# Patient Record
Sex: FEMALE | Race: WHITE | NOT HISPANIC OR LATINO | Employment: OTHER | URBAN - NONMETROPOLITAN AREA
[De-identification: names, ages, dates, MRNs, and addresses within clinical notes are randomized per-mention and may not be internally consistent; named-entity substitution may affect disease eponyms.]

---

## 2017-02-01 ENCOUNTER — LAB (OUTPATIENT)
Dept: LAB | Facility: HOSPITAL | Age: 67
End: 2017-02-01
Attending: INTERNAL MEDICINE

## 2017-02-01 DIAGNOSIS — M81.0 OSTEOPOROSIS: ICD-10-CM

## 2017-02-01 DIAGNOSIS — E04.2 NONTOXIC MULTINODULAR GOITER: ICD-10-CM

## 2017-02-01 DIAGNOSIS — E53.8 B12 DEFICIENCY: ICD-10-CM

## 2017-02-01 DIAGNOSIS — I10 ESSENTIAL HYPERTENSION: ICD-10-CM

## 2017-02-01 LAB
25(OH)D3 SERPL-MCNC: 46.4 NG/ML (ref 30–100)
ALBUMIN SERPL-MCNC: 4.5 G/DL (ref 3.4–4.8)
ALBUMIN/GLOB SERPL: 2 G/DL (ref 1.1–1.8)
ALP SERPL-CCNC: 108 U/L (ref 38–126)
ALT SERPL W P-5'-P-CCNC: 136 U/L (ref 9–52)
ANION GAP SERPL CALCULATED.3IONS-SCNC: 13 MMOL/L (ref 5–15)
AST SERPL-CCNC: 102 U/L (ref 14–36)
BASOPHILS # BLD AUTO: 0.06 10*3/MM3 (ref 0–0.2)
BASOPHILS NFR BLD AUTO: 0.9 % (ref 0–2)
BILIRUB SERPL-MCNC: 0.4 MG/DL (ref 0.2–1.3)
BUN BLD-MCNC: 15 MG/DL (ref 7–21)
BUN/CREAT SERPL: 13.5 (ref 7–25)
CALCIUM SPEC-SCNC: 8.9 MG/DL (ref 8.4–10.2)
CHLORIDE SERPL-SCNC: 98 MMOL/L (ref 95–110)
CO2 SERPL-SCNC: 28 MMOL/L (ref 22–31)
CREAT BLD-MCNC: 1.11 MG/DL (ref 0.5–1)
DEPRECATED RDW RBC AUTO: 41.2 FL (ref 36.4–46.3)
EOSINOPHIL # BLD AUTO: 0.14 10*3/MM3 (ref 0–0.7)
EOSINOPHIL NFR BLD AUTO: 2 % (ref 0–7)
ERYTHROCYTE [DISTWIDTH] IN BLOOD BY AUTOMATED COUNT: 12.4 % (ref 11.5–14.5)
GFR SERPL CREATININE-BSD FRML MDRD: 49 ML/MIN/1.73 (ref 45–104)
GLOBULIN UR ELPH-MCNC: 2.2 GM/DL (ref 2.3–3.5)
GLUCOSE BLD-MCNC: 105 MG/DL (ref 60–100)
HCT VFR BLD AUTO: 41.1 % (ref 35–45)
HGB BLD-MCNC: 13.7 G/DL (ref 12–15.5)
IMM GRANULOCYTES # BLD: 0.01 10*3/MM3 (ref 0–0.02)
IMM GRANULOCYTES NFR BLD: 0.1 % (ref 0–0.5)
LYMPHOCYTES # BLD AUTO: 2.21 10*3/MM3 (ref 0.6–4.2)
LYMPHOCYTES NFR BLD AUTO: 31.6 % (ref 10–50)
MCH RBC QN AUTO: 30.2 PG (ref 26.5–34)
MCHC RBC AUTO-ENTMCNC: 33.3 G/DL (ref 31.4–36)
MCV RBC AUTO: 90.5 FL (ref 80–98)
MONOCYTES # BLD AUTO: 0.6 10*3/MM3 (ref 0–0.9)
MONOCYTES NFR BLD AUTO: 8.6 % (ref 0–12)
NEUTROPHILS # BLD AUTO: 3.97 10*3/MM3 (ref 2–8.6)
NEUTROPHILS NFR BLD AUTO: 56.8 % (ref 37–80)
PLATELET # BLD AUTO: 196 10*3/MM3 (ref 150–450)
PMV BLD AUTO: 9.8 FL (ref 8–12)
POTASSIUM BLD-SCNC: 4.6 MMOL/L (ref 3.5–5.1)
PROT SERPL-MCNC: 6.7 G/DL (ref 6.3–8.6)
RBC # BLD AUTO: 4.54 10*6/MM3 (ref 3.77–5.16)
SODIUM BLD-SCNC: 139 MMOL/L (ref 137–145)
TSH SERPL DL<=0.05 MIU/L-ACNC: 1.84 MIU/ML (ref 0.46–4.68)
VIT B12 BLD-MCNC: 937 PG/ML (ref 239–931)
WBC NRBC COR # BLD: 6.99 10*3/MM3 (ref 3.2–9.8)

## 2017-02-01 PROCEDURE — 84443 ASSAY THYROID STIM HORMONE: CPT

## 2017-02-01 PROCEDURE — 82306 VITAMIN D 25 HYDROXY: CPT

## 2017-02-01 PROCEDURE — 85025 COMPLETE CBC W/AUTO DIFF WBC: CPT

## 2017-02-01 PROCEDURE — 82607 VITAMIN B-12: CPT

## 2017-02-01 PROCEDURE — 80053 COMPREHEN METABOLIC PANEL: CPT

## 2017-02-01 PROCEDURE — 36415 COLL VENOUS BLD VENIPUNCTURE: CPT

## 2017-04-06 ENCOUNTER — LAB (OUTPATIENT)
Dept: LAB | Facility: HOSPITAL | Age: 67
End: 2017-04-06
Attending: INTERNAL MEDICINE

## 2017-04-06 DIAGNOSIS — E53.8 B12 DEFICIENCY: ICD-10-CM

## 2017-04-06 DIAGNOSIS — E04.2 NONTOXIC MULTINODULAR GOITER: ICD-10-CM

## 2017-04-06 DIAGNOSIS — I10 ESSENTIAL HYPERTENSION: ICD-10-CM

## 2017-04-06 DIAGNOSIS — M81.0 OSTEOPOROSIS: ICD-10-CM

## 2017-04-06 LAB
25(OH)D3 SERPL-MCNC: 43.5 NG/ML (ref 30–100)
ALBUMIN SERPL-MCNC: 4.6 G/DL (ref 3.4–4.8)
ALBUMIN/GLOB SERPL: 1.8 G/DL (ref 1.1–1.8)
ALP SERPL-CCNC: 117 U/L (ref 38–126)
ALT SERPL W P-5'-P-CCNC: 38 U/L (ref 9–52)
ANION GAP SERPL CALCULATED.3IONS-SCNC: 13 MMOL/L (ref 5–15)
AST SERPL-CCNC: 27 U/L (ref 14–36)
BASOPHILS # BLD AUTO: 0.06 10*3/MM3 (ref 0–0.2)
BASOPHILS NFR BLD AUTO: 1 % (ref 0–2)
BILIRUB SERPL-MCNC: 0.5 MG/DL (ref 0.2–1.3)
BUN BLD-MCNC: 21 MG/DL (ref 7–21)
BUN/CREAT SERPL: 17.2 (ref 7–25)
CALCIUM SPEC-SCNC: 9.1 MG/DL (ref 8.4–10.2)
CHLORIDE SERPL-SCNC: 101 MMOL/L (ref 95–110)
CO2 SERPL-SCNC: 24 MMOL/L (ref 22–31)
CREAT BLD-MCNC: 1.22 MG/DL (ref 0.5–1)
DEPRECATED RDW RBC AUTO: 39.9 FL (ref 36.4–46.3)
EOSINOPHIL # BLD AUTO: 0.15 10*3/MM3 (ref 0–0.7)
EOSINOPHIL NFR BLD AUTO: 2.5 % (ref 0–7)
ERYTHROCYTE [DISTWIDTH] IN BLOOD BY AUTOMATED COUNT: 12.4 % (ref 11.5–14.5)
GFR SERPL CREATININE-BSD FRML MDRD: 44 ML/MIN/1.73 (ref 45–104)
GLOBULIN UR ELPH-MCNC: 2.5 GM/DL (ref 2.3–3.5)
GLUCOSE BLD-MCNC: 114 MG/DL (ref 60–100)
HCT VFR BLD AUTO: 38.2 % (ref 35–45)
HGB BLD-MCNC: 12.9 G/DL (ref 12–15.5)
IMM GRANULOCYTES # BLD: 0.01 10*3/MM3 (ref 0–0.02)
IMM GRANULOCYTES NFR BLD: 0.2 % (ref 0–0.5)
LYMPHOCYTES # BLD AUTO: 2.2 10*3/MM3 (ref 0.6–4.2)
LYMPHOCYTES NFR BLD AUTO: 36.4 % (ref 10–50)
MCH RBC QN AUTO: 29.9 PG (ref 26.5–34)
MCHC RBC AUTO-ENTMCNC: 33.8 G/DL (ref 31.4–36)
MCV RBC AUTO: 88.6 FL (ref 80–98)
MONOCYTES # BLD AUTO: 0.5 10*3/MM3 (ref 0–0.9)
MONOCYTES NFR BLD AUTO: 8.3 % (ref 0–12)
NEUTROPHILS # BLD AUTO: 3.12 10*3/MM3 (ref 2–8.6)
NEUTROPHILS NFR BLD AUTO: 51.6 % (ref 37–80)
PLATELET # BLD AUTO: 184 10*3/MM3 (ref 150–450)
PMV BLD AUTO: 9.6 FL (ref 8–12)
POTASSIUM BLD-SCNC: 4 MMOL/L (ref 3.5–5.1)
PROT SERPL-MCNC: 7.1 G/DL (ref 6.3–8.6)
RBC # BLD AUTO: 4.31 10*6/MM3 (ref 3.77–5.16)
SODIUM BLD-SCNC: 138 MMOL/L (ref 137–145)
TSH SERPL DL<=0.05 MIU/L-ACNC: 1.32 MIU/ML (ref 0.46–4.68)
VIT B12 BLD-MCNC: 628 PG/ML (ref 239–931)
WBC NRBC COR # BLD: 6.04 10*3/MM3 (ref 3.2–9.8)

## 2017-04-06 PROCEDURE — 84443 ASSAY THYROID STIM HORMONE: CPT

## 2017-04-06 PROCEDURE — 80053 COMPREHEN METABOLIC PANEL: CPT

## 2017-04-06 PROCEDURE — 82306 VITAMIN D 25 HYDROXY: CPT

## 2017-04-06 PROCEDURE — 36415 COLL VENOUS BLD VENIPUNCTURE: CPT

## 2017-04-06 PROCEDURE — 85025 COMPLETE CBC W/AUTO DIFF WBC: CPT

## 2017-04-06 PROCEDURE — 82607 VITAMIN B-12: CPT

## 2017-06-20 ENCOUNTER — LAB (OUTPATIENT)
Dept: ONCOLOGY | Facility: HOSPITAL | Age: 67
End: 2017-06-20

## 2017-06-20 ENCOUNTER — CONSULT (OUTPATIENT)
Dept: ONCOLOGY | Facility: CLINIC | Age: 67
End: 2017-06-20

## 2017-06-20 VITALS
RESPIRATION RATE: 16 BRPM | HEIGHT: 70 IN | BODY MASS INDEX: 26.07 KG/M2 | SYSTOLIC BLOOD PRESSURE: 132 MMHG | TEMPERATURE: 97.9 F | HEART RATE: 77 BPM | WEIGHT: 182.1 LBS | DIASTOLIC BLOOD PRESSURE: 69 MMHG

## 2017-06-20 DIAGNOSIS — C7A.090 ATYPICAL CARCINOID LUNG TUMOR (HCC): ICD-10-CM

## 2017-06-20 DIAGNOSIS — R79.89 ELEVATED FERRITIN LEVEL: Primary | ICD-10-CM

## 2017-06-20 DIAGNOSIS — R79.89 ELEVATED FERRITIN LEVEL: ICD-10-CM

## 2017-06-20 LAB
ALBUMIN SERPL-MCNC: 4.6 G/DL (ref 3.4–4.8)
ALBUMIN/GLOB SERPL: 1.6 G/DL (ref 1.1–1.8)
ALP SERPL-CCNC: 119 U/L (ref 38–126)
ALT SERPL W P-5'-P-CCNC: 39 U/L (ref 9–52)
ANION GAP SERPL CALCULATED.3IONS-SCNC: 13 MMOL/L (ref 5–15)
AST SERPL-CCNC: 29 U/L (ref 14–36)
BASOPHILS # BLD AUTO: 0.06 10*3/MM3 (ref 0–0.2)
BASOPHILS NFR BLD AUTO: 0.8 % (ref 0–2)
BILIRUB SERPL-MCNC: 0.3 MG/DL (ref 0.2–1.3)
BUN BLD-MCNC: 27 MG/DL (ref 7–21)
BUN/CREAT SERPL: 21.8 (ref 7–25)
CALCIUM SPEC-SCNC: 9.3 MG/DL (ref 8.4–10.2)
CHLORIDE SERPL-SCNC: 98 MMOL/L (ref 95–110)
CO2 SERPL-SCNC: 26 MMOL/L (ref 22–31)
CREAT BLD-MCNC: 1.24 MG/DL (ref 0.5–1)
DEPRECATED RDW RBC AUTO: 41.4 FL (ref 36.4–46.3)
EOSINOPHIL # BLD AUTO: 0.15 10*3/MM3 (ref 0–0.7)
EOSINOPHIL NFR BLD AUTO: 2 % (ref 0–7)
ERYTHROCYTE [DISTWIDTH] IN BLOOD BY AUTOMATED COUNT: 12.8 % (ref 11.5–14.5)
FERRITIN SERPL-MCNC: 454 NG/ML (ref 11.1–264)
FOLATE SERPL-MCNC: 9.41 NG/ML (ref 2.76–21)
GFR SERPL CREATININE-BSD FRML MDRD: 43 ML/MIN/1.73 (ref 60–104)
GLOBULIN UR ELPH-MCNC: 2.8 GM/DL (ref 2.3–3.5)
GLUCOSE BLD-MCNC: 85 MG/DL (ref 60–100)
HCT VFR BLD AUTO: 41.7 % (ref 35–45)
HGB BLD-MCNC: 14 G/DL (ref 12–15.5)
IMM GRANULOCYTES # BLD: 0.02 10*3/MM3 (ref 0–0.02)
IMM GRANULOCYTES NFR BLD: 0.3 % (ref 0–0.5)
IRON 24H UR-MRATE: 50 MCG/DL (ref 37–170)
IRON SATN MFR SERPL: 18 % (ref 15–50)
LYMPHOCYTES # BLD AUTO: 2.16 10*3/MM3 (ref 0.6–4.2)
LYMPHOCYTES NFR BLD AUTO: 29.3 % (ref 10–50)
MCH RBC QN AUTO: 29.7 PG (ref 26.5–34)
MCHC RBC AUTO-ENTMCNC: 33.6 G/DL (ref 31.4–36)
MCV RBC AUTO: 88.5 FL (ref 80–98)
MONOCYTES # BLD AUTO: 0.63 10*3/MM3 (ref 0–0.9)
MONOCYTES NFR BLD AUTO: 8.5 % (ref 0–12)
NEUTROPHILS # BLD AUTO: 4.36 10*3/MM3 (ref 2–8.6)
NEUTROPHILS NFR BLD AUTO: 59.1 % (ref 37–80)
PLATELET # BLD AUTO: 257 10*3/MM3 (ref 150–450)
PMV BLD AUTO: 9.6 FL (ref 8–12)
POTASSIUM BLD-SCNC: 4.1 MMOL/L (ref 3.5–5.1)
PROT SERPL-MCNC: 7.4 G/DL (ref 6.3–8.6)
RBC # BLD AUTO: 4.71 10*6/MM3 (ref 3.77–5.16)
SODIUM BLD-SCNC: 137 MMOL/L (ref 137–145)
TIBC SERPL-MCNC: 278 MCG/DL (ref 265–497)
VIT B12 BLD-MCNC: 600 PG/ML (ref 239–931)
WBC NRBC COR # BLD: 7.38 10*3/MM3 (ref 3.2–9.8)

## 2017-06-20 PROCEDURE — G0463 HOSPITAL OUTPT CLINIC VISIT: HCPCS | Performed by: INTERNAL MEDICINE

## 2017-06-20 PROCEDURE — 99204 OFFICE O/P NEW MOD 45 MIN: CPT | Performed by: INTERNAL MEDICINE

## 2017-06-20 PROCEDURE — 82746 ASSAY OF FOLIC ACID SERUM: CPT | Performed by: INTERNAL MEDICINE

## 2017-06-20 PROCEDURE — 85025 COMPLETE CBC W/AUTO DIFF WBC: CPT | Performed by: INTERNAL MEDICINE

## 2017-06-20 PROCEDURE — 83540 ASSAY OF IRON: CPT | Performed by: INTERNAL MEDICINE

## 2017-06-20 PROCEDURE — 82607 VITAMIN B-12: CPT | Performed by: INTERNAL MEDICINE

## 2017-06-20 PROCEDURE — 80053 COMPREHEN METABOLIC PANEL: CPT | Performed by: INTERNAL MEDICINE

## 2017-06-20 PROCEDURE — 83550 IRON BINDING TEST: CPT | Performed by: INTERNAL MEDICINE

## 2017-06-20 PROCEDURE — 82728 ASSAY OF FERRITIN: CPT | Performed by: INTERNAL MEDICINE

## 2017-06-20 NOTE — PROGRESS NOTES
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  Patient was found to have elevated ferritin few months back with a level of 474.  Subsequently patient had a repeat blood work done in May 2017 by Dr. Shah Which showed a ferritin still elevated at 499.  Patient is being referred to [facility] for further evaluation and recommendation regarding her elevated ferritin level.  Patient denies any family history of suggestive of hemochromatosis.  Denies any blood in the stool or urine.  Denies any dizziness or tingling numbness affecting extremities.  Denies any symptoms suggestive of erythromelalgia.      PAST MEDICAL HISTORY:    Past Medical History:   Diagnosis Date   • Backache 02/17/2016    Low back pain, in sacral region      • Benign hypertension 02/17/2016   • Cortical senile cataract 02/17/2016   • Essential (primary) hypertension 04/20/2016   • GERD (gastroesophageal reflux disease) 02/17/2016   • Hallux valgus     Deformity, w/inflammation      • Iron deficiency anemia    • Lung nodule, solitary 03/08/2012    Solitary nodule of lung - RIGHT upper lobe 15mm, PET positive      • Malignant carcinoid tumor of lung 02/17/2016    Atypical carcinoid tumor of the LEFT lung treated in March 2012     • Mitral valve prolapse 02/17/2016   • Osteoporosis 04/20/2016   • Primary fibromyalgia syndrome 02/17/2016   • Rosacea 02/17/2016   • Seborrheic keratosis     History of, upper and lower back      • 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 Enodscopic vein harvest on the left. Left lower extremity arteriogram.)   • CERVICAL CONIZATION      CONIZATION OF CERVIX 23116 (Excisional laser cone biopsy and vaporization of cervix.)   • CHOLECYSTECTOMY  09/07/2005    Cholecystectomy, laparoscopic (With intraoperative cholangiogram.)   • EXCISION LESION  11/11/1998    REMOVE LESION BACK OR FLANK 46307 (Excision times two.)   • FOOT SURGERY  01/20/2009    Foot/toes surgery procedure (Soft tissue mass, left foot. Excision of)   • HERNIA REPAIR      Past history of umbilical herni repair.   • OTHER SURGICAL HISTORY  12/04/2012    Anesth, elbow area surgery (Manipulation of left elbow.)   • OTHER SURGICAL HISTORY  03/05/2012    Anesth, elbow area surgery (Excision of plate and screws from olecranon bursa. Retained plate and screws, olecranon bursitis of previous fracture left elbow.)   • OTHER SURGICAL HISTORY  10/16/2012    Treat elbow fracture (Open reduction and internal fixation.)   • THORACOSCOPY  03/14/2012    Thoracoscopy, surgical (Bronchoscopy,LVATS (wedge resect MARYBETH), LULobectomy Thoracic lymphadenectomy)   • THROAT SURGERY  07/25/2012    Throat surgery procedure (Thyroplasty type I (vocal cord medialozation & larynooplasty) Left vocal cord paralysis. Dysphoria. [facility] by Dr Tk Heart)   • THYROID SURGERY  03/22/2016    Thyroid surgery (Right thyroid lobectomy and isthmusectomy.)   • TONSILLECTOMY  02/03/1956    Chronic tonsillitis   • VEIN LIGATION  09/28/2000    PHLEB VEINS - EXTREM (20+) 96926 (High ligation of ling saphenous  vein, left, plus multiple phlebectomies, left lower extremity.)       ALLERGIES:    Allergies   Allergen Reactions   • Evista [Raloxifene]    • Gabapentin      HA/sedation   • Lyrica [Pregabalin]        SOCIAL HISTORY:   Social History   Substance Use Topics   • Smoking status: Never Smoker   • Smokeless tobacco: Never Used   • Alcohol use Yes      Comment: OCCASIONAL WINE       CURRENT MEDICATIONS:    Current Outpatient Prescriptions   Medication Sig Dispense Refill   • amLODIPine (NORVASC) 5 MG tablet Take 5 mg by mouth daily.     • ASCORBIC ACID PO Take 1 tablet by mouth daily.     • ASPIRIN PO Take 81 mg by mouth Daily. YSP Aspirin oral  (unconfirmed      • Calcium Citrate-Vitamin D (CITRACAL + D PO) Take 1 tablet by mouth 2 (two) times a day.     • cholecalciferol (VITAMIN D3) 1000 UNITS tablet Take 1,000 Units by mouth daily.     • Cyanocobalamin (VITAMIN B-12 PO) Take 1 tablet by mouth 1 (one) time per week.     • cyclobenzaprine (FLEXERIL) 10 MG tablet Take 10 mg by mouth at night as needed for muscle spasms.     • DULoxetine (CYMBALTA) 60 MG capsule Take 60 mg by mouth daily.     • HYDROcodone-acetaminophen (NORCO) 7.5-325 MG per tablet Take 1 tablet by mouth every 4 (four) hours as needed for moderate pain (4-6).     • metoprolol succinate XL (TOPROL-XL) 25 MG 24 hr tablet Take 25 mg by mouth 2 (two) times a day.     • OMEPRAZOLE PO Take 40 mg by mouth Daily.     • promethazine-dextromethorphan (PROMETHAZINE-DM) 6.25-15 MG/5ML syrup      • ranitidine (ZANTAC) 150 MG tablet Take 150 mg by mouth daily.       No current facility-administered medications for this visit.         HOME MEDICATIONS:   Current Outpatient Prescriptions on File Prior to Visit   Medication Sig Dispense Refill   • amLODIPine (NORVASC) 5 MG tablet Take 5 mg by mouth daily.     • ASCORBIC ACID PO Take 1 tablet by mouth daily.     • ASPIRIN PO Take 81 mg by mouth Daily. YSP Aspirin oral  (unconfirmed      • Calcium Citrate-Vitamin D  (CITRACAL + D PO) Take 1 tablet by mouth 2 (two) times a day.     • cholecalciferol (VITAMIN D3) 1000 UNITS tablet Take 1,000 Units by mouth daily.     • Cyanocobalamin (VITAMIN B-12 PO) Take 1 tablet by mouth 1 (one) time per week.     • cyclobenzaprine (FLEXERIL) 10 MG tablet Take 10 mg by mouth at night as needed for muscle spasms.     • DULoxetine (CYMBALTA) 60 MG capsule Take 60 mg by mouth daily.     • HYDROcodone-acetaminophen (NORCO) 7.5-325 MG per tablet Take 1 tablet by mouth every 4 (four) hours as needed for moderate pain (4-6).     • metoprolol succinate XL (TOPROL-XL) 25 MG 24 hr tablet Take 25 mg by mouth 2 (two) times a day.     • OMEPRAZOLE PO Take 40 mg by mouth Daily.     • promethazine-dextromethorphan (PROMETHAZINE-DM) 6.25-15 MG/5ML syrup      • ranitidine (ZANTAC) 150 MG tablet Take 150 mg by mouth daily.     • [DISCONTINUED] gabapentin (NEURONTIN) 100 MG capsule Take 100 mg by mouth As Needed.       No current facility-administered medications on file prior to visit.        FAMILY HISTORY:    Family History   Problem Relation Age of Onset   • Cancer Mother    • Diabetes Mother    • Heart disease Mother    • Stroke Mother    • Heart disease Father    • Lung cancer Maternal Grandfather    • Lung cancer Other        REVIEW OF SYSTEMS:      CONSTITUTIONAL:  Complains of fatigue. Denies any fever, chills or weight loss.     HEENT:  No epistaxis, mouth sores or difficulty swallowing.    RESPIRATORY:  Complains of chronic shortness of breath since her lobectomy in 2012.  No new cough or hemoptysis.    CARDIOVASCULAR:  No chest pain or palpitations.    GASTROINTESTINAL:  No abdominal pain nausea, vomiting or blood in the stool.    GENITOURINARY: No Dysuria or Hematuria.    MUSCULOSKELETAL:  Complains of diffuse body pain consistent with fibromyalgia.    LYMPHATICS:  Denies any abnormal swollen glands anywhere in the body.    NEUROLOGICAL : No tingling or numbness. No headache or dizziness. No  seizures or balance problems.          PHYSICAL EXAMINATION:      VITAL SIGNS:  Temp:  [97.9 °F (36.6 °C)] 97.9 °F (36.6 °C)  Heart Rate:  [77] 77  Resp:  [16] 16  BP: (132)/(69) 132/69    GENERAL:  Not in any distress.    HEENT:  Normocephalic, Atraumatic.Eyes  Shows mild pallor. No icterus. Extraocular Movements Intact. No Facial Asymmetry noted.    NECK:  No adenopathy. NO JVD.    RESPIRATORY:  Fair air entry bilateral. No rhonchi or wheezing.    CARDIOVASCULAR:  S1, S2. Regular rate and rhythm. No murmur or gallop appreciated.    ABDOMEN:  Soft,  nontender. Bowel sounds present in all four quadrants.  No organomegaly appreciated.    EXTREMITIES:  No edema.No Calf Tenderness.    NEUROLOGIC:  Alert, awake and oriented ×3.  No  Motor or sensory deficit appreciated. Cranial Nerves 2-12 grossly intact.          DIAGNOSTIC DATA:    WBC   Date Value Ref Range Status   04/06/2017 6.04 3.20 - 9.80 10*3/mm3 Final     RBC   Date Value Ref Range Status   04/06/2017 4.31 3.77 - 5.16 10*6/mm3 Final     Hemoglobin   Date Value Ref Range Status   04/06/2017 12.9 12.0 - 15.5 g/dL Final     Hematocrit   Date Value Ref Range Status   04/06/2017 38.2 35.0 - 45.0 % Final     MCV   Date Value Ref Range Status   04/06/2017 88.6 80.0 - 98.0 fL Final     MCH   Date Value Ref Range Status   04/06/2017 29.9 26.5 - 34.0 pg Final     MCHC   Date Value Ref Range Status   04/06/2017 33.8 31.4 - 36.0 g/dL Final     RDW   Date Value Ref Range Status   04/06/2017 12.4 11.5 - 14.5 % Final     RDW-SD   Date Value Ref Range Status   04/06/2017 39.9 36.4 - 46.3 fl Final     MPV   Date Value Ref Range Status   04/06/2017 9.6 8.0 - 12.0 fL Final     Platelets   Date Value Ref Range Status   04/06/2017 184 150 - 450 10*3/mm3 Final     Neutrophil %   Date Value Ref Range Status   04/06/2017 51.6 37.0 - 80.0 % Final     Lymphocyte %   Date Value Ref Range Status   04/06/2017 36.4 10.0 - 50.0 % Final     Monocyte %   Date Value Ref Range Status    04/06/2017 8.3 0.0 - 12.0 % Final     Eosinophil %   Date Value Ref Range Status   04/06/2017 2.5 0.0 - 7.0 % Final     Basophil %   Date Value Ref Range Status   04/06/2017 1.0 0.0 - 2.0 % Final     Immature Grans %   Date Value Ref Range Status   04/06/2017 0.2 0.0 - 0.5 % Final     Neutrophils, Absolute   Date Value Ref Range Status   04/06/2017 3.12 2.00 - 8.60 10*3/mm3 Final     Lymphocytes, Absolute   Date Value Ref Range Status   04/06/2017 2.20 0.60 - 4.20 10*3/mm3 Final     Monocytes, Absolute   Date Value Ref Range Status   04/06/2017 0.50 0.00 - 0.90 10*3/mm3 Final     Eosinophils, Absolute   Date Value Ref Range Status   04/06/2017 0.15 0.00 - 0.70 10*3/mm3 Final     Basophils, Absolute   Date Value Ref Range Status   04/06/2017 0.06 0.00 - 0.20 10*3/mm3 Final     Immature Grans, Absolute   Date Value Ref Range Status   04/06/2017 0.01 0.00 - 0.02 10*3/mm3 Final     Glucose   Date Value Ref Range Status   04/06/2017 114 (H) 60 - 100 mg/dL Final     Sodium   Date Value Ref Range Status   04/06/2017 138 137 - 145 mmol/L Final     Potassium   Date Value Ref Range Status   04/06/2017 4.0 3.5 - 5.1 mmol/L Final     CO2   Date Value Ref Range Status   04/06/2017 24.0 22.0 - 31.0 mmol/L Final     Chloride   Date Value Ref Range Status   04/06/2017 101 95 - 110 mmol/L Final     Anion Gap   Date Value Ref Range Status   04/06/2017 13.0 5.0 - 15.0 mmol/L Final     Creatinine   Date Value Ref Range Status   04/06/2017 1.22 (H) 0.50 - 1.00 mg/dL Final     BUN   Date Value Ref Range Status   04/06/2017 21 7 - 21 mg/dL Final     BUN/Creatinine Ratio   Date Value Ref Range Status   04/06/2017 17.2 7.0 - 25.0 Final     Calcium   Date Value Ref Range Status   04/06/2017 9.1 8.4 - 10.2 mg/dL Final     eGFR Non  Amer   Date Value Ref Range Status   04/06/2017 44 (L) 45 - 104 mL/min/1.73 Final     Alkaline Phosphatase   Date Value Ref Range Status   04/06/2017 117 38 - 126 U/L Final     Total Protein   Date Value  Ref Range Status   04/06/2017 7.1 6.3 - 8.6 g/dL Final     ALT (SGPT)   Date Value Ref Range Status   04/06/2017 38 9 - 52 U/L Final     AST (SGOT)   Date Value Ref Range Status   04/06/2017 27 14 - 36 U/L Final     Total Bilirubin   Date Value Ref Range Status   04/06/2017 0.5 0.2 - 1.3 mg/dL Final     Albumin   Date Value Ref Range Status   04/06/2017 4.60 3.40 - 4.80 g/dL Final     Globulin   Date Value Ref Range Status   04/06/2017 2.5 2.3 - 3.5 gm/dL Final     A/G Ratio   Date Value Ref Range Status   04/06/2017 1.8 1.1 - 1.8 g/dL Final     Lab Results   Component Value Date    [id number] 04/06/2017         Radiology Data :  CT of chest without contrast done on November 3, 2016 showed:      There is enlarged and heterogeneous thyroid gland. The cardiac  silhouette is within normal limits. No mediastinal lymphadenopathy is  seen. There is calcified granuloma in the left hilum. There is  evidence of left upper lobe lobectomy. There is mild pleural  thickening in the lung apices. Otherwise lungs are clear. No pleural  effusion is identified.      No acute abnormality is seen in the upper abdomen. No acute bony  abnormality is seen.      CONCLUSION- No residual or recurrent tumor is identified.  No mediastinal lymphadenopathy is seen.        Pathology :  Pathology report from March 2012 showed:  FINAL DIAGNOSIS:   A.  WEDGE, UPPER LOBE OF LEFT LUNG:            ATYPICAL CARCINOID TUMOR (1.8 CM), COMPLETELY EXCISED.   B.  UPPER LOBE, LEFT LUNG:            RECENT WEDGE EXCISIONAL SITE.            BRONCHIAL SURGICAL MARGIN NEGATIVE FOR TUMOR.            EIGHT (8) LOBAR LYMPH NODES (LEVEL 12) NEGATIVE FOR TUMOR.   C.  LYMPH NODE, LEVEL 6:            ONE (1) LYMPH NODE NEGATIVE FOR TUMOR.   D.  LYMPH NODE, LEVEL 8:            ONE (1) LYMPH NODE NEGATIVE FOR TUMOR.   E.  LYMPH NODES, LEVEL 9:            TWO (2) LYMPH NODES NEGATIVE FOR TUMOR.   F.  LYMPH NODE, LEVEL 10:            ONE (1) LYMPH NODE NEGATIVE FOR  TUMOR.   G.  LYMPH NODE, LEVEL 7:            ONE (1) LYMPH NODE NEGATIVE FOR TUMOR.          Comment   COMMENT:   Immunostains (block A2) have atypical carcinoid tumor positive for   synaptophysin, positive for chromogranin A, few cells positive for S-100   protein, strongly positive for thyroid transcription factor-1 (TTF-1)   and positive for cytokeratin (AE1/AE3).  Dr. Cheney reviewed the   histologic slides and concurs with the above diagnoses.   Synoptic report, lung, resection:        Specimen type:  Upper lobe of left lung.        Procedure:  Lobectomy.        Specimen integrity:  Intact.        Tumor site:  Upper lobe.        Tumor size:  1.8 x 1.5 x 1.5 cm.        Tumor focality:  Unifocal.        Histologic type:  Atypical carcinoid tumor.        Histologic grade:  Not applicable.        Visceral pleural invasion:  Not identified.        Direct tumor extension into extrapulmonary structures:  Not   applicable.        Bronchial margin:  Uninvolved by invasive carcinoma.        Vascular margin:  Uninvolved by invasive carcinoma.        Parenchymal (stapled) margin:  Uninvolved by invasive carcinoma.        Parietal pleural margin:  Not applicable.        Chest wall margin:  Not applicable.        Other attached tissue margin:  Not applicable.        Distance to closest margin:  3.0 cm from bronchial margin.        Neoadjuvant treatment effect:  Not applicable.        Lymph-vascular invasion:  Not identified.            Pathologic staging (pTNM):             Primary tumor:  pT1a.             Regional lymph nodes:  pN0.                  Number examined:  14.                  Number involved:  0.   The atypical carcinoid tumor has an average of 2 mitotic figures per 10   high power fields (12 mitotic figures per 60 high power fields).   Necrosis is not identified.                 ASSESSMENT AND PLAN:      1.  Elevated ferritin: Patient does have evidence of elevated ferritin with ferritin being 499 on most  recent blood work done by Dr. Shah on May 22, 2017.  Patient had a hysterectomy done in 2005.  Her hemoglobin is still 13.  Differential diagnosis for elevated ferritin includes reactive causes like inflammation versus chronic kidney disease versus genetic causes like candidate for hemochromatosis.  Since her hemoglobin is normal and her ferritin has been around 475 for last few months Hereditary Hemochromatosis is less likely.  However we don't have any iron saturation  or TIBC level available.  At this point we'll repeat iron studies today including ferritin, TIBC, percent saturation of iron and iron level.  We will also repeat CBC and CMP today.  We will see her back in about 2 weeks to go over the result of above-mentioned blood work.  If her iron saturation comes high then we will call her back to get genetic testing done for hereditary hemochromatosis which was discussed with patient.    2.  History of atypical carcinoid of left upper lobe diagnosed in March 2012.  Patient is status post left upper lobectomy.  At that 0.14 lymph nodes were negative.  Most recent CT scan done in November 2016 is negative for any recurrence.  At this point recommend following up with Dr. Iglesias.    3.  Hypertension    4.  Chronic kidney disease    5.  Fibromyalgia    6.  Health maintenance: Patient does not smoke.  Had a colonoscopy done in last 5 years.  Remains full code.        Thank you for this consultation.        Mayur Neal MD  6/20/2017  3:52 PM          EMR Dragon/Transcription disclaimer:   Much of this encounter note is an electronic transcription/translation of spoken language to printed text. The electronic translation of spoken language may permit erroneous, or at times, nonsensical words or phrases to be inadvertently transcribed; Although I have reviewed the note for such errors, some may still exist.

## 2017-06-23 ENCOUNTER — TELEPHONE (OUTPATIENT)
Dept: ONCOLOGY | Facility: HOSPITAL | Age: 67
End: 2017-06-23

## 2017-06-23 NOTE — TELEPHONE ENCOUNTER
When I spoke with patient when she called, I told patient that I would call her back if any other labs were needed and if she didn't hear back from me to just keep her appointment for 2 weeks. Pt verbalized understanding. Pt will follow up in 2 weeks as scheduled.

## 2017-06-23 NOTE — TELEPHONE ENCOUNTER
----- Message from Mayur Neal MD sent at 6/22/2017  5:57 PM CDT -----  No, she does not need any more labs done prior to seeing me in 2 weeks.  Thank you  ----- Message -----     From: Noreen Rodriguez RN     Sent: 6/22/2017   3:09 PM       To: Mayur Neal MD    Pt checked her labs on My Chart and called to make sure she does not need any more lab work prior to her appt with you in two weeks.

## 2017-07-06 ENCOUNTER — OFFICE VISIT (OUTPATIENT)
Dept: ONCOLOGY | Facility: CLINIC | Age: 67
End: 2017-07-06

## 2017-07-06 VITALS
BODY MASS INDEX: 27.02 KG/M2 | HEART RATE: 69 BPM | TEMPERATURE: 97.4 F | SYSTOLIC BLOOD PRESSURE: 152 MMHG | DIASTOLIC BLOOD PRESSURE: 77 MMHG | RESPIRATION RATE: 18 BRPM | WEIGHT: 185.6 LBS

## 2017-07-06 DIAGNOSIS — E04.2 NONTOXIC MULTINODULAR GOITER: Primary | ICD-10-CM

## 2017-07-06 DIAGNOSIS — M81.0 OSTEOPOROSIS: ICD-10-CM

## 2017-07-06 DIAGNOSIS — I10 ESSENTIAL HYPERTENSION: ICD-10-CM

## 2017-07-06 DIAGNOSIS — E53.8 B12 DEFICIENCY: ICD-10-CM

## 2017-07-06 DIAGNOSIS — R79.89 ELEVATED FERRITIN: Primary | ICD-10-CM

## 2017-07-06 PROCEDURE — 99213 OFFICE O/P EST LOW 20 MIN: CPT | Performed by: INTERNAL MEDICINE

## 2017-07-06 PROCEDURE — G0463 HOSPITAL OUTPT CLINIC VISIT: HCPCS | Performed by: INTERNAL MEDICINE

## 2017-07-06 NOTE — PROGRESS NOTES
DATE OF VISIT: 7/6/2017    REASON FOR VISIT:  Elevated Ferritin    HISTORY OF PRESENT ILLNESS:    66-year-old female with a past medical history significant for history of atypical carcinoid of the lung status post lobectomy in March 2012, history of fibromyalgia, history of restless leg syndrome who was seen in consultation on June 22, 2017 for evaluation of persistent elevated ferritin around 499 for last 1 year.  Patient had some blood work done on that visit.  She is here to discuss the result of blood work and further treatment recommendation.  Complains of chronic shortness of breath.  Denies any blood in the stool or urine.    PAST MEDICAL HISTORY:    Past Medical History:   Diagnosis Date   • Backache 02/17/2016    Low back pain, in sacral region      • Benign hypertension 02/17/2016   • Cortical senile cataract 02/17/2016   • Essential (primary) hypertension 04/20/2016   • GERD (gastroesophageal reflux disease) 02/17/2016   • Hallux valgus     Deformity, w/inflammation      • Iron deficiency anemia    • Lung nodule, solitary 03/08/2012    Solitary nodule of lung - RIGHT upper lobe 15mm, PET positive      • Malignant carcinoid tumor of lung 02/17/2016    Atypical carcinoid tumor of the LEFT lung treated in March 2012     • Mitral valve prolapse 02/17/2016   • Osteoporosis 04/20/2016   • Primary fibromyalgia syndrome 02/17/2016   • Rosacea 02/17/2016   • Seborrheic keratosis     History of, upper and lower back      • 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 04/20/2016   • 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 02/17/2016    Paralysis of vocal cords and larynx, unilateral - LEFT side      • Varicose vein    • Vitamin D deficiency 04/20/2016   • Vitreous detachment 12/01/2014   • Vitreous floaters 12/01/2014       SOCIAL HISTORY:    Social History   Substance Use Topics   • Smoking status: Never Smoker   • Smokeless tobacco: Never Used   • Alcohol use Yes       Comment: OCCASIONAL WINE       Surgical History :  Past Surgical History:   Procedure Laterality Date   • ARTERIAL BYPASS SURGERY  09/25/2012    Artery bypass graft (Left femoral to posterior tibial artery bypass. Enodscopic vein harvest on the left. Left lower extremity arteriogram.)   • CERVICAL CONIZATION      CONIZATION OF CERVIX 74617 (Excisional laser cone biopsy and vaporization of cervix.)   • CHOLECYSTECTOMY  09/07/2005    Cholecystectomy, laparoscopic (With intraoperative cholangiogram.)   • EXCISION LESION  11/11/1998    REMOVE LESION BACK OR FLANK 57130 (Excision times two.)   • FOOT SURGERY  01/20/2009    Foot/toes surgery procedure (Soft tissue mass, left foot. Excision of)   • HERNIA REPAIR      Past history of umbilical herni repair.   • OTHER SURGICAL HISTORY  12/04/2012    Anesth, elbow area surgery (Manipulation of left elbow.)   • OTHER SURGICAL HISTORY  03/05/2012    Anesth, elbow area surgery (Excision of plate and screws from olecranon bursa. Retained plate and screws, olecranon bursitis of previous fracture left elbow.)   • OTHER SURGICAL HISTORY  10/16/2012    Treat elbow fracture (Open reduction and internal fixation.)   • THORACOSCOPY  03/14/2012    Thoracoscopy, surgical (Bronchoscopy,LVATS (wedge resect MARYBETH), LULobectomy Thoracic lymphadenectomy)   • THROAT SURGERY  07/25/2012    Throat surgery procedure (Thyroplasty type I (vocal cord medialozation & larynooplasty) Left vocal cord paralysis. Dysphoria. [facility] by Dr Tk Heart)   • THYROID SURGERY  03/22/2016    Thyroid surgery (Right thyroid lobectomy and isthmusectomy.)   • TONSILLECTOMY  02/03/1956    Chronic tonsillitis   • VEIN LIGATION  09/28/2000    PHLEB VEINS - EXTREM (20+) 48417 (High ligation of ling saphenous vein, left, plus multiple phlebectomies, left lower extremity.)       ALLERGIES:    Allergies   Allergen Reactions   • Evista [Raloxifene]    • Gabapentin      HA/sedation   • Lyrica [Pregabalin]         REVIEW OF SYSTEMS:      CONSTITUTIONAL: Complains of fatigue. Denies any fever, chills or weight loss.      HEENT: No epistaxis, mouth sores or difficulty swallowing.     RESPIRATORY: Complains of chronic shortness of breath since her lobectomy in 2012. No new cough or hemoptysis.     CARDIOVASCULAR: No chest pain or palpitations.     GASTROINTESTINAL: No abdominal pain nausea, vomiting or blood in the stool.     GENITOURINARY: No Dysuria or Hematuria.     MUSCULOSKELETAL: Complains of diffuse body pain consistent with fibromyalgia.     LYMPHATICS: Denies any abnormal swollen glands anywhere in the body.     NEUROLOGICAL : No tingling or numbness. No headache or dizziness. No seizures or balance problems.      PHYSICAL EXAMINATION:      VITAL SIGNS:  /77  Pulse 69  Temp 97.4 °F (36.3 °C)  Resp 18  Wt 185 lb 9.6 oz (84.2 kg)  BMI 27.02 kg/m2    GENERAL:  Not in any distress.    HEENT:  Normocephalic, Atraumatic.Mild Conjunctival pallor. No icterus. Extraocular Movements Intact. No Facial Asymmetry noted.    NECK:  No adenopathy. No JVD.    RESPIRATORY:  Fair air entry bilateral. No rhonchi or wheezing.    CARDIOVASCULAR:  S1, S2. Regular rate and rhythm. No murmur or gallop appreciated.    ABDOMEN:  Soft, obese, nontender. Bowel sounds present in all four quadrants.  No organomegaly appreciated.    EXTREMITIES:  No edema.No Calf Tenderness.    NEUROLOGIC:  Alert, awake and oriented ×3.  No  Motor or sensory deficit appreciated. Cranial Nerves 2-12 grossly intact.        DIAGNOSTIC DATA:    Glucose   Date Value Ref Range Status   06/20/2017 85 60 - 100 mg/dL Final     Sodium   Date Value Ref Range Status   06/20/2017 137 137 - 145 mmol/L Final     Potassium   Date Value Ref Range Status   06/20/2017 4.1 3.5 - 5.1 mmol/L Final     CO2   Date Value Ref Range Status   06/20/2017 26.0 22.0 - 31.0 mmol/L Final     Chloride   Date Value Ref Range Status   06/20/2017 98 95 - 110 mmol/L Final     Anion Gap    Date Value Ref Range Status   06/20/2017 13.0 5.0 - 15.0 mmol/L Final     Creatinine   Date Value Ref Range Status   06/20/2017 1.24 (H) 0.50 - 1.00 mg/dL Final     BUN   Date Value Ref Range Status   06/20/2017 27 (H) 7 - 21 mg/dL Final     BUN/Creatinine Ratio   Date Value Ref Range Status   06/20/2017 21.8 7.0 - 25.0 Final     Calcium   Date Value Ref Range Status   06/20/2017 9.3 8.4 - 10.2 mg/dL Final     eGFR Non  Amer   Date Value Ref Range Status   06/20/2017 43 (L) >60 mL/min/1.73 Final     Alkaline Phosphatase   Date Value Ref Range Status   06/20/2017 119 38 - 126 U/L Final     Total Protein   Date Value Ref Range Status   06/20/2017 7.4 6.3 - 8.6 g/dL Final     ALT (SGPT)   Date Value Ref Range Status   06/20/2017 39 9 - 52 U/L Final     AST (SGOT)   Date Value Ref Range Status   06/20/2017 29 14 - 36 U/L Final     Total Bilirubin   Date Value Ref Range Status   06/20/2017 0.3 0.2 - 1.3 mg/dL Final     Albumin   Date Value Ref Range Status   06/20/2017 4.60 3.40 - 4.80 g/dL Final     Globulin   Date Value Ref Range Status   06/20/2017 2.8 2.3 - 3.5 gm/dL Final     A/G Ratio   Date Value Ref Range Status   06/20/2017 1.6 1.1 - 1.8 g/dL Final     Lab Results   Component Value Date    WBC 7.38 06/20/2017    HGB 14.0 06/20/2017    HCT 41.7 06/20/2017    MCV 88.5 06/20/2017     06/20/2017     Lab Results   Component Value Date    NEUTROABS 4.36 06/20/2017    IRON 50 06/20/2017    TIBC 278 06/20/2017    LABIRON 18 06/20/2017    FERRITIN 454.00 (H) 06/20/2017    [id number] 600 06/20/2017    FOLATE 9.41 06/20/2017     Radiology Data :  CT of chest without contrast done on November 3, 2016 showed:      There is enlarged and heterogeneous thyroid gland. The cardiac  silhouette is within normal limits. No mediastinal lymphadenopathy is  seen. There is calcified granuloma in the left hilum. There is  evidence of left upper lobe lobectomy. There is mild pleural  thickening in the lung apices.  Otherwise lungs are clear. No pleural  effusion is identified.      No acute abnormality is seen in the upper abdomen. No acute bony  abnormality is seen.      CONCLUSION- No residual or recurrent tumor is identified.  No mediastinal lymphadenopathy is seen.           Pathology :  Pathology report from March 2012 showed:  FINAL DIAGNOSIS:   A.  WEDGE, UPPER LOBE OF LEFT LUNG:            ATYPICAL CARCINOID TUMOR (1.8 CM), COMPLETELY EXCISED.   B.  UPPER LOBE, LEFT LUNG:            RECENT WEDGE EXCISIONAL SITE.            BRONCHIAL SURGICAL MARGIN NEGATIVE FOR TUMOR.            EIGHT (8) LOBAR LYMPH NODES (LEVEL 12) NEGATIVE FOR TUMOR.   C.  LYMPH NODE, LEVEL 6:            ONE (1) LYMPH NODE NEGATIVE FOR TUMOR.   D.  LYMPH NODE, LEVEL 8:            ONE (1) LYMPH NODE NEGATIVE FOR TUMOR.   E.  LYMPH NODES, LEVEL 9:            TWO (2) LYMPH NODES NEGATIVE FOR TUMOR.   F.  LYMPH NODE, LEVEL 10:            ONE (1) LYMPH NODE NEGATIVE FOR TUMOR.   G.  LYMPH NODE, LEVEL 7:            ONE (1) LYMPH NODE NEGATIVE FOR TUMOR.          Comment   COMMENT:   Immunostains (block A2) have atypical carcinoid tumor positive for   synaptophysin, positive for chromogranin A, few cells positive for S-100   protein, strongly positive for thyroid transcription factor-1 (TTF-1)   and positive for cytokeratin (AE1/AE3).  Dr. Cheney reviewed the   histologic slides and concurs with the above diagnoses.   Synoptic report, lung, resection:        Specimen type:  Upper lobe of left lung.        Procedure:  Lobectomy.        Specimen integrity:  Intact.        Tumor site:  Upper lobe.        Tumor size:  1.8 x 1.5 x 1.5 cm.        Tumor focality:  Unifocal.        Histologic type:  Atypical carcinoid tumor.        Histologic grade:  Not applicable.        Visceral pleural invasion:  Not identified.        Direct tumor extension into extrapulmonary structures:  Not   applicable.        Bronchial margin:  Uninvolved by invasive carcinoma.         Vascular margin:  Uninvolved by invasive carcinoma.        Parenchymal (stapled) margin:  Uninvolved by invasive carcinoma.        Parietal pleural margin:  Not applicable.        Chest wall margin:  Not applicable.        Other attached tissue margin:  Not applicable.        Distance to closest margin:  3.0 cm from bronchial margin.        Neoadjuvant treatment effect:  Not applicable.        Lymph-vascular invasion:  Not identified.            Pathologic staging (pTNM):             Primary tumor:  pT1a.             Regional lymph nodes:  pN0.                  Number examined:  14.                  Number involved:  0.   The atypical carcinoid tumor has an average of 2 mitotic figures per 10   high power fields (12 mitotic figures per 60 high power fields).   Necrosis is not identified.                     ASSESSMENT AND PLAN:      1. Elevated ferritin:Blood work done on June 22, 2017.  Patient's ferritin was elevated at 454.  However iron saturation is only 18% which is normal.  Workup was consistent with inflammatory process versus chronic disease.  Since her iron saturation is only 18% no need to do any genetic testing for hemochromatosis which was discussed with patient.  At this point recommend not taking any iron supplement.  Since she has a history of fibromyalgia as well as chronic kidney disease or ferritin might stay above normal limit.  We will see her back in about one year with a repeat ferritin as well as folic acid and vitamin B12 level to be done prior to that.  Since her folate level is only 9.0.  It was recommended to patient she started taking folic acid supplement for next 3 months.     2. History of atypical carcinoid of left upper lobe diagnosed in March 2012. Patient is status post left upper lobectomy. 14 lymph nodes were negative. Most recent CT scan done in November 2016 is negative for any recurrence. At this point recommend following up with Dr. Waite.     3. Hypertension     4.  Chronic kidney disease     5. Fibromyalgia     6. Health maintenance: Patient does not smoke. Had a colonoscopy done in last 5 years. Remains full code.         Mayur Neal MD  7/6/2017  3:24 PM        EMR Dragon/Transcription disclaimer:   Much of this encounter note is an electronic transcription/translation of spoken language to printed text. The electronic translation of spoken language may permit erroneous, or at times, nonsensical words or phrases to be inadvertently transcribed; Although I have reviewed the note for such errors, some may still exist.

## 2017-07-11 ENCOUNTER — APPOINTMENT (OUTPATIENT)
Dept: LAB | Facility: HOSPITAL | Age: 67
End: 2017-07-11
Attending: INTERNAL MEDICINE

## 2017-07-11 ENCOUNTER — LAB (OUTPATIENT)
Dept: LAB | Facility: HOSPITAL | Age: 67
End: 2017-07-11
Attending: INTERNAL MEDICINE

## 2017-07-11 DIAGNOSIS — E53.8 B12 DEFICIENCY: ICD-10-CM

## 2017-07-11 DIAGNOSIS — R79.89 ELEVATED FERRITIN: ICD-10-CM

## 2017-07-11 LAB
25(OH)D3 SERPL-MCNC: 54.8 NG/ML (ref 30–100)
ALBUMIN SERPL-MCNC: 4.7 G/DL (ref 3.4–4.8)
ALBUMIN/GLOB SERPL: 1.9 G/DL (ref 1.1–1.8)
ALP SERPL-CCNC: 135 U/L (ref 38–126)
ALT SERPL W P-5'-P-CCNC: 36 U/L (ref 9–52)
ANION GAP SERPL CALCULATED.3IONS-SCNC: 12 MMOL/L (ref 5–15)
AST SERPL-CCNC: 23 U/L (ref 14–36)
BASOPHILS # BLD AUTO: 0.05 10*3/MM3 (ref 0–0.2)
BASOPHILS NFR BLD AUTO: 0.6 % (ref 0–2)
BILIRUB SERPL-MCNC: 0.4 MG/DL (ref 0.2–1.3)
BUN BLD-MCNC: 19 MG/DL (ref 7–21)
BUN/CREAT SERPL: 16.2 (ref 7–25)
CALCIUM SPEC-SCNC: 9.2 MG/DL (ref 8.4–10.2)
CHLORIDE SERPL-SCNC: 100 MMOL/L (ref 95–110)
CO2 SERPL-SCNC: 27 MMOL/L (ref 22–31)
CREAT BLD-MCNC: 1.17 MG/DL (ref 0.5–1)
DEPRECATED RDW RBC AUTO: 42.8 FL (ref 36.4–46.3)
EOSINOPHIL # BLD AUTO: 0.17 10*3/MM3 (ref 0–0.7)
EOSINOPHIL NFR BLD AUTO: 2 % (ref 0–7)
ERYTHROCYTE [DISTWIDTH] IN BLOOD BY AUTOMATED COUNT: 13 % (ref 11.5–14.5)
FERRITIN SERPL-MCNC: 501 NG/ML (ref 11.1–264)
FOLATE SERPL-MCNC: >20 NG/ML (ref 2.76–21)
GFR SERPL CREATININE-BSD FRML MDRD: 46 ML/MIN/1.73 (ref 45–104)
GLOBULIN UR ELPH-MCNC: 2.5 GM/DL (ref 2.3–3.5)
GLUCOSE BLD-MCNC: 91 MG/DL (ref 60–100)
HCT VFR BLD AUTO: 42.3 % (ref 35–45)
HGB BLD-MCNC: 13.9 G/DL (ref 12–15.5)
IMM GRANULOCYTES # BLD: 0.02 10*3/MM3 (ref 0–0.02)
IMM GRANULOCYTES NFR BLD: 0.2 % (ref 0–0.5)
IRON 24H UR-MRATE: 83 MCG/DL (ref 37–170)
IRON SATN MFR SERPL: 31 % (ref 15–50)
LYMPHOCYTES # BLD AUTO: 2.82 10*3/MM3 (ref 0.6–4.2)
LYMPHOCYTES NFR BLD AUTO: 33.5 % (ref 10–50)
MCH RBC QN AUTO: 29.5 PG (ref 26.5–34)
MCHC RBC AUTO-ENTMCNC: 32.9 G/DL (ref 31.4–36)
MCV RBC AUTO: 89.8 FL (ref 80–98)
MONOCYTES # BLD AUTO: 0.67 10*3/MM3 (ref 0–0.9)
MONOCYTES NFR BLD AUTO: 7.9 % (ref 0–12)
NEUTROPHILS # BLD AUTO: 4.7 10*3/MM3 (ref 2–8.6)
NEUTROPHILS NFR BLD AUTO: 55.8 % (ref 37–80)
PLATELET # BLD AUTO: 277 10*3/MM3 (ref 150–450)
PMV BLD AUTO: 10.7 FL (ref 8–12)
POTASSIUM BLD-SCNC: 4 MMOL/L (ref 3.5–5.1)
PROT SERPL-MCNC: 7.2 G/DL (ref 6.3–8.6)
RBC # BLD AUTO: 4.71 10*6/MM3 (ref 3.77–5.16)
SODIUM BLD-SCNC: 139 MMOL/L (ref 137–145)
TIBC SERPL-MCNC: 265 MCG/DL (ref 265–497)
TSH SERPL DL<=0.05 MIU/L-ACNC: 1.99 MIU/ML (ref 0.46–4.68)
VIT B12 BLD-MCNC: 473 PG/ML (ref 239–931)
WBC NRBC COR # BLD: 8.43 10*3/MM3 (ref 3.2–9.8)

## 2017-07-11 PROCEDURE — 36415 COLL VENOUS BLD VENIPUNCTURE: CPT | Performed by: INTERNAL MEDICINE

## 2017-07-11 PROCEDURE — 83540 ASSAY OF IRON: CPT

## 2017-07-11 PROCEDURE — 80053 COMPREHEN METABOLIC PANEL: CPT

## 2017-07-11 PROCEDURE — 84443 ASSAY THYROID STIM HORMONE: CPT | Performed by: INTERNAL MEDICINE

## 2017-07-11 PROCEDURE — 82728 ASSAY OF FERRITIN: CPT

## 2017-07-11 PROCEDURE — 83550 IRON BINDING TEST: CPT

## 2017-07-11 PROCEDURE — 82607 VITAMIN B-12: CPT

## 2017-07-11 PROCEDURE — 85025 COMPLETE CBC W/AUTO DIFF WBC: CPT

## 2017-07-11 PROCEDURE — 82746 ASSAY OF FOLIC ACID SERUM: CPT

## 2017-07-11 PROCEDURE — 82306 VITAMIN D 25 HYDROXY: CPT | Performed by: INTERNAL MEDICINE

## 2017-07-14 ENCOUNTER — OFFICE VISIT (OUTPATIENT)
Dept: CARDIOLOGY | Facility: CLINIC | Age: 67
End: 2017-07-14

## 2017-07-14 VITALS
SYSTOLIC BLOOD PRESSURE: 108 MMHG | HEIGHT: 70 IN | WEIGHT: 186 LBS | DIASTOLIC BLOOD PRESSURE: 68 MMHG | BODY MASS INDEX: 26.63 KG/M2 | HEART RATE: 68 BPM

## 2017-07-14 DIAGNOSIS — I10 ESSENTIAL HYPERTENSION: Primary | ICD-10-CM

## 2017-07-14 DIAGNOSIS — R00.2 PALPITATIONS: ICD-10-CM

## 2017-07-14 DIAGNOSIS — R06.02 SOB (SHORTNESS OF BREATH): ICD-10-CM

## 2017-07-14 PROBLEM — D3A.090 CARCINOID TUMOR OF LUNG: Status: ACTIVE | Noted: 2017-06-20

## 2017-07-14 PROCEDURE — 99214 OFFICE O/P EST MOD 30 MIN: CPT | Performed by: INTERNAL MEDICINE

## 2017-07-14 NOTE — PROGRESS NOTES
Myra Charles  66 y.o. female      1. Essential hypertension    2. SOB (shortness of breath)    3. Palpitations        Chief complaint -  palpitations      History of present Illness- 65-year-old lady Who had thyrotoxicosis and had hemithyroidectomy and she is off of Tapazole for hyperthyroidism and has done well and is followed by Dr. Lewis Caraballo.  She denies any palpitations or chest pain or shortness of breath.  She is on Toprol-XL and amlodipine for blood pressure and blood pressure.  The blood pressure is very well controlled and  doesn't drop at any time.  She has history of carcinoid of the lung and is being followed by Dr. Waite.  And she is scheduled for a scan soon.  She has no clinical sequelae from the carcinoid with flushing or diarrhea.  Her palpitations are well controlled with the Toprol-XL and it has become well controlled after she had the thyroid surgery.          Allergies   Allergen Reactions   • Evista [Raloxifene]    • Gabapentin      HA/sedation   • Lyrica [Pregabalin]          Past Medical History:   Diagnosis Date   • Backache 02/17/2016    Low back pain, in sacral region      • Benign hypertension 02/17/2016   • Cortical senile cataract 02/17/2016   • Essential (primary) hypertension 04/20/2016   • GERD (gastroesophageal reflux disease) 02/17/2016   • Hallux valgus     Deformity, w/inflammation      • Iron deficiency anemia    • Lung nodule, solitary 03/08/2012    Solitary nodule of lung - RIGHT upper lobe 15mm, PET positive      • Malignant carcinoid tumor of lung 02/17/2016    Atypical carcinoid tumor of the LEFT lung treated in March 2012     • Mitral valve prolapse 02/17/2016   • Osteoporosis 04/20/2016   • Primary fibromyalgia syndrome 02/17/2016   • Rosacea 02/17/2016   • Seborrheic keratosis     History of, upper and lower back      • 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 Enodscopic vein harvest on the left. Left lower extremity arteriogram.)   • CERVICAL CONIZATION      CONIZATION OF CERVIX 69455 (Excisional laser cone biopsy and vaporization of cervix.)   • CHOLECYSTECTOMY  09/07/2005    Cholecystectomy, laparoscopic (With intraoperative cholangiogram.)   • EXCISION LESION  11/11/1998    REMOVE LESION BACK OR FLANK 89271 (Excision times two.)   • FOOT SURGERY  01/20/2009    Foot/toes surgery procedure (Soft tissue mass, left foot. Excision of)   • HERNIA REPAIR      Past history of umbilical herni repair.   • OTHER SURGICAL HISTORY  12/04/2012    Anesth, elbow area surgery (Manipulation of left elbow.)   • OTHER SURGICAL HISTORY  03/05/2012    Anesth, elbow area surgery (Excision of plate and screws from olecranon bursa. Retained plate and screws, olecranon bursitis of previous fracture left elbow.)   • OTHER SURGICAL HISTORY  10/16/2012    Treat elbow fracture (Open reduction and internal fixation.)   • THORACOSCOPY  03/14/2012    Thoracoscopy, surgical (Bronchoscopy,LVATS (wedge resect MARYBETH), LULobectomy Thoracic lymphadenectomy)   • THROAT SURGERY  07/25/2012    Throat surgery procedure (Thyroplasty type I (vocal cord medialozation & larynooplasty) Left vocal cord paralysis. Dysphoria. [facility] by Dr Tk Heart)   • THYROID SURGERY  03/22/2016    Thyroid surgery (Right thyroid lobectomy and isthmusectomy.)   • TONSILLECTOMY  02/03/1956    Chronic tonsillitis   • VEIN LIGATION  09/28/2000    PHLEB VEINS - EXTREM (20+) 00489 (High ligation of ling saphenous vein, left, plus multiple phlebectomies,  left lower extremity.)         Family History   Problem Relation Age of Onset   • Cancer Mother    • Diabetes Mother    • Heart disease Mother    • Stroke Mother    • Heart disease Father    • Lung cancer Maternal Grandfather    • Lung cancer Other          Social History     Social History   • Marital status:      Spouse name: N/A   • Number of children: N/A   • Years of education: N/A     Occupational History   • Not on file.     Social History Main Topics   • Smoking status: Never Smoker   • Smokeless tobacco: Never Used   • Alcohol use Yes      Comment: OCCASIONAL WINE   • Drug use: No   • Sexual activity: Defer     Other Topics Concern   • Not on file     Social History Narrative         Current Outpatient Prescriptions   Medication Sig Dispense Refill   • amLODIPine (NORVASC) 5 MG tablet Take 5 mg by mouth daily.     • ASCORBIC ACID PO Take 1 tablet by mouth daily.     • ASPIRIN PO Take 81 mg by mouth Daily. YSP Aspirin oral  (unconfirmed      • Calcium Citrate-Vitamin D (CITRACAL + D PO) Take 1 tablet by mouth 2 (two) times a day.     • cholecalciferol (VITAMIN D3) 1000 UNITS tablet Take 1,000 Units by mouth daily.     • Cyanocobalamin (VITAMIN B-12 PO) Take 1 tablet by mouth 1 (one) time per week.     • cyclobenzaprine (FLEXERIL) 10 MG tablet Take 10 mg by mouth at night as needed for muscle spasms.     • DULoxetine (CYMBALTA) 60 MG capsule Take 60 mg by mouth daily.     • FOLIC ACID PO Take 10 mg by mouth Daily.     • metoprolol succinate XL (TOPROL-XL) 25 MG 24 hr tablet Take 25 mg by mouth 2 (two) times a day.     • OMEPRAZOLE PO Take 40 mg by mouth Daily.     • ranitidine (ZANTAC) 150 MG tablet Take 150 mg by mouth daily.       No current facility-administered medications for this visit.          Review of Systems     Constitution: Denies any fatigue, fever or chills    HENT: Denies any headache, hearing impairment, nasal discharge or sore throat    Eyes: Denies any blurring of vision, or  "photophobia     Cardivascular - As per history of present illness     Respiratory system-denies any COPD, shortness of breath, sleep apnea.     Endocrine:  No history of hyperlipidemia, diabetes, had thyrotoxicosis       Musculoskeletal:  No history of arthritis, musculoskeletal problems    Gastrointestinal: No nausea, vomiting, or melena.  Has GERD    Genitourinary: No dysuria or hematuria    Neurological:   No history of seizure disorder, stroke, memory problems and neuropathy    Psychiatric/Behavioral:        No history of depression, bipolar disorder or schizophrenia     Hematological- no history of easy bruising or any bleeding diathesis            OBJECTIVE    /68  Pulse 68  Ht 69.5\" (176.5 cm)  Wt 186 lb (84.4 kg)  BMI 27.07 kg/m2      Physical Exam     Constitutional: is oriented to person, place, and time.     Skin-warm and dry    Well developed and nourished in no acute distress      Head: Normocephalic and atraumatic.     Eyes: Pupils are equal, round, and reactive to light.     Neck: Neck supple. No bruit in the carotids, no elevation of JVD.  Surgical scar healed well    Cardiovascular: [place] in the fifth intercostal space, regular rate, and  Rhythm,  S1 greater than S2, no S3 or S4, no gallop       Pulmonary/Chest:   Air  Entry is equal on both sides  No wheezing or crackles,      Abdominal: Soft.  No hepatosplenomegaly, bowel sounds are present    Musculoskeletal: No kyphoscoliosis, no significant thickening of the joints    Neurological: is alert and oriented to person, place, and time.    cranial nerve are intact .   No motor or sensory deficit    Extremities-no edema,  pulses are felt equally on both sides, no radial femoral delay      Psychiatric: He has a normal mood and affect.                  His behavior is normal.           Procedures      Lab Results   Component Value Date    WBC 8.43 07/11/2017    HGB 13.9 07/11/2017    HCT 42.3 07/11/2017    MCV 89.8 07/11/2017     " 07/11/2017     Lab Results   Component Value Date    GLUCOSE 91 07/11/2017    BUN 19 07/11/2017    CREATININE 1.17 (H) 07/11/2017    EGFRIFNONA 46 07/11/2017    BCR 16.2 07/11/2017    CO2 27.0 07/11/2017    CALCIUM 9.2 07/11/2017    ALBUMIN 4.70 07/11/2017    LABIL2 1.9 (H) 07/11/2017    AST 23 07/11/2017    ALT 36 07/11/2017       Lab Results   Component Value Date    TSH 1.990 07/11/2017    [id number] 8.2 02/29/2016                  A/P      Palpitations status post thyrotoxicosis and partial thyroidectomy,  on Toprol-XL 25 mg twice a day and his blood pressure is controlled along with amlodipine.    GERD doing well with Prilosec daily and ranitidine.  She takes vitamin D supplements for vitamin D deficiency    Carcinoid of the lung-followed by Dr. Mariann Natarajan on Cymbalta, follow-up in 10 months      Tomy Pathak MD  7/14/2017  11:20 AM

## 2017-08-16 ENCOUNTER — OFFICE VISIT (OUTPATIENT)
Dept: OPHTHALMOLOGY | Facility: CLINIC | Age: 67
End: 2017-08-16

## 2017-08-16 DIAGNOSIS — H25.013 CORTICAL AGE-RELATED CATARACT, BILATERAL: Primary | ICD-10-CM

## 2017-08-16 DIAGNOSIS — H52.13 MYOPIA, BILATERAL: ICD-10-CM

## 2017-08-16 DIAGNOSIS — H35.3190 NONEXUDATIVE AGE-RELATED MACULAR DEGENERATION: ICD-10-CM

## 2017-08-16 PROCEDURE — 99214 OFFICE O/P EST MOD 30 MIN: CPT | Performed by: OPHTHALMOLOGY

## 2017-08-16 NOTE — PROGRESS NOTES
Subjective   Myra Charles is a 66 y.o. female.   Chief Complaint   Patient presents with   • Eye Exam     HPI     Eye Exam   Laterality: both eyes       Last edited by Myranda Livingston on 8/16/2017  1:18 PM. (History)          Review of Systems    Objective   Base Eye Exam     Visual Acuity (Snellen - Linear)      Right Left   Dist cc 20/30 +1 20/25   Near cc J1 J2       Correction:  Glasses      Tonometry (Applanation, 1:46 PM)      Right Left   Pressure 17 17         Dilation     Both eyes:  1.0% Mydriacyl, 2.5% Damir Synephrine @ 1:50 PM            Additional Tests     Amsler      Right Left   [place] Normal Normal               Slit Lamp and Fundus Exam     External Exam      Right Left    External Normal Normal      Slit Lamp Exam      Right Left    Lids/Lashes Normal Normal    Conjunctiva/Sclera White and quiet White and quiet    Cornea Clear Clear    Anterior Chamber Deep and quiet Deep and quiet    Iris Round and reactive Round and reactive    Lens 1+ Cortical cataract 1+ Cortical cataract    Vitreous Normal Normal      Fundus Exam      Right Left    Disc Normal Normal    Macula Early age related macular degeneration, no hemorrhage, no exudates, Mottling Early age related macular degeneration, no lesion, no exudates, Mottling    Vessels Normal Normal    Periphery Normal Normal            Refraction     Wearing Rx      Sphere Cylinder Axis Add   Right -3.00 +1.00 152 +2.25   Left -1.75  180 +2.25         Manifest Refraction      Sphere Cylinder Axis Dist   Right -2.25 +0.75 160 20/25   Left -1.75 Sphere  20/25         Final Rx      Sphere Cylinder Axis Add   Right -2.25 +0.75 160 +2.50   Left -1.75 Sphere  +2.50                   Assessment/Plan   Diagnoses and all orders for this visit:    Cortical age-related cataract, bilateral    Nonexudative age-related macular degeneration    Myopia, bilateral      Plan:    Glasses Rx given per refraction  High antioxidant foods +/- AREDS 2 supplement  [place]  grid daily, call if changes occur.

## 2017-09-18 ENCOUNTER — LAB (OUTPATIENT)
Dept: LAB | Facility: HOSPITAL | Age: 67
End: 2017-09-18
Attending: INTERNAL MEDICINE

## 2017-09-18 DIAGNOSIS — I10 ESSENTIAL HYPERTENSION: ICD-10-CM

## 2017-09-18 DIAGNOSIS — M81.0 OSTEOPOROSIS: ICD-10-CM

## 2017-09-18 DIAGNOSIS — E04.2 NONTOXIC MULTINODULAR GOITER: ICD-10-CM

## 2017-09-18 DIAGNOSIS — E53.8 B12 DEFICIENCY: ICD-10-CM

## 2017-09-18 LAB — TSH SERPL DL<=0.05 MIU/L-ACNC: 1.35 MIU/ML (ref 0.46–4.68)

## 2017-09-18 PROCEDURE — 36415 COLL VENOUS BLD VENIPUNCTURE: CPT

## 2017-09-18 PROCEDURE — 84443 ASSAY THYROID STIM HORMONE: CPT

## 2017-09-21 ENCOUNTER — TELEPHONE (OUTPATIENT)
Dept: ONCOLOGY | Facility: CLINIC | Age: 67
End: 2017-09-21

## 2017-09-21 RX ORDER — FOLIC ACID 1 MG/1
1 TABLET ORAL DAILY
Qty: 90 TABLET | Refills: 1 | Status: SHIPPED | OUTPATIENT
Start: 2017-09-21 | End: 2018-03-27 | Stop reason: SDUPTHER

## 2017-11-06 ENCOUNTER — HOSPITAL ENCOUNTER (OUTPATIENT)
Dept: CT IMAGING | Facility: HOSPITAL | Age: 67
Discharge: HOME | End: 2017-11-06
Attending: THORACIC SURGERY (CARDIOTHORACIC VASCULAR SURGERY) | Admitting: THORACIC SURGERY (CARDIOTHORACIC VASCULAR SURGERY)

## 2017-11-06 ENCOUNTER — TELEPHONE (OUTPATIENT)
Dept: ENDOCRINOLOGY | Facility: CLINIC | Age: 67
End: 2017-11-06

## 2017-11-06 DIAGNOSIS — E53.8 B12 DEFICIENCY: ICD-10-CM

## 2017-11-06 DIAGNOSIS — E04.2 NONTOXIC MULTINODULAR GOITER: Primary | ICD-10-CM

## 2017-11-06 DIAGNOSIS — I10 ESSENTIAL HYPERTENSION: ICD-10-CM

## 2017-11-06 DIAGNOSIS — C34.12 MALIGNANT NEOPLASM OF UPPER LOBE OF LEFT LUNG (HCC): ICD-10-CM

## 2017-11-06 DIAGNOSIS — M81.0 AGE RELATED OSTEOPOROSIS, UNSPECIFIED PATHOLOGICAL FRACTURE PRESENCE: ICD-10-CM

## 2017-11-06 PROCEDURE — 71250 CT THORAX DX C-: CPT

## 2017-11-16 ENCOUNTER — OFFICE VISIT (OUTPATIENT)
Dept: CARDIAC SURGERY | Facility: CLINIC | Age: 67
End: 2017-11-16

## 2017-11-16 ENCOUNTER — APPOINTMENT (OUTPATIENT)
Dept: LAB | Facility: HOSPITAL | Age: 67
End: 2017-11-16
Attending: INTERNAL MEDICINE

## 2017-11-16 VITALS
HEART RATE: 77 BPM | DIASTOLIC BLOOD PRESSURE: 74 MMHG | BODY MASS INDEX: 27.1 KG/M2 | SYSTOLIC BLOOD PRESSURE: 130 MMHG | HEIGHT: 70 IN | WEIGHT: 189.3 LBS | OXYGEN SATURATION: 98 %

## 2017-11-16 DIAGNOSIS — I83.813 VARICOSE VEINS OF BOTH LOWER EXTREMITIES WITH PAIN: ICD-10-CM

## 2017-11-16 DIAGNOSIS — I10 ESSENTIAL (PRIMARY) HYPERTENSION: ICD-10-CM

## 2017-11-16 DIAGNOSIS — R00.2 PALPITATIONS: ICD-10-CM

## 2017-11-16 DIAGNOSIS — C7A.090 MALIGNANT CARCINOID TUMOR OF LUNG (HCC): Primary | ICD-10-CM

## 2017-11-16 LAB
25(OH)D3 SERPL-MCNC: 39.9 NG/ML (ref 30–100)
ALBUMIN SERPL-MCNC: 4.7 G/DL (ref 3.4–4.8)
ALBUMIN/GLOB SERPL: 1.6 G/DL (ref 1.1–1.8)
ALP SERPL-CCNC: 120 U/L (ref 38–126)
ALT SERPL W P-5'-P-CCNC: 40 U/L (ref 9–52)
ANION GAP SERPL CALCULATED.3IONS-SCNC: 11 MMOL/L (ref 5–15)
AST SERPL-CCNC: 32 U/L (ref 14–36)
BASOPHILS # BLD AUTO: 0.08 10*3/MM3 (ref 0–0.2)
BASOPHILS NFR BLD AUTO: 1 % (ref 0–2)
BILIRUB SERPL-MCNC: 0.4 MG/DL (ref 0.2–1.3)
BUN BLD-MCNC: 23 MG/DL (ref 7–21)
BUN/CREAT SERPL: 19 (ref 7–25)
CALCIUM SPEC-SCNC: 9.5 MG/DL (ref 8.4–10.2)
CHLORIDE SERPL-SCNC: 101 MMOL/L (ref 95–110)
CO2 SERPL-SCNC: 27 MMOL/L (ref 22–31)
CREAT BLD-MCNC: 1.21 MG/DL (ref 0.5–1)
DEPRECATED RDW RBC AUTO: 41 FL (ref 36.4–46.3)
EOSINOPHIL # BLD AUTO: 0.19 10*3/MM3 (ref 0–0.7)
EOSINOPHIL NFR BLD AUTO: 2.5 % (ref 0–7)
ERYTHROCYTE [DISTWIDTH] IN BLOOD BY AUTOMATED COUNT: 12.6 % (ref 11.5–14.5)
GFR SERPL CREATININE-BSD FRML MDRD: 45 ML/MIN/1.73 (ref 45–104)
GLOBULIN UR ELPH-MCNC: 2.9 GM/DL (ref 2.3–3.5)
GLUCOSE BLD-MCNC: 86 MG/DL (ref 60–100)
HCT VFR BLD AUTO: 40.1 % (ref 35–45)
HGB BLD-MCNC: 13.2 G/DL (ref 12–15.5)
IMM GRANULOCYTES # BLD: 0.02 10*3/MM3 (ref 0–0.02)
IMM GRANULOCYTES NFR BLD: 0.3 % (ref 0–0.5)
LYMPHOCYTES # BLD AUTO: 2.47 10*3/MM3 (ref 0.6–4.2)
LYMPHOCYTES NFR BLD AUTO: 32 % (ref 10–50)
MCH RBC QN AUTO: 29.5 PG (ref 26.5–34)
MCHC RBC AUTO-ENTMCNC: 32.9 G/DL (ref 31.4–36)
MCV RBC AUTO: 89.7 FL (ref 80–98)
MONOCYTES # BLD AUTO: 0.72 10*3/MM3 (ref 0–0.9)
MONOCYTES NFR BLD AUTO: 9.3 % (ref 0–12)
NEUTROPHILS # BLD AUTO: 4.23 10*3/MM3 (ref 2–8.6)
NEUTROPHILS NFR BLD AUTO: 54.9 % (ref 37–80)
PLATELET # BLD AUTO: 217 10*3/MM3 (ref 150–450)
PMV BLD AUTO: 9.7 FL (ref 8–12)
POTASSIUM BLD-SCNC: 4.4 MMOL/L (ref 3.5–5.1)
PROT SERPL-MCNC: 7.6 G/DL (ref 6.3–8.6)
RBC # BLD AUTO: 4.47 10*6/MM3 (ref 3.77–5.16)
SODIUM BLD-SCNC: 139 MMOL/L (ref 137–145)
TSH SERPL DL<=0.05 MIU/L-ACNC: 1.45 MIU/ML (ref 0.46–4.68)
VIT B12 BLD-MCNC: 810 PG/ML (ref 239–931)
WBC NRBC COR # BLD: 7.71 10*3/MM3 (ref 3.2–9.8)

## 2017-11-16 PROCEDURE — 99214 OFFICE O/P EST MOD 30 MIN: CPT | Performed by: THORACIC SURGERY (CARDIOTHORACIC VASCULAR SURGERY)

## 2017-11-16 PROCEDURE — 80053 COMPREHEN METABOLIC PANEL: CPT | Performed by: INTERNAL MEDICINE

## 2017-11-16 PROCEDURE — 85025 COMPLETE CBC W/AUTO DIFF WBC: CPT | Performed by: INTERNAL MEDICINE

## 2017-11-16 PROCEDURE — 84443 ASSAY THYROID STIM HORMONE: CPT | Performed by: INTERNAL MEDICINE

## 2017-11-16 PROCEDURE — 82306 VITAMIN D 25 HYDROXY: CPT | Performed by: INTERNAL MEDICINE

## 2017-11-16 PROCEDURE — 82607 VITAMIN B-12: CPT | Performed by: INTERNAL MEDICINE

## 2017-11-16 PROCEDURE — 36415 COLL VENOUS BLD VENIPUNCTURE: CPT | Performed by: INTERNAL MEDICINE

## 2017-11-18 PROBLEM — D3A.090 CARCINOID TUMOR OF LUNG: Status: RESOLVED | Noted: 2017-06-20 | Resolved: 2017-11-18

## 2017-11-19 NOTE — PROGRESS NOTES
11/16/2017    Myra Charles  1950    Chief Complaint:    Cardiology:  Dr Pathak       65 y.o. female with lung cancer, osteoporosis, GERD, HTN. .  never smoked.  Lung cancer resected 2012.  No Hemoptysis, No bone pain, Smoking none, Walking, Progressing with activities, Compliant with medications and instructions, Weight is stable, Shortness of breath stable, no new problems..       2/2012 CXR:  small LEFT upper lobe nodule  2/20/2012 CT Chest:  1.5cm LEFT upper lobe nodule.  no significant lymphadenopathy.  liver, adrenals clear.  3cm thyroid nodule RIGHT lobe.  3/5/2012 PET CT:  1.5cm LEFT upper lobe nodule, mSUV 3.8, suspicious for malignancy.   RIGHT thyroid nodule PET negative.  no evidence metastatic disease.  3/14/2012:  LEFT upper lobectomy, thoracic lymphadenectomy (Atypical Carcinoid neuroendocrine tumor.  no lymph node or lymphatic vascular invasion)  4/16/2012 CXR:  satisfactory post op.  7/25/2012:  paralyzed LEFT vocal cord, treated with vocal cord medialization and laryngoplasty  9/11/2012 CT Chest:  no evidence of recurrence. liver, adrenals ok.  no new mass or adenopathy.  multinodular goiter.  2/8/2013 CT Chest:  post surgical changes.  no evidence of recurrence. liver, adrenals ok.  no new mass or adenopathy.  multinodular goiter.  8/12/2013 CT Chest:  post surgical changes.  no evidence of recurrence. liver, adrenals ok.  no new mass or adenopathy.  multinodular goiter.  3/10/2014  CT Chest:  post surgical changes.  no evidence of recurrence. liver, adrenals ok.  no new mass or adenopathy.  multinodular goiter.  9/23/2014 CT Chest:  post surgical changes.  no evidence of recurrence. liver, adrenals ok.  no new mass or adenopathy.  multinodular goiter.  10/19/2015 CT Chest:  post surgical changes.  no evidence of recurrence. liver, adrenals ok.  no new mass or adenopathy.  multinodular goiter.  11/3/2016 CT Chest:  post surgical changes.  no evidence of recurrence. liver,  adrenals ok.  no new mass or adenopathy.  multinodular goiter.  11/6/2017 CT Chest:  post surgical changes.  no evidence of recurrence. liver, adrenals ok.  no new mass or adenopathy.  multinodular goiter.    The following portions of the patient's history were reviewed and updated as appropriate: allergies, current medications, past family history, past medical history, past social history, past surgical history and problem list.  Recent images independently reviewed.  Available laboratory values reviewed.    PMH:  Past Medical History:   Diagnosis Date   • Backache 02/17/2016    Low back pain, in sacral region      • Benign hypertension 02/17/2016   • Cortical senile cataract 02/17/2016   • Essential (primary) hypertension 04/20/2016   • GERD (gastroesophageal reflux disease) 02/17/2016   • Hallux valgus     Deformity, w/inflammation      • Iron deficiency anemia    • Lung nodule, solitary 03/08/2012    Solitary nodule of lung - RIGHT upper lobe 15mm, PET positive      • Malignant carcinoid tumor of lung 02/17/2016    Atypical carcinoid tumor of the LEFT lung treated in March 2012     • Mitral valve prolapse 02/17/2016   • Osteoporosis 04/20/2016   • Primary fibromyalgia syndrome 02/17/2016   • Rosacea 02/17/2016   • Seborrheic keratosis     History of, upper and lower back      • 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 04/20/2016   • 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 02/17/2016    Paralysis of vocal cords and larynx, unilateral - LEFT side      • Varicose vein    • Vitamin D deficiency 04/20/2016   • Vitreous detachment 12/01/2014   • Vitreous floaters 12/01/2014       ALLERGIES:  Allergies   Allergen Reactions   • Evista [Raloxifene]    • Gabapentin      HA/sedation   • Lyrica [Pregabalin]          MEDICATIONS:    Current Outpatient Prescriptions:   •  amLODIPine (NORVASC) 5 MG tablet, Take 5 mg by mouth daily., Disp: , Rfl:   •  ASCORBIC ACID PO, Take 1 tablet  by mouth daily., Disp: , Rfl:   •  ASPIRIN PO, Take 81 mg by mouth Daily. YSP Aspirin oral  (unconfirmed , Disp: , Rfl:   •  Calcium Citrate-Vitamin D (CITRACAL + D PO), Take 1 tablet by mouth 2 (two) times a day., Disp: , Rfl:   •  cholecalciferol (VITAMIN D3) 1000 UNITS tablet, Take 1,000 Units by mouth daily., Disp: , Rfl:   •  Cyanocobalamin (VITAMIN B-12 PO), Take 1 tablet by mouth 1 (one) time per week., Disp: , Rfl:   •  cyclobenzaprine (FLEXERIL) 10 MG tablet, Take 10 mg by mouth at night as needed for muscle spasms., Disp: , Rfl:   •  DULoxetine (CYMBALTA) 60 MG capsule, Take 60 mg by mouth daily., Disp: , Rfl:   •  folic acid (FOLVITE) 1 MG tablet, Take 1 tablet by mouth Daily., Disp: 90 tablet, Rfl: 1  •  metoprolol succinate XL (TOPROL-XL) 25 MG 24 hr tablet, Take 25 mg by mouth 2 (two) times a day., Disp: , Rfl:   •  OMEPRAZOLE PO, Take 40 mg by mouth Daily., Disp: , Rfl:   •  ranitidine (ZANTAC) 150 MG tablet, Take 150 mg by mouth daily., Disp: , Rfl:     Review of Systems   Review of Systems   Constitution: Negative for weakness, malaise/fatigue and weight loss.   Cardiovascular: Negative for chest pain, claudication and dyspnea on exertion.   Respiratory: Negative for cough and shortness of breath.    Skin: Negative for color change and poor wound healing.   Neurological: Negative for dizziness and numbness.       Physical Exam   Physical Exam   Constitutional: She is oriented to person, place, and time. She is active and cooperative. She does not appear ill. No distress.   HENT:   Right Ear: Hearing normal.   Left Ear: Hearing normal.   Nose: No nasal deformity. No epistaxis.   Mouth/Throat: She does not have dentures. Normal dentition.   Cardiovascular: Normal rate and regular rhythm.    No murmur heard.  Pulses:       Carotid pulses are 2+ on the right side, and 2+ on the left side.       Radial pulses are 2+ on the right side, and 2+ on the left side.        Dorsalis pedis pulses are 2+ on the  right side, and 2+ on the left side.        Posterior tibial pulses are 2+ on the right side, and 2+ on the left side.   Pulmonary/Chest: Effort normal and breath sounds normal.   Abdominal: Soft. She exhibits no distension and no mass. There is no tenderness.   Musculoskeletal: She exhibits no deformity.   Gait normal.    Neurological: She is alert and oriented to person, place, and time. She has normal strength.   Skin: Skin is warm and dry. No cyanosis or erythema. No pallor.   No venous staining   Psychiatric: She has a normal mood and affect. Her speech is normal. Judgment and thought content normal.     bun 23 creat 1.2    ASSESSMENT:  Myra was seen today for lung nodule.    Diagnoses and all orders for this visit:    Malignant carcinoid tumor of lung  -     CT Chest Without Contrast; Future    Essential (primary) hypertension    Palpitations    Varicose veins of both lower extremities with pain    PLAN:  Detailed discussion with Ms Charles regarding situation and options.  Stable CT imaging.  No evidence recurrence or metastases.  Multiple risk factors with severe comorbidities.  No intervention indicated at this time.  Will follow with interval imaging.  Risks, benefits discussed.  Understands and wishes to proceed with plan.     Return in 2years with CT Chest without contrast.  Return after above studies complete  Recommended regular physical activity, progressive walking program.  Continue current medications as directed.    Thank you for the opportunity to participate in this patient's care.    Copy to primary care provider.    EMR Dragon/Transcription disclaimer:   Much of this encounter note is an electronic transcription/translation of spoken language to printed text. The electronic translation of spoken language may permit erroneous, or at times, nonsensical words or phrases to be inadvertently transcribed; Although I have reviewed the note for such errors, some may still exist.

## 2017-12-04 ENCOUNTER — OFFICE VISIT (OUTPATIENT)
Dept: ENDOCRINOLOGY | Facility: CLINIC | Age: 67
End: 2017-12-04

## 2017-12-04 VITALS
BODY MASS INDEX: 27.59 KG/M2 | SYSTOLIC BLOOD PRESSURE: 126 MMHG | HEART RATE: 76 BPM | WEIGHT: 186.3 LBS | DIASTOLIC BLOOD PRESSURE: 74 MMHG | HEIGHT: 69 IN

## 2017-12-04 DIAGNOSIS — I10 ESSENTIAL HYPERTENSION: ICD-10-CM

## 2017-12-04 DIAGNOSIS — M81.0 AGE RELATED OSTEOPOROSIS, UNSPECIFIED PATHOLOGICAL FRACTURE PRESENCE: ICD-10-CM

## 2017-12-04 DIAGNOSIS — E53.8 B12 DEFICIENCY: ICD-10-CM

## 2017-12-04 DIAGNOSIS — E04.2 NONTOXIC MULTINODULAR GOITER: Primary | ICD-10-CM

## 2017-12-04 PROCEDURE — 99214 OFFICE O/P EST MOD 30 MIN: CPT | Performed by: INTERNAL MEDICINE

## 2017-12-04 NOTE — PROGRESS NOTES
Myra Charles is a 67 y.o. female who presents for  evaluation of   Chief Complaint   Patient presents with   • Thyroid Problem         Primary Care / Referring Provider  Jeff Shah MD  65 y o comes for cfu    history of lung cancer sp VATS , cured     reason toxic  MNG    thyroid scan 12-15 revealed increased bilateral uptake   right cold nodule inferior pole and progressive enlargement despite 2 FNA led to right thryoidectomy that was benign    left thyroid nodules are stable in size dating back to 2013    Last US 12-15    Stable mid and lower pole thyroid lobe heterogeneous solid nodules as well as   lower pole complex cystic lesion are seen with the largest measuring 1   x 0.76 x 0.89 cm.     prior to surgery on methimazole, now euthyroid off treatment.           Past Medical History:   Diagnosis Date   • Backache 02/17/2016    Low back pain, in sacral region      • Benign hypertension 02/17/2016   • Cortical senile cataract 02/17/2016   • Essential (primary) hypertension 04/20/2016   • GERD (gastroesophageal reflux disease) 02/17/2016   • Hallux valgus     Deformity, w/inflammation      • Iron deficiency anemia    • Lung nodule, solitary 03/08/2012    Solitary nodule of lung - RIGHT upper lobe 15mm, PET positive      • Malignant carcinoid tumor of lung 02/17/2016    Atypical carcinoid tumor of the LEFT lung treated in March 2012     • Mitral valve prolapse 02/17/2016   • Osteoporosis 04/20/2016   • Primary fibromyalgia syndrome 02/17/2016   • Rosacea 02/17/2016   • Seborrheic keratosis     History of, upper and lower back      • 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 04/20/2016   • 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 02/17/2016    Paralysis of vocal cords and larynx, unilateral - LEFT side      • Varicose vein    • Vitamin D deficiency 04/20/2016   • Vitreous detachment 12/01/2014   • Vitreous floaters 12/01/2014     Family History   Problem  Relation Age of Onset   • Cancer Mother    • Diabetes Mother    • Heart disease Mother    • Stroke Mother    • Heart disease Father    • Lung cancer Maternal Grandfather    • Lung cancer Other      Social History   Substance Use Topics   • Smoking status: Never Smoker   • Smokeless tobacco: Never Used   • Alcohol use Yes      Comment: OCCASIONAL WINE         Current Outpatient Prescriptions:   •  amLODIPine (NORVASC) 5 MG tablet, Take 5 mg by mouth daily., Disp: , Rfl:   •  ASCORBIC ACID PO, Take 1 tablet by mouth daily., Disp: , Rfl:   •  ASPIRIN PO, Take 81 mg by mouth Daily. YSP Aspirin oral  (unconfirmed , Disp: , Rfl:   •  Calcium Citrate-Vitamin D (CITRACAL + D PO), Take 1 tablet by mouth 2 (two) times a day., Disp: , Rfl:   •  cholecalciferol (VITAMIN D3) 1000 UNITS tablet, Take 1,000 Units by mouth daily., Disp: , Rfl:   •  Cyanocobalamin (VITAMIN B-12 PO), Take 1 tablet by mouth 1 (one) time per week., Disp: , Rfl:   •  cyclobenzaprine (FLEXERIL) 10 MG tablet, Take 10 mg by mouth at night as needed for muscle spasms., Disp: , Rfl:   •  DULoxetine (CYMBALTA) 60 MG capsule, Take 60 mg by mouth daily., Disp: , Rfl:   •  folic acid (FOLVITE) 1 MG tablet, Take 1 tablet by mouth Daily., Disp: 90 tablet, Rfl: 1  •  metoprolol succinate XL (TOPROL-XL) 25 MG 24 hr tablet, Take 25 mg by mouth 2 (two) times a day., Disp: , Rfl:   •  OMEPRAZOLE PO, Take 40 mg by mouth Daily., Disp: , Rfl:   •  ranitidine (ZANTAC) 150 MG tablet, Take 150 mg by mouth daily., Disp: , Rfl:     Review of Systems    Review of Systems   Constitutional: Negative for activity change, appetite change, chills, diaphoresis, fatigue, fever and unexpected weight change.   HENT: Negative for congestion, drooling, ear discharge, ear pain, facial swelling, mouth sores, nosebleeds, postnasal drip, sinus pressure, sneezing, sore throat, tinnitus, trouble swallowing and voice change.    Eyes: Negative.  Negative for photophobia, pain, discharge, redness  "and itching.   Respiratory: Negative.  Negative for apnea, cough, choking, chest tightness, shortness of breath, wheezing and stridor.    Cardiovascular: Negative.  Negative for chest pain, palpitations and leg swelling.   Gastrointestinal: Negative.  Negative for abdominal distention, abdominal pain, constipation, diarrhea, nausea and vomiting.   Endocrine: Negative.  Negative for cold intolerance, heat intolerance, polydipsia, polyphagia and polyuria.   Genitourinary: Negative for difficulty urinating, dysuria, flank pain and frequency.   Musculoskeletal: Negative for arthralgias, back pain, gait problem, joint swelling, myalgias, neck pain and neck stiffness.   Skin: Negative for color change, pallor, rash and wound.   Allergic/Immunologic: Negative for environmental allergies, food allergies and immunocompromised state.   Neurological: Negative for dizziness, tremors, seizures, syncope, facial asymmetry, speech difficulty, weakness, light-headedness, numbness and headaches.   Hematological: Negative for adenopathy. Does not bruise/bleed easily.   Psychiatric/Behavioral: Negative for agitation, behavioral problems, confusion, decreased concentration, dysphoric mood, hallucinations, self-injury, sleep disturbance and suicidal ideas. The patient is not nervous/anxious and is not hyperactive.         Objective:     /74 (BP Location: Right arm, Patient Position: Sitting, Cuff Size: Adult)  Pulse 76  Ht 69\" (175.3 cm)  Wt 186 lb 4.8 oz (84.5 kg)  BMI 27.51 kg/m2    Physical Exam   Constitutional: She is oriented to person, place, and time. She appears well-developed.   HENT:   Head: Normocephalic.   Right Ear: External ear normal.   Left Ear: External ear normal.   Nose: Nose normal.   Eyes: Conjunctivae and EOM are normal. No scleral icterus.   Neck: Normal range of motion. Neck supple. No tracheal deviation present. No thyromegaly present.   Absent right thyroid lobe    Cardiovascular: Normal rate, regular " rhythm, normal heart sounds and intact distal pulses.  Exam reveals no gallop and no friction rub.    No murmur heard.  Pulmonary/Chest: Effort normal and breath sounds normal. No stridor. No respiratory distress. She has no wheezes. She has no rales. She exhibits no tenderness.   Abdominal: Soft. Bowel sounds are normal. She exhibits no distension and no mass. There is no tenderness. There is no rebound and no guarding.   Musculoskeletal: Normal range of motion. She exhibits no tenderness or deformity.   Lymphadenopathy:     She has no cervical adenopathy.   Neurological: She is alert and oriented to person, place, and time. She displays normal reflexes. She exhibits normal muscle tone. Coordination normal.   Skin: No rash noted. No erythema. No pallor.   Psychiatric: She has a normal mood and affect. Her behavior is normal. Judgment and thought content normal.       Lab Review    Results for orders placed or performed in visit on 11/06/17   CBC Auto Differential   Result Value Ref Range    WBC 7.71 3.20 - 9.80 10*3/mm3    RBC 4.47 3.77 - 5.16 10*6/mm3    Hemoglobin 13.2 12.0 - 15.5 g/dL    Hematocrit 40.1 35.0 - 45.0 %    MCV 89.7 80.0 - 98.0 fL    MCH 29.5 26.5 - 34.0 pg    MCHC 32.9 31.4 - 36.0 g/dL    RDW 12.6 11.5 - 14.5 %    RDW-SD 41.0 36.4 - 46.3 fl    MPV 9.7 8.0 - 12.0 fL    Platelets 217 150 - 450 10*3/mm3    Neutrophil % 54.9 37.0 - 80.0 %    Lymphocyte % 32.0 10.0 - 50.0 %    Monocyte % 9.3 0.0 - 12.0 %    Eosinophil % 2.5 0.0 - 7.0 %    Basophil % 1.0 0.0 - 2.0 %    Immature Grans % 0.3 0.0 - 0.5 %    Neutrophils, Absolute 4.23 2.00 - 8.60 10*3/mm3    Lymphocytes, Absolute 2.47 0.60 - 4.20 10*3/mm3    Monocytes, Absolute 0.72 0.00 - 0.90 10*3/mm3    Eosinophils, Absolute 0.19 0.00 - 0.70 10*3/mm3    Basophils, Absolute 0.08 0.00 - 0.20 10*3/mm3    Immature Grans, Absolute 0.02 0.00 - 0.02 10*3/mm3   Comprehensive Metabolic Panel   Result Value Ref Range    Glucose 86 60 - 100 mg/dL    BUN 23 (H) 7 -  21 mg/dL    Creatinine 1.21 (H) 0.50 - 1.00 mg/dL    Sodium 139 137 - 145 mmol/L    Potassium 4.4 3.5 - 5.1 mmol/L    Chloride 101 95 - 110 mmol/L    CO2 27.0 22.0 - 31.0 mmol/L    Calcium 9.5 8.4 - 10.2 mg/dL    Total Protein 7.6 6.3 - 8.6 g/dL    Albumin 4.70 3.40 - 4.80 g/dL    ALT (SGPT) 40 9 - 52 U/L    AST (SGOT) 32 14 - 36 U/L    Alkaline Phosphatase 120 38 - 126 U/L    Total Bilirubin 0.4 0.2 - 1.3 mg/dL    eGFR Non  Amer 45 45 - 104 mL/min/1.73    Globulin 2.9 2.3 - 3.5 gm/dL    A/G Ratio 1.6 1.1 - 1.8 g/dL    BUN/Creatinine Ratio 19.0 7.0 - 25.0    Anion Gap 11.0 5.0 - 15.0 mmol/L   Vitamin B12   Result Value Ref Range    Vitamin B-12 810 239 - 931 pg/mL   TSH   Result Value Ref Range    TSH 1.450 0.460 - 4.680 mIU/mL   Vitamin D 25 Hydroxy   Result Value Ref Range    25 Hydroxy, Vitamin D 39.9 30.0 - 100.0 ng/ml           Assessment/Plan       ICD-10-CM ICD-9-CM   1. Nontoxic multinodular goiter E04.2 241.1   2. Essential hypertension I10 401.9   3. B12 deficiency E53.8 266.2   4. Age related osteoporosis, unspecified pathological fracture presence M81.0 733.01         toxic mng  sp right hemityroidectomy for progressive enlarging r thyroid nodule, pathology benign  left nodules stable in size, last US from 12-15    was on  mmi, now euthyroid off tx    we will still do one more US , since there is low risk of malignancy ( hot nodules )  then follow clinically       ==    HTN , palpitations    on amlodipine 5 mg qam     lopressor 25 mg  Bid   labs q 3 m but meet in 12m    ==    osteoporosis     Followed by rheumatology    Was on boniva but completed 10 y approx      I reviewed and summarized records from Jeff Shah MD from 2016 and I reviewed / ordered labs.     No orders of the defined types were placed in this encounter.        A copy of my note was sent to Jeff Shah MD    Please see my above opinion and suggestions.

## 2017-12-08 ENCOUNTER — HOSPITAL ENCOUNTER (OUTPATIENT)
Dept: ULTRASOUND IMAGING | Facility: HOSPITAL | Age: 67
Discharge: HOME | End: 2017-12-08
Attending: INTERNAL MEDICINE | Admitting: INTERNAL MEDICINE

## 2017-12-08 PROCEDURE — 76536 US EXAM OF HEAD AND NECK: CPT

## 2018-03-27 RX ORDER — FOLIC ACID 1 MG/1
TABLET ORAL
Qty: 90 TABLET | Refills: 1 | Status: SHIPPED | OUTPATIENT
Start: 2018-03-27 | End: 2018-09-27 | Stop reason: SDUPTHER

## 2018-05-02 ENCOUNTER — OFFICE VISIT (OUTPATIENT)
Dept: PODIATRY | Facility: CLINIC | Age: 68
End: 2018-05-02

## 2018-05-02 VITALS
DIASTOLIC BLOOD PRESSURE: 89 MMHG | HEIGHT: 69 IN | SYSTOLIC BLOOD PRESSURE: 136 MMHG | BODY MASS INDEX: 28.53 KG/M2 | HEART RATE: 87 BPM | OXYGEN SATURATION: 98 % | WEIGHT: 192.6 LBS

## 2018-05-02 DIAGNOSIS — M79.2 NERVE PAIN: ICD-10-CM

## 2018-05-02 DIAGNOSIS — M79.675 CHRONIC PAIN OF TOE OF LEFT FOOT: Primary | ICD-10-CM

## 2018-05-02 DIAGNOSIS — G89.29 CHRONIC PAIN OF TOE OF LEFT FOOT: Primary | ICD-10-CM

## 2018-05-02 PROCEDURE — 99203 OFFICE O/P NEW LOW 30 MIN: CPT | Performed by: PODIATRIST

## 2018-05-02 NOTE — PROGRESS NOTES
Myra Charles  1950  67 y.o. female   PCP- Dr. Shah  Patient presents today for left 3rd toe pain. Patient states she thinks it could be nerve pain.    05/02/2018    Chief Complaint   Patient presents with   • Left Foot - Toe Pain       History of Present Illness    Myra Charles is a 67 y.o.female presents with chief complaint of left foot pain.  Pain is located to the underside of the third digit.  Rates the pain as a 3 out of 10.  She describes it as burning and numbness pain.  Pain has been present for several years.  Pain is aggravated with weightbearing and pressure. She has history of cyst removal under the third digit back in 2009.  States that this has been a problem since the surgery.  She has done nothing to treat it.  She denies any new injuries to the toe. She has no other pedal complaints.       Past Medical History:   Diagnosis Date   • Backache 02/17/2016    Low back pain, in sacral region      • Benign hypertension 02/17/2016   • Cortical senile cataract 02/17/2016   • Essential (primary) hypertension 04/20/2016   • GERD (gastroesophageal reflux disease) 02/17/2016   • Hallux valgus     Deformity, w/inflammation      • Iron deficiency anemia    • Lung nodule, solitary 03/08/2012    Solitary nodule of lung - RIGHT upper lobe 15mm, PET positive      • Malignant carcinoid tumor of lung 02/17/2016    Atypical carcinoid tumor of the LEFT lung treated in March 2012     • Mitral valve prolapse 02/17/2016   • Osteoporosis 04/20/2016   • Primary fibromyalgia syndrome 02/17/2016   • Rosacea 02/17/2016   • Seborrheic keratosis     History of, upper and lower back      • 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 Enodscopic vein harvest on the left. Left lower extremity arteriogram.)   • CERVICAL CONIZATION      CONIZATION OF CERVIX 33434 (Excisional laser cone biopsy and vaporization of cervix.)   • CHOLECYSTECTOMY  09/07/2005    Cholecystectomy, laparoscopic (With intraoperative cholangiogram.)   • CYST REMOVAL      left 3rd toe   • EXCISION LESION  11/11/1998    REMOVE LESION BACK OR FLANK 50740 (Excision times two.)   • FOOT SURGERY  01/20/2009    Foot/toes surgery procedure (Soft tissue mass, left foot. Excision of)   • HERNIA REPAIR      Past history of umbilical herni repair.   • OTHER SURGICAL HISTORY  12/04/2012    Anesth, elbow area surgery (Manipulation of left elbow.)   • OTHER SURGICAL HISTORY  03/05/2012    Anesth, elbow area surgery (Excision of plate and screws from olecranon bursa. Retained plate and screws, olecranon bursitis of previous fracture left elbow.)   • OTHER SURGICAL HISTORY  10/16/2012    Treat elbow fracture (Open reduction and internal fixation.)   • THORACOSCOPY  03/14/2012    Thoracoscopy, surgical (Bronchoscopy,LVATS (wedge resect MARYBETH), LULobectomy Thoracic lymphadenectomy)   • THROAT SURGERY  07/25/2012    Throat surgery procedure (Thyroplasty type I (vocal cord medialozation & larynooplasty) Left vocal cord paralysis. Dysphoria. [facility] by Dr Tk Heart)   • THYROID SURGERY  03/22/2016    Thyroid surgery (Right thyroid lobectomy and isthmusectomy.)   • TONSILLECTOMY  02/03/1956    Chronic tonsillitis   • VEIN LIGATION  09/28/2000    PHLEB VEINS - EXTREM (20+) 64521 (High ligation of ling saphenous vein, left, plus multiple phlebectomies, left lower extremity.)         Family History   Problem Relation Age of Onset   • Cancer Mother    • Diabetes Mother    • Heart disease Mother    •  "Stroke Mother    • Heart disease Father    • Lung cancer Maternal Grandfather    • Lung cancer Other        Allergies   Allergen Reactions   • Evista [Raloxifene]    • Gabapentin      HA/sedation   • Lyrica [Pregabalin]        Social History     Social History   • Marital status:      Spouse name: N/A   • Number of children: N/A   • Years of education: N/A     Occupational History   • Not on file.     Social History Main Topics   • Smoking status: Never Smoker   • Smokeless tobacco: Never Used   • Alcohol use Yes      Comment: OCCASIONAL WINE   • Drug use: No   • Sexual activity: Defer     Other Topics Concern   • Not on file     Social History Narrative   • No narrative on file         Current Outpatient Prescriptions   Medication Sig Dispense Refill   • amLODIPine (NORVASC) 5 MG tablet Take 5 mg by mouth daily.     • ASCORBIC ACID PO Take 1 tablet by mouth daily.     • ASPIRIN PO Take 81 mg by mouth Daily. YSP Aspirin oral  (unconfirmed      • Calcium Citrate-Vitamin D (CITRACAL + D PO) Take 1 tablet by mouth 2 (two) times a day.     • cholecalciferol (VITAMIN D3) 1000 UNITS tablet Take 1,000 Units by mouth daily.     • Cyanocobalamin (VITAMIN B-12 PO) Take 1 tablet by mouth 1 (one) time per week.     • cyclobenzaprine (FLEXERIL) 10 MG tablet Take 10 mg by mouth at night as needed for muscle spasms.     • DULoxetine (CYMBALTA) 60 MG capsule Take 60 mg by mouth daily.     • folic acid (FOLVITE) 1 MG tablet TAKE ONE TABLET BY MOUTH ONCE DAILY 90 tablet 1   • metoprolol succinate XL (TOPROL-XL) 25 MG 24 hr tablet Take 25 mg by mouth 2 (two) times a day.     • Multiple Vitamins-Minerals (PRESERVISION AREDS 2 PO) 2 (Two) Times a Day.     • OMEPRAZOLE PO Take 40 mg by mouth Daily.     • ranitidine (ZANTAC) 150 MG tablet Take 150 mg by mouth daily.       No current facility-administered medications for this visit.          OBJECTIVE    /89   Pulse 87   Ht 175.3 cm (69.02\")   Wt 87.4 kg (192 lb 9.6 oz)   " SpO2 98%   BMI 28.43 kg/m²       Review of Systems   Constitutional: Negative.    HENT: Negative.    Eyes: Positive for visual disturbance.   Respiratory: Positive for shortness of breath.    Cardiovascular: Negative.    Gastrointestinal: Negative.    Endocrine: Negative.    Genitourinary: Negative.    Musculoskeletal:        Foot pain   Skin: Negative.    Allergic/Immunologic: Negative.    Neurological: Negative.    Hematological: Negative.    Psychiatric/Behavioral: Negative.            Physical Exam   Constitutional: She is oriented to person, place, and time. She appears well-developed and well-nourished. No distress.   HENT:   Head: Normocephalic and atraumatic.   Nose: Nose normal.   Eyes: Conjunctivae are normal. Pupils are equal, round, and reactive to light.   Pulmonary/Chest: Effort normal. No respiratory distress. She has no wheezes.   Neurological: She is alert and oriented to person, place, and time. She has normal reflexes.   Skin: She is not diaphoretic.   Psychiatric: She has a normal mood and affect. Her behavior is normal.   Vitals reviewed.      Gait: normal    Assistive Device: none    Lower Extremity    Cardiovascular:    DP/PT pulses palpable    Pedal hair growth present.   No erythema or edema noted     Musculoskeletal:  Muscle strength is 5/5 for all muscle groups tested   ROM of the 1st MTP is full without pain or crepitus  ROM of the MTJ is full without pain or crepitus    ROM of the STJ is full without pain or crepitus    ROM of the ankle joint is full without pain or crepitus      Dermatological:   Webspaces 1-4 bilateral are clean, dry and intact.   No subcutaneous nodules or masses noted    No open wounds noted     Neurological:   Protective sensation decreased to the plantar surface and distal tuft of the left third digit  Sensation intact to light touch          Procedures        ASSESSMENT AND PLAN    Myra was seen today for toe pain.    Diagnoses and all orders for this  visit:    Chronic pain of toe of left foot    Nerve pain      - Comprehensive foot and ankle exam performed.   - Diagnosis, etiology and treatment of patients nerve pain discussed in detail. Treatment options discussed including steroid injection vs pain cream. Patient elected for pain cream  - rx for compounded pain cream  - All questions were answered to the patients satisfaction.  - RTC 4 weeks as needed           This document has been electronically signed by Damian Rock DPM on May 2, 2018 2:22 PM     5/2/2018  2:22 PM

## 2018-05-14 ENCOUNTER — OFFICE VISIT (OUTPATIENT)
Dept: CARDIOLOGY | Facility: CLINIC | Age: 68
End: 2018-05-14

## 2018-05-14 VITALS
BODY MASS INDEX: 28.14 KG/M2 | SYSTOLIC BLOOD PRESSURE: 120 MMHG | DIASTOLIC BLOOD PRESSURE: 82 MMHG | HEIGHT: 69 IN | HEART RATE: 86 BPM | WEIGHT: 190 LBS

## 2018-05-14 DIAGNOSIS — I10 ESSENTIAL HYPERTENSION: ICD-10-CM

## 2018-05-14 DIAGNOSIS — R00.2 PALPITATIONS: Primary | ICD-10-CM

## 2018-05-14 PROCEDURE — 99213 OFFICE O/P EST LOW 20 MIN: CPT | Performed by: INTERNAL MEDICINE

## 2018-05-14 NOTE — PROGRESS NOTES
[facility] Cardiology  OFFICE NOTE    Myra Charles  67 y.o. female    05/14/2018  1. Palpitations    2. Essential hypertension        Chief complaint -Palpitations      History of present Illness- 67-year-old lady who has history of palpitations and was on Tapazole for hyperthyroidism, after she had hemithyroidectomy she has done well and she is off of Tapazole and she is not requiring any Synthroid supplements either and is being followed by Dr. Lewis Caraballo.  She is on Toprol-XL and amlodipine for blood pressure she do get occasional palpitations when she lies on the left side.  She is under stress as her  was diagnosed with Lewy body dementia.  She also has history of carcinoid in the lung and has been followed by Dr. Waite and has been cancer free.  She denies any chest pain or shortness of breath.  No GI symptoms of diarrhea or flushing.              Allergies   Allergen Reactions   • Evista [Raloxifene]    • Gabapentin      HA/sedation   • Lyrica [Pregabalin]          Past Medical History:   Diagnosis Date   • Backache 02/17/2016    Low back pain, in sacral region      • Benign hypertension 02/17/2016   • Cortical senile cataract 02/17/2016   • Essential (primary) hypertension 04/20/2016   • GERD (gastroesophageal reflux disease) 02/17/2016   • Hallux valgus     Deformity, w/inflammation      • Iron deficiency anemia    • Lung nodule, solitary 03/08/2012    Solitary nodule of lung - RIGHT upper lobe 15mm, PET positive      • Malignant carcinoid tumor of lung 02/17/2016    Atypical carcinoid tumor of the LEFT lung treated in March 2012     • Mitral valve prolapse 02/17/2016   • Osteoporosis 04/20/2016   • Primary fibromyalgia syndrome 02/17/2016   • Rosacea 02/17/2016   • Seborrheic keratosis     History of, upper and lower back      • 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 Enodscopic vein harvest on the left. Left lower extremity arteriogram.)   • CERVICAL CONIZATION      CONIZATION OF CERVIX 70784 (Excisional laser cone biopsy and vaporization of cervix.)   • CHOLECYSTECTOMY  09/07/2005    Cholecystectomy, laparoscopic (With intraoperative cholangiogram.)   • CYST REMOVAL      left 3rd toe   • EXCISION LESION  11/11/1998    REMOVE LESION BACK OR FLANK 01303 (Excision times two.)   • FOOT SURGERY  01/20/2009    Foot/toes surgery procedure (Soft tissue mass, left foot. Excision of)   • HERNIA REPAIR      Past history of umbilical herni repair.   • OTHER SURGICAL HISTORY  12/04/2012    Anesth, elbow area surgery (Manipulation of left elbow.)   • OTHER SURGICAL HISTORY  03/05/2012    Anesth, elbow area surgery (Excision of plate and screws from olecranon bursa. Retained plate and screws, olecranon bursitis of previous fracture left elbow.)   • OTHER SURGICAL HISTORY  10/16/2012    Treat elbow fracture (Open reduction and internal fixation.)   • THORACOSCOPY  03/14/2012    Thoracoscopy, surgical (Bronchoscopy,LVATS (wedge resect MARYBETH), LULobectomy Thoracic lymphadenectomy)   • THROAT SURGERY  07/25/2012    Throat surgery procedure (Thyroplasty type I (vocal cord medialozation & larynooplasty) Left vocal cord paralysis. Dysphoria. [facility] by Dr Tk Heart)   • THYROID SURGERY  03/22/2016    Thyroid surgery (Right thyroid lobectomy and isthmusectomy.)   • TONSILLECTOMY  02/03/1956    Chronic tonsillitis   • VEIN LIGATION  09/28/2000    PHLEB VEINS - EXTREM (20+) 94124 (High ligation of ling saphenous vein,  left, plus multiple phlebectomies, left lower extremity.)         Family History   Problem Relation Age of Onset   • Cancer Mother    • Diabetes Mother    • Heart disease Mother    • Stroke Mother    • Heart disease Father    • Lung cancer Maternal Grandfather    • Lung cancer Other          Social History     Social History   • Marital status:      Spouse name: N/A   • Number of children: N/A   • Years of education: N/A     Occupational History   • Not on file.     Social History Main Topics   • Smoking status: Never Smoker   • Smokeless tobacco: Never Used   • Alcohol use Yes      Comment: OCCASIONAL WINE   • Drug use: No   • Sexual activity: Defer     Other Topics Concern   • Not on file     Social History Narrative   • No narrative on file         Current Outpatient Prescriptions   Medication Sig Dispense Refill   • amLODIPine (NORVASC) 5 MG tablet Take 5 mg by mouth daily.     • ASCORBIC ACID PO Take 1 tablet by mouth daily.     • ASPIRIN PO Take 81 mg by mouth Daily. YSP Aspirin oral  (unconfirmed      • Calcium Citrate-Vitamin D (CITRACAL + D PO) Take 1 tablet by mouth 2 (two) times a day.     • cholecalciferol (VITAMIN D3) 1000 UNITS tablet Take 1,000 Units by mouth daily.     • Cyanocobalamin (VITAMIN B-12 PO) Take 1 tablet by mouth 1 (one) time per week.     • cyclobenzaprine (FLEXERIL) 10 MG tablet Take 10 mg by mouth at night as needed for muscle spasms.     • DULoxetine (CYMBALTA) 60 MG capsule Take 60 mg by mouth daily.     • folic acid (FOLVITE) 1 MG tablet TAKE ONE TABLET BY MOUTH ONCE DAILY 90 tablet 1   • Lidocaine-Prilocaine, Bulk, 2.5-2.5 % cream 3 (Three) Times a Day.     • metoprolol succinate XL (TOPROL-XL) 25 MG 24 hr tablet Take 25 mg by mouth 2 (two) times a day.     • Multiple Vitamins-Minerals (PRESERVISION AREDS 2 PO) 2 (Two) Times a Day.     • OMEPRAZOLE PO Take 40 mg by mouth Daily.     • ranitidine (ZANTAC) 150 MG tablet Take 150 mg by mouth daily.       No current  "facility-administered medications for this visit.          Review of Systems     Constitution: Denies any fatigue, fever or chills    HENT: Denies any headache, hearing impairment,     Eyes: Denies any blurring of vision, or photophobia     Cardivascular - As per history of present illness     Respiratory system-History of carcinoid of the lung in remission     Endocrine:  History of hypothyroidism now corrected after partial thyroidectomy      Musculoskeletal:  history of arthritis with musculoskeletal problems    Gastrointestinal: No nausea, vomiting, or melena    Genitourinary: No dysuria or hematuria    Neurological:   No history of seizure disorder, stroke, memory problems    Psychiatric/Behavioral:        No history of depression    Hematological- no history of easy bruising or any bleeding diathesis            OBJECTIVE    /82   Pulse 86   Ht 175.3 cm (69.02\")   Wt 86.2 kg (190 lb)   BMI 28.05 kg/m²       Physical Exam     Constitutional: is oriented to person, place, and time.     Skin-warm and dry    Well developed and nourished in no acute distress      Head: Normocephalic and atraumatic.     Eyes: Pupils are equal    Neck: Neck supple. No bruit in the carotids    Cardiovascular: [place] in the fifth intercostal space   Regular rate, and  Rhythm,    S1 greater than S2, no S3 or S4, no gallop     Pulmonary/Chest:   Air  Entry is equal on both sides  No wheezing or crackles,      Abdominal: Soft.  No hepatosplenomegaly, bowel sounds are present    Musculoskeletal: No kyphoscoliosis, no significant thickening of the joints    Neurological: is alert and oriented to person, place, and time.    cranial nerve are intact .   No motor or sensory deficit    Extremities-no edema, no radial femoral delay      Psychiatric: He has a normal mood and affect.                  His behavior is normal.           Procedures      Lab Results   Component Value Date    WBC 7.71 11/16/2017    HGB 13.2 11/16/2017    HCT 40.1 " 11/16/2017    MCV 89.7 11/16/2017     11/16/2017     Lab Results   Component Value Date    GLUCOSE 86 11/16/2017    BUN 23 (H) 11/16/2017    CREATININE 1.21 (H) 11/16/2017    EGFRIFNONA 45 11/16/2017    BCR 19.0 11/16/2017    CO2 27.0 11/16/2017    CALCIUM 9.5 11/16/2017    ALBUMIN 4.70 11/16/2017    LABIL2 1.6 11/16/2017    AST 32 11/16/2017    ALT 40 11/16/2017                  A/P    Palpitations reasonably well controlled with Toprol-XL and has gotten better after partial hemithyroidectomy for her hyperthyroidism.  And her thyroid level has been normal and is being followed by Dr. Lewis Caraballo.    Hypertension controlled with amlodipine.    History of depression and anxiety on Cymbalta.    History of carcinoid of the lungs stable currently cancer free followed by Dr. Waite.    Follow-up in 1 year            This document has been electronically signed by Tomy Pathak MD on May 14, 2018 11:37 AM       EMR Dragon/Transcription disclaimer:   Some of this note may be an electronic transcription/translation of spoken language to printed text. The electronic translation of spoken language may permit erroneous, or at times, nonsensical words or phrases to be inadvertently transcribed; Although I have reviewed the note for such errors, some may still exist.

## 2018-06-27 DIAGNOSIS — C34.12 MALIGNANT NEOPLASM OF UPPER LOBE OF LEFT LUNG (HCC): Primary | ICD-10-CM

## 2018-07-05 ENCOUNTER — LAB (OUTPATIENT)
Dept: ONCOLOGY | Facility: HOSPITAL | Age: 68
End: 2018-07-05

## 2018-07-05 ENCOUNTER — OFFICE VISIT (OUTPATIENT)
Dept: ONCOLOGY | Facility: CLINIC | Age: 68
End: 2018-07-05

## 2018-07-05 VITALS
TEMPERATURE: 97.9 F | RESPIRATION RATE: 18 BRPM | WEIGHT: 189.6 LBS | HEART RATE: 71 BPM | BODY MASS INDEX: 28.08 KG/M2 | DIASTOLIC BLOOD PRESSURE: 84 MMHG | HEIGHT: 69 IN | SYSTOLIC BLOOD PRESSURE: 145 MMHG

## 2018-07-05 DIAGNOSIS — C34.12 MALIGNANT NEOPLASM OF UPPER LOBE OF LEFT LUNG (HCC): ICD-10-CM

## 2018-07-05 DIAGNOSIS — R79.89 ELEVATED FERRITIN: Primary | ICD-10-CM

## 2018-07-05 LAB
FERRITIN SERPL-MCNC: 359 NG/ML (ref 11.1–264)
FOLATE SERPL-MCNC: >20 NG/ML (ref 2.76–21)
IRON 24H UR-MRATE: 94 MCG/DL (ref 37–170)
IRON SATN MFR SERPL: 37 % (ref 15–50)
TIBC SERPL-MCNC: 257 MCG/DL (ref 265–497)
VIT B12 BLD-MCNC: 588 PG/ML (ref 239–931)

## 2018-07-05 PROCEDURE — 82607 VITAMIN B-12: CPT | Performed by: INTERNAL MEDICINE

## 2018-07-05 PROCEDURE — G0463 HOSPITAL OUTPT CLINIC VISIT: HCPCS | Performed by: INTERNAL MEDICINE

## 2018-07-05 PROCEDURE — 82728 ASSAY OF FERRITIN: CPT | Performed by: INTERNAL MEDICINE

## 2018-07-05 PROCEDURE — 1123F ACP DISCUSS/DSCN MKR DOCD: CPT | Performed by: INTERNAL MEDICINE

## 2018-07-05 PROCEDURE — 82746 ASSAY OF FOLIC ACID SERUM: CPT | Performed by: INTERNAL MEDICINE

## 2018-07-05 PROCEDURE — 83550 IRON BINDING TEST: CPT | Performed by: INTERNAL MEDICINE

## 2018-07-05 PROCEDURE — 99213 OFFICE O/P EST LOW 20 MIN: CPT | Performed by: INTERNAL MEDICINE

## 2018-07-05 PROCEDURE — 83540 ASSAY OF IRON: CPT | Performed by: INTERNAL MEDICINE

## 2018-07-05 NOTE — PROGRESS NOTES
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  Patient is here for 1 year follow up appointment today.Complains of chronic shortness of breath.  Denies any blood in the stool or urine.    PAST MEDICAL HISTORY:    Past Medical History:   Diagnosis Date   • Backache 02/17/2016    Low back pain, in sacral region      • Benign hypertension 02/17/2016   • Cortical senile cataract 02/17/2016   • Essential (primary) hypertension 04/20/2016   • GERD (gastroesophageal reflux disease) 02/17/2016   • Hallux valgus     Deformity, w/inflammation      • Iron deficiency anemia    • Lung nodule, solitary 03/08/2012    Solitary nodule of lung - RIGHT upper lobe 15mm, PET positive      • Malignant carcinoid tumor of lung (CMS/HCC) 02/17/2016    Atypical carcinoid tumor of the LEFT lung treated in March 2012     • Mitral valve prolapse 02/17/2016   • Osteoporosis 04/20/2016   • Primary fibromyalgia syndrome 02/17/2016   • Rosacea 02/17/2016   • Seborrheic keratosis     History of, upper and lower back      • 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 04/20/2016   • 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 02/17/2016    Paralysis of vocal cords and larynx, unilateral - LEFT side      • Varicose vein    • Vitamin D deficiency 04/20/2016   • Vitreous detachment 12/01/2014   • Vitreous floaters 12/01/2014       SOCIAL HISTORY:    Social History   Substance Use Topics   • Smoking status: Never Smoker   • Smokeless tobacco: Never Used   • Alcohol use Yes      Comment: OCCASIONAL WINE       Surgical History :  Past Surgical History:    Procedure Laterality Date   • ARTERIAL BYPASS SURGERY  09/25/2012    Artery bypass graft (Left femoral to posterior tibial artery bypass. Enodscopic vein harvest on the left. Left lower extremity arteriogram.)   • CERVICAL CONIZATION      CONIZATION OF CERVIX 43557 (Excisional laser cone biopsy and vaporization of cervix.)   • CHOLECYSTECTOMY  09/07/2005    Cholecystectomy, laparoscopic (With intraoperative cholangiogram.)   • CYST REMOVAL      left 3rd toe   • EXCISION LESION  11/11/1998    REMOVE LESION BACK OR FLANK 05948 (Excision times two.)   • FOOT SURGERY  01/20/2009    Foot/toes surgery procedure (Soft tissue mass, left foot. Excision of)   • HERNIA REPAIR      Past history of umbilical herni repair.   • OTHER SURGICAL HISTORY  12/04/2012    Anesth, elbow area surgery (Manipulation of left elbow.)   • OTHER SURGICAL HISTORY  03/05/2012    Anesth, elbow area surgery (Excision of plate and screws from olecranon bursa. Retained plate and screws, olecranon bursitis of previous fracture left elbow.)   • OTHER SURGICAL HISTORY  10/16/2012    Treat elbow fracture (Open reduction and internal fixation.)   • THORACOSCOPY  03/14/2012    Thoracoscopy, surgical (Bronchoscopy,LVATS (wedge resect MARYBETH), LULobectomy Thoracic lymphadenectomy)   • THROAT SURGERY  07/25/2012    Throat surgery procedure (Thyroplasty type I (vocal cord medialozation & larynooplasty) Left vocal cord paralysis. Dysphoria. [facility] by Dr Tk Heart)   • THYROID SURGERY  03/22/2016    Thyroid surgery (Right thyroid lobectomy and isthmusectomy.)   • TONSILLECTOMY  02/03/1956    Chronic tonsillitis   • VEIN LIGATION  09/28/2000    PHLEB VEINS - EXTREM (20+) 17656 (High ligation of ling saphenous vein, left, plus multiple phlebectomies, left lower extremity.)       ALLERGIES:    Allergies   Allergen Reactions   • Evista [Raloxifene]    • Gabapentin      HA/sedation   • Lyrica [Pregabalin]        REVIEW OF SYSTEMS:      CONSTITUTIONAL:  "Complains of fatigue. Denies any fever, chills or weight loss.      HEENT: No epistaxis, mouth sores or difficulty swallowing.     RESPIRATORY: Complains of chronic shortness of breath since her lobectomy in 2012. No new cough or hemoptysis.     CARDIOVASCULAR: No chest pain or palpitations.     GASTROINTESTINAL: No abdominal pain nausea, vomiting or blood in the stool.     GENITOURINARY: No Dysuria or Hematuria.     MUSCULOSKELETAL: Complains of diffuse body pain consistent with fibromyalgia.     LYMPHATICS: Denies any abnormal swollen glands anywhere in the body.     NEUROLOGICAL : No tingling or numbness. No headache or dizziness. No seizures or balance problems.      PHYSICAL EXAMINATION:      VITAL SIGNS:  /84   Pulse 71   Temp 97.9 °F (36.6 °C) (Temporal Artery )   Resp 18   Ht 175.3 cm (69.02\")   Wt 86 kg (189 lb 9.6 oz)   BMI 27.99 kg/m²     GENERAL:  Not in any distress.    HEENT:  Normocephalic, Atraumatic.Mild Conjunctival pallor. No icterus. Extraocular Movements Intact. No Facial Asymmetry noted.    NECK:  No adenopathy. No JVD.    RESPIRATORY:  Fair air entry bilateral. No rhonchi or wheezing.    CARDIOVASCULAR:  S1, S2. Regular rate and rhythm. No murmur or gallop appreciated.    ABDOMEN:  Soft, obese, nontender. Bowel sounds present in all four quadrants.  No organomegaly appreciated.    EXTREMITIES:  No edema.No Calf Tenderness.    NEUROLOGIC:  Alert, awake and oriented ×3.  No  Motor or sensory deficit appreciated. Cranial Nerves 2-12 grossly intact.    SKIN: No new skin lesions.        DIAGNOSTIC DATA:    Glucose   Date Value Ref Range Status   11/16/2017 86 60 - 100 mg/dL Final     Sodium   Date Value Ref Range Status   11/16/2017 139 137 - 145 mmol/L Final     Potassium   Date Value Ref Range Status   11/16/2017 4.4 3.5 - 5.1 mmol/L Final     CO2   Date Value Ref Range Status   11/16/2017 27.0 22.0 - 31.0 mmol/L Final     Chloride   Date Value Ref Range Status   11/16/2017 101 95 - " 110 mmol/L Final     Anion Gap   Date Value Ref Range Status   11/16/2017 11.0 5.0 - 15.0 mmol/L Final     Creatinine   Date Value Ref Range Status   11/16/2017 1.21 (H) 0.50 - 1.00 mg/dL Final     BUN   Date Value Ref Range Status   11/16/2017 23 (H) 7 - 21 mg/dL Final     BUN/Creatinine Ratio   Date Value Ref Range Status   11/16/2017 19.0 7.0 - 25.0 Final     Calcium   Date Value Ref Range Status   11/16/2017 9.5 8.4 - 10.2 mg/dL Final     eGFR Non  Amer   Date Value Ref Range Status   11/16/2017 45 45 - 104 mL/min/1.73 Final     Alkaline Phosphatase   Date Value Ref Range Status   11/16/2017 120 38 - 126 U/L Final     Total Protein   Date Value Ref Range Status   11/16/2017 7.6 6.3 - 8.6 g/dL Final     ALT (SGPT)   Date Value Ref Range Status   11/16/2017 40 9 - 52 U/L Final     AST (SGOT)   Date Value Ref Range Status   11/16/2017 32 14 - 36 U/L Final     Total Bilirubin   Date Value Ref Range Status   11/16/2017 0.4 0.2 - 1.3 mg/dL Final     Albumin   Date Value Ref Range Status   11/16/2017 4.70 3.40 - 4.80 g/dL Final     Globulin   Date Value Ref Range Status   11/16/2017 2.9 2.3 - 3.5 gm/dL Final     A/G Ratio   Date Value Ref Range Status   11/16/2017 1.6 1.1 - 1.8 g/dL Final     Lab Results   Component Value Date    WBC 7.71 11/16/2017    HGB 13.2 11/16/2017    HCT 40.1 11/16/2017    MCV 89.7 11/16/2017     11/16/2017     Lab Results   Component Value Date    NEUTROABS 4.23 11/16/2017    IRON 94 07/05/2018    TIBC 257 (L) 07/05/2018    LABIRON 37 07/05/2018    FERRITIN 359.00 (H) 07/05/2018    [id number] 588 07/05/2018    FOLATE >20.00 07/05/2018     Radiology Data :  CT of chest without contrast done on November 3, 2016 showed:      There is enlarged and heterogeneous thyroid gland. The cardiac  silhouette is within normal limits. No mediastinal lymphadenopathy is  seen. There is calcified granuloma in the left hilum. There is  evidence of left upper lobe lobectomy. There is mild  pleural  thickening in the lung apices. Otherwise lungs are clear. No pleural  effusion is identified.      No acute abnormality is seen in the upper abdomen. No acute bony  abnormality is seen.      CONCLUSION- No residual or recurrent tumor is identified.  No mediastinal lymphadenopathy is seen.           Pathology :  Pathology report from March 2012 showed:  FINAL DIAGNOSIS:   A.  WEDGE, UPPER LOBE OF LEFT LUNG:            ATYPICAL CARCINOID TUMOR (1.8 CM), COMPLETELY EXCISED.   B.  UPPER LOBE, LEFT LUNG:            RECENT WEDGE EXCISIONAL SITE.            BRONCHIAL SURGICAL MARGIN NEGATIVE FOR TUMOR.            EIGHT (8) LOBAR LYMPH NODES (LEVEL 12) NEGATIVE FOR TUMOR.   C.  LYMPH NODE, LEVEL 6:            ONE (1) LYMPH NODE NEGATIVE FOR TUMOR.   D.  LYMPH NODE, LEVEL 8:            ONE (1) LYMPH NODE NEGATIVE FOR TUMOR.   E.  LYMPH NODES, LEVEL 9:            TWO (2) LYMPH NODES NEGATIVE FOR TUMOR.   F.  LYMPH NODE, LEVEL 10:            ONE (1) LYMPH NODE NEGATIVE FOR TUMOR.   G.  LYMPH NODE, LEVEL 7:            ONE (1) LYMPH NODE NEGATIVE FOR TUMOR.          Comment   COMMENT:   Immunostains (block A2) have atypical carcinoid tumor positive for   synaptophysin, positive for chromogranin A, few cells positive for S-100   protein, strongly positive for thyroid transcription factor-1 (TTF-1)   and positive for cytokeratin (AE1/AE3).  Dr. Cheney reviewed the   histologic slides and concurs with the above diagnoses.   Synoptic report, lung, resection:        Specimen type:  Upper lobe of left lung.        Procedure:  Lobectomy.        Specimen integrity:  Intact.        Tumor site:  Upper lobe.        Tumor size:  1.8 x 1.5 x 1.5 cm.        Tumor focality:  Unifocal.        Histologic type:  Atypical carcinoid tumor.        Histologic grade:  Not applicable.        Visceral pleural invasion:  Not identified.        Direct tumor extension into extrapulmonary structures:  Not   applicable.        Bronchial margin:   Uninvolved by invasive carcinoma.        Vascular margin:  Uninvolved by invasive carcinoma.        Parenchymal (stapled) margin:  Uninvolved by invasive carcinoma.        Parietal pleural margin:  Not applicable.        Chest wall margin:  Not applicable.        Other attached tissue margin:  Not applicable.        Distance to closest margin:  3.0 cm from bronchial margin.        Neoadjuvant treatment effect:  Not applicable.        Lymph-vascular invasion:  Not identified.            Pathologic staging (pTNM):             Primary tumor:  pT1a.             Regional lymph nodes:  pN0.                  Number examined:  14.                  Number involved:  0.   The atypical carcinoid tumor has an average of 2 mitotic figures per 10   high power fields (12 mitotic figures per 60 high power fields).   Necrosis is not identified.                     ASSESSMENT AND PLAN:      1. Elevated ferritin:Blood work done on June 22, 2017.  Patient's ferritin was elevated at 454.  However iron saturation is only 18% which is normal.  Workup was consistent with inflammatory process versus chronic disease.  Since her iron saturation is only 18% no need to do any genetic testing for hemochromatosis which was discussed with patient.  At this point recommend not taking any iron supplement.  Since she has a history of fibromyalgia as well as chronic kidney disease or ferritin might stay above normal limit.    -Ferritin level done earlier today on July 5, 2018 has decreased to 359.  We will continue monitoring clinically.  Will ask patient to return to clinic in 1 year with a repeat CBC and anemia workup to be done 2 days prior to next clinic visit.     2. History of atypical carcinoid of left upper lobe diagnosed in March 2012. Patient is status post left upper lobectomy. 14 lymph nodes were negative. Most recent CT scan done in November 2017 is negative for any recurrence. At this point recommend following up with   Mariann.     3. Hypertension     4. Chronic kidney disease     5. Fibromyalgia     6. Health maintenance: Patient does not smoke. Had a colonoscopy done in last 5 years.     7. Advance care planning: Patient has advance directive on file.         Mayur Neal MD  7/5/2018  8:01 PM        EMR Dragon/Transcription disclaimer:   Much of this encounter note is an electronic transcription/translation of spoken language to printed text. The electronic translation of spoken language may permit erroneous, or at times, nonsensical words or phrases to be inadvertently transcribed; Although I have reviewed the note for such errors, some may still exist.

## 2018-07-06 ENCOUNTER — TELEPHONE (OUTPATIENT)
Dept: ONCOLOGY | Facility: HOSPITAL | Age: 68
End: 2018-07-06

## 2018-07-06 NOTE — TELEPHONE ENCOUNTER
----- Message from Mayur Neal MD sent at 7/5/2018  8:04 PM CDT -----  Please let patient know that her ferritin level has decreased as compared to last year.  We'll recheck ferritin level prior to next clinic visit in one year.Thanks

## 2018-07-26 LAB
BH CV ECHO MEAS - ACS: 1.9 CM
BH CV ECHO MEAS - AO MAX PG (FULL): 1.6 MMHG
BH CV ECHO MEAS - AO MAX PG: 4.2 MMHG
BH CV ECHO MEAS - AO MEAN PG (FULL): 0 MMHG
BH CV ECHO MEAS - AO MEAN PG: 2 MMHG
BH CV ECHO MEAS - AO ROOT AREA (BSA CORRECTED): 1.3
BH CV ECHO MEAS - AO ROOT AREA: 5.7 CM^2
BH CV ECHO MEAS - AO ROOT DIAM: 2.7 CM
BH CV ECHO MEAS - AO V2 MAX: 102 CM/SEC
BH CV ECHO MEAS - AO V2 MEAN: 72.6 CM/SEC
BH CV ECHO MEAS - AO V2 VTI: 22.7 CM
BH CV ECHO MEAS - ASC AORTA: 2.7 CM
BH CV ECHO MEAS - AVA(I,A): 3.1 CM^2
BH CV ECHO MEAS - AVA(I,D): 3.1 CM^2
BH CV ECHO MEAS - AVA(V,A): 2.7 CM^2
BH CV ECHO MEAS - AVA(V,D): 2.7 CM^2
BH CV ECHO MEAS - BSA(HAYCOCK): 2.1 M^2
BH CV ECHO MEAS - BSA: 2 M^2
BH CV ECHO MEAS - BZI_BMI: 27.9 KILOGRAMS/M^2
BH CV ECHO MEAS - BZI_METRIC_HEIGHT: 175.3 CM
BH CV ECHO MEAS - BZI_METRIC_WEIGHT: 85.7 KG
BH CV ECHO MEAS - EDV(CUBED): 97.3 ML
BH CV ECHO MEAS - EDV(TEICH): 97.3 ML
BH CV ECHO MEAS - EF(CUBED): 69.4 %
BH CV ECHO MEAS - EF(TEICH): 61 %
BH CV ECHO MEAS - EPSS: 0.6 CM
BH CV ECHO MEAS - ESV(CUBED): 29.8 ML
BH CV ECHO MEAS - ESV(TEICH): 37.9 ML
BH CV ECHO MEAS - FS: 32.6 %
BH CV ECHO MEAS - IVS/LVPW: 0.56
BH CV ECHO MEAS - IVSD: 0.9 CM
BH CV ECHO MEAS - LA DIMENSION: 3.4 CM
BH CV ECHO MEAS - LA/AO: 1.3
BH CV ECHO MEAS - LV MASS(C)D: 217.4 GRAMS
BH CV ECHO MEAS - LV MASS(C)DI: 107.8 GRAMS/M^2
BH CV ECHO MEAS - LV MAX PG: 2.5 MMHG
BH CV ECHO MEAS - LV MEAN PG: 2 MMHG
BH CV ECHO MEAS - LV V1 MAX: 79.7 CM/SEC
BH CV ECHO MEAS - LV V1 MEAN: 59.4 CM/SEC
BH CV ECHO MEAS - LV V1 VTI: 20 CM
BH CV ECHO MEAS - LVIDD: 4.6 CM
BH CV ECHO MEAS - LVIDS: 3.1 CM
BH CV ECHO MEAS - LVOT AREA (M): 3.5 CM^2
BH CV ECHO MEAS - LVOT AREA: 3.5 CM^2
BH CV ECHO MEAS - LVOT DIAM: 2.1 CM
BH CV ECHO MEAS - LVPWD: 1.6 CM
BH CV ECHO MEAS - MR MAX PG: 97.2 MMHG
BH CV ECHO MEAS - MR MAX VEL: 493 CM/SEC
BH CV ECHO MEAS - MV A MAX VEL: 110 CM/SEC
BH CV ECHO MEAS - MV E MAX VEL: 84.4 CM/SEC
BH CV ECHO MEAS - MV E/A: 0.77
BH CV ECHO MEAS - PA MAX PG: 2.2 MMHG
BH CV ECHO MEAS - PA MEAN PG: 1 MMHG
BH CV ECHO MEAS - PA V2 MAX: 74.4 CM/SEC
BH CV ECHO MEAS - PA V2 MEAN: 54.2 CM/SEC
BH CV ECHO MEAS - PA V2 VTI: 16.7 CM
BH CV ECHO MEAS - RAP SYSTOLE: 5 MMHG
BH CV ECHO MEAS - RVDD: 2 CM
BH CV ECHO MEAS - RVSP: 22.5 MMHG
BH CV ECHO MEAS - SI(AO): 64.5 ML/M^2
BH CV ECHO MEAS - SI(CUBED): 33.5 ML/M^2
BH CV ECHO MEAS - SI(LVOT): 34.4 ML/M^2
BH CV ECHO MEAS - SI(TEICH): 29.5 ML/M^2
BH CV ECHO MEAS - SV(AO): 130 ML
BH CV ECHO MEAS - SV(CUBED): 67.5 ML
BH CV ECHO MEAS - SV(LVOT): 69.3 ML
BH CV ECHO MEAS - SV(TEICH): 59.4 ML
BH CV ECHO MEAS - TR MAX VEL: 209 CM/SEC
LV EF 2D ECHO EST: 63 %

## 2018-08-01 ENCOUNTER — DOCUMENTATION (OUTPATIENT)
Dept: CARDIOLOGY | Facility: CLINIC | Age: 68
End: 2018-08-01

## 2018-09-27 RX ORDER — FOLIC ACID 1 MG/1
TABLET ORAL
Qty: 90 TABLET | Refills: 1 | Status: SHIPPED | OUTPATIENT
Start: 2018-09-27 | End: 2019-03-29 | Stop reason: SDUPTHER

## 2018-12-04 ENCOUNTER — APPOINTMENT (OUTPATIENT)
Dept: LAB | Facility: HOSPITAL | Age: 68
End: 2018-12-04

## 2018-12-04 ENCOUNTER — OFFICE VISIT (OUTPATIENT)
Dept: ENDOCRINOLOGY | Facility: CLINIC | Age: 68
End: 2018-12-04

## 2018-12-04 VITALS
WEIGHT: 188.8 LBS | SYSTOLIC BLOOD PRESSURE: 126 MMHG | OXYGEN SATURATION: 98 % | DIASTOLIC BLOOD PRESSURE: 80 MMHG | BODY MASS INDEX: 27.96 KG/M2 | HEIGHT: 69 IN | HEART RATE: 79 BPM

## 2018-12-04 DIAGNOSIS — M81.0 AGE RELATED OSTEOPOROSIS, UNSPECIFIED PATHOLOGICAL FRACTURE PRESENCE: ICD-10-CM

## 2018-12-04 DIAGNOSIS — E04.2 NONTOXIC MULTINODULAR GOITER: Primary | ICD-10-CM

## 2018-12-04 DIAGNOSIS — I10 ESSENTIAL HYPERTENSION: ICD-10-CM

## 2018-12-04 DIAGNOSIS — E53.8 B12 DEFICIENCY: ICD-10-CM

## 2018-12-04 LAB
25(OH)D3 SERPL-MCNC: 52.7 NG/ML (ref 30–100)
ALBUMIN SERPL-MCNC: 4.5 G/DL (ref 3.4–4.8)
ALBUMIN/GLOB SERPL: 1.6 G/DL (ref 1.1–1.8)
ALP SERPL-CCNC: 108 U/L (ref 38–126)
ALT SERPL W P-5'-P-CCNC: 21 U/L (ref 9–52)
ANION GAP SERPL CALCULATED.3IONS-SCNC: 10 MMOL/L (ref 5–15)
AST SERPL-CCNC: 31 U/L (ref 14–36)
BASOPHILS # BLD AUTO: 0.05 10*3/MM3 (ref 0–0.2)
BASOPHILS NFR BLD AUTO: 0.6 % (ref 0–2)
BILIRUB SERPL-MCNC: 0.4 MG/DL (ref 0.2–1.3)
BUN BLD-MCNC: 25 MG/DL (ref 7–21)
BUN/CREAT SERPL: 18.9 (ref 7–25)
CALCIUM SPEC-SCNC: 9.4 MG/DL (ref 8.4–10.2)
CHLORIDE SERPL-SCNC: 102 MMOL/L (ref 95–110)
CO2 SERPL-SCNC: 28 MMOL/L (ref 22–31)
CREAT BLD-MCNC: 1.32 MG/DL (ref 0.5–1)
DEPRECATED RDW RBC AUTO: 42.5 FL (ref 36.4–46.3)
EOSINOPHIL # BLD AUTO: 0.14 10*3/MM3 (ref 0–0.7)
EOSINOPHIL NFR BLD AUTO: 1.8 % (ref 0–7)
ERYTHROCYTE [DISTWIDTH] IN BLOOD BY AUTOMATED COUNT: 12.8 % (ref 11.5–14.5)
GFR SERPL CREATININE-BSD FRML MDRD: 40 ML/MIN/1.73 (ref 45–104)
GLOBULIN UR ELPH-MCNC: 2.8 GM/DL (ref 2.3–3.5)
GLUCOSE BLD-MCNC: 57 MG/DL (ref 60–100)
HCT VFR BLD AUTO: 41.5 % (ref 35–45)
HGB BLD-MCNC: 13.6 G/DL (ref 12–15.5)
IMM GRANULOCYTES # BLD: 0.01 10*3/MM3 (ref 0–0.02)
IMM GRANULOCYTES NFR BLD: 0.1 % (ref 0–0.5)
LYMPHOCYTES # BLD AUTO: 2.2 10*3/MM3 (ref 0.6–4.2)
LYMPHOCYTES NFR BLD AUTO: 28 % (ref 10–50)
MCH RBC QN AUTO: 29.8 PG (ref 26.5–34)
MCHC RBC AUTO-ENTMCNC: 32.8 G/DL (ref 31.4–36)
MCV RBC AUTO: 91 FL (ref 80–98)
MONOCYTES # BLD AUTO: 0.57 10*3/MM3 (ref 0–0.9)
MONOCYTES NFR BLD AUTO: 7.2 % (ref 0–12)
NEUTROPHILS # BLD AUTO: 4.9 10*3/MM3 (ref 2–8.6)
NEUTROPHILS NFR BLD AUTO: 62.3 % (ref 37–80)
PLATELET # BLD AUTO: 243 10*3/MM3 (ref 150–450)
PMV BLD AUTO: 9.9 FL (ref 8–12)
POTASSIUM BLD-SCNC: 4.1 MMOL/L (ref 3.5–5.1)
PROT SERPL-MCNC: 7.3 G/DL (ref 6.3–8.6)
RBC # BLD AUTO: 4.56 10*6/MM3 (ref 3.77–5.16)
SODIUM BLD-SCNC: 140 MMOL/L (ref 137–145)
TSH SERPL DL<=0.05 MIU/L-ACNC: 1.91 MIU/ML (ref 0.46–4.68)
VIT B12 BLD-MCNC: 788 PG/ML (ref 239–931)
WBC NRBC COR # BLD: 7.87 10*3/MM3 (ref 3.2–9.8)

## 2018-12-04 PROCEDURE — 99214 OFFICE O/P EST MOD 30 MIN: CPT | Performed by: INTERNAL MEDICINE

## 2018-12-04 PROCEDURE — 84443 ASSAY THYROID STIM HORMONE: CPT | Performed by: INTERNAL MEDICINE

## 2018-12-04 PROCEDURE — 85025 COMPLETE CBC W/AUTO DIFF WBC: CPT | Performed by: INTERNAL MEDICINE

## 2018-12-04 PROCEDURE — 80053 COMPREHEN METABOLIC PANEL: CPT | Performed by: INTERNAL MEDICINE

## 2018-12-04 PROCEDURE — 36415 COLL VENOUS BLD VENIPUNCTURE: CPT | Performed by: INTERNAL MEDICINE

## 2018-12-04 PROCEDURE — 82306 VITAMIN D 25 HYDROXY: CPT | Performed by: INTERNAL MEDICINE

## 2018-12-04 PROCEDURE — 82607 VITAMIN B-12: CPT | Performed by: INTERNAL MEDICINE

## 2018-12-04 PROCEDURE — 83835 ASSAY OF METANEPHRINES: CPT | Performed by: INTERNAL MEDICINE

## 2018-12-04 NOTE — PROGRESS NOTES
Myra Charles is a 68 y.o. female who presents for  evaluation of   Chief Complaint   Patient presents with   • Goiter         Primary Care / Referring Provider  Jeff Shah MD  65 y o comes for cfu    history of lung cancer sp VATS , cured     reason toxic  MNG    thyroid scan 12-15 revealed increased bilateral uptake   right cold nodule inferior pole and progressive enlargement despite 2 FNA led to right thryoidectomy that was benign    left thyroid nodules are stable in size dating back to 2013    Last US 12-17    Stable mid and lower pole thyroid lobe heterogeneous solid nodules as well as   lower pole complex cystic lesion are seen with the largest measuring 1   x 0.76 x 0.89 cm.     prior to surgery on methimazole, now euthyroid off treatment.           Past Medical History:   Diagnosis Date   • Backache 02/17/2016    Low back pain, in sacral region      • Benign hypertension 02/17/2016   • Cortical senile cataract 02/17/2016   • Essential (primary) hypertension 04/20/2016   • GERD (gastroesophageal reflux disease) 02/17/2016   • Hallux valgus     Deformity, w/inflammation      • Iron deficiency anemia    • Lung nodule, solitary 03/08/2012    Solitary nodule of lung - RIGHT upper lobe 15mm, PET positive      • Malignant carcinoid tumor of lung (CMS/HCC) 02/17/2016    Atypical carcinoid tumor of the LEFT lung treated in March 2012     • Mitral valve prolapse 02/17/2016   • Osteoporosis 04/20/2016   • Primary fibromyalgia syndrome 02/17/2016   • Rosacea 02/17/2016   • Seborrheic keratosis     History of, upper and lower back      • 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 04/20/2016   • 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 02/17/2016    Paralysis of vocal cords and larynx, unilateral - LEFT side      • Varicose vein    • Vitamin D deficiency 04/20/2016   • Vitreous detachment 12/01/2014   • Vitreous floaters 12/01/2014     Family History   Problem  Relation Age of Onset   • Cancer Mother    • Diabetes Mother    • Heart disease Mother    • Stroke Mother    • Heart disease Father    • Lung cancer Maternal Grandfather    • Lung cancer Other      Social History     Tobacco Use   • Smoking status: Never Smoker   • Smokeless tobacco: Never Used   Substance Use Topics   • Alcohol use: Yes     Comment: OCCASIONAL WINE   • Drug use: No         Current Outpatient Medications:   •  amLODIPine (NORVASC) 5 MG tablet, Take 5 mg by mouth daily., Disp: , Rfl:   •  ASCORBIC ACID PO, Take 1 tablet by mouth daily., Disp: , Rfl:   •  ASPIRIN PO, Take 81 mg by mouth Daily. YSP Aspirin oral  (unconfirmed , Disp: , Rfl:   •  Calcium Citrate-Vitamin D (CITRACAL + D PO), Take 1 tablet by mouth 2 (two) times a day., Disp: , Rfl:   •  cholecalciferol (VITAMIN D3) 1000 UNITS tablet, Take 1,000 Units by mouth daily., Disp: , Rfl:   •  Cyanocobalamin (VITAMIN B-12 PO), Take 1 tablet by mouth 1 (one) time per week., Disp: , Rfl:   •  cyclobenzaprine (FLEXERIL) 10 MG tablet, Take 10 mg by mouth at night as needed for muscle spasms., Disp: , Rfl:   •  DULoxetine (CYMBALTA) 60 MG capsule, Take 60 mg by mouth daily., Disp: , Rfl:   •  folic acid (FOLVITE) 1 MG tablet, TAKE ONE TABLET BY MOUTH ONCE DAILY, Disp: 90 tablet, Rfl: 1  •  Lidocaine-Prilocaine, Bulk, 2.5-2.5 % cream, 3 (Three) Times a Day., Disp: , Rfl:   •  metoprolol succinate XL (TOPROL-XL) 25 MG 24 hr tablet, Take 25 mg by mouth 2 (two) times a day., Disp: , Rfl:   •  OMEPRAZOLE PO, Take 40 mg by mouth Daily., Disp: , Rfl:   •  ranitidine (ZANTAC) 150 MG tablet, Take 150 mg by mouth daily., Disp: , Rfl:     Review of Systems    Review of Systems   Constitutional: Negative for activity change, appetite change, chills, diaphoresis, fatigue, fever and unexpected weight change.   HENT: Negative for congestion, drooling, ear discharge, ear pain, facial swelling, mouth sores, nosebleeds, postnasal drip, sinus pressure, sneezing, sore  "throat, tinnitus, trouble swallowing and voice change.    Eyes: Negative.  Negative for photophobia, pain, discharge, redness and itching.   Respiratory: Negative.  Negative for apnea, cough, choking, chest tightness, shortness of breath, wheezing and stridor.    Cardiovascular: Negative.  Negative for chest pain, palpitations and leg swelling.   Gastrointestinal: Negative.  Negative for abdominal distention, abdominal pain, constipation, diarrhea, nausea and vomiting.   Endocrine: Negative.  Negative for cold intolerance, heat intolerance, polydipsia, polyphagia and polyuria.   Genitourinary: Negative for difficulty urinating, dysuria, flank pain and frequency.   Musculoskeletal: Negative for arthralgias, back pain, gait problem, joint swelling, myalgias, neck pain and neck stiffness.   Skin: Negative for color change, pallor, rash and wound.   Allergic/Immunologic: Negative for environmental allergies, food allergies and immunocompromised state.   Neurological: Negative for dizziness, tremors, seizures, syncope, facial asymmetry, speech difficulty, weakness, light-headedness, numbness and headaches.   Hematological: Negative for adenopathy. Does not bruise/bleed easily.   Psychiatric/Behavioral: Negative for agitation, behavioral problems, confusion, decreased concentration, dysphoric mood, hallucinations, self-injury, sleep disturbance and suicidal ideas. The patient is not nervous/anxious and is not hyperactive.         Objective:     /80   Pulse 79   Ht 175.3 cm (69\")   Wt 85.6 kg (188 lb 12.8 oz)   SpO2 98%   BMI 27.88 kg/m²     Physical Exam   Constitutional: She is oriented to person, place, and time. She appears well-developed.   HENT:   Head: Normocephalic.   Right Ear: External ear normal.   Left Ear: External ear normal.   Nose: Nose normal.   Eyes: Conjunctivae and EOM are normal. No scleral icterus.   Neck: Normal range of motion. Neck supple. No tracheal deviation present. No thyromegaly " present.   Absent right thyroid lobe    Cardiovascular: Normal rate, regular rhythm, normal heart sounds and intact distal pulses. Exam reveals no gallop and no friction rub.   No murmur heard.  Pulmonary/Chest: Effort normal and breath sounds normal. No stridor. No respiratory distress. She has no wheezes. She has no rales. She exhibits no tenderness.   Abdominal: Soft. Bowel sounds are normal. She exhibits no distension and no mass. There is no tenderness. There is no rebound and no guarding.   Musculoskeletal: Normal range of motion. She exhibits no tenderness or deformity.   Lymphadenopathy:     She has no cervical adenopathy.   Neurological: She is alert and oriented to person, place, and time. She displays normal reflexes. She exhibits normal muscle tone. Coordination normal.   Skin: No rash noted. No erythema. No pallor.   Psychiatric: She has a normal mood and affect. Her behavior is normal. Judgment and thought content normal.       Lab Review    Results for orders placed or performed in visit on 07/05/18   Iron Profile   Result Value Ref Range    Iron 94 37 - 170 mcg/dL    TIBC 257 (L) 265 - 497 mcg/dL    Iron Saturation 37 15 - 50 %   Ferritin   Result Value Ref Range    Ferritin 359.00 (H) 11.10 - 264.00 ng/mL   Vitamin B12   Result Value Ref Range    Vitamin B-12 588 239 - 931 pg/mL   Folate   Result Value Ref Range    Folate >20.00 2.76 - 21.00 ng/mL           Assessment/Plan       ICD-10-CM ICD-9-CM   1. Nontoxic multinodular goiter E04.2 241.1   2. Essential hypertension I10 401.9   3. B12 deficiency E53.8 266.2   4. Age related osteoporosis, unspecified pathological fracture presence M81.0 733.01         toxic mng  sp right hemityroidectomy for progressive enlarging r thyroid nodule, pathology benign  left nodules stable in size, last US from 12-17    was on  mmi, now euthyroid off tx    Repeat US , repeat SH      ==    HTN , palpitations    on amlodipine 5 mg qam     lopressor 25 mg  Bid   labs q 3  m but meet in 12m    ==    osteoporosis     Followed by rheumatology that also follows her for fibromyalgia    Was on boniva but completed 10 y approx    --    Breast lesions, clarified by MRI to be benign       I reviewed and summarized records from Jeff Shah MD from 2016 and I reviewed / ordered labs.     No orders of the defined types were placed in this encounter.        A copy of my note was sent to Jeff Shah MD    Please see my above opinion and suggestions.

## 2018-12-07 ENCOUNTER — HOSPITAL ENCOUNTER (OUTPATIENT)
Dept: ULTRASOUND IMAGING | Facility: HOSPITAL | Age: 68
Discharge: HOME | End: 2018-12-07
Attending: INTERNAL MEDICINE | Admitting: INTERNAL MEDICINE

## 2018-12-07 PROCEDURE — 76536 US EXAM OF HEAD AND NECK: CPT

## 2018-12-09 LAB
METANEPHRINE, PL: <10 PG/ML (ref 0–62)
NORMETANEPHRINE, PL: 78 PG/ML (ref 0–145)

## 2019-03-29 ENCOUNTER — TELEPHONE (OUTPATIENT)
Dept: ONCOLOGY | Facility: HOSPITAL | Age: 69
End: 2019-03-29

## 2019-03-29 RX ORDER — FOLIC ACID 1 MG/1
TABLET ORAL
Qty: 90 TABLET | Refills: 1 | Status: SHIPPED | OUTPATIENT
Start: 2019-03-29 | End: 2019-09-23 | Stop reason: SDUPTHER

## 2019-03-29 NOTE — TELEPHONE ENCOUNTER
----- Message from Ashli Ridley sent at 3/29/2019  2:12 PM CDT -----  Regarding: refill  Folic Acid  Send to [facility]

## 2019-03-29 NOTE — TELEPHONE ENCOUNTER
----- Message from Mayur Neal MD sent at 3/29/2019  4:04 PM CDT -----  Regarding: RE: refill  Prescription sent. Thanks  ----- Message -----  From: Mariann Dixon RN  Sent: 3/29/2019   2:15 PM  To: Mayur Neal MD  Subject: FW: refill                                       Patient needs refill.    ----- Message -----  From: Ashli Ridley  Sent: 3/29/2019   2:12 PM  To: [facility]  Subject: refill                                           Folic Acid  Send to [facility]

## 2019-05-22 ENCOUNTER — LAB (OUTPATIENT)
Dept: LAB | Facility: HOSPITAL | Age: 69
End: 2019-05-22

## 2019-05-22 ENCOUNTER — OFFICE VISIT (OUTPATIENT)
Dept: CARDIOLOGY | Facility: CLINIC | Age: 69
End: 2019-05-22

## 2019-05-22 ENCOUNTER — DOCUMENTATION (OUTPATIENT)
Dept: CARDIOLOGY | Facility: CLINIC | Age: 69
End: 2019-05-22

## 2019-05-22 VITALS
HEIGHT: 69 IN | SYSTOLIC BLOOD PRESSURE: 126 MMHG | BODY MASS INDEX: 27.7 KG/M2 | WEIGHT: 187 LBS | OXYGEN SATURATION: 98 % | HEART RATE: 76 BPM | DIASTOLIC BLOOD PRESSURE: 78 MMHG

## 2019-05-22 DIAGNOSIS — I10 ESSENTIAL HYPERTENSION: ICD-10-CM

## 2019-05-22 DIAGNOSIS — I10 ESSENTIAL HYPERTENSION: Primary | ICD-10-CM

## 2019-05-22 DIAGNOSIS — I20.9 ANGINA PECTORIS (HCC): ICD-10-CM

## 2019-05-22 DIAGNOSIS — I34.1 MITRAL VALVE PROLAPSE: ICD-10-CM

## 2019-05-22 DIAGNOSIS — R00.2 PALPITATIONS: Primary | ICD-10-CM

## 2019-05-22 LAB
ALBUMIN SERPL-MCNC: 4.5 G/DL (ref 3.5–5.2)
ALBUMIN/GLOB SERPL: 1.9 G/DL
ALP SERPL-CCNC: 114 U/L (ref 39–117)
ALT SERPL W P-5'-P-CCNC: 15 U/L (ref 1–33)
ANION GAP SERPL CALCULATED.3IONS-SCNC: 13 MMOL/L
AST SERPL-CCNC: 14 U/L (ref 1–32)
BILIRUB SERPL-MCNC: 0.3 MG/DL (ref 0.2–1.2)
BUN BLD-MCNC: 22 MG/DL (ref 8–23)
BUN/CREAT SERPL: 15.5 (ref 7–25)
CALCIUM SPEC-SCNC: 9.4 MG/DL (ref 8.6–10.5)
CHLORIDE SERPL-SCNC: 101 MMOL/L (ref 98–107)
CO2 SERPL-SCNC: 26 MMOL/L (ref 22–29)
CREAT BLD-MCNC: 1.42 MG/DL (ref 0.57–1)
GFR SERPL CREATININE-BSD FRML MDRD: 37 ML/MIN/1.73
GLOBULIN UR ELPH-MCNC: 2.4 GM/DL
GLUCOSE BLD-MCNC: 87 MG/DL (ref 65–99)
POTASSIUM BLD-SCNC: 4.2 MMOL/L (ref 3.5–5.2)
PROT SERPL-MCNC: 6.9 G/DL (ref 6–8.5)
SODIUM BLD-SCNC: 140 MMOL/L (ref 136–145)

## 2019-05-22 PROCEDURE — 80053 COMPREHEN METABOLIC PANEL: CPT

## 2019-05-22 PROCEDURE — 99214 OFFICE O/P EST MOD 30 MIN: CPT | Performed by: INTERNAL MEDICINE

## 2019-05-22 PROCEDURE — 36415 COLL VENOUS BLD VENIPUNCTURE: CPT

## 2019-05-22 NOTE — PROGRESS NOTES
CTA scheduled. Date,time, and instructions given to patient. CMP ordered, patient aware. Patient on metoprolol and advised per order to take an extra 25 mg x3 doses prior to CT scan and to hold amlodipine on those days. Pt reports no allergy to IVP dye. Patient verbalized understanding and advised to call office should questions arise.

## 2019-05-22 NOTE — PROGRESS NOTES
[facility] Cardiology  OFFICE NOTE    Myra Charles  68 y.o. female    05/22/2019  1. Palpitations    2. Mitral valve prolapse    3. Angina pectoris (CMS/HCC)        Chief complaint -Palpitations/episode of angina      History of present Illness- 68-year-old lady who has history of palpitations and She is on Toprol-XL and amlodipine for blood pressure she do get occasional palpitations when she lies on the left side.  She is under stress as her  was diagnosed with Lewy body dementia.  She also has history of carcinoid in the lung and has been followed by Dr. Waite and has been cancer free.  She denies any chest pain or shortness of breath.  No GI symptoms of diarrhea or flushing.  She had an episode of chest pain with shortness of breath diaphoresis and nausea about 2 weeks ago and has not had any more since then.  She is going to have cataract surgery tomorrow.  We will schedule for a CT coronary angiogram in 2 to 3 weeks after the cataract surgery              Allergies   Allergen Reactions   • Evista [Raloxifene]    • Gabapentin      HA/sedation   • Lyrica [Pregabalin]          Past Medical History:   Diagnosis Date   • Backache 02/17/2016    Low back pain, in sacral region      • Benign hypertension 02/17/2016   • Cortical senile cataract 02/17/2016   • Essential (primary) hypertension 04/20/2016   • GERD (gastroesophageal reflux disease) 02/17/2016   • Hallux valgus     Deformity, w/inflammation      • Iron deficiency anemia    • Lung nodule, solitary 03/08/2012    Solitary nodule of lung - RIGHT upper lobe 15mm, PET positive      • Malignant carcinoid tumor of lung (CMS/HCC) 02/17/2016    Atypical carcinoid tumor of the LEFT lung treated in March 2012     • Mitral valve prolapse 02/17/2016   • Osteoporosis 04/20/2016   • Primary fibromyalgia syndrome 02/17/2016   • Rosacea 02/17/2016   • Seborrheic keratosis     History of, upper and lower back      • Thyrotoxicosis with  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 Enodscopic vein harvest on the left. Left lower extremity arteriogram.)   • CERVICAL CONIZATION      CONIZATION OF CERVIX 90595 (Excisional laser cone biopsy and vaporization of cervix.)   • CHOLECYSTECTOMY  09/07/2005    Cholecystectomy, laparoscopic (With intraoperative cholangiogram.)   • CYST REMOVAL      left 3rd toe   • EXCISION LESION  11/11/1998    REMOVE LESION BACK OR FLANK 61512 (Excision times two.)   • FOOT SURGERY  01/20/2009    Foot/toes surgery procedure (Soft tissue mass, left foot. Excision of)   • HERNIA REPAIR      Past history of umbilical herni repair.   • OTHER SURGICAL HISTORY  12/04/2012    Anesth, elbow area surgery (Manipulation of left elbow.)   • OTHER SURGICAL HISTORY  03/05/2012    Anesth, elbow area surgery (Excision of plate and screws from olecranon bursa. Retained plate and screws, olecranon bursitis of previous fracture left elbow.)   • OTHER SURGICAL HISTORY  10/16/2012    Treat elbow fracture (Open reduction and internal fixation.)   • THORACOSCOPY  03/14/2012    Thoracoscopy, surgical (Bronchoscopy,LVATS (wedge resect MARYBETH), LULobectomy Thoracic lymphadenectomy)   • THROAT SURGERY  07/25/2012    Throat surgery procedure (Thyroplasty type I (vocal cord medialozation & larynooplasty) Left vocal cord paralysis. Dysphoria. [facility] by Dr Tk Heart)   • THYROID SURGERY  03/22/2016    Thyroid surgery (Right thyroid lobectomy and isthmusectomy.)   • TONSILLECTOMY  02/03/1956    Chronic  tonsillitis   • VEIN LIGATION  09/28/2000    PHLEB VEINS - EXTREM (20+) 24643 (High ligation of ling saphenous vein, left, plus multiple phlebectomies, left lower extremity.)         Family History   Problem Relation Age of Onset   • Cancer Mother    • Diabetes Mother    • Heart disease Mother    • Stroke Mother    • Heart disease Father    • Lung cancer Maternal Grandfather    • Lung cancer Other          Social History     Socioeconomic History   • Marital status:      Spouse name: Not on file   • Number of children: Not on file   • Years of education: Not on file   • Highest education level: Not on file   Tobacco Use   • Smoking status: Never Smoker   • Smokeless tobacco: Never Used   Substance and Sexual Activity   • Alcohol use: Yes     Comment: OCCASIONAL WINE   • Drug use: No   • Sexual activity: Defer         Current Outpatient Medications   Medication Sig Dispense Refill   • amLODIPine (NORVASC) 5 MG tablet Take 5 mg by mouth daily.     • ASCORBIC ACID PO Take 1 tablet by mouth daily.     • ASPIRIN PO Take 81 mg by mouth Daily. YSP Aspirin oral  (unconfirmed      • Calcium Citrate-Vitamin D (CITRACAL + D PO) Take 1 tablet by mouth 2 (two) times a day.     • cholecalciferol (VITAMIN D3) 1000 UNITS tablet Take 1,000 Units by mouth daily.     • Cyanocobalamin (VITAMIN B-12 PO) Take 1 tablet by mouth 1 (one) time per week.     • cyclobenzaprine (FLEXERIL) 10 MG tablet Take 10 mg by mouth at night as needed for muscle spasms.     • DULoxetine (CYMBALTA) 60 MG capsule Take 60 mg by mouth daily.     • folic acid (FOLVITE) 1 MG tablet TAKE ONE TABLET BY MOUTH ONCE DAILY 90 tablet 1   • Lidocaine-Prilocaine, Bulk, 2.5-2.5 % cream 3 (Three) Times a Day.     • metoprolol succinate XL (TOPROL-XL) 25 MG 24 hr tablet Take 25 mg by mouth 2 (two) times a day.     • OMEPRAZOLE PO Take 40 mg by mouth Daily.     • ranitidine (ZANTAC) 150 MG tablet Take 150 mg by mouth daily.       No current facility-administered  "medications for this visit.          Review of Systems     Constitution: Denies any fatigue, fever or chills    HENT: Denies any headache, hearing impairment,     Eyes: Denies any blurring of vision, or photophobia     Cardivascular - As per history of present illness     Respiratory system-History of carcinoid of the lung in remission     Endocrine:  History of hypothyroidism now corrected after partial thyroidectomy      Musculoskeletal:  history of arthritis with musculoskeletal problems    Gastrointestinal: No nausea, vomiting, or melena    Genitourinary: No dysuria or hematuria    Neurological:   No history of seizure disorder, stroke, memory problems    Psychiatric/Behavioral:        No history of depression    Hematological- no history of easy bruising or any bleeding diathesis            OBJECTIVE    /78   Pulse 76   Ht 175.3 cm (69.02\")   Wt 84.8 kg (187 lb)   SpO2 98%   BMI 27.60 kg/m²       Physical Exam     Constitutional: is oriented to person, place, and time.     Skin-warm and dry    Well developed and nourished in no acute distress      Head: Normocephalic and atraumatic.     Eyes: Pupils are equal    Neck: Neck supple. No bruit in the carotids    Cardiovascular: [place] in the fifth intercostal space   Regular rate, and  Rhythm,    S1 greater than S2, no S3 or S4, no gallop     Pulmonary/Chest:   Air  Entry is equal on both sides  No wheezing or crackles,      Abdominal: Soft.  No hepatosplenomegaly, bowel sounds are present    Musculoskeletal: No kyphoscoliosis, no significant thickening of the joints    Neurological: is alert and oriented to person, place, and time.    cranial nerve are intact .   No motor or sensory deficit    Extremities-no edema, no radial femoral delay      Psychiatric: He has a normal mood and affect.                  His behavior is normal.           Procedures      Lab Results   Component Value Date    WBC 7.87 12/04/2018    HGB 13.6 12/04/2018    HCT 41.5 " 12/04/2018    MCV 91.0 12/04/2018     12/04/2018     Lab Results   Component Value Date    GLUCOSE 57 (L) 12/04/2018    BUN 25 (H) 12/04/2018    CREATININE 1.32 (H) 12/04/2018    EGFRIFNONA 40 (L) 12/04/2018    BCR 18.9 12/04/2018    CO2 28.0 12/04/2018    CALCIUM 9.4 12/04/2018    ALBUMIN 4.50 12/04/2018    AST 31 12/04/2018    ALT 21 12/04/2018                  A/P  Angina one episode with the risk factors-scheduled for a CT coronary angiogram to rule out obstructive CAD    Palpitations reasonably well controlled with Toprol-XL and has gotten better after partial hemithyroidectomy for her hyperthyroidism.  And her thyroid level has been normal and is being followed by Dr. Lewis Caraballo.    Hypertension controlled with amlodipine.    History of depression and anxiety on Cymbalta.    History of carcinoid of the lungs stable currently cancer free followed by Dr. Waite.    Follow-up in 1 year or earlier if the CT coronary angiogram is abnormal            This document has been electronically signed by Tomy Pathak MD on May 22, 2019 2:32 PM       EMR Dragon/Transcription disclaimer:   Some of this note may be an electronic transcription/translation of spoken language to printed text. The electronic translation of spoken language may permit erroneous, or at times, nonsensical words or phrases to be inadvertently transcribed; Although I have reviewed the note for such errors, some may still exist.

## 2019-05-30 ENCOUNTER — TELEPHONE (OUTPATIENT)
Dept: CARDIOLOGY | Facility: CLINIC | Age: 69
End: 2019-05-30

## 2019-05-30 NOTE — TELEPHONE ENCOUNTER
Spoke to pt about lab results  Per MD she needs to increase her fluid intake   Pt verbalized understanding

## 2019-06-10 ENCOUNTER — TELEPHONE (OUTPATIENT)
Dept: CARDIOLOGY | Facility: CLINIC | Age: 69
End: 2019-06-10

## 2019-06-10 DIAGNOSIS — I20.9 ANGINA PECTORIS (HCC): Primary | ICD-10-CM

## 2019-06-10 RX ORDER — SODIUM CHLORIDE 9 MG/ML
200 INJECTION, SOLUTION INTRAVENOUS CONTINUOUS
Status: CANCELLED | OUTPATIENT
Start: 2019-06-11 | End: 2019-06-11

## 2019-06-10 NOTE — TELEPHONE ENCOUNTER
Attempted to call patient to let her know she needs to come in at 8am to [facility] to receive iv fluids before CT scan. Left message.

## 2019-06-11 ENCOUNTER — HOSPITAL ENCOUNTER (OUTPATIENT)
Dept: CT IMAGING | Facility: HOSPITAL | Age: 69
Discharge: HOME | End: 2019-06-11
Admitting: INTERNAL MEDICINE

## 2019-06-11 ENCOUNTER — TELEPHONE (OUTPATIENT)
Dept: CARDIOLOGY | Facility: CLINIC | Age: 69
End: 2019-06-11

## 2019-06-11 VITALS
TEMPERATURE: 98.6 F | SYSTOLIC BLOOD PRESSURE: 151 MMHG | OXYGEN SATURATION: 99 % | DIASTOLIC BLOOD PRESSURE: 66 MMHG | HEIGHT: 70 IN | BODY MASS INDEX: 26.54 KG/M2 | HEART RATE: 61 BPM | WEIGHT: 185.41 LBS | RESPIRATION RATE: 18 BRPM

## 2019-06-11 DIAGNOSIS — I34.1 MITRAL VALVE PROLAPSE: ICD-10-CM

## 2019-06-11 DIAGNOSIS — R00.2 PALPITATIONS: ICD-10-CM

## 2019-06-11 DIAGNOSIS — I20.9 ANGINA PECTORIS (HCC): ICD-10-CM

## 2019-06-11 PROCEDURE — 0 IOPAMIDOL PER 1 ML: Performed by: INTERNAL MEDICINE

## 2019-06-11 PROCEDURE — 75574 CT ANGIO HRT W/3D IMAGE: CPT

## 2019-06-11 RX ORDER — SODIUM CHLORIDE 9 MG/ML
200 INJECTION, SOLUTION INTRAVENOUS CONTINUOUS
Status: ACTIVE | OUTPATIENT
Start: 2019-06-11 | End: 2019-06-11

## 2019-06-11 RX ADMIN — IOPAMIDOL 80 ML: 755 INJECTION, SOLUTION INTRAVENOUS at 11:28

## 2019-06-11 RX ADMIN — SODIUM CHLORIDE 200 ML/HR: 9 INJECTION, SOLUTION INTRAVENOUS at 08:30

## 2019-06-11 NOTE — TELEPHONE ENCOUNTER
Gave pt CTA results      ----- Message from Lottie Pearl RN sent at 6/11/2019  2:37 PM CDT -----  CTA of coronaries looks ok per Dr Pathak

## 2019-07-11 ENCOUNTER — LAB (OUTPATIENT)
Dept: ONCOLOGY | Facility: HOSPITAL | Age: 69
End: 2019-07-11

## 2019-07-11 DIAGNOSIS — R79.89 ELEVATED FERRITIN: ICD-10-CM

## 2019-07-11 LAB
BASOPHILS # BLD AUTO: 0.08 10*3/MM3 (ref 0–0.2)
BASOPHILS NFR BLD AUTO: 1.4 % (ref 0–1.5)
DEPRECATED RDW RBC AUTO: 40 FL (ref 37–54)
EOSINOPHIL # BLD AUTO: 0.17 10*3/MM3 (ref 0–0.4)
EOSINOPHIL NFR BLD AUTO: 2.9 % (ref 0.3–6.2)
ERYTHROCYTE [DISTWIDTH] IN BLOOD BY AUTOMATED COUNT: 12.2 % (ref 12.3–15.4)
FERRITIN SERPL-MCNC: 617.4 NG/ML (ref 13–150)
FOLATE SERPL-MCNC: >20 NG/ML (ref 4.78–24.2)
HCT VFR BLD AUTO: 38.5 % (ref 34–46.6)
HGB BLD-MCNC: 12.6 G/DL (ref 12–15.9)
IMM GRANULOCYTES # BLD AUTO: 0.01 10*3/MM3 (ref 0–0.05)
IMM GRANULOCYTES NFR BLD AUTO: 0.2 % (ref 0–0.5)
IRON 24H UR-MRATE: 76 MCG/DL (ref 37–145)
IRON SATN MFR SERPL: 27 % (ref 20–50)
LYMPHOCYTES # BLD AUTO: 2.08 10*3/MM3 (ref 0.7–3.1)
LYMPHOCYTES NFR BLD AUTO: 35.6 % (ref 19.6–45.3)
MCH RBC QN AUTO: 29.2 PG (ref 26.6–33)
MCHC RBC AUTO-ENTMCNC: 32.7 G/DL (ref 31.5–35.7)
MCV RBC AUTO: 89.3 FL (ref 79–97)
MONOCYTES # BLD AUTO: 0.54 10*3/MM3 (ref 0.1–0.9)
MONOCYTES NFR BLD AUTO: 9.2 % (ref 5–12)
NEUTROPHILS # BLD AUTO: 2.96 10*3/MM3 (ref 1.7–7)
NEUTROPHILS NFR BLD AUTO: 50.7 % (ref 42.7–76)
NRBC BLD AUTO-RTO: 0 /100 WBC (ref 0–0.2)
PLATELET # BLD AUTO: 215 10*3/MM3 (ref 140–450)
PMV BLD AUTO: 9.7 FL (ref 6–12)
RBC # BLD AUTO: 4.31 10*6/MM3 (ref 3.77–5.28)
TIBC SERPL-MCNC: 280 MCG/DL (ref 298–536)
TRANSFERRIN SERPL-MCNC: 188 MG/DL (ref 200–360)
VIT B12 BLD-MCNC: 1129 PG/ML (ref 211–946)
WBC NRBC COR # BLD: 5.84 10*3/MM3 (ref 3.4–10.8)

## 2019-07-11 PROCEDURE — 83540 ASSAY OF IRON: CPT | Performed by: INTERNAL MEDICINE

## 2019-07-11 PROCEDURE — 82746 ASSAY OF FOLIC ACID SERUM: CPT | Performed by: INTERNAL MEDICINE

## 2019-07-11 PROCEDURE — 36415 COLL VENOUS BLD VENIPUNCTURE: CPT | Performed by: INTERNAL MEDICINE

## 2019-07-11 PROCEDURE — 85025 COMPLETE CBC W/AUTO DIFF WBC: CPT | Performed by: INTERNAL MEDICINE

## 2019-07-11 PROCEDURE — 82607 VITAMIN B-12: CPT | Performed by: INTERNAL MEDICINE

## 2019-07-11 PROCEDURE — 84466 ASSAY OF TRANSFERRIN: CPT | Performed by: INTERNAL MEDICINE

## 2019-07-11 PROCEDURE — 82728 ASSAY OF FERRITIN: CPT | Performed by: INTERNAL MEDICINE

## 2019-07-12 ENCOUNTER — OFFICE VISIT (OUTPATIENT)
Dept: ONCOLOGY | Facility: CLINIC | Age: 69
End: 2019-07-12

## 2019-07-12 VITALS
DIASTOLIC BLOOD PRESSURE: 65 MMHG | HEART RATE: 74 BPM | TEMPERATURE: 97.4 F | BODY MASS INDEX: 26.51 KG/M2 | SYSTOLIC BLOOD PRESSURE: 131 MMHG | HEIGHT: 70 IN | WEIGHT: 185.2 LBS | RESPIRATION RATE: 18 BRPM

## 2019-07-12 DIAGNOSIS — R79.89 ELEVATED FERRITIN: Primary | ICD-10-CM

## 2019-07-12 DIAGNOSIS — C34.12 MALIGNANT NEOPLASM OF UPPER LOBE OF LEFT LUNG (HCC): ICD-10-CM

## 2019-07-12 DIAGNOSIS — C7A.090 MALIGNANT CARCINOID TUMOR OF LUNG (HCC): ICD-10-CM

## 2019-07-12 PROCEDURE — G9903 PT SCRN TBCO ID AS NON USER: HCPCS | Performed by: INTERNAL MEDICINE

## 2019-07-12 PROCEDURE — G0463 HOSPITAL OUTPT CLINIC VISIT: HCPCS | Performed by: INTERNAL MEDICINE

## 2019-07-12 PROCEDURE — G8731 PAIN NEG NO PLAN: HCPCS | Performed by: INTERNAL MEDICINE

## 2019-07-12 PROCEDURE — 1157F ADVNC CARE PLAN IN RCRD: CPT | Performed by: INTERNAL MEDICINE

## 2019-07-12 PROCEDURE — 99213 OFFICE O/P EST LOW 20 MIN: CPT | Performed by: INTERNAL MEDICINE

## 2019-07-12 NOTE — PROGRESS NOTES
DATE OF VISIT: 7/12/2019      REASON FOR VISIT:  Elevated Ferritin      HISTORY OF PRESENT ILLNESS:    68-year-old female with a past medical history significant for history of atypical carcinoid of the lung status post lobectomy in March 2012, history of fibromyalgia, history of restless leg syndrome who was seen in consultation on June 22, 2017 for evaluation of persistent elevated ferritin around 499 for last 1 year.  Patient is here for 1 year follow up appointment today.Complains of chronic shortness of breath.  Complains of recent worsening joint pain neuropathy affecting lower extremity.  Denies any blood in the stool or urine.    PAST MEDICAL HISTORY:    Past Medical History:   Diagnosis Date   • Backache 02/17/2016    Low back pain, in sacral region      • Benign hypertension 02/17/2016   • Cortical senile cataract 02/17/2016   • Essential (primary) hypertension 04/20/2016   • GERD (gastroesophageal reflux disease) 02/17/2016   • Hallux valgus     Deformity, w/inflammation      • Iron deficiency anemia    • Lung nodule, solitary 03/08/2012    Solitary nodule of lung - RIGHT upper lobe 15mm, PET positive      • Malignant carcinoid tumor of lung (CMS/HCC) 02/17/2016    Atypical carcinoid tumor of the LEFT lung treated in March 2012     • Mitral valve prolapse 02/17/2016   • Osteoporosis 04/20/2016   • Primary fibromyalgia syndrome 02/17/2016   • Rosacea 02/17/2016   • Seborrheic keratosis     History of, upper and lower back      • 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 04/20/2016   • 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 02/17/2016    Paralysis of vocal cords and larynx, unilateral - LEFT side      • Varicose vein    • Vitamin D deficiency 04/20/2016   • Vitreous detachment 12/01/2014   • Vitreous floaters 12/01/2014       SOCIAL HISTORY:    Social History     Tobacco Use   • Smoking status: Never Smoker   • Smokeless tobacco: Never Used   Substance Use Topics    • Alcohol use: Yes     Comment: OCCASIONAL WINE   • Drug use: No       Surgical History :  Past Surgical History:   Procedure Laterality Date   • ARTERIAL BYPASS SURGERY  09/25/2012    Artery bypass graft (Left femoral to posterior tibial artery bypass. Enodscopic vein harvest on the left. Left lower extremity arteriogram.)   • CERVICAL CONIZATION      CONIZATION OF CERVIX 71358 (Excisional laser cone biopsy and vaporization of cervix.)   • CHOLECYSTECTOMY  09/07/2005    Cholecystectomy, laparoscopic (With intraoperative cholangiogram.)   • CYST REMOVAL      left 3rd toe   • EXCISION LESION  11/11/1998    REMOVE LESION BACK OR FLANK 16438 (Excision times two.)   • FOOT SURGERY  01/20/2009    Foot/toes surgery procedure (Soft tissue mass, left foot. Excision of)   • HERNIA REPAIR      Past history of umbilical herni repair.   • HYSTERECTOMY     • OTHER SURGICAL HISTORY  12/04/2012    Anesth, elbow area surgery (Manipulation of left elbow.)   • OTHER SURGICAL HISTORY  03/05/2012    Anesth, elbow area surgery (Excision of plate and screws from olecranon bursa. Retained plate and screws, olecranon bursitis of previous fracture left elbow.)   • OTHER SURGICAL HISTORY  10/16/2012    Treat elbow fracture (Open reduction and internal fixation.)   • THORACOSCOPY  03/14/2012    Thoracoscopy, surgical (Bronchoscopy,LVATS (wedge resect MARYBETH), LULobectomy Thoracic lymphadenectomy)   • THROAT SURGERY  07/25/2012    Throat surgery procedure (Thyroplasty type I (vocal cord medialozation & larynooplasty) Left vocal cord paralysis. Dysphoria. [facility] by Dr Tk Heart)   • THYROID SURGERY  03/22/2016    Thyroid surgery (Right thyroid lobectomy and isthmusectomy.)   • TONSILLECTOMY  02/03/1956    Chronic tonsillitis   • VEIN LIGATION  09/28/2000    PHLEB VEINS - EXTREM (20+) 70485 (High ligation of ling saphenous vein, left, plus multiple phlebectomies, left lower extremity.)       ALLERGIES:    Allergies   Allergen  "Reactions   • Evista [Raloxifene] Swelling   • Gabapentin Other (See Comments)     HA/sedation   • Lyrica [Pregabalin] Swelling       REVIEW OF SYSTEMS:      CONSTITUTIONAL: Complains of fatigue. Denies any fever, chills or weight loss.      HEENT: No epistaxis, mouth sores or difficulty swallowing.     RESPIRATORY: Complains of chronic shortness of breath since her lobectomy in 2012. No new cough or hemoptysis.     CARDIOVASCULAR: No chest pain or palpitations.     GASTROINTESTINAL: No abdominal pain nausea, vomiting or blood in the stool.     GENITOURINARY: No Dysuria or Hematuria.     MUSCULOSKELETAL: Complains of recent worsening of diffuse body pain consistent with fibromyalgia.     LYMPHATICS: Denies any abnormal swollen glands anywhere in the body.     NEUROLOGICAL : Complains of tingling and numbness affecting lower extremity. No headache or dizziness. No seizures or balance problems.          PHYSICAL EXAMINATION:      VITAL SIGNS:  /65   Pulse 74   Temp 97.4 °F (36.3 °C) (Temporal)   Resp 18   Ht 176.5 cm (69.5\")   Wt 84 kg (185 lb 3.2 oz)   BMI 26.96 kg/m²      ECOG performance status: 2    GENERAL:  Not in any distress.    HEENT:  Normocephalic, Atraumatic.Mild Conjunctival pallor. No icterus. Extraocular Movements Intact. No Facial Asymmetry noted.    NECK:  No adenopathy. No JVD.Trachea in midline    RESPIRATORY:  Fair air entry bilateral. No rhonchi or wheezing.    CARDIOVASCULAR:  S1, S2. Regular rate and rhythm. No murmur or gallop appreciated.    ABDOMEN:  Soft, obese, nontender. Bowel sounds present in all four quadrants.  No hepatosplenomegaly appreciated.    MUSCULOSKELTAL:  No edema.No Calf Tenderness.Decreased range of motion    NEUROLOGIC:  Alert, awake and oriented ×3.  No  Motor  deficit appreciated. Cranial Nerves 2-12 grossly intact.    SKIN: No new skin lesions.        DIAGNOSTIC DATA:    Glucose   Date Value Ref Range Status   05/22/2019 87 65 - 99 mg/dL Final     Sodium "   Date Value Ref Range Status   05/22/2019 140 136 - 145 mmol/L Final   06/01/2018 137 134 - 144 mmol/L Final     Potassium   Date Value Ref Range Status   05/22/2019 4.2 3.5 - 5.2 mmol/L Final   06/01/2018 4.2 3.5 - 5.1 mmol/L Final     CO2   Date Value Ref Range Status   05/22/2019 26.0 22.0 - 29.0 mmol/L Final     Chloride   Date Value Ref Range Status   05/22/2019 101 98 - 107 mmol/L Final   06/01/2018 103 98 - 107 mmol/L Final     Anion Gap   Date Value Ref Range Status   05/22/2019 13.0 mmol/L Final     Creatinine   Date Value Ref Range Status   05/22/2019 1.42 (H) 0.57 - 1.00 mg/dL Final   06/01/2018 1.2 0.6 - 1.3 mg/dL Final     BUN   Date Value Ref Range Status   05/22/2019 22 8 - 23 mg/dL Final   06/01/2018 17 7 - 18 mg/dL Final     BUN/Creatinine Ratio   Date Value Ref Range Status   05/22/2019 15.5 7.0 - 25.0 Final     Calcium   Date Value Ref Range Status   05/22/2019 9.4 8.6 - 10.5 mg/dL Final   06/01/2018 9.1 8.8 - 10.5 mg/dL Final     eGFR Non  Amer   Date Value Ref Range Status   05/22/2019 37 (L) >60 mL/min/1.73 Final     Alkaline Phosphatase   Date Value Ref Range Status   05/22/2019 114 39 - 117 U/L Final   06/01/2018 117 (H) 46 - 116 U/L Final     Total Protein   Date Value Ref Range Status   05/22/2019 6.9 6.0 - 8.5 g/dL Final   06/01/2018 7 6.4 - 8.2 g/dL Final     ALT (SGPT)   Date Value Ref Range Status   05/22/2019 15 1 - 33 U/L Final   06/01/2018 26 (L) 30 - 65 U/L Final     AST (SGOT)   Date Value Ref Range Status   05/22/2019 14 1 - 32 U/L Final   06/01/2018 19 15 - 37 U/L Final     Total Bilirubin   Date Value Ref Range Status   05/22/2019 0.3 0.2 - 1.2 mg/dL Final   06/01/2018 0.4 0 - 1.0 mg/dL Final     Albumin   Date Value Ref Range Status   05/22/2019 4.50 3.50 - 5.20 g/dL Final   06/01/2018 3.9 3.4 - 5.0 g/dL Final     Globulin   Date Value Ref Range Status   05/22/2019 2.4 gm/dL Final     Lab Results   Component Value Date    WBC 5.84 07/11/2019    HGB 12.6 07/11/2019     HCT 38.5 07/11/2019    MCV 89.3 07/11/2019     07/11/2019     Lab Results   Component Value Date    NEUTROABS 2.96 07/11/2019    IRON 76 07/11/2019    TIBC 280 (L) 07/11/2019    LABIRON 27 07/11/2019    FERRITIN 617.40 (H) 07/11/2019    WVDJAGFR08 1,129 (H) 07/11/2019    FOLATE >20.00 07/11/2019     Radiology Data :  CT of chest without contrast done on November 3, 2016 showed:      There is enlarged and heterogeneous thyroid gland. The cardiac  silhouette is within normal limits. No mediastinal lymphadenopathy is  seen. There is calcified granuloma in the left hilum. There is  evidence of left upper lobe lobectomy. There is mild pleural  thickening in the lung apices. Otherwise lungs are clear. No pleural  effusion is identified.      No acute abnormality is seen in the upper abdomen. No acute bony  abnormality is seen.      CONCLUSION- No residual or recurrent tumor is identified.  No mediastinal lymphadenopathy is seen.           Pathology :  Pathology report from March 2012 showed:  FINAL DIAGNOSIS:   A.  WEDGE, UPPER LOBE OF LEFT LUNG:            ATYPICAL CARCINOID TUMOR (1.8 CM), COMPLETELY EXCISED.   B.  UPPER LOBE, LEFT LUNG:            RECENT WEDGE EXCISIONAL SITE.            BRONCHIAL SURGICAL MARGIN NEGATIVE FOR TUMOR.            EIGHT (8) LOBAR LYMPH NODES (LEVEL 12) NEGATIVE FOR TUMOR.   C.  LYMPH NODE, LEVEL 6:            ONE (1) LYMPH NODE NEGATIVE FOR TUMOR.   D.  LYMPH NODE, LEVEL 8:            ONE (1) LYMPH NODE NEGATIVE FOR TUMOR.   E.  LYMPH NODES, LEVEL 9:            TWO (2) LYMPH NODES NEGATIVE FOR TUMOR.   F.  LYMPH NODE, LEVEL 10:            ONE (1) LYMPH NODE NEGATIVE FOR TUMOR.   G.  LYMPH NODE, LEVEL 7:            ONE (1) LYMPH NODE NEGATIVE FOR TUMOR.          Comment   COMMENT:   Immunostains (block A2) have atypical carcinoid tumor positive for   synaptophysin, positive for chromogranin A, few cells positive for S-100   protein, strongly positive for thyroid transcription  factor-1 (TTF-1)   and positive for cytokeratin (AE1/AE3).  Dr. Cheney reviewed the   histologic slides and concurs with the above diagnoses.   Synoptic report, lung, resection:        Specimen type:  Upper lobe of left lung.        Procedure:  Lobectomy.        Specimen integrity:  Intact.        Tumor site:  Upper lobe.        Tumor size:  1.8 x 1.5 x 1.5 cm.        Tumor focality:  Unifocal.        Histologic type:  Atypical carcinoid tumor.        Histologic grade:  Not applicable.        Visceral pleural invasion:  Not identified.        Direct tumor extension into extrapulmonary structures:  Not   applicable.        Bronchial margin:  Uninvolved by invasive carcinoma.        Vascular margin:  Uninvolved by invasive carcinoma.        Parenchymal (stapled) margin:  Uninvolved by invasive carcinoma.        Parietal pleural margin:  Not applicable.        Chest wall margin:  Not applicable.        Other attached tissue margin:  Not applicable.        Distance to closest margin:  3.0 cm from bronchial margin.        Neoadjuvant treatment effect:  Not applicable.        Lymph-vascular invasion:  Not identified.            Pathologic staging (pTNM):             Primary tumor:  pT1a.             Regional lymph nodes:  pN0.                  Number examined:  14.                  Number involved:  0.   The atypical carcinoid tumor has an average of 2 mitotic figures per 10   high power fields (12 mitotic figures per 60 high power fields).   Necrosis is not identified.                     ASSESSMENT AND PLAN:      1. Elevated ferritin:Blood work done on June 22, 2017.  Patient's ferritin was elevated at 454.  However iron saturation is only 18% which is normal.  Workup was consistent with inflammatory process versus chronic disease.  Since her iron saturation is only 18% no need to do any genetic testing for hemochromatosis which was discussed with patient.  At this point recommend not taking any iron supplement.     -Anemia work-up done on July 11, 2019 shows hemoglobin is normal at 12.6.  Ferritin is still elevated at 617.  His ferritin is acute phase reactant secondary to chronic kidney disease most likely.  -We will continue with clinical surveillance, will ask patient to return to clinic in 1 year with a repeat CBC in iron studies to be done prior to that.    2. History of atypical carcinoid of left upper lobe diagnosed in March 2012. Patient is status post left upper lobectomy. 14 lymph nodes were negative. Most recent CT scan done in November 2017 is negative for any recurrence. At this point recommend following up with Dr. Waite.  -Coronary CT angiogram done in May 2019 does not show any new lung nodules.     3. Hypertension     4. Chronic kidney disease     5. Fibromyalgia     6. Health maintenance: Patient does not smoke. Had a colonoscopy done in last 5 years.     7. Advance care planning: Patient has advance directive on file.     8.  Pain assessment:  - Patient denies any pain today.      Mayur Neal MD  7/12/2019  2:20 PM        EMR Dragon/Transcription disclaimer:   Much of this encounter note is an electronic transcription/translation of spoken language to printed text. The electronic translation of spoken language may permit erroneous, or at times, nonsensical words or phrases to be inadvertently transcribed; Although I have reviewed the note for such errors, some may still exist.

## 2019-09-23 RX ORDER — FOLIC ACID 1 MG/1
TABLET ORAL
Qty: 90 TABLET | Refills: 1 | Status: SHIPPED | OUTPATIENT
Start: 2019-09-23 | End: 2020-03-17 | Stop reason: SDUPTHER

## 2019-10-01 RX ORDER — LIDOCAINE AND PRILOCAINE 25; 25 MG/G; MG/G
CREAM TOPICAL
Qty: 200 G | Refills: 11 | Status: SHIPPED | OUTPATIENT
Start: 2019-10-01 | End: 2020-07-10

## 2019-11-08 ENCOUNTER — HOSPITAL ENCOUNTER (OUTPATIENT)
Dept: CT IMAGING | Facility: HOSPITAL | Age: 69
Discharge: HOME | End: 2019-11-08
Admitting: THORACIC SURGERY (CARDIOTHORACIC VASCULAR SURGERY)

## 2019-11-08 DIAGNOSIS — C7A.090 MALIGNANT CARCINOID TUMOR OF LUNG (HCC): ICD-10-CM

## 2019-11-08 PROCEDURE — 71250 CT THORAX DX C-: CPT

## 2019-11-14 ENCOUNTER — OFFICE VISIT (OUTPATIENT)
Dept: CARDIAC SURGERY | Facility: CLINIC | Age: 69
End: 2019-11-14

## 2019-11-14 VITALS
SYSTOLIC BLOOD PRESSURE: 122 MMHG | HEART RATE: 74 BPM | TEMPERATURE: 98.5 F | OXYGEN SATURATION: 98 % | HEIGHT: 69 IN | DIASTOLIC BLOOD PRESSURE: 80 MMHG | BODY MASS INDEX: 26.96 KG/M2 | RESPIRATION RATE: 16 BRPM | WEIGHT: 182 LBS

## 2019-11-14 DIAGNOSIS — N18.30 CKD (CHRONIC KIDNEY DISEASE) STAGE 3, GFR 30-59 ML/MIN (HCC): ICD-10-CM

## 2019-11-14 DIAGNOSIS — C34.12 MALIGNANT NEOPLASM OF UPPER LOBE OF LEFT LUNG (HCC): ICD-10-CM

## 2019-11-14 DIAGNOSIS — I83.813 VARICOSE VEINS OF BOTH LOWER EXTREMITIES WITH PAIN: ICD-10-CM

## 2019-11-14 DIAGNOSIS — E66.3 OVERWEIGHT (BMI 25.0-29.9): ICD-10-CM

## 2019-11-14 DIAGNOSIS — I34.1 MITRAL VALVE PROLAPSE: ICD-10-CM

## 2019-11-14 DIAGNOSIS — I10 ESSENTIAL (PRIMARY) HYPERTENSION: ICD-10-CM

## 2019-11-14 DIAGNOSIS — C7A.090 MALIGNANT CARCINOID TUMOR OF LUNG (HCC): Primary | ICD-10-CM

## 2019-11-14 DIAGNOSIS — I20.9 ANGINA PECTORIS (HCC): ICD-10-CM

## 2019-11-14 DIAGNOSIS — R00.2 PALPITATIONS: ICD-10-CM

## 2019-11-14 PROCEDURE — 99214 OFFICE O/P EST MOD 30 MIN: CPT | Performed by: THORACIC SURGERY (CARDIOTHORACIC VASCULAR SURGERY)

## 2019-11-29 PROBLEM — N18.30 CKD (CHRONIC KIDNEY DISEASE) STAGE 3, GFR 30-59 ML/MIN: Status: ACTIVE | Noted: 2019-11-29

## 2019-11-29 PROBLEM — E66.3 OVERWEIGHT (BMI 25.0-29.9): Status: ACTIVE | Noted: 2019-11-29

## 2019-11-29 NOTE — PROGRESS NOTES
11/14/2019    Myra Charles  1950    Chief Complaint:  Lung cancer    HPI:      PCP:  Jeff Shah MD  Cardiology:  Dr Berger      65 y.o. female with lung cancer, osteoporosis, GERD, HTN. .  never smoked.  Lung cancer resected 2012.  No Hemoptysis, No bone pain, Smoking none, Walking, Progressing with activities, Compliant with medications and instructions, Weight is stable, Shortness of breath stable, no new problems..      2/2012 CXR:  small LEFT upper lobe nodule  2/2012 CT Chest:  1.5cm LEFT upper lobe nodule.  no significant lymphadenopathy.  liver, adrenals clear.  3cm thyroid nodule RIGHT lobe.  3/2012 PET CT:  1.5cm LEFT upper lobe nodule, mSUV 3.8, suspicious for malignancy.   RIGHT thyroid nodule PET negative.  no evidence metastatic disease.  3/2012:  LEFT upper lobectomy, thoracic lymphadenectomy (Atypical Carcinoid neuroendocrine tumor.  no lymph node or lymphatic vascular invasion)  4/16/2012 CXR:  satisfactory post op.  7/2012:  paralyzed LEFT vocal cord, treated with vocal cord medialization and laryngoplasty  9/2012 CT Chest:  no evidence of recurrence. liver, adrenals ok.  no new mass or adenopathy.  multinodular goiter.  2/2013 CT Chest:  post surgical changes.  no evidence of recurrence. liver, adrenals ok.  no new mass or adenopathy.  multinodular goiter.  8/2013 CT Chest:  post surgical changes.  no evidence of recurrence. liver, adrenals ok.  no new mass or adenopathy.  multinodular goiter.  3/2014  CT Chest:  post surgical changes.  no evidence of recurrence. liver, adrenals ok.  no new mass or adenopathy.  multinodular goiter.  9/2014 CT Chest:  post surgical changes.  no evidence of recurrence. liver, adrenals ok.  no new mass or adenopathy.  multinodular goiter.  10/2015 CT Chest:  post surgical changes.  no evidence of recurrence. liver, adrenals ok.  no new mass or adenopathy.  multinodular goiter.  11/2016 CT Chest:  post surgical changes.  no evidence of  recurrence. liver, adrenals ok.  no new mass or adenopathy.  multinodular goiter.  11/2017 CT Chest:  post surgical changes.  no evidence of recurrence. liver, adrenals ok.  no new mass or adenopathy.  multinodular goiter.  11/2019 CT Chest:  post surgical changes.  no evidence of recurrence. liver, adrenals ok.  no new mass or adenopathy.  multinodular goiter.    The following portions of the patient's history were reviewed and updated as appropriate: allergies, current medications, past family history, past medical history, past social history, past surgical history and problem list.  Recent images independently reviewed.  Available laboratory values reviewed.    PMH:  Past Medical History:   Diagnosis Date   • Backache 02/17/2016    Low back pain, in sacral region      • Benign hypertension 02/17/2016   • Cortical senile cataract 02/17/2016   • Essential (primary) hypertension 04/20/2016   • GERD (gastroesophageal reflux disease) 02/17/2016   • Hallux valgus     Deformity, w/inflammation      • Iron deficiency anemia    • Lung nodule, solitary 03/08/2012    Solitary nodule of lung - RIGHT upper lobe 15mm, PET positive      • Malignant carcinoid tumor of lung (CMS/HCC) 02/17/2016    Atypical carcinoid tumor of the LEFT lung treated in March 2012     • Mitral valve prolapse 02/17/2016   • Osteoporosis 04/20/2016   • Primary fibromyalgia syndrome 02/17/2016   • Rosacea 02/17/2016   • Seborrheic keratosis     History of, upper and lower back      • 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 Enodscopic vein harvest on the left. Left lower extremity arteriogram.)   • CERVICAL CONIZATION      CONIZATION OF CERVIX 38835 (Excisional laser cone biopsy and vaporization of cervix.)   • CHOLECYSTECTOMY  09/07/2005    Cholecystectomy, laparoscopic (With intraoperative cholangiogram.)   • CYST REMOVAL      left 3rd toe   • EXCISION LESION  11/11/1998    REMOVE LESION BACK OR FLANK 78064 (Excision times two.)   • FOOT SURGERY  01/20/2009    Foot/toes surgery procedure (Soft tissue mass, left foot. Excision of)   • HERNIA REPAIR      Past history of umbilical herni repair.   • HYSTERECTOMY     • OTHER SURGICAL HISTORY  12/04/2012    Anesth, elbow area surgery (Manipulation of left elbow.)   • OTHER SURGICAL HISTORY  03/05/2012    Anesth, elbow area surgery (Excision of plate and screws from olecranon bursa. Retained plate and screws, olecranon bursitis of previous fracture left elbow.)   • OTHER SURGICAL HISTORY  10/16/2012    Treat elbow fracture (Open reduction and internal fixation.)   • THORACOSCOPY  03/14/2012    Thoracoscopy, surgical (Bronchoscopy,LVATS (wedge resect MARYBETH), LULobectomy Thoracic lymphadenectomy)   • THROAT SURGERY  07/25/2012    Throat surgery procedure (Thyroplasty type I (vocal cord medialozation & larynooplasty) Left vocal cord paralysis. Dysphoria. [facility] by Dr Tk Heart)   • THYROID SURGERY  03/22/2016    Thyroid surgery (Right thyroid lobectomy and isthmusectomy.)   • TONSILLECTOMY  02/03/1956    Chronic tonsillitis   • VEIN LIGATION  09/28/2000    PHLEB VEINS - EXTREM (20+) 43976 (High ligation of ling saphenous vein, left, plus multiple phlebectomies, left lower extremity.)     Family History   Problem Relation Age of Onset   • Cancer Mother    • Diabetes Mother    • Heart disease Mother    • Stroke Mother    • Heart disease Father    • Lung cancer Maternal Grandfather    • Lung cancer Other      Social History     Tobacco Use   •  Smoking status: Never Smoker   • Smokeless tobacco: Never Used   Substance Use Topics   • Alcohol use: Yes     Comment: OCCASIONAL WINE   • Drug use: No       ALLERGIES:  Allergies   Allergen Reactions   • Evista [Raloxifene] Swelling   • Gabapentin Other (See Comments)     HA/sedation   • Lyrica [Pregabalin] Swelling         MEDICATIONS:    Current Outpatient Medications:   •  amLODIPine (NORVASC) 5 MG tablet, Take 5 mg by mouth daily., Disp: , Rfl:   •  ASCORBIC ACID PO, Take 1 tablet by mouth daily., Disp: , Rfl:   •  ASPIRIN PO, Take 81 mg by mouth Daily. YSP Aspirin oral  (unconfirmed , Disp: , Rfl:   •  Calcium Citrate-Vitamin D (CITRACAL + D PO), Take 1 tablet by mouth 2 (two) times a day., Disp: , Rfl:   •  cholecalciferol (VITAMIN D3) 1000 UNITS tablet, Take 1,000 Units by mouth daily., Disp: , Rfl:   •  Cyanocobalamin (VITAMIN B-12 PO), Take 1 tablet by mouth Every Other Day., Disp: , Rfl:   •  cyclobenzaprine (FLEXERIL) 10 MG tablet, Take 10 mg by mouth at night as needed for muscle spasms., Disp: , Rfl:   •  DULoxetine (CYMBALTA) 60 MG capsule, Take 60 mg by mouth daily., Disp: , Rfl:   •  folic acid (FOLVITE) 1 MG tablet, TAKE ONE TABLET BY MOUTH ONCE DAILY, Disp: 90 tablet, Rfl: 1  •  FORTEO 600 MCG/2.4ML injection, , Disp: , Rfl:   •  lidocaine-prilocaine (EMLA) 2.5-2.5 % cream, APPLY ONE PUMP (GRAM) TO AFFECTED AREA(S) THREE TO FOUR TIMES A DAY TO THE APPENDAGES AND TRUNK AS DIRECTED, Disp: 200 g, Rfl: 11  •  Lidocaine-Prilocaine, Bulk, 2.5-2.5 % cream, 3 (Three) Times a Day., Disp: , Rfl:   •  metoprolol succinate XL (TOPROL-XL) 25 MG 24 hr tablet, Take 25 mg by mouth 2 (two) times a day., Disp: , Rfl:   •  Multiple Vitamins-Minerals (PRESERVISION AREDS 2 PO), Take  by mouth 2 (Two) Times a Day., Disp: , Rfl:   •  OMEPRAZOLE PO, Take 40 mg by mouth Daily., Disp: , Rfl:   •  ranitidine (ZANTAC) 150 MG tablet, Take 150 mg by mouth daily., Disp: , Rfl:     Review of Systems   Review of Systems  "  Constitution: Negative for weakness, malaise/fatigue and weight loss.   Cardiovascular: Negative for chest pain, claudication and dyspnea on exertion.   Respiratory: Negative for cough and shortness of breath.    Skin: Negative for color change and poor wound healing.   Neurological: Negative for dizziness and numbness.       Physical Exam   Vitals:    11/14/19 1401   BP: 122/80   BP Location: Left arm   Patient Position: Sitting   Cuff Size: Adult   Pulse: 74   Resp: 16   Temp: 98.5 °F (36.9 °C)   TempSrc: Temporal   SpO2: 98%   Weight: 82.6 kg (182 lb)   Height: 175.3 cm (69\")     Physical Exam   Constitutional: She is oriented to person, place, and time. She is active and cooperative. She does not appear ill. No distress.   HENT:   Right Ear: Hearing normal.   Left Ear: Hearing normal.   Nose: No nasal deformity. No epistaxis.   Mouth/Throat: She does not have dentures. Normal dentition.   Cardiovascular: Normal rate and regular rhythm.   No murmur heard.  Pulses:       Carotid pulses are 2+ on the right side, and 2+ on the left side.       Radial pulses are 2+ on the right side, and 2+ on the left side.        Dorsalis pedis pulses are 2+ on the right side, and 2+ on the left side.        Posterior tibial pulses are 2+ on the right side, and 2+ on the left side.   Pulmonary/Chest: Effort normal and breath sounds normal.   Abdominal: Soft. She exhibits no distension and no mass. There is no tenderness.   Musculoskeletal: She exhibits no deformity.   Gait normal.    Neurological: She is alert and oriented to person, place, and time. She has normal strength.   Skin: Skin is warm and dry. No cyanosis or erythema. No pallor.   No venous staining   Psychiatric: She has a normal mood and affect. Her speech is normal. Judgment and thought content normal.     Results for CHRISTIAN LOZA (MRN [id number]) as of 11/29/2019 08:19  GFR 37 Ref. Range 5/22/2019 14:50   Creatinine Latest Ref Range: 0.57 - 1.00 mg/dL " 1.42 (H)   BUN Latest Ref Range: 8 - 23 mg/dL 22     ASSESSMENT:  Myra was seen today for follow-up.    Diagnoses and all orders for this visit:    Malignant carcinoid tumor of lung (CMS/HCC)  -     CT Chest Without Contrast; Future    Palpitations    Varicose veins of both lower extremities with pain    Mitral valve prolapse    Essential (primary) hypertension    Angina pectoris (CMS/HCC)    Malignant neoplasm of upper lobe of left lung (CMS/HCC)    CKD (chronic kidney disease) stage 3, GFR 30-59 ml/min (CMS/HCC)    Overweight (BMI 25.0-29.9)    PLAN:  Detailed discussion with Ms Charles regarding situation and options.  Stable CT imaging.  No evidence recurrence or metastases.  Multiple risk factors with severe comorbidities.  No intervention indicated at this time.  Will follow with interval imaging.  Risks, benefits discussed.  Understands and wishes to proceed with plan.     Return in 2 years with CT Chest without contrast.    Return after above studies complete  Obesity Class  0. Increased risk cardiovascular events, sleep and breathing disorders, joint issues, type 2 diabetes mellitus. Options for weight management, heart healthy diet, exercise programs, and associated health risks of obesity discussed.  Recommended regular physical activity, progressive walking program.  Continue current medications as directed.    Thank you for the opportunity to participate in this patient's care.    Copy to primary care provider.    EMR Dragon/Transcription disclaimer:   Much of this encounter note is an electronic transcription/translation of spoken language to printed text. The electronic translation of spoken language may permit erroneous, or at times, nonsensical words or phrases to be inadvertently transcribed; Although I have reviewed the note for such errors, some may still exist.

## 2019-12-26 ENCOUNTER — TRANSCRIBE ORDERS (OUTPATIENT)
Dept: PODIATRY | Facility: CLINIC | Age: 69
End: 2019-12-26

## 2019-12-26 DIAGNOSIS — M79.672 LEFT FOOT PAIN: Primary | ICD-10-CM

## 2019-12-26 DIAGNOSIS — M79.89 SWELLING OF LEFT FOOT: ICD-10-CM

## 2020-01-08 ENCOUNTER — OFFICE VISIT (OUTPATIENT)
Dept: PODIATRY | Facility: CLINIC | Age: 70
End: 2020-01-08

## 2020-01-08 VITALS — OXYGEN SATURATION: 99 % | HEART RATE: 90 BPM | BODY MASS INDEX: 26.96 KG/M2 | HEIGHT: 69 IN | WEIGHT: 182 LBS

## 2020-01-08 DIAGNOSIS — M79.672 LEFT FOOT PAIN: Primary | ICD-10-CM

## 2020-01-08 DIAGNOSIS — D36.13 FOOT NEUROMA: ICD-10-CM

## 2020-01-08 PROCEDURE — 99213 OFFICE O/P EST LOW 20 MIN: CPT | Performed by: PODIATRIST

## 2020-01-08 PROCEDURE — 64455 NJX AA&/STRD PLTR COM DG NRV: CPT | Performed by: PODIATRIST

## 2020-01-08 RX ORDER — TRIAMCINOLONE ACETONIDE 40 MG/ML
10 INJECTION, SUSPENSION INTRA-ARTICULAR; INTRAMUSCULAR ONCE
Status: COMPLETED | OUTPATIENT
Start: 2020-01-08 | End: 2020-01-08

## 2020-01-08 RX ORDER — DEXAMETHASONE SODIUM PHOSPHATE 4 MG/ML
4 INJECTION, SOLUTION INTRA-ARTICULAR; INTRALESIONAL; INTRAMUSCULAR; INTRAVENOUS; SOFT TISSUE ONCE
Status: COMPLETED | OUTPATIENT
Start: 2020-01-08 | End: 2020-01-08

## 2020-01-08 RX ORDER — BUPIVACAINE HYDROCHLORIDE 5 MG/ML
3 INJECTION, SOLUTION EPIDURAL; INTRACAUDAL ONCE
Status: COMPLETED | OUTPATIENT
Start: 2020-01-08 | End: 2020-01-08

## 2020-01-08 RX ADMIN — BUPIVACAINE HYDROCHLORIDE 3 ML: 5 INJECTION, SOLUTION EPIDURAL; INTRACAUDAL at 09:15

## 2020-01-08 RX ADMIN — TRIAMCINOLONE ACETONIDE 10 MG: 40 INJECTION, SUSPENSION INTRA-ARTICULAR; INTRAMUSCULAR at 10:39

## 2020-01-08 RX ADMIN — DEXAMETHASONE SODIUM PHOSPHATE 4 MG: 4 INJECTION, SOLUTION INTRA-ARTICULAR; INTRALESIONAL; INTRAMUSCULAR; INTRAVENOUS; SOFT TISSUE at 09:15

## 2020-01-08 NOTE — PROGRESS NOTES
Myra Charles  1950  69 y.o. female   PCP- Dr. Shah    Patient presents today for left foot swelling states she is not having pain at the moment    01/08/2020     Chief Complaint   Patient presents with   • Left Foot - Foot Swelling       History of Present Illness    Myra Charles is a 69 y.o.female presents with chief complaint of left foot pain.  Pain started a week and before [place].  There are no associated injuries.  She describes the pain as burning and tingling causing her toes to go numb.  She is currently weightbearing in surgical shoe which does help.  She has no other complaints.      Past Medical History:   Diagnosis Date   • Backache 02/17/2016    Low back pain, in sacral region      • Benign hypertension 02/17/2016   • Cortical senile cataract 02/17/2016   • Essential (primary) hypertension 04/20/2016   • GERD (gastroesophageal reflux disease) 02/17/2016   • Hallux valgus     Deformity, w/inflammation      • Iron deficiency anemia    • Lung nodule, solitary 03/08/2012    Solitary nodule of lung - RIGHT upper lobe 15mm, PET positive      • Malignant carcinoid tumor of lung (CMS/HCC) 02/17/2016    Atypical carcinoid tumor of the LEFT lung treated in March 2012     • Mitral valve prolapse 02/17/2016   • Osteoporosis 04/20/2016   • Primary fibromyalgia syndrome 02/17/2016   • Rosacea 02/17/2016   • Seborrheic keratosis     History of, upper and lower back      • 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 Enodscopic  vein harvest on the left. Left lower extremity arteriogram.)   • CERVICAL CONIZATION      CONIZATION OF CERVIX 89373 (Excisional laser cone biopsy and vaporization of cervix.)   • CHOLECYSTECTOMY  09/07/2005    Cholecystectomy, laparoscopic (With intraoperative cholangiogram.)   • CYST REMOVAL      left 3rd toe   • EXCISION LESION  11/11/1998    REMOVE LESION BACK OR FLANK 09389 (Excision times two.)   • FOOT SURGERY  01/20/2009    Foot/toes surgery procedure (Soft tissue mass, left foot. Excision of)   • HERNIA REPAIR      Past history of umbilical herni repair.   • HYSTERECTOMY     • OTHER SURGICAL HISTORY  12/04/2012    Anesth, elbow area surgery (Manipulation of left elbow.)   • OTHER SURGICAL HISTORY  03/05/2012    Anesth, elbow area surgery (Excision of plate and screws from olecranon bursa. Retained plate and screws, olecranon bursitis of previous fracture left elbow.)   • OTHER SURGICAL HISTORY  10/16/2012    Treat elbow fracture (Open reduction and internal fixation.)   • THORACOSCOPY  03/14/2012    Thoracoscopy, surgical (Bronchoscopy,LVATS (wedge resect MARYBETH), LULobectomy Thoracic lymphadenectomy)   • THROAT SURGERY  07/25/2012    Throat surgery procedure (Thyroplasty type I (vocal cord medialozation & larynooplasty) Left vocal cord paralysis. Dysphoria. [facility] by Dr Tk Heart)   • THYROID SURGERY  03/22/2016    Thyroid surgery (Right thyroid lobectomy and isthmusectomy.)   • TONSILLECTOMY  02/03/1956    Chronic tonsillitis   • VEIN LIGATION  09/28/2000    PHLEB VEINS - EXTREM (20+) 06178 (High ligation of ling saphenous vein, left, plus multiple phlebectomies, left lower extremity.)         Family History   Problem Relation Age of Onset   • Cancer Mother    • Diabetes Mother    • Heart disease Mother    • Stroke Mother    • Heart disease Father    • Lung cancer Maternal Grandfather    • Lung cancer Other        Allergies   Allergen Reactions   • Evista [Raloxifene] Swelling   • Gabapentin  Other (See Comments)     HA/sedation   • Lyrica [Pregabalin] Swelling       Social History     Socioeconomic History   • Marital status:      Spouse name: Not on file   • Number of children: Not on file   • Years of education: Not on file   • Highest education level: Not on file   Tobacco Use   • Smoking status: Never Smoker   • Smokeless tobacco: Never Used   Substance and Sexual Activity   • Alcohol use: Yes     Comment: OCCASIONAL WINE   • Drug use: No   • Sexual activity: Defer         Current Outpatient Medications   Medication Sig Dispense Refill   • amLODIPine (NORVASC) 5 MG tablet Take 5 mg by mouth daily.     • ASCORBIC ACID PO Take 1 tablet by mouth daily.     • ASPIRIN PO Take 81 mg by mouth Daily. YSP Aspirin oral  (unconfirmed      • Calcium Citrate-Vitamin D (CITRACAL + D PO) Take 1 tablet by mouth 2 (two) times a day.     • cholecalciferol (VITAMIN D3) 1000 UNITS tablet Take 1,000 Units by mouth daily.     • Cyanocobalamin (VITAMIN B-12 PO) Take 1 tablet by mouth Every Other Day.     • cyclobenzaprine (FLEXERIL) 10 MG tablet Take 10 mg by mouth at night as needed for muscle spasms.     • DULoxetine (CYMBALTA) 60 MG capsule Take 60 mg by mouth daily.     • folic acid (FOLVITE) 1 MG tablet TAKE ONE TABLET BY MOUTH ONCE DAILY 90 tablet 1   • FORTEO 600 MCG/2.4ML injection      • lidocaine-prilocaine (EMLA) 2.5-2.5 % cream APPLY ONE PUMP (GRAM) TO AFFECTED AREA(S) THREE TO FOUR TIMES A DAY TO THE APPENDAGES AND TRUNK AS DIRECTED 200 g 11   • Lidocaine-Prilocaine, Bulk, 2.5-2.5 % cream 3 (Three) Times a Day.     • metoprolol succinate XL (TOPROL-XL) 25 MG 24 hr tablet Take 25 mg by mouth 2 (two) times a day.     • Multiple Vitamins-Minerals (PRESERVISION AREDS 2 PO) Take  by mouth 2 (Two) Times a Day.     • OMEPRAZOLE PO Take 40 mg by mouth Daily.     • ranitidine (ZANTAC) 150 MG tablet Take 150 mg by mouth daily.       No current facility-administered medications for this visit.   "        OBJECTIVE    Pulse 90   Ht 175.3 cm (69\")   Wt 82.6 kg (182 lb)   SpO2 99%   BMI 26.88 kg/m²       Review of Systems   Constitutional: Negative.    HENT: Negative.    Cardiovascular: Negative.    Gastrointestinal: Negative.    Endocrine: Negative.    Genitourinary: Negative.    Musculoskeletal:        Left foot pain   Skin: Negative.    Neurological: Negative.    Psychiatric/Behavioral: Negative.            Physical Exam   Constitutional: She is oriented to person, place, and time. She appears well-developed and well-nourished. No distress.   HENT:   Head: Normocephalic and atraumatic.   Nose: Nose normal.   Eyes: Pupils are equal, round, and reactive to light. Conjunctivae are normal.   Pulmonary/Chest: Effort normal. No respiratory distress. She has no wheezes.   Neurological: She is alert and oriented to person, place, and time. She has normal reflexes.   Skin: Skin is warm and dry. Capillary refill takes less than 2 seconds. She is not diaphoretic.   Psychiatric: She has a normal mood and affect. Her behavior is normal.   Vitals reviewed.      Gait: normal    Assistive Device: none    Left lower Extremity    Cardiovascular:    DP/PT pulses palpable    Pedal hair growth present.   No erythema or edema noted   Musculoskeletal:  Muscle strength is 5/5 for all muscle groups tested   ROM of the 1st MTP is full without pain or crepitus  ROM of the ankle joint is full without pain or crepitus    Pain with compression of the third intermetatarsal space.  Pain with lateral compression of the forefoot  Dermatological:   Webspaces 1-4  are clean, dry and intact.   No subcutaneous nodules or masses noted    No open wounds noted   Neurological:   Protective sensation decreased to  digits 3 4 and 5  Sensation intact to light touch          Procedures    Neuroma njection  Date/Time: 01/09/20  Performed by: SUZAN CARLISLE  Authorized by: SUZAN CARLISLE   Consent: Verbal consent obtained. Written consent " obtained.  Risks and benefits: risks, benefits and alternatives were discussed  Consent given by: patient  Patient identity confirmed: verbally with patient  Indications: pain relief  Nerve block body site: Left 3rd IM space  Sedation:  Patient sedated: no    Patient position: sitting  Needle size: 27 G  Local anesthetic: 0.5% Marcaine plain, Kenalog 40 mg/ml , Decadron 4 mg/mL.   Outcome: pain improved  Patient tolerance: Patient tolerated the procedure well with no immediate complications        ASSESSMENT AND PLAN    Myra was seen today for foot swelling.    Diagnoses and all orders for this visit:    Left foot pain  -     XR Foot 3+ View Left  -     bupivacaine (PF) (MARCAINE) 0.5 % injection 3 mL  -     dexamethasone (DECADRON) injection 4 mg  -     triamcinolone acetonide (KENALOG-40) injection 10 mg    Foot neuroma      - Comprehensive foot and ankle exam performed.   - radiographs taken and reviewed  - Diagnosis, etiology and treatment of neuromas discussed in detail with the patient.  Conservative and surgical treatment options are discussed.  Patient elected for steroid injection.  - Steroid injection left third intermetatarsal space  -Recommended OTC arch supports with metatarsal pads  - All questions were answered to the patients satisfaction.  - RTC 4 weeks as needed           This document has been electronically signed by Damian Rock DPM on January 9, 2020 11:25 AM     1/9/2020  11:25 AM

## 2020-01-15 ENCOUNTER — OFFICE VISIT (OUTPATIENT)
Dept: ENDOCRINOLOGY | Facility: CLINIC | Age: 70
End: 2020-01-15

## 2020-01-15 VITALS
HEIGHT: 69 IN | WEIGHT: 178 LBS | OXYGEN SATURATION: 98 % | SYSTOLIC BLOOD PRESSURE: 124 MMHG | DIASTOLIC BLOOD PRESSURE: 76 MMHG | HEART RATE: 81 BPM | BODY MASS INDEX: 26.36 KG/M2

## 2020-01-15 DIAGNOSIS — E04.2 NONTOXIC MULTINODULAR GOITER: Primary | ICD-10-CM

## 2020-01-15 DIAGNOSIS — E53.8 B12 DEFICIENCY: ICD-10-CM

## 2020-01-15 DIAGNOSIS — I10 ESSENTIAL HYPERTENSION: ICD-10-CM

## 2020-01-15 DIAGNOSIS — M81.0 AGE RELATED OSTEOPOROSIS, UNSPECIFIED PATHOLOGICAL FRACTURE PRESENCE: ICD-10-CM

## 2020-01-15 PROCEDURE — 99214 OFFICE O/P EST MOD 30 MIN: CPT | Performed by: INTERNAL MEDICINE

## 2020-01-15 NOTE — PROGRESS NOTES
Myra Charles is a 69 y.o. female who presents for  evaluation of   Chief Complaint   Patient presents with   • Goiter         Primary Care / Referring Provider  Jeff Shah MD  69 y o comes for cfu    history of lung cancer sp VATS , cured     reason toxic  MNG    thyroid scan 12-15 revealed increased bilateral uptake   right cold nodule inferior pole and progressive enlargement despite 2 FNA led to right thryoidectomy that was benign    left thyroid nodules are stable in size dating back to 2013    Last US 12-17    Stable mid and lower pole thyroid lobe heterogeneous solid nodules as well as   lower pole complex cystic lesion are seen with the largest measuring 1   x 0.76 x 0.89 cm.     prior to surgery on methimazole, now euthyroid off treatment.           Past Medical History:   Diagnosis Date   • Backache 02/17/2016    Low back pain, in sacral region      • Benign hypertension 02/17/2016   • Cortical senile cataract 02/17/2016   • Essential (primary) hypertension 04/20/2016   • GERD (gastroesophageal reflux disease) 02/17/2016   • Hallux valgus     Deformity, w/inflammation      • Iron deficiency anemia    • Lung nodule, solitary 03/08/2012    Solitary nodule of lung - RIGHT upper lobe 15mm, PET positive      • Malignant carcinoid tumor of lung (CMS/HCC) 02/17/2016    Atypical carcinoid tumor of the LEFT lung treated in March 2012     • Mitral valve prolapse 02/17/2016   • Osteoporosis 04/20/2016   • Primary fibromyalgia syndrome 02/17/2016   • Rosacea 02/17/2016   • Seborrheic keratosis     History of, upper and lower back      • 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 04/20/2016   • 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 02/17/2016    Paralysis of vocal cords and larynx, unilateral - LEFT side      • Varicose vein    • Vitamin D deficiency 04/20/2016   • Vitreous detachment 12/01/2014   • Vitreous floaters 12/01/2014     Family History   Problem  Relation Age of Onset   • Cancer Mother    • Diabetes Mother    • Heart disease Mother    • Stroke Mother    • Heart disease Father    • Lung cancer Maternal Grandfather    • Lung cancer Other      Social History     Tobacco Use   • Smoking status: Never Smoker   • Smokeless tobacco: Never Used   Substance Use Topics   • Alcohol use: Yes     Comment: OCCASIONAL WINE   • Drug use: No         Current Outpatient Medications:   •  amLODIPine (NORVASC) 5 MG tablet, Take 5 mg by mouth daily., Disp: , Rfl:   •  ASCORBIC ACID PO, Take 1 tablet by mouth daily., Disp: , Rfl:   •  ASPIRIN PO, Take 81 mg by mouth Daily. YSP Aspirin oral  (unconfirmed , Disp: , Rfl:   •  Calcium Citrate-Vitamin D (CITRACAL + D PO), Take 1 tablet by mouth 2 (two) times a day., Disp: , Rfl:   •  cholecalciferol (VITAMIN D3) 1000 UNITS tablet, Take 1,000 Units by mouth daily., Disp: , Rfl:   •  Cyanocobalamin (VITAMIN B-12 PO), Take 1 tablet by mouth Every Other Day., Disp: , Rfl:   •  cyclobenzaprine (FLEXERIL) 10 MG tablet, Take 10 mg by mouth at night as needed for muscle spasms., Disp: , Rfl:   •  DULoxetine (CYMBALTA) 60 MG capsule, Take 60 mg by mouth daily., Disp: , Rfl:   •  folic acid (FOLVITE) 1 MG tablet, TAKE ONE TABLET BY MOUTH ONCE DAILY, Disp: 90 tablet, Rfl: 1  •  FORTEO 600 MCG/2.4ML injection, , Disp: , Rfl:   •  lidocaine-prilocaine (EMLA) 2.5-2.5 % cream, APPLY ONE PUMP (GRAM) TO AFFECTED AREA(S) THREE TO FOUR TIMES A DAY TO THE APPENDAGES AND TRUNK AS DIRECTED, Disp: 200 g, Rfl: 11  •  Lidocaine-Prilocaine, Bulk, 2.5-2.5 % cream, 3 (Three) Times a Day., Disp: , Rfl:   •  metoprolol succinate XL (TOPROL-XL) 25 MG 24 hr tablet, Take 25 mg by mouth 2 (two) times a day., Disp: , Rfl:   •  Multiple Vitamins-Minerals (PRESERVISION AREDS 2 PO), Take  by mouth 2 (Two) Times a Day., Disp: , Rfl:   •  OMEPRAZOLE PO, Take 40 mg by mouth Daily., Disp: , Rfl:   •  ranitidine (ZANTAC) 150 MG tablet, Take 150 mg by mouth daily., Disp: , Rfl:  "    Review of Systems    Review of Systems   Constitutional: Negative for activity change, appetite change, chills, diaphoresis, fatigue, fever and unexpected weight change.   HENT: Negative for congestion, drooling, ear discharge, ear pain, facial swelling, mouth sores, nosebleeds, postnasal drip, sinus pressure, sneezing, sore throat, tinnitus, trouble swallowing and voice change.    Eyes: Negative.  Negative for photophobia, pain, discharge, redness and itching.   Respiratory: Negative.  Negative for apnea, cough, choking, chest tightness, shortness of breath, wheezing and stridor.    Cardiovascular: Negative.  Negative for chest pain, palpitations and leg swelling.   Gastrointestinal: Negative.  Negative for abdominal distention, abdominal pain, constipation, diarrhea, nausea and vomiting.   Endocrine: Negative.  Negative for cold intolerance, heat intolerance, polydipsia, polyphagia and polyuria.   Genitourinary: Negative for difficulty urinating, dysuria, flank pain and frequency.   Musculoskeletal: Negative for arthralgias, back pain, gait problem, joint swelling, myalgias, neck pain and neck stiffness.   Skin: Negative for color change, pallor, rash and wound.   Allergic/Immunologic: Negative for environmental allergies, food allergies and immunocompromised state.   Neurological: Negative for dizziness, tremors, seizures, syncope, facial asymmetry, speech difficulty, weakness, light-headedness, numbness and headaches.   Hematological: Negative for adenopathy. Does not bruise/bleed easily.   Psychiatric/Behavioral: Negative for agitation, behavioral problems, confusion, decreased concentration, dysphoric mood, hallucinations, self-injury, sleep disturbance and suicidal ideas. The patient is not nervous/anxious and is not hyperactive.         Objective:     /76   Pulse 81   Ht 175.3 cm (69\")   Wt 80.7 kg (178 lb)   SpO2 98%   BMI 26.29 kg/m²     Physical Exam   Constitutional: She is oriented to " person, place, and time. She appears well-developed.   HENT:   Head: Normocephalic.   Right Ear: External ear normal.   Left Ear: External ear normal.   Nose: Nose normal.   Eyes: Conjunctivae and EOM are normal. No scleral icterus.   Neck: Normal range of motion. Neck supple. No tracheal deviation present. No thyromegaly present.   Absent right thyroid lobe    Cardiovascular: Normal rate, regular rhythm, normal heart sounds and intact distal pulses. Exam reveals no gallop and no friction rub.   No murmur heard.  Pulmonary/Chest: Effort normal and breath sounds normal. No stridor. No respiratory distress. She has no wheezes. She has no rales. She exhibits no tenderness.   Abdominal: Soft. Bowel sounds are normal. She exhibits no distension and no mass. There is no tenderness. There is no rebound and no guarding.   Musculoskeletal: Normal range of motion. She exhibits no tenderness or deformity.   Lymphadenopathy:     She has no cervical adenopathy.   Neurological: She is alert and oriented to person, place, and time. She displays normal reflexes. She exhibits normal muscle tone. Coordination normal.   Skin: No rash noted. No erythema. No pallor.   Psychiatric: She has a normal mood and affect. Her behavior is normal. Judgment and thought content normal.       Lab Review    Results for orders placed or performed in visit on 07/11/19   Vitamin B12   Result Value Ref Range    Vitamin B-12 1,129 (H) 211 - 946 pg/mL   Folate   Result Value Ref Range    Folate >20.00 4.78 - 24.20 ng/mL   Ferritin   Result Value Ref Range    Ferritin 617.40 (H) 13.00 - 150.00 ng/mL   Iron and TIBC   Result Value Ref Range    Iron 76 37 - 145 mcg/dL    Iron Saturation 27 20 - 50 %    Transferrin 188 (L) 200 - 360 mg/dL    TIBC 280 (L) 298 - 536 mcg/dL   CBC Auto Differential   Result Value Ref Range    WBC 5.84 3.40 - 10.80 10*3/mm3    RBC 4.31 3.77 - 5.28 10*6/mm3    Hemoglobin 12.6 12.0 - 15.9 g/dL    Hematocrit 38.5 34.0 - 46.6 %    MCV  89.3 79.0 - 97.0 fL    MCH 29.2 26.6 - 33.0 pg    MCHC 32.7 31.5 - 35.7 g/dL    RDW 12.2 (L) 12.3 - 15.4 %    RDW-SD 40.0 37.0 - 54.0 fl    MPV 9.7 6.0 - 12.0 fL    Platelets 215 140 - 450 10*3/mm3    Neutrophil % 50.7 42.7 - 76.0 %    Lymphocyte % 35.6 19.6 - 45.3 %    Monocyte % 9.2 5.0 - 12.0 %    Eosinophil % 2.9 0.3 - 6.2 %    Basophil % 1.4 0.0 - 1.5 %    Immature Grans % 0.2 0.0 - 0.5 %    Neutrophils, Absolute 2.96 1.70 - 7.00 10*3/mm3    Lymphocytes, Absolute 2.08 0.70 - 3.10 10*3/mm3    Monocytes, Absolute 0.54 0.10 - 0.90 10*3/mm3    Eosinophils, Absolute 0.17 0.00 - 0.40 10*3/mm3    Basophils, Absolute 0.08 0.00 - 0.20 10*3/mm3    Immature Grans, Absolute 0.01 0.00 - 0.05 10*3/mm3    nRBC 0.0 0.0 - 0.2 /100 WBC           Assessment/Plan       ICD-10-CM ICD-9-CM   1. Nontoxic multinodular goiter E04.2 241.1   2. Essential hypertension I10 401.9   3. B12 deficiency E53.8 266.2   4. Age related osteoporosis, unspecified pathological fracture presence M81.0 733.01         toxic mng  sp right hemityroidectomy for progressive enlarging r thyroid nodule, pathology benign  left nodules stable in size, last US from 12-17 - last from 12-19     was on  mmi, now euthyroid off tx    Repeat US , repeat TSH      ==    HTN , palpitations    on amlodipine 5 mg qam     lopressor 25 mg  Bid   labs q 3 m but meet in 12m    ==    osteoporosis     Followed by rheumatology that also follows her for fibromyalgia    Was on boniva but completed 10 y approx    Now on forteo T score -3.1 forearm , followed by Shahram   Start Sept 2019     --    Breast lesions, clarified by MRI to be benign       I reviewed and summarized records from Jeff Shah MD from 2019 and I reviewed / ordered labs.     No orders of the defined types were placed in this encounter.        A copy of my note was sent to Jeff Shah MD    Please see my above opinion and suggestions.

## 2020-01-21 ENCOUNTER — HOSPITAL ENCOUNTER (OUTPATIENT)
Dept: ULTRASOUND IMAGING | Facility: HOSPITAL | Age: 70
Discharge: HOME | End: 2020-01-21
Admitting: INTERNAL MEDICINE

## 2020-01-21 PROCEDURE — 76536 US EXAM OF HEAD AND NECK: CPT

## 2020-01-28 ENCOUNTER — TELEPHONE (OUTPATIENT)
Dept: ENDOCRINOLOGY | Facility: CLINIC | Age: 70
End: 2020-01-28

## 2020-01-28 NOTE — TELEPHONE ENCOUNTER
Jeri, she is signed up for my chart so I assumed she read it     I sent the following message on Jan 23    MsEladio Melvin, I reviewed your thyroid ultrasound, the left inferior nodule has remained the same size compared to 2017.  I know the radiologist states that it has increased but after personal evaluation of the images it has remained stable in size

## 2020-02-05 ENCOUNTER — OFFICE VISIT (OUTPATIENT)
Dept: PODIATRY | Facility: CLINIC | Age: 70
End: 2020-02-05

## 2020-02-05 VITALS — BODY MASS INDEX: 26.36 KG/M2 | HEIGHT: 69 IN | HEART RATE: 84 BPM | WEIGHT: 178 LBS | OXYGEN SATURATION: 99 %

## 2020-02-05 DIAGNOSIS — D36.13 FOOT NEUROMA: Primary | ICD-10-CM

## 2020-02-05 DIAGNOSIS — M79.672 LEFT FOOT PAIN: ICD-10-CM

## 2020-02-05 PROCEDURE — 99212 OFFICE O/P EST SF 10 MIN: CPT | Performed by: PODIATRIST

## 2020-02-05 NOTE — PROGRESS NOTES
Myra Charles  1950  69 y.o. female     Patient presents today for a recheck of her left foot.    01/08/2020     Chief Complaint   Patient presents with   • Left Foot - Follow-up       History of Present Illness    Myra Charles is a 69 y.o.female presents to clinic for follow-up of her left foot pain.  She received a steroid injection and recommendation for over-the-counter arch supports on her last visit.  She states that both have significantly helped her pain.  Currently she denies pain.  She is very happy with her progression.  She denies any new complaints      Past Medical History:   Diagnosis Date   • Backache 02/17/2016    Low back pain, in sacral region      • Benign hypertension 02/17/2016   • Cortical senile cataract 02/17/2016   • Essential (primary) hypertension 04/20/2016   • GERD (gastroesophageal reflux disease) 02/17/2016   • Hallux valgus     Deformity, w/inflammation      • Iron deficiency anemia    • Lung nodule, solitary 03/08/2012    Solitary nodule of lung - RIGHT upper lobe 15mm, PET positive      • Malignant carcinoid tumor of lung (CMS/HCC) 02/17/2016    Atypical carcinoid tumor of the LEFT lung treated in March 2012     • Mitral valve prolapse 02/17/2016   • Osteoporosis 04/20/2016   • Primary fibromyalgia syndrome 02/17/2016   • Rosacea 02/17/2016   • Seborrheic keratosis     History of, upper and lower back      • 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 Enodscopic vein harvest on the left. Left lower  extremity arteriogram.)   • CERVICAL CONIZATION      CONIZATION OF CERVIX 77615 (Excisional laser cone biopsy and vaporization of cervix.)   • CHOLECYSTECTOMY  09/07/2005    Cholecystectomy, laparoscopic (With intraoperative cholangiogram.)   • CYST REMOVAL      left 3rd toe   • EXCISION LESION  11/11/1998    REMOVE LESION BACK OR FLANK 53355 (Excision times two.)   • FOOT SURGERY  01/20/2009    Foot/toes surgery procedure (Soft tissue mass, left foot. Excision of)   • HERNIA REPAIR      Past history of umbilical herni repair.   • HYSTERECTOMY     • OTHER SURGICAL HISTORY  12/04/2012    Anesth, elbow area surgery (Manipulation of left elbow.)   • OTHER SURGICAL HISTORY  03/05/2012    Anesth, elbow area surgery (Excision of plate and screws from olecranon bursa. Retained plate and screws, olecranon bursitis of previous fracture left elbow.)   • OTHER SURGICAL HISTORY  10/16/2012    Treat elbow fracture (Open reduction and internal fixation.)   • THORACOSCOPY  03/14/2012    Thoracoscopy, surgical (Bronchoscopy,LVATS (wedge resect MARYBETH), LULobectomy Thoracic lymphadenectomy)   • THROAT SURGERY  07/25/2012    Throat surgery procedure (Thyroplasty type I (vocal cord medialozation & larynooplasty) Left vocal cord paralysis. Dysphoria. [facility] by Dr Tk Heart)   • THYROID SURGERY  03/22/2016    Thyroid surgery (Right thyroid lobectomy and isthmusectomy.)   • TONSILLECTOMY  02/03/1956    Chronic tonsillitis   • VEIN LIGATION  09/28/2000    PHLEB VEINS - EXTREM (20+) 90084 (High ligation of ling saphenous vein, left, plus multiple phlebectomies, left lower extremity.)         Family History   Problem Relation Age of Onset   • Cancer Mother    • Diabetes Mother    • Heart disease Mother    • Stroke Mother    • Heart disease Father    • Lung cancer Maternal Grandfather    • Lung cancer Other        Allergies   Allergen Reactions   • Evista [Raloxifene] Swelling   • Gabapentin Other (See Comments)     HA/sedation  "  • Lyrica [Pregabalin] Swelling       Social History     Socioeconomic History   • Marital status:      Spouse name: Not on file   • Number of children: Not on file   • Years of education: Not on file   • Highest education level: Not on file   Tobacco Use   • Smoking status: Never Smoker   • Smokeless tobacco: Never Used   Substance and Sexual Activity   • Alcohol use: Yes     Comment: OCCASIONAL WINE   • Drug use: No   • Sexual activity: Defer         Current Outpatient Medications   Medication Sig Dispense Refill   • amLODIPine (NORVASC) 5 MG tablet Take 5 mg by mouth daily.     • ASCORBIC ACID PO Take 1 tablet by mouth daily.     • ASPIRIN PO Take 81 mg by mouth Daily. YSP Aspirin oral  (unconfirmed      • Calcium Citrate-Vitamin D (CITRACAL + D PO) Take 1 tablet by mouth 2 (two) times a day.     • cholecalciferol (VITAMIN D3) 1000 UNITS tablet Take 1,000 Units by mouth daily.     • Cyanocobalamin (VITAMIN B-12 PO) Take 1 tablet by mouth Every Other Day.     • cyclobenzaprine (FLEXERIL) 10 MG tablet Take 10 mg by mouth at night as needed for muscle spasms.     • DULoxetine (CYMBALTA) 60 MG capsule Take 60 mg by mouth daily.     • folic acid (FOLVITE) 1 MG tablet TAKE ONE TABLET BY MOUTH ONCE DAILY 90 tablet 1   • FORTEO 600 MCG/2.4ML injection      • lidocaine-prilocaine (EMLA) 2.5-2.5 % cream APPLY ONE PUMP (GRAM) TO AFFECTED AREA(S) THREE TO FOUR TIMES A DAY TO THE APPENDAGES AND TRUNK AS DIRECTED 200 g 11   • Lidocaine-Prilocaine, Bulk, 2.5-2.5 % cream 3 (Three) Times a Day.     • metoprolol succinate XL (TOPROL-XL) 25 MG 24 hr tablet Take 25 mg by mouth 2 (two) times a day.     • Multiple Vitamins-Minerals (PRESERVISION AREDS 2 PO) Take  by mouth 2 (Two) Times a Day.     • OMEPRAZOLE PO Take 40 mg by mouth Daily.     • ranitidine (ZANTAC) 150 MG tablet Take 150 mg by mouth daily.       No current facility-administered medications for this visit.          OBJECTIVE    Pulse 84   Ht 175.3 cm (69\")   " Wt 80.7 kg (178 lb)   SpO2 99%   BMI 26.29 kg/m²       Review of Systems   Constitutional: Negative.    HENT: Negative.    Eyes: Negative.    Respiratory: Negative.    Cardiovascular: Negative.    Gastrointestinal: Negative.    Musculoskeletal: Negative.    Skin: Negative.    Neurological: Negative.    Psychiatric/Behavioral: Negative.            Physical Exam   Constitutional: She is oriented to person, place, and time. She appears well-developed and well-nourished. No distress.   HENT:   Head: Normocephalic and atraumatic.   Nose: Nose normal.   Eyes: Pupils are equal, round, and reactive to light. Conjunctivae are normal.   Pulmonary/Chest: Effort normal. No respiratory distress. She has no wheezes.   Musculoskeletal: Normal range of motion. She exhibits no tenderness.   Neurological: She is alert and oriented to person, place, and time. She has normal reflexes.   Skin: Skin is warm and dry. Capillary refill takes less than 2 seconds. She is not diaphoretic.   Psychiatric: She has a normal mood and affect. Her behavior is normal.   Vitals reviewed.      Gait: normal    Assistive Device: none    Left lower Extremity    Cardiovascular:    DP/PT pulses palpable    Pedal hair growth present.   No erythema or edema noted   Musculoskeletal:  Muscle strength is 5/5 for all muscle groups tested   ROM of the 1st MTP is full without pain or crepitus  ROM of the ankle joint is full without pain or crepitus    No pain on palpation  Dermatological:   Webspaces 1-4  are clean, dry and intact.   No subcutaneous nodules or masses noted    No open wounds noted   Neurological:   Protective sensation decreased to  digits 3 4 and 5  Sensation intact to light touch          Procedures        ASSESSMENT AND PLAN    Myra was seen today for follow-up.    Diagnoses and all orders for this visit:    Foot neuroma    Left foot pain      -Pain to left foot has significantly improved.  Continue OTC arch supports.  Limit barefoot  walking.  - All questions were answered to the patients satisfaction.  - RTC as needed           This document has been electronically signed by Damian Rock DPM on February 5, 2020 4:40 PM     2/5/2020  4:40 PM

## 2020-03-17 RX ORDER — FOLIC ACID 1 MG/1
1000 TABLET ORAL DAILY
Qty: 90 TABLET | Refills: 1 | Status: SHIPPED | OUTPATIENT
Start: 2020-03-17 | End: 2020-06-25

## 2020-06-24 DIAGNOSIS — L70.8 OTHER ACNE: ICD-10-CM

## 2020-06-25 RX ORDER — FOLIC ACID 1 MG/1
TABLET ORAL
Qty: 90 TABLET | Refills: 1 | Status: SHIPPED | OUTPATIENT
Start: 2020-06-25 | End: 2021-01-30 | Stop reason: SDUPTHER

## 2020-07-09 ENCOUNTER — LAB (OUTPATIENT)
Dept: ONCOLOGY | Facility: HOSPITAL | Age: 70
End: 2020-07-09

## 2020-07-09 DIAGNOSIS — C34.12 MALIGNANT NEOPLASM OF UPPER LOBE OF LEFT LUNG (HCC): ICD-10-CM

## 2020-07-09 DIAGNOSIS — R79.89 ELEVATED FERRITIN: ICD-10-CM

## 2020-07-09 LAB
BASOPHILS # BLD AUTO: 0.1 10*3/MM3 (ref 0–0.2)
BASOPHILS NFR BLD AUTO: 1.3 % (ref 0–1.5)
DEPRECATED RDW RBC AUTO: 40.8 FL (ref 37–54)
EOSINOPHIL # BLD AUTO: 0.17 10*3/MM3 (ref 0–0.4)
EOSINOPHIL NFR BLD AUTO: 2.3 % (ref 0.3–6.2)
ERYTHROCYTE [DISTWIDTH] IN BLOOD BY AUTOMATED COUNT: 12.5 % (ref 12.3–15.4)
FERRITIN SERPL-MCNC: 591.6 NG/ML (ref 13–150)
HCT VFR BLD AUTO: 38.2 % (ref 34–46.6)
HGB BLD-MCNC: 12.5 G/DL (ref 12–15.9)
IMM GRANULOCYTES # BLD AUTO: 0.02 10*3/MM3 (ref 0–0.05)
IMM GRANULOCYTES NFR BLD AUTO: 0.3 % (ref 0–0.5)
IRON 24H UR-MRATE: 84 MCG/DL (ref 37–145)
IRON SATN MFR SERPL: 29 % (ref 20–50)
LYMPHOCYTES # BLD AUTO: 2.13 10*3/MM3 (ref 0.7–3.1)
LYMPHOCYTES NFR BLD AUTO: 28.7 % (ref 19.6–45.3)
MCH RBC QN AUTO: 29.2 PG (ref 26.6–33)
MCHC RBC AUTO-ENTMCNC: 32.7 G/DL (ref 31.5–35.7)
MCV RBC AUTO: 89.3 FL (ref 79–97)
MONOCYTES # BLD AUTO: 0.64 10*3/MM3 (ref 0.1–0.9)
MONOCYTES NFR BLD AUTO: 8.6 % (ref 5–12)
NEUTROPHILS NFR BLD AUTO: 4.35 10*3/MM3 (ref 1.7–7)
NEUTROPHILS NFR BLD AUTO: 58.8 % (ref 42.7–76)
NRBC BLD AUTO-RTO: 0 /100 WBC (ref 0–0.2)
PLATELET # BLD AUTO: 237 10*3/MM3 (ref 140–450)
PMV BLD AUTO: 9.6 FL (ref 6–12)
RBC # BLD AUTO: 4.28 10*6/MM3 (ref 3.77–5.28)
TIBC SERPL-MCNC: 294 MCG/DL (ref 298–536)
TRANSFERRIN SERPL-MCNC: 197 MG/DL (ref 200–360)
WBC # BLD AUTO: 7.41 10*3/MM3 (ref 3.4–10.8)

## 2020-07-09 PROCEDURE — 82728 ASSAY OF FERRITIN: CPT | Performed by: INTERNAL MEDICINE

## 2020-07-09 PROCEDURE — 36415 COLL VENOUS BLD VENIPUNCTURE: CPT | Performed by: INTERNAL MEDICINE

## 2020-07-09 PROCEDURE — 84466 ASSAY OF TRANSFERRIN: CPT | Performed by: INTERNAL MEDICINE

## 2020-07-09 PROCEDURE — 83540 ASSAY OF IRON: CPT | Performed by: INTERNAL MEDICINE

## 2020-07-09 PROCEDURE — 85025 COMPLETE CBC W/AUTO DIFF WBC: CPT | Performed by: INTERNAL MEDICINE

## 2020-07-10 ENCOUNTER — OFFICE VISIT (OUTPATIENT)
Dept: ONCOLOGY | Facility: CLINIC | Age: 70
End: 2020-07-10

## 2020-07-10 VITALS
DIASTOLIC BLOOD PRESSURE: 60 MMHG | SYSTOLIC BLOOD PRESSURE: 135 MMHG | HEART RATE: 77 BPM | BODY MASS INDEX: 26.32 KG/M2 | HEIGHT: 69 IN | WEIGHT: 177.7 LBS | TEMPERATURE: 97.5 F | RESPIRATION RATE: 20 BRPM

## 2020-07-10 DIAGNOSIS — C34.12 MALIGNANT NEOPLASM OF UPPER LOBE OF LEFT LUNG (HCC): ICD-10-CM

## 2020-07-10 DIAGNOSIS — R79.89 ELEVATED FERRITIN: Primary | ICD-10-CM

## 2020-07-10 PROCEDURE — G8730 PAIN DOC POS AND PLAN: HCPCS | Performed by: INTERNAL MEDICINE

## 2020-07-10 PROCEDURE — 1157F ADVNC CARE PLAN IN RCRD: CPT | Performed by: INTERNAL MEDICINE

## 2020-07-10 PROCEDURE — G0463 HOSPITAL OUTPT CLINIC VISIT: HCPCS | Performed by: INTERNAL MEDICINE

## 2020-07-10 PROCEDURE — G9903 PT SCRN TBCO ID AS NON USER: HCPCS | Performed by: INTERNAL MEDICINE

## 2020-07-10 PROCEDURE — 99214 OFFICE O/P EST MOD 30 MIN: CPT | Performed by: INTERNAL MEDICINE

## 2020-07-10 NOTE — PROGRESS NOTES
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  Patient is here for one-year follow-up appointment today.  Complains of recent worsening of neck pain for which she is getting physical therapy.  Continues to have fibromyalgia pain.  Denies any bleeding.  Denies any new lymph node enlargement            PAST MEDICAL HISTORY:    Past Medical History:   Diagnosis Date   • Backache 02/17/2016    Low back pain, in sacral region      • Benign hypertension 02/17/2016   • Cortical senile cataract 02/17/2016   • Essential (primary) hypertension 04/20/2016   • GERD (gastroesophageal reflux disease) 02/17/2016   • Hallux valgus     Deformity, w/inflammation      • Iron deficiency anemia    • Lung nodule, solitary 03/08/2012    Solitary nodule of lung - RIGHT upper lobe 15mm, PET positive      • Malignant carcinoid tumor of lung (CMS/HCC) 02/17/2016    Atypical carcinoid tumor of the LEFT lung treated in March 2012     • Mitral valve prolapse 02/17/2016   • Osteoporosis 04/20/2016   • Primary fibromyalgia syndrome 02/17/2016   • Rosacea 02/17/2016   • Seborrheic keratosis     History of, upper and lower back      • 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 04/20/2016   • 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 02/17/2016    Paralysis of vocal cords and larynx, unilateral - LEFT side      • Varicose vein    • Vitamin D deficiency 04/20/2016   • Vitreous detachment 12/01/2014   • Vitreous floaters 12/01/2014       SOCIAL HISTORY:    Social History     Tobacco Use   • Smoking status: Never Smoker   • Smokeless tobacco: Never Used    Substance Use Topics   • Alcohol use: Yes     Comment: OCCASIONAL WINE   • Drug use: No       Surgical History :  Past Surgical History:   Procedure Laterality Date   • ARTERIAL BYPASS SURGERY  09/25/2012    Artery bypass graft (Left femoral to posterior tibial artery bypass. Enodscopic vein harvest on the left. Left lower extremity arteriogram.)   • CERVICAL CONIZATION      CONIZATION OF CERVIX 21410 (Excisional laser cone biopsy and vaporization of cervix.)   • CHOLECYSTECTOMY  09/07/2005    Cholecystectomy, laparoscopic (With intraoperative cholangiogram.)   • CYST REMOVAL      left 3rd toe   • EXCISION LESION  11/11/1998    REMOVE LESION BACK OR FLANK 21327 (Excision times two.)   • FOOT SURGERY  01/20/2009    Foot/toes surgery procedure (Soft tissue mass, left foot. Excision of)   • HERNIA REPAIR      Past history of umbilical herni repair.   • HYSTERECTOMY     • OTHER SURGICAL HISTORY  12/04/2012    Anesth, elbow area surgery (Manipulation of left elbow.)   • OTHER SURGICAL HISTORY  03/05/2012    Anesth, elbow area surgery (Excision of plate and screws from olecranon bursa. Retained plate and screws, olecranon bursitis of previous fracture left elbow.)   • OTHER SURGICAL HISTORY  10/16/2012    Treat elbow fracture (Open reduction and internal fixation.)   • THORACOSCOPY  03/14/2012    Thoracoscopy, surgical (Bronchoscopy,LVATS (wedge resect MARYBETH), LULobectomy Thoracic lymphadenectomy)   • THROAT SURGERY  07/25/2012    Throat surgery procedure (Thyroplasty type I (vocal cord medialozation & larynooplasty) Left vocal cord paralysis. Dysphoria. [facility] by Dr Tk Heart)   • THYROID SURGERY  03/22/2016    Thyroid surgery (Right thyroid lobectomy and isthmusectomy.)   • TONSILLECTOMY  02/03/1956    Chronic tonsillitis   • VEIN LIGATION  09/28/2000    PHLEB VEINS - EXTREM (20+) 21971 (High ligation of ling saphenous vein, left, plus multiple phlebectomies, left lower extremity.)       ALLERGIES:   "  Allergies   Allergen Reactions   • Evista [Raloxifene] Swelling   • Gabapentin Other (See Comments)     HA/sedation   • Lyrica [Pregabalin] Swelling         FAMILY HISTORY:  Family History   Problem Relation Age of Onset   • Cancer Mother    • Diabetes Mother    • Heart disease Mother    • Stroke Mother    • Heart disease Father    • Lung cancer Maternal Grandfather    • Lung cancer Other            REVIEW OF SYSTEMS:      CONSTITUTIONAL:  Complains of fatigue.  Has lost 7 pounds since last clinic visit.  Denies any fever, chills .     EYES: No visual disturbances. No discharge. No new lesions    ENMT:  No epistaxis, mouth sores or difficulty swallowing.    RESPIRATORY: Positive for chronic shortness of breath, denies any recent worsening. No new cough or hemoptysis.    CARDIOVASCULAR:  No chest pain or palpitations.    GASTROINTESTINAL:  No abdominal pain nausea, vomiting or blood in the stool.    GENITOURINARY: No Dysuria or Hematuria.    MUSCULOSKELETAL: Complains of recent worsening of neck pain for which she is getting physical therapy.    LYMPHATICS:  Denies any abnormal swollen glands anywhere in the body.    NEUROLOGICAL : Positive for tingling and numbness affecting bilateral lower extremity. No headache or dizziness. No seizures or balance problems.    SKIN: No new skin lesions.    ENDOCRINE : No new hot or cold intolerance. No new polyuria . No polydipsia.        PHYSICAL EXAMINATION:      VITAL SIGNS:  /60   Pulse 77   Temp 97.5 °F (36.4 °C) (Temporal)   Resp 20   Ht 175.3 cm (69.02\")   Wt 80.6 kg (177 lb 11.2 oz)   BMI 26.23 kg/m²       07/10/20  1107   Weight: 80.6 kg (177 lb 11.2 oz)         ECOG performance status: 2    CONSTITUTIONAL:  Not in any distress.    EYES: Mild conjunctival Pallor. No Icterus. No Pterygium. Extraocular Movements intact.No ptosis.    ENMT:  Normocephalic, Atraumatic.No Facial Asymmetry noted.    NECK:  No adenopathy.Trachea midline. NO JVD.    RESPIRATORY:  " Fair air entry bilateral. No rhonchi or wheezing.Fair respiratory effort.    CARDIOVASCULAR:  S1, S2. Regular rate and rhythm. No murmur or gallop appreciated.    ABDOMEN:  Soft, obese, nontender. Bowel sounds present in all four quadrants.  No Hepatosplenomegaly appreciated.    MUSCULOSKELETAL:  No edema.No Calf Tenderness.Decreased range of motion.    NEUROLOGIC:    No  Motor or sensory deficit appreciated. Cranial Nerves 2-12 grossly intact.    SKIN : No new skin lesion identified. Skin is warm and moist to touch.    LYMPHATICS: No new enlarged lymph nodes in neck or supraclavicular area.    PSYCHIATRY: Alert, awake and oriented ×3.            DIAGNOSTIC DATA:    Glucose   Date Value Ref Range Status   05/22/2019 87 65 - 99 mg/dL Final     Sodium   Date Value Ref Range Status   05/22/2019 140 136 - 145 mmol/L Final   06/01/2018 137 134 - 144 mmol/L Final     Potassium   Date Value Ref Range Status   05/22/2019 4.2 3.5 - 5.2 mmol/L Final   06/01/2018 4.2 3.5 - 5.1 mmol/L Final     CO2   Date Value Ref Range Status   05/22/2019 26.0 22.0 - 29.0 mmol/L Final     Chloride   Date Value Ref Range Status   05/22/2019 101 98 - 107 mmol/L Final   06/01/2018 103 98 - 107 mmol/L Final     Anion Gap   Date Value Ref Range Status   05/22/2019 13.0 mmol/L Final     Creatinine   Date Value Ref Range Status   05/22/2019 1.42 (H) 0.57 - 1.00 mg/dL Final   06/01/2018 1.2 0.6 - 1.3 mg/dL Final     BUN   Date Value Ref Range Status   05/22/2019 22 8 - 23 mg/dL Final   06/01/2018 17 7 - 18 mg/dL Final     BUN/Creatinine Ratio   Date Value Ref Range Status   05/22/2019 15.5 7.0 - 25.0 Final     Calcium   Date Value Ref Range Status   05/22/2019 9.4 8.6 - 10.5 mg/dL Final   06/01/2018 9.1 8.8 - 10.5 mg/dL Final     eGFR Non  Amer   Date Value Ref Range Status   05/22/2019 37 (L) >60 mL/min/1.73 Final     Alkaline Phosphatase   Date Value Ref Range Status   05/22/2019 114 39 - 117 U/L Final   06/01/2018 117 (H) 46 - 116 U/L Final      Total Protein   Date Value Ref Range Status   05/22/2019 6.9 6.0 - 8.5 g/dL Final   06/01/2018 7 6.4 - 8.2 g/dL Final     ALT (SGPT)   Date Value Ref Range Status   05/22/2019 15 1 - 33 U/L Final   06/01/2018 26 (L) 30 - 65 U/L Final     AST (SGOT)   Date Value Ref Range Status   05/22/2019 14 1 - 32 U/L Final   06/01/2018 19 15 - 37 U/L Final     Total Bilirubin   Date Value Ref Range Status   05/22/2019 0.3 0.2 - 1.2 mg/dL Final   06/01/2018 0.4 0 - 1.0 mg/dL Final     Albumin   Date Value Ref Range Status   05/22/2019 4.50 3.50 - 5.20 g/dL Final   06/01/2018 3.9 3.4 - 5.0 g/dL Final     Globulin   Date Value Ref Range Status   05/22/2019 2.4 gm/dL Final     Lab Results   Component Value Date    WBC 7.41 07/09/2020    HGB 12.5 07/09/2020    HCT 38.2 07/09/2020    MCV 89.3 07/09/2020     07/09/2020     Lab Results   Component Value Date    NEUTROABS 4.35 07/09/2020    IRON 84 07/09/2020    TIBC 294 (L) 07/09/2020    LABIRON 29 07/09/2020    FERRITIN 591.60 (H) 07/09/2020    YCIOIDJS48 837 07/08/2020    FOLATE >20.00 07/11/2019     No results found for: , LABCA2, AFPTM, HCGQUANT, , CHROMGRNA, [id number], CEA, REFLABREPO        Component Ferritin   Latest Ref Rng & Units 13.00 - 150.00 ng/mL   6/20/2017 454.00 (H)   7/11/2017 501.00 (H)   7/5/2018 359.00 (H)   6/5/2019 458 (H)   7/11/2019 617.40 (H)   10/7/2019 506 (H)   7/9/2020 591.60 (H)         RADIOLOGY DATA :  CT of chest without contrast done on November 8, 2019 was reviewed and discussed with patient, it showed:       IMPRESSION:  CONCLUSION: Evidence of prior surgery, left upper lobectomy.  Minimal linear fibrotic changes left lung base. Otherwise  unremarkable CT chest without contrast. No evidence of any  recurrent disease.     No radiology results for the last 7 days      ASSESSMENT AND PLAN:      1.  Elevated ferritin:  -Recent ferritin level done on July 9, 2020 is again elevated at 591.  Ferritin has been between 406 100 since  2017.  - Her ferritin is elevated secondary to being acute phase reactant since patient has significant arthritis affecting the neck.  -Her fibromyalgia can also contribute to chronically elevated ferritin.  - Her iron saturation is normal.  I do not believe patient has any hemochromatosis going on.  -For now we will ask patient to return to clinic in 1 year with repeat CBC, iron studies and ferritin to be done prior to that.    2.  Left upper lobe carcinoid:  -Patient was diagnosed in March 2012.  Had a left upper lobectomy done, 14 lymph node at that time were negative.  -Most recent CT scan done on November 8, 2019 does not show any evidence of recurrence.  -She is scheduled for another CT scan done in November 2020.    3.  Health maintenance: Patient does not smoke.  Had a colonoscopy in last 6 years.    4. Advance Care Planning   ACP discussion was held with the patient during this visit. Patient has an advance directive in EMR which is still valid.          PHQ-9 Total Score: 0   -Patient is not homicidal or suicidal.  No acute intervention required    Myra Charles reports a pain score of 4.  Given her pain assessment as noted, treatment options were discussed and the following options were decided upon as a follow-up plan to address the patient's pain: continuation of current treatment plan for pain.         Mayur Neal MD  7/10/2020  11:13        Part of this note may be an electronic transcription/translation of spoken language to printed text using the Dragon Dictation System.

## 2020-09-30 ENCOUNTER — OFFICE VISIT (OUTPATIENT)
Dept: CARDIOLOGY | Facility: CLINIC | Age: 70
End: 2020-09-30

## 2020-09-30 VITALS
DIASTOLIC BLOOD PRESSURE: 78 MMHG | SYSTOLIC BLOOD PRESSURE: 126 MMHG | HEART RATE: 80 BPM | WEIGHT: 182 LBS | BODY MASS INDEX: 26.96 KG/M2 | OXYGEN SATURATION: 99 % | HEIGHT: 69 IN

## 2020-09-30 DIAGNOSIS — R00.2 PALPITATIONS: Primary | ICD-10-CM

## 2020-09-30 DIAGNOSIS — I10 ESSENTIAL (PRIMARY) HYPERTENSION: ICD-10-CM

## 2020-09-30 DIAGNOSIS — I34.1 MITRAL VALVE PROLAPSE: Primary | ICD-10-CM

## 2020-09-30 DIAGNOSIS — C7A.090 MALIGNANT CARCINOID TUMOR OF LUNG (HCC): ICD-10-CM

## 2020-09-30 DIAGNOSIS — I10 ESSENTIAL HYPERTENSION: ICD-10-CM

## 2020-09-30 PROCEDURE — 93000 ELECTROCARDIOGRAM COMPLETE: CPT | Performed by: INTERNAL MEDICINE

## 2020-09-30 PROCEDURE — 99214 OFFICE O/P EST MOD 30 MIN: CPT | Performed by: INTERNAL MEDICINE

## 2020-09-30 RX ORDER — LANOLIN ALCOHOL/MO/W.PET/CERES
1000 CREAM (GRAM) TOPICAL 2 TIMES WEEKLY
COMMUNITY

## 2020-09-30 RX ORDER — AMLODIPINE BESYLATE 5 MG/1
TABLET ORAL
COMMUNITY
Start: 2020-06-19 | End: 2021-08-27 | Stop reason: SDUPTHER

## 2020-09-30 RX ORDER — TERIPARATIDE 250 UG/ML
INJECTION, SOLUTION SUBCUTANEOUS
COMMUNITY
Start: 2020-08-04 | End: 2021-10-14 | Stop reason: ALTCHOICE

## 2020-09-30 RX ORDER — MULTIVIT WITH MINERALS/LUTEIN
500 TABLET ORAL DAILY
COMMUNITY
End: 2022-06-22

## 2020-09-30 RX ORDER — FAMOTIDINE 20 MG/1
TABLET, FILM COATED ORAL
COMMUNITY
Start: 2020-07-15 | End: 2021-08-27 | Stop reason: SDUPTHER

## 2020-09-30 RX ORDER — NICOTINE POLACRILEX 4 MG/1
GUM, CHEWING ORAL
COMMUNITY
End: 2021-08-16 | Stop reason: SDUPTHER

## 2020-09-30 NOTE — PROGRESS NOTES
Myra Charles  69 y.o. female    09/30/2020  1. Mitral valve prolapse    2. Essential (primary) hypertension    3. Malignant carcinoid tumor of lung (CMS/HCC)        History of Present Illness  Myra Charles is a 69-year-old female who is being seen by me for the first time.  With a patient of Dr. Pathak in the past.  Her history is remarkable for carcinoid tumor of the lung status post resection by Dr. Waite in 2012 and she has been followed on a regular basis without any reoccurrence.  She has been evaluated for one episode of chest pain and palpitation in the past.  She has been placed on beta-blockers to which she is responded well.  She does follow-up with Dr. Neal for elevated ferritin levels.  She had lab work done by  and these were reviewed.    She has been taking care of her  who has dementia and hence under a lot of stress.  He is my patient.    CT angiogram in June 2019 showed:   CT FUNCTIONAL ANALYSIS:  Ejection Fraction      65    %  Diastolic Volume      126    ml  Systolic Volume       44    ml  Stroke Volume         82    ml  Cardiac Output        5.3    L/minute     IMPRESSION:  CONCLUSION: Unremarkable CT coronary arteriogram. No plaque or  narrowing in the coronary arteries.  Calcium score 0, low risk for coronary artery disease.     Normal functional analysis. Computer-assisted calculation of left  ventricular ejection fraction 65%.     Echocardiogram in July 2018 showed:  · Left ventricular systolic function is normal. Estimated EF = 63%.  · Left ventricular diastolic dysfunction (grade I) consistent with impaired relaxation.  · Mild thickening and prolapse of AML. Trace-to-mild mitral valve regurgitation is present.    EKG today showed sinus rhythm with heart rate of 72 bpm.  IVCD/incomplete right bundle branch block.  PAC.    Clinical exam today was unremarkable with no signs of arrhythmia or congestive heart failure.  Her blood pressure was in the normal  range.    SUBJECTIVE    Allergies   Allergen Reactions   • Raloxifene Swelling and Other (See Comments)   • Gabapentin Other (See Comments)     HA/sedation   • Pregabalin Swelling and Other (See Comments)         Past Medical History:   Diagnosis Date   • Backache 02/17/2016    Low back pain, in sacral region      • Benign hypertension 02/17/2016   • Cortical senile cataract 02/17/2016   • Essential (primary) hypertension 04/20/2016   • GERD (gastroesophageal reflux disease) 02/17/2016   • Hallux valgus     Deformity, w/inflammation      • Iron deficiency anemia    • Lung nodule, solitary 03/08/2012    Solitary nodule of lung - RIGHT upper lobe 15mm, PET positive      • Malignant carcinoid tumor of lung (CMS/HCC) 02/17/2016    Atypical carcinoid tumor of the LEFT lung treated in March 2012     • Mitral valve prolapse 02/17/2016   • Osteoporosis 04/20/2016   • Primary fibromyalgia syndrome 02/17/2016   • Rosacea 02/17/2016   • Seborrheic keratosis     History of, upper and lower back      • 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 Enodscopic vein harvest on the left. Left lower extremity arteriogram.)   • CERVICAL CONIZATION      CONIZATION OF CERVIX 41611 (Excisional laser cone biopsy and vaporization of cervix.)   • CHOLECYSTECTOMY  09/07/2005    Cholecystectomy, laparoscopic (With intraoperative cholangiogram.)   • CYST REMOVAL      left 3rd toe   • EXCISION LESION  11/11/1998    REMOVE LESION BACK OR FLANK 73028 (Excision times two.)   • FOOT SURGERY   01/20/2009    Foot/toes surgery procedure (Soft tissue mass, left foot. Excision of)   • HERNIA REPAIR      Past history of umbilical herni repair.   • HYSTERECTOMY     • OTHER SURGICAL HISTORY  12/04/2012    Anesth, elbow area surgery (Manipulation of left elbow.)   • OTHER SURGICAL HISTORY  03/05/2012    Anesth, elbow area surgery (Excision of plate and screws from olecranon bursa. Retained plate and screws, olecranon bursitis of previous fracture left elbow.)   • OTHER SURGICAL HISTORY  10/16/2012    Treat elbow fracture (Open reduction and internal fixation.)   • THORACOSCOPY  03/14/2012    Thoracoscopy, surgical (Bronchoscopy,LVATS (wedge resect MARYBETH), LULobectomy Thoracic lymphadenectomy)   • THROAT SURGERY  07/25/2012    Throat surgery procedure (Thyroplasty type I (vocal cord medialozation & larynooplasty) Left vocal cord paralysis. Dysphoria. [facility] by Dr Tk Heart)   • THYROID SURGERY  03/22/2016    Thyroid surgery (Right thyroid lobectomy and isthmusectomy.)   • TONSILLECTOMY  02/03/1956    Chronic tonsillitis   • VEIN LIGATION  09/28/2000    PHLEB VEINS - EXTREM (20+) 50171 (High ligation of ling saphenous vein, left, plus multiple phlebectomies, left lower extremity.)         Family History   Problem Relation Age of Onset   • Cancer Mother    • Diabetes Mother    • Heart disease Mother    • Stroke Mother    • Heart disease Father    • Lung cancer Maternal Grandfather    • Lung cancer Other          Social History     Socioeconomic History   • Marital status:      Spouse name: Not on file   • Number of children: Not on file   • Years of education: Not on file   • Highest education level: Not on file   Tobacco Use   • Smoking status: Never Smoker   • Smokeless tobacco: Never Used   Substance and Sexual Activity   • Alcohol use: Yes     Comment: OCCASIONAL WINE   • Drug use: No   • Sexual activity: Defer         Current Outpatient Medications   Medication Sig Dispense Refill   •  "amLODIPine (NORVASC) 5 MG tablet TAKE 1 TABLET DAILY     • ascorbic acid (VITAMIN C) 1000 MG tablet Take 1,000 mg by mouth Daily.     • ASCORBIC ACID PO Take 1 tablet by mouth daily.     • ASPIRIN PO Take 81 mg by mouth Daily. YSP Aspirin oral  (unconfirmed      • Calcium Citrate-Vitamin D (CITRACAL + D PO) Take 1 tablet by mouth 2 (two) times a day.     • Calcium-Phosphorus-Vitamin D 250-107-500 MG-MG-UNIT chewable tablet Chew 1 tablet Daily.     • cholecalciferol (VITAMIN D3) 1000 UNITS tablet Take 1,000 Units by mouth daily.     • Cyanocobalamin (VITAMIN B-12 PO) Take 1 tablet by mouth Every Other Day.     • cyclobenzaprine (FLEXERIL) 10 MG tablet Take 10 mg by mouth at night as needed for muscle spasms.     • DULoxetine (CYMBALTA) 60 MG capsule Take 60 mg by mouth daily.     • famotidine (PEPCID) 20 MG tablet      • folic acid (FOLVITE) 1 MG tablet TAKE ONE TABLET BY MOUTH ONCE DAILY 90 tablet 1   • FORTEO 600 MCG/2.4ML injection      • metoprolol succinate XL (TOPROL-XL) 25 MG 24 hr tablet Take 25 mg by mouth 2 (two) times a day.     • Multiple Vitamins-Minerals (PRESERVISION AREDS 2 PO) Take  by mouth 2 (Two) Times a Day.     • Omeprazole 20 MG tablet delayed-release      • ranitidine (ZANTAC) 150 MG tablet Take 150 mg by mouth daily.     • Teriparatide, Recombinant, (Forteo) 600 MCG/2.4ML injection INJECT 20 MCG UNDER THE SKIN DAILY (20 MCG=0.08 ML)     • vitamin B-12 (CYANOCOBALAMIN) 1000 MCG tablet Take 1,000 mcg by mouth 2 (Two) Times a Week.       No current facility-administered medications for this visit.          OBJECTIVE    /78   Pulse 80   Ht 175.3 cm (69\")   Wt 82.6 kg (182 lb)   SpO2 99%   BMI 26.88 kg/m²         Review of Systems     Constitutional:  Denies recent weight loss, weight gain, fever or chills, no change in exercise tolerance     HENT:  Denies any hearing loss, epistaxis, hoarseness, or difficulty speaking.     Eyes: Wears eyeglasses or contact lenses     Respiratory:  " Denies dyspnea with exertion,no cough, wheezing, or hemoptysis.  History of carcinoid tumor lung status post resection 2012    Cardiovascular: Negative for palpations, chest pain, orthopnea, PND, peripheral edema, syncope, or claudication.     Gastrointestinal:  Denies change in bowel habits, dyspepsia, ulcer disease, hematochezia, or melena.     Endocrine: Negative for cold intolerance, heat intolerance, polydipsia, polyphagia and polyuria.    Genitourinary: Negative.      Musculoskeletal: Denies any history of arthritic symptoms or back problems     Skin:  Denies any change in hair or nails, rashes, or skin lesions.     Allergic/Immunologic: Negative.  Negative for environmental allergies, food allergies and immunocompromised state.     Neurological:  Denies any history of recurrent headaches, strokes, TIA, or seizure disorder.     Hematological: Denies any food allergies, seasonal allergies, bleeding disorders, or lymphadenopathy.     Psychiatric/Behavioral: Denies any history of depression, substance abuse, or change in cognitive function.         Physical Exam     Constitutional: Cooperative, alert and oriented, well-developed, well-nourished, in no acute distress.     HENT:   Head: Normocephalic, normal hair patterns, no masses or tenderness.  Ears, Nose, and Throat: No gross abnormalities. No pallor or cyanosis. Dentition good.   Eyes: EOMS intact, PERRL, conjunctivae and lids unremarkable. Fundoscopic exam and visual fields not performed.   Neck: No palpable masses or adenopathy, no thyromegaly, no JVD, carotid pulses are full and equal bilaterally and without  Bruits.     Cardiovascular: Regular rhythm, S1 and S2 normal, no S3 or S4.  No murmurs, gallops, or rubs detected.     Pulmonary/Chest: Chest: normal symmetry, normal respiratory excursion, no intercostal retraction, no use of accessory muscles.            Pulmonary: Normal breath sounds. No rales or ronchi.    Abdominal: Abdomen soft, bowel sounds  normoactive, no masses, no hepatosplenomegaly, non-tender, no bruits.     Musculoskeletal: No deformities, clubbing, cyanosis, erythema, or edema observed. There are no spinal abnormalities noted. Normal muscle strength and tone. Pulses full and equal in all extremities, no bruits auscultated.     Neurological: No gross motor or sensory deficits noted, affect appropriate, oriented to time, person, place.     Skin: Warm and dry to the touch, no apparent skin lesions or masses noted.     Psychiatric: She has a normal mood and affect. Her behavior is normal. Judgment and thought content normal.         Procedures      Lab Results   Component Value Date    WBC 7.41 07/09/2020    HGB 12.5 07/09/2020    HCT 38.2 07/09/2020    MCV 89.3 07/09/2020     07/09/2020     Lab Results   Component Value Date    GLUCOSE 87 05/22/2019    BUN 22 05/22/2019    CREATININE 1.42 (H) 05/22/2019    EGFRIFNONA 37 (L) 05/22/2019    BCR 15.5 05/22/2019    CO2 26.0 05/22/2019    CALCIUM 9.4 05/22/2019    ALBUMIN 4.50 05/22/2019    AST 14 05/22/2019    ALT 15 05/22/2019     Lab Results   Component Value Date    CHOL 175 07/08/2020    CHOL 179 06/05/2019    CHOL 180 06/01/2018     Lab Results   Component Value Date    TRIG 79 07/08/2020    TRIG 90 06/05/2019    TRIG 67 06/01/2018     Lab Results   Component Value Date    HDL 60 07/08/2020    HDL 57 06/05/2019    HDL 79 06/01/2018     No components found for: LDLCALC  Lab Results   Component Value Date     (H) 07/08/2020     (H) 06/05/2019    LDL 91 06/01/2018     No results found for: HDLLDLRATIO  No components found for: CHOLHDL  No results found for: HGBA1C  Lab Results   Component Value Date    TSH 1.910 12/04/2018    [id number] 95 (L) 04/13/2016    [id number] 8.2 02/29/2016           ASSESSMENT AND PLAN  Myra Charles has multiple medical issues as described in the history of present illness but is stable with regards to her heart with no clinical evidence of angina, arrhythmia  or congestive heart failure.  She has no documented coronary artery disease.  To assess her mitral valve prolapse and mitral regurgitation an echocardiogram is being arranged.    I have continued antihypertensive therapy with amlodipine, metoprolol succinate.    Myra was seen today for establish care.    Diagnoses and all orders for this visit:    Mitral valve prolapse    Essential (primary) hypertension    Malignant carcinoid tumor of lung (CMS/HCC)        Patient's Body mass index is 26.88 kg/m². BMI is above normal parameters. Recommendations include: exercise counseling and nutrition counseling.      Myra Charles  reports that she has never smoked. She has never used smokeless tobacco..    Toño Berger MD  9/30/2020  09:59 CDT

## 2020-10-27 ENCOUNTER — DOCUMENTATION (OUTPATIENT)
Dept: CARDIOLOGY | Facility: CLINIC | Age: 70
End: 2020-10-27

## 2021-01-15 ENCOUNTER — OFFICE VISIT (OUTPATIENT)
Dept: ENDOCRINOLOGY | Facility: CLINIC | Age: 71
End: 2021-01-15

## 2021-01-15 ENCOUNTER — LAB (OUTPATIENT)
Dept: LAB | Facility: HOSPITAL | Age: 71
End: 2021-01-15

## 2021-01-15 VITALS
DIASTOLIC BLOOD PRESSURE: 74 MMHG | BODY MASS INDEX: 26.63 KG/M2 | WEIGHT: 179.8 LBS | OXYGEN SATURATION: 99 % | HEIGHT: 69 IN | SYSTOLIC BLOOD PRESSURE: 130 MMHG | HEART RATE: 74 BPM

## 2021-01-15 DIAGNOSIS — E53.8 B12 DEFICIENCY: ICD-10-CM

## 2021-01-15 DIAGNOSIS — C34.12 MALIGNANT NEOPLASM OF UPPER LOBE OF LEFT LUNG (HCC): ICD-10-CM

## 2021-01-15 DIAGNOSIS — R79.89 ELEVATED FERRITIN: ICD-10-CM

## 2021-01-15 DIAGNOSIS — I10 ESSENTIAL HYPERTENSION: ICD-10-CM

## 2021-01-15 DIAGNOSIS — E04.2 NONTOXIC MULTINODULAR GOITER: Primary | ICD-10-CM

## 2021-01-15 DIAGNOSIS — M81.0 AGE RELATED OSTEOPOROSIS, UNSPECIFIED PATHOLOGICAL FRACTURE PRESENCE: ICD-10-CM

## 2021-01-15 LAB
ALBUMIN SERPL-MCNC: 4.6 G/DL (ref 3.5–5.2)
ALBUMIN/GLOB SERPL: 2.1 G/DL
ALP SERPL-CCNC: 241 U/L (ref 39–117)
ALT SERPL W P-5'-P-CCNC: 31 U/L (ref 1–33)
ANION GAP SERPL CALCULATED.3IONS-SCNC: 11.1 MMOL/L (ref 5–15)
AST SERPL-CCNC: 21 U/L (ref 1–32)
BASOPHILS # BLD AUTO: 0.11 10*3/MM3 (ref 0–0.2)
BASOPHILS NFR BLD AUTO: 1.3 % (ref 0–1.5)
BILIRUB SERPL-MCNC: 0.2 MG/DL (ref 0–1.2)
BUN SERPL-MCNC: 23 MG/DL (ref 8–23)
BUN/CREAT SERPL: 17.8 (ref 7–25)
CALCIUM SPEC-SCNC: 9 MG/DL (ref 8.6–10.5)
CHLORIDE SERPL-SCNC: 106 MMOL/L (ref 98–107)
CO2 SERPL-SCNC: 24.9 MMOL/L (ref 22–29)
CREAT SERPL-MCNC: 1.29 MG/DL (ref 0.57–1)
DEPRECATED RDW RBC AUTO: 39.9 FL (ref 37–54)
EOSINOPHIL # BLD AUTO: 0.28 10*3/MM3 (ref 0–0.4)
EOSINOPHIL NFR BLD AUTO: 3.4 % (ref 0.3–6.2)
ERYTHROCYTE [DISTWIDTH] IN BLOOD BY AUTOMATED COUNT: 12.5 % (ref 12.3–15.4)
FERRITIN SERPL-MCNC: 505 NG/ML (ref 13–150)
GFR SERPL CREATININE-BSD FRML MDRD: 41 ML/MIN/1.73
GLOBULIN UR ELPH-MCNC: 2.2 GM/DL
GLUCOSE SERPL-MCNC: 93 MG/DL (ref 65–99)
HCT VFR BLD AUTO: 38.5 % (ref 34–46.6)
HGB BLD-MCNC: 12.9 G/DL (ref 12–15.9)
IMM GRANULOCYTES # BLD AUTO: 0.02 10*3/MM3 (ref 0–0.05)
IMM GRANULOCYTES NFR BLD AUTO: 0.2 % (ref 0–0.5)
IRON 24H UR-MRATE: 51 MCG/DL (ref 37–145)
IRON SATN MFR SERPL: 17 % (ref 20–50)
LYMPHOCYTES # BLD AUTO: 2.64 10*3/MM3 (ref 0.7–3.1)
LYMPHOCYTES NFR BLD AUTO: 32.1 % (ref 19.6–45.3)
MCH RBC QN AUTO: 29.4 PG (ref 26.6–33)
MCHC RBC AUTO-ENTMCNC: 33.5 G/DL (ref 31.5–35.7)
MCV RBC AUTO: 87.7 FL (ref 79–97)
MONOCYTES # BLD AUTO: 0.71 10*3/MM3 (ref 0.1–0.9)
MONOCYTES NFR BLD AUTO: 8.6 % (ref 5–12)
NEUTROPHILS NFR BLD AUTO: 4.46 10*3/MM3 (ref 1.7–7)
NEUTROPHILS NFR BLD AUTO: 54.4 % (ref 42.7–76)
NRBC BLD AUTO-RTO: 0 /100 WBC (ref 0–0.2)
PLATELET # BLD AUTO: 254 10*3/MM3 (ref 140–450)
PMV BLD AUTO: 10.2 FL (ref 6–12)
POTASSIUM SERPL-SCNC: 4.4 MMOL/L (ref 3.5–5.2)
PROT SERPL-MCNC: 6.8 G/DL (ref 6–8.5)
RBC # BLD AUTO: 4.39 10*6/MM3 (ref 3.77–5.28)
SODIUM SERPL-SCNC: 142 MMOL/L (ref 136–145)
TIBC SERPL-MCNC: 307 MCG/DL (ref 298–536)
TRANSFERRIN SERPL-MCNC: 206 MG/DL (ref 200–360)
TSH SERPL DL<=0.05 MIU/L-ACNC: 1.9 UIU/ML (ref 0.27–4.2)
WBC # BLD AUTO: 8.22 10*3/MM3 (ref 3.4–10.8)

## 2021-01-15 PROCEDURE — 80053 COMPREHEN METABOLIC PANEL: CPT | Performed by: INTERNAL MEDICINE

## 2021-01-15 PROCEDURE — 84466 ASSAY OF TRANSFERRIN: CPT

## 2021-01-15 PROCEDURE — 83540 ASSAY OF IRON: CPT

## 2021-01-15 PROCEDURE — 84443 ASSAY THYROID STIM HORMONE: CPT | Performed by: INTERNAL MEDICINE

## 2021-01-15 PROCEDURE — 82306 VITAMIN D 25 HYDROXY: CPT | Performed by: INTERNAL MEDICINE

## 2021-01-15 PROCEDURE — 99214 OFFICE O/P EST MOD 30 MIN: CPT | Performed by: INTERNAL MEDICINE

## 2021-01-15 PROCEDURE — 36415 COLL VENOUS BLD VENIPUNCTURE: CPT | Performed by: INTERNAL MEDICINE

## 2021-01-15 PROCEDURE — 85025 COMPLETE CBC W/AUTO DIFF WBC: CPT

## 2021-01-15 PROCEDURE — 82728 ASSAY OF FERRITIN: CPT

## 2021-01-15 RX ORDER — ROPINIROLE 0.5 MG/1
TABLET, FILM COATED ORAL
COMMUNITY
Start: 2020-12-09 | End: 2021-08-16

## 2021-01-15 NOTE — PROGRESS NOTES
Myra Charles is a 70 y.o. female who presents for  evaluation of   Chief Complaint   Patient presents with   • Nontoxic multinodular goiter         Primary Care / Referring Provider  Jeff Shah MD  70  y o comes for fu   history of lung cancer sp VATS , cured     reason toxic  MNG    thyroid scan 12-15 revealed increased bilateral uptake   right cold nodule inferior pole and progressive enlargement despite 2 FNA led to right thryoidectomy that was benign    left thyroid nodules are stable in size dating back to 2013    Last US Jan 2020 , see below          prior to surgery on methimazole, now euthyroid off treatment.           Review of Systems    Review of Systems   Constitutional: Negative for activity change, appetite change, chills, diaphoresis, fatigue, fever and unexpected weight change.   HENT: Negative for congestion, dental problem, drooling, ear discharge, ear pain, facial swelling, mouth sores, postnasal drip, rhinorrhea, sinus pressure, sore throat, tinnitus, trouble swallowing and voice change.    Eyes: Negative for photophobia, pain, discharge, redness, itching and visual disturbance.   Respiratory: Negative for apnea, cough, choking, chest tightness, shortness of breath, wheezing and stridor.    Cardiovascular: Negative for chest pain, palpitations and leg swelling.   Gastrointestinal: Negative for abdominal distention, abdominal pain, constipation, diarrhea, nausea and vomiting.   Endocrine: Negative for cold intolerance, heat intolerance, polydipsia, polyphagia and polyuria.   Genitourinary: Negative for decreased urine volume, difficulty urinating, dysuria, flank pain, frequency, hematuria and urgency.   Musculoskeletal: Negative for arthralgias, back pain, gait problem, joint swelling, myalgias, neck pain and neck stiffness.   Skin: Negative for color change, pallor, rash and wound.   Allergic/Immunologic: Negative for immunocompromised state.   Neurological: Negative for  "dizziness, tremors, seizures, syncope, facial asymmetry, speech difficulty, weakness, light-headedness, numbness and headaches.   Hematological: Negative for adenopathy.   Psychiatric/Behavioral: Negative for agitation, behavioral problems, confusion, decreased concentration, dysphoric mood, hallucinations, self-injury, sleep disturbance and suicidal ideas. The patient is not nervous/anxious and is not hyperactive.         Objective:     /74   Pulse 74   Ht 175.3 cm (69.02\")   Wt 81.6 kg (179 lb 12.8 oz)   SpO2 99%   BMI 26.54 kg/m²     Physical Exam   HENT:   Head: Normocephalic.   Cardiovascular: Normal rate. An irregular rhythm present.   PAC   Pulmonary/Chest: Effort normal and breath sounds normal.   Abdominal: Normal appearance.   Musculoskeletal: Normal range of motion.   Neurological: She is alert.   Skin: Skin is warm and dry.       Lab Review            Assessment/Plan       ICD-10-CM ICD-9-CM   1. Nontoxic multinodular goiter  E04.2 241.1   2. Essential hypertension  I10 401.9   3. B12 deficiency  E53.8 266.2   4. Age related osteoporosis, unspecified pathological fracture presence  M81.0 733.01         toxic mng  sp right hemityroidectomy for progressive enlarging r thyroid nodule, pathology benign  left nodules stable in size, last US from 12-17 - last from Jan 2020     To my impression , stable in size at 3.3 cm      was on  mmi, now euthyroid off tx    Repeat US , repeat TSH      ==    HTN , palpitations    on amlodipine 5 mg qam     toprol xl 25 bid     ==    osteoporosis     Followed by rheumatology that also follows her for fibromyalgia    Was on boniva but completed 10 y approx    Now on forteo T score -3.1 forearm , followed by Shahram   Start Sept 2019      On citracal plus D one per day and vit D 1000 units daily           =============      I spoke with patient.    I disagree with the radiologist noted that it has increased in size    Isthmus nodule is the exact same size and images " below        Left nodule remains at 2.7 cm

## 2021-01-16 LAB — 25(OH)D3 SERPL-MCNC: 35.8 NG/ML (ref 30–100)

## 2021-01-22 ENCOUNTER — HOSPITAL ENCOUNTER (OUTPATIENT)
Dept: ULTRASOUND IMAGING | Facility: HOSPITAL | Age: 71
Discharge: HOME | End: 2021-01-22
Admitting: INTERNAL MEDICINE

## 2021-01-22 PROCEDURE — 76536 US EXAM OF HEAD AND NECK: CPT

## 2021-01-26 ENCOUNTER — IMMUNIZATION (OUTPATIENT)
Dept: VACCINE CLINIC | Facility: HOSPITAL | Age: 71
End: 2021-01-26

## 2021-01-26 PROCEDURE — 91300 HC SARSCOV02 VAC 30MCG/0.3ML IM: CPT | Performed by: THORACIC SURGERY (CARDIOTHORACIC VASCULAR SURGERY)

## 2021-01-26 PROCEDURE — 0001A: CPT | Performed by: THORACIC SURGERY (CARDIOTHORACIC VASCULAR SURGERY)

## 2021-01-30 DIAGNOSIS — L70.8 OTHER ACNE: ICD-10-CM

## 2021-02-01 RX ORDER — FOLIC ACID 1 MG/1
1000 TABLET ORAL DAILY
Qty: 90 TABLET | Refills: 1 | Status: SHIPPED | OUTPATIENT
Start: 2021-02-01 | End: 2021-06-30 | Stop reason: SDUPTHER

## 2021-02-16 ENCOUNTER — APPOINTMENT (OUTPATIENT)
Dept: VACCINE CLINIC | Facility: HOSPITAL | Age: 71
End: 2021-02-16

## 2021-02-25 ENCOUNTER — IMMUNIZATION (OUTPATIENT)
Dept: VACCINE CLINIC | Facility: HOSPITAL | Age: 71
End: 2021-02-25

## 2021-02-25 PROCEDURE — 0002A: CPT | Performed by: THORACIC SURGERY (CARDIOTHORACIC VASCULAR SURGERY)

## 2021-02-25 PROCEDURE — 91300 HC SARSCOV02 VAC 30MCG/0.3ML IM: CPT | Performed by: THORACIC SURGERY (CARDIOTHORACIC VASCULAR SURGERY)

## 2021-06-09 ENCOUNTER — OFFICE VISIT (OUTPATIENT)
Dept: PODIATRY | Facility: CLINIC | Age: 71
End: 2021-06-09

## 2021-06-09 VITALS
HEIGHT: 69 IN | SYSTOLIC BLOOD PRESSURE: 128 MMHG | OXYGEN SATURATION: 100 % | DIASTOLIC BLOOD PRESSURE: 60 MMHG | WEIGHT: 179 LBS | HEART RATE: 82 BPM | BODY MASS INDEX: 26.51 KG/M2

## 2021-06-09 DIAGNOSIS — M79.672 LEFT FOOT PAIN: Primary | ICD-10-CM

## 2021-06-09 DIAGNOSIS — M77.9 CAPSULITIS: ICD-10-CM

## 2021-06-09 DIAGNOSIS — M77.42 METATARSALGIA, LEFT FOOT: ICD-10-CM

## 2021-06-09 PROCEDURE — 20600 DRAIN/INJ JOINT/BURSA W/O US: CPT | Performed by: PODIATRIST

## 2021-06-09 PROCEDURE — 99213 OFFICE O/P EST LOW 20 MIN: CPT | Performed by: PODIATRIST

## 2021-06-09 RX ORDER — BUPIVACAINE HYDROCHLORIDE 5 MG/ML
1 INJECTION, SOLUTION EPIDURAL; INTRACAUDAL ONCE
Status: COMPLETED | OUTPATIENT
Start: 2021-06-09 | End: 2021-06-09

## 2021-06-09 RX ORDER — TRIAMCINOLONE ACETONIDE 40 MG/ML
10 INJECTION, SUSPENSION INTRA-ARTICULAR; INTRAMUSCULAR ONCE
Status: COMPLETED | OUTPATIENT
Start: 2021-06-09 | End: 2021-06-09

## 2021-06-09 RX ORDER — DEXAMETHASONE SODIUM PHOSPHATE 10 MG/ML
5 INJECTION INTRAMUSCULAR; INTRAVENOUS ONCE
Status: COMPLETED | OUTPATIENT
Start: 2021-06-09 | End: 2021-06-09

## 2021-06-09 RX ADMIN — BUPIVACAINE HYDROCHLORIDE 1 ML: 5 INJECTION, SOLUTION EPIDURAL; INTRACAUDAL at 17:19

## 2021-06-09 RX ADMIN — DEXAMETHASONE SODIUM PHOSPHATE 5 MG: 10 INJECTION INTRAMUSCULAR; INTRAVENOUS at 17:19

## 2021-06-09 RX ADMIN — TRIAMCINOLONE ACETONIDE 10 MG: 40 INJECTION, SUSPENSION INTRA-ARTICULAR; INTRAMUSCULAR at 17:19

## 2021-06-09 NOTE — PROGRESS NOTES
Myra Charles  1950  70 y.o. female     06/09/2021      Chief Complaint   Patient presents with   • Left Foot - Follow-up, Pain       History of Present Illness    Myra Charles is a 70 y.o.female presents to clinic fo with chief complaint of left foot pain.  Pain is located to the top of the left foot at the base of the third toe.  She rates the pain as a 2 out of 10.  She describes it as sharp and achy.  Pain is aggravated with prolonged weightbearing.  There are no associated injuries or trauma.  She has no other complaints.    Past Medical History:   Diagnosis Date   • Backache 02/17/2016    Low back pain, in sacral region      • Benign hypertension 02/17/2016   • Cortical senile cataract 02/17/2016   • Essential (primary) hypertension 04/20/2016   • GERD (gastroesophageal reflux disease) 02/17/2016   • Hallux valgus     Deformity, w/inflammation      • Iron deficiency anemia    • Lung nodule, solitary 03/08/2012    Solitary nodule of lung - RIGHT upper lobe 15mm, PET positive      • Malignant carcinoid tumor of lung (CMS/HCC) 02/17/2016    Atypical carcinoid tumor of the LEFT lung treated in March 2012     • Mitral valve prolapse 02/17/2016   • Osteoporosis 04/20/2016   • Primary fibromyalgia syndrome 02/17/2016   • Rosacea 02/17/2016   • Seborrheic keratosis     History of, upper and lower back      • 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 Enodscopic vein harvest on the left. Left lower extremity arteriogram.)   • CERVICAL  CONIZATION      CONIZATION OF CERVIX 66427 (Excisional laser cone biopsy and vaporization of cervix.)   • CHOLECYSTECTOMY  09/07/2005    Cholecystectomy, laparoscopic (With intraoperative cholangiogram.)   • CYST REMOVAL      left 3rd toe   • EXCISION LESION  11/11/1998    REMOVE LESION BACK OR FLANK 44505 (Excision times two.)   • FOOT SURGERY  01/20/2009    Foot/toes surgery procedure (Soft tissue mass, left foot. Excision of)   • HERNIA REPAIR      Past history of umbilical herni repair.   • HYSTERECTOMY     • OTHER SURGICAL HISTORY  12/04/2012    Anesth, elbow area surgery (Manipulation of left elbow.)   • OTHER SURGICAL HISTORY  03/05/2012    Anesth, elbow area surgery (Excision of plate and screws from olecranon bursa. Retained plate and screws, olecranon bursitis of previous fracture left elbow.)   • OTHER SURGICAL HISTORY  10/16/2012    Treat elbow fracture (Open reduction and internal fixation.)   • THORACOSCOPY  03/14/2012    Thoracoscopy, surgical (Bronchoscopy,LVATS (wedge resect MARYBETH), LULobectomy Thoracic lymphadenectomy)   • THROAT SURGERY  07/25/2012    Throat surgery procedure (Thyroplasty type I (vocal cord medialozation & larynooplasty) Left vocal cord paralysis. Dysphoria. [facility] by Dr Tk Heart)   • THYROID SURGERY  03/22/2016    Thyroid surgery (Right thyroid lobectomy and isthmusectomy.)   • TONSILLECTOMY  02/03/1956    Chronic tonsillitis   • VEIN LIGATION  09/28/2000    PHLEB VEINS - EXTREM (20+) 00564 (High ligation of ling saphenous vein, left, plus multiple phlebectomies, left lower extremity.)         Family History   Problem Relation Age of Onset   • Cancer Mother    • Diabetes Mother    • Heart disease Mother    • Stroke Mother    • Heart disease Father    • Lung cancer Maternal Grandfather    • Lung cancer Other        Allergies   Allergen Reactions   • Raloxifene Swelling and Other (See Comments)   • Gabapentin Other (See Comments)     HA/sedation   • Pregabalin  Swelling and Other (See Comments)       Social History     Socioeconomic History   • Marital status:      Spouse name: Not on file   • Number of children: Not on file   • Years of education: Not on file   • Highest education level: Not on file   Tobacco Use   • Smoking status: Never Smoker   • Smokeless tobacco: Never Used   Vaping Use   • Vaping Use: Never used   Substance and Sexual Activity   • Alcohol use: Yes     Comment: OCCASIONAL WINE   • Drug use: No   • Sexual activity: Defer         Current Outpatient Medications   Medication Sig Dispense Refill   • amLODIPine (NORVASC) 5 MG tablet TAKE 1 TABLET DAILY     • ascorbic acid (VITAMIN C) 1000 MG tablet Take 1,000 mg by mouth Daily.     • ASPIRIN PO Take 81 mg by mouth Daily. YSP Aspirin oral  (unconfirmed      • cholecalciferol (VITAMIN D3) 1000 UNITS tablet Take 1,000 Units by mouth daily.     • cyclobenzaprine (FLEXERIL) 10 MG tablet Take 10 mg by mouth at night as needed for muscle spasms.     • DULoxetine (CYMBALTA) 60 MG capsule Take 60 mg by mouth daily.     • famotidine (PEPCID) 20 MG tablet      • folic acid (FOLVITE) 1 MG tablet Take 1 tablet by mouth Daily. 90 tablet 1   • metoprolol succinate XL (TOPROL-XL) 25 MG 24 hr tablet Take 25 mg by mouth 2 (two) times a day.     • Multiple Vitamins-Minerals (PRESERVISION AREDS 2 PO) Take  by mouth 2 (Two) Times a Day.     • Teriparatide, Recombinant, (Forteo) 600 MCG/2.4ML injection INJECT 20 MCG UNDER THE SKIN DAILY (20 MCG=0.08 ML)     • vitamin B-12 (CYANOCOBALAMIN) 1000 MCG tablet Take 1,000 mcg by mouth 2 (Two) Times a Week.     • ASCORBIC ACID PO Take 1 tablet by mouth daily.     • Calcium Citrate-Vitamin D (CITRACAL + D PO) Take 1 tablet by mouth 2 (two) times a day.     • Calcium-Phosphorus-Vitamin D 250-107-500 MG-MG-UNIT chewable tablet Chew 1 tablet Daily.     • Cyanocobalamin (VITAMIN B-12 PO) Take 1 tablet by mouth Every Other Day.     • FORTEO 600 MCG/2.4ML injection      • Omeprazole 20  "MG tablet delayed-release      • ranitidine (ZANTAC) 150 MG tablet Take 150 mg by mouth daily.     • rOPINIRole (REQUIP) 0.5 MG tablet Take  by mouth.       No current facility-administered medications for this visit.         OBJECTIVE    /60   Pulse 82   Ht 175.3 cm (69.02\")   Wt 81.2 kg (179 lb)   SpO2 100%   BMI 26.42 kg/m²       Review of Systems   Constitutional: Negative.    HENT: Negative.    Eyes: Negative.    Respiratory: Negative.    Cardiovascular: Negative.    Gastrointestinal: Negative.    Musculoskeletal: Negative.    Skin: Negative.    Neurological: Negative.    Psychiatric/Behavioral: Negative.            Physical Exam   Constitutional: She is oriented to person, place, and time. She appears well-developed. No distress.   HENT:   Head: Normocephalic and atraumatic.   Nose: Nose normal.   Eyes: Pupils are equal, round, and reactive to light. Conjunctivae are normal.   Pulmonary/Chest: Effort normal. No respiratory distress. She has no wheezes.   Musculoskeletal: Normal range of motion. No tenderness.   Neurological: She is alert and oriented to person, place, and time. She has normal reflexes.   Skin: Skin is warm and dry. Capillary refill takes less than 2 seconds. She is not diaphoretic.   Psychiatric: Her behavior is normal.   Vitals reviewed.      Gait: normal    Assistive Device: none    Left lower Extremity    Cardiovascular:    DP/PT pulses palpable    Pedal hair growth present.   No erythema or edema noted   Musculoskeletal:  Muscle strength is 5/5 for all muscle groups tested   ROM of the 1st MTP is full without pain or crepitus  ROM of the ankle joint is full without pain or crepitus    Pain along palpation of the dorsal third metatarsal phalangeal joint and with range of motion of the third metatarsal phalangeal joint.  Dermatological:   Webspaces 1-4  are clean, dry and intact.   No subcutaneous nodules or masses noted    No open wounds noted   Neurological:   Protective " sensation decreased    Sensation intact to light touch          Small Joint Arthrocentesis: L third MTP  Consent given by: patient  Site marked: site marked  Timeout: Immediately prior to procedure a time out was called to verify the correct patient, procedure, equipment, support staff and site/side marked as required   Supporting Documentation  Indications: pain   Procedure Details  Location: third toe - L third MTP  Needle size: 27 G  Approach: dorsal  Patient tolerance: patient tolerated the procedure well with no immediate complications              ASSESSMENT AND PLAN    Diagnoses and all orders for this visit:    1. Left foot pain (Primary)    2. Capsulitis    3. Metatarsalgia, left foot    Other orders  -     Small Joint Arthrocentesis      - Comprehensive foot and ankle exam performed.   - steroid injection left foot see procedure note above  -Recommended new pair of OTC power step Millrift plus  - All questions were answered to the patients satisfaction.  - RTC as needed            This document has been electronically signed by Damian Rock DPM on June 9, 2021 14:54 CDT     6/9/2021  14:54 CDT

## 2021-06-16 DIAGNOSIS — D50.9 IRON DEFICIENCY ANEMIA, UNSPECIFIED IRON DEFICIENCY ANEMIA TYPE: Primary | ICD-10-CM

## 2021-06-17 ENCOUNTER — OFFICE VISIT (OUTPATIENT)
Dept: ONCOLOGY | Facility: CLINIC | Age: 71
End: 2021-06-17

## 2021-06-17 ENCOUNTER — LAB (OUTPATIENT)
Dept: ONCOLOGY | Facility: HOSPITAL | Age: 71
End: 2021-06-17

## 2021-06-17 VITALS
HEART RATE: 80 BPM | DIASTOLIC BLOOD PRESSURE: 70 MMHG | SYSTOLIC BLOOD PRESSURE: 149 MMHG | RESPIRATION RATE: 20 BRPM | WEIGHT: 177.8 LBS | HEIGHT: 69 IN | TEMPERATURE: 97.9 F | BODY MASS INDEX: 26.33 KG/M2

## 2021-06-17 DIAGNOSIS — C7A.090 MALIGNANT CARCINOID TUMOR OF LUNG (HCC): Primary | ICD-10-CM

## 2021-06-17 DIAGNOSIS — D50.9 IRON DEFICIENCY ANEMIA, UNSPECIFIED IRON DEFICIENCY ANEMIA TYPE: ICD-10-CM

## 2021-06-17 DIAGNOSIS — R79.89 ELEVATED FERRITIN: Chronic | ICD-10-CM

## 2021-06-17 LAB
BASOPHILS # BLD AUTO: 0.09 10*3/MM3 (ref 0–0.2)
BASOPHILS NFR BLD AUTO: 1.1 % (ref 0–1.5)
DEPRECATED RDW RBC AUTO: 42.7 FL (ref 37–54)
EOSINOPHIL # BLD AUTO: 0.22 10*3/MM3 (ref 0–0.4)
EOSINOPHIL NFR BLD AUTO: 2.7 % (ref 0.3–6.2)
ERYTHROCYTE [DISTWIDTH] IN BLOOD BY AUTOMATED COUNT: 13.1 % (ref 12.3–15.4)
FERRITIN SERPL-MCNC: 519.2 NG/ML (ref 13–150)
HCT VFR BLD AUTO: 37.3 % (ref 34–46.6)
HGB BLD-MCNC: 12.2 G/DL (ref 12–15.9)
HOLD SPECIMEN: NORMAL
IMM GRANULOCYTES # BLD AUTO: 0.03 10*3/MM3 (ref 0–0.05)
IMM GRANULOCYTES NFR BLD AUTO: 0.4 % (ref 0–0.5)
IRON 24H UR-MRATE: 64 MCG/DL (ref 37–145)
IRON SATN MFR SERPL: 23 % (ref 20–50)
LYMPHOCYTES # BLD AUTO: 2.05 10*3/MM3 (ref 0.7–3.1)
LYMPHOCYTES NFR BLD AUTO: 25.1 % (ref 19.6–45.3)
MCH RBC QN AUTO: 29.2 PG (ref 26.6–33)
MCHC RBC AUTO-ENTMCNC: 32.7 G/DL (ref 31.5–35.7)
MCV RBC AUTO: 89.2 FL (ref 79–97)
MONOCYTES # BLD AUTO: 0.81 10*3/MM3 (ref 0.1–0.9)
MONOCYTES NFR BLD AUTO: 9.9 % (ref 5–12)
NEUTROPHILS NFR BLD AUTO: 4.97 10*3/MM3 (ref 1.7–7)
NEUTROPHILS NFR BLD AUTO: 60.8 % (ref 42.7–76)
NRBC BLD AUTO-RTO: 0 /100 WBC (ref 0–0.2)
PLATELET # BLD AUTO: 216 10*3/MM3 (ref 140–450)
PMV BLD AUTO: 9.8 FL (ref 6–12)
RBC # BLD AUTO: 4.18 10*6/MM3 (ref 3.77–5.28)
TIBC SERPL-MCNC: 283 MCG/DL (ref 298–536)
TRANSFERRIN SERPL-MCNC: 190 MG/DL (ref 200–360)
WBC # BLD AUTO: 8.17 10*3/MM3 (ref 3.4–10.8)

## 2021-06-17 PROCEDURE — G9903 PT SCRN TBCO ID AS NON USER: HCPCS | Performed by: INTERNAL MEDICINE

## 2021-06-17 PROCEDURE — 85025 COMPLETE CBC W/AUTO DIFF WBC: CPT

## 2021-06-17 PROCEDURE — 83540 ASSAY OF IRON: CPT

## 2021-06-17 PROCEDURE — G0463 HOSPITAL OUTPT CLINIC VISIT: HCPCS | Performed by: INTERNAL MEDICINE

## 2021-06-17 PROCEDURE — 36415 COLL VENOUS BLD VENIPUNCTURE: CPT

## 2021-06-17 PROCEDURE — 99214 OFFICE O/P EST MOD 30 MIN: CPT | Performed by: INTERNAL MEDICINE

## 2021-06-17 PROCEDURE — 1126F AMNT PAIN NOTED NONE PRSNT: CPT | Performed by: INTERNAL MEDICINE

## 2021-06-17 PROCEDURE — 82728 ASSAY OF FERRITIN: CPT

## 2021-06-17 PROCEDURE — 1157F ADVNC CARE PLAN IN RCRD: CPT | Performed by: INTERNAL MEDICINE

## 2021-06-17 PROCEDURE — 84466 ASSAY OF TRANSFERRIN: CPT

## 2021-06-17 NOTE — PROGRESS NOTES
"DATE OF VISIT: 6/17/2021      REASON FOR VISIT: Carcinoid tumor of the lung, elevated ferritin      HISTORY OF PRESENT ILLNESS:   70-year-old female with medical problems significant for atypical carcinoid tumor of lung s/p lobectomy in March 2012, fibromyalgia, restless leg syndrome has been following up with [facility] since June 22, 2020 for  persistently elevated ferritin and history of carcinoid tumor.  Patient is here for 1 year follow-up appointment today.  Complains of arthritis pain.  Denies any bleeding.  Denies any new lymph node enlargement.  Denies taking any kind of iron supplementation.          Past Medical History, Past Surgical History, Social History, Family History have been reviewed and are without significant changes except as mentioned.    Review of Systems   Constitutional: Positive for fatigue.   Respiratory: Positive for shortness of breath.    Musculoskeletal: Positive for arthralgias.   Neurological: Positive for numbness (Affecting bilateral lower extremity).      A comprehensive 14 point review of systems was performed and was negative except as mentioned.    Medications:  The current medication list was reviewed in the EMR    ALLERGIES:    Allergies   Allergen Reactions   • Raloxifene Swelling and Other (See Comments)   • Gabapentin Other (See Comments)     HA/sedation   • Pregabalin Swelling and Other (See Comments)       Objective      Vitals:    06/17/21 1250   BP: 149/70   Pulse: 80   Resp: 20   Temp: 97.9 °F (36.6 °C)   TempSrc: Temporal   Weight: 80.6 kg (177 lb 12.8 oz)   Height: 175.3 cm (69.02\")   PainSc: 0-No pain     Current Status 6/17/2021   ECOG score 0       Physical Exam  Cardiovascular:      Rate and Rhythm: Normal rate and regular rhythm.   Pulmonary:      Breath sounds: Normal breath sounds.   Neurological:      Mental Status: She is alert and oriented to person, place, and time.           RECENT LABS:  Glucose   Date Value Ref Range Status   01/15/2021 93 65 " - 99 mg/dL Final     Sodium   Date Value Ref Range Status   01/15/2021 142 136 - 145 mmol/L Final   06/01/2018 137 134 - 144 mmol/L Final     Potassium   Date Value Ref Range Status   01/15/2021 4.4 3.5 - 5.2 mmol/L Final   06/01/2018 4.2 3.5 - 5.1 mmol/L Final     CO2   Date Value Ref Range Status   01/15/2021 24.9 22.0 - 29.0 mmol/L Final     Chloride   Date Value Ref Range Status   01/15/2021 106 98 - 107 mmol/L Final   06/01/2018 103 98 - 107 mmol/L Final     Anion Gap   Date Value Ref Range Status   01/15/2021 11.1 5.0 - 15.0 mmol/L Final     Creatinine   Date Value Ref Range Status   01/15/2021 1.29 (H) 0.57 - 1.00 mg/dL Final   06/01/2018 1.2 0.6 - 1.3 mg/dL Final     BUN   Date Value Ref Range Status   01/15/2021 23 8 - 23 mg/dL Final   06/01/2018 17 7 - 18 mg/dL Final     BUN/Creatinine Ratio   Date Value Ref Range Status   01/15/2021 17.8 7.0 - 25.0 Final     Calcium   Date Value Ref Range Status   01/15/2021 9.0 8.6 - 10.5 mg/dL Final   06/01/2018 9.1 8.8 - 10.5 mg/dL Final     eGFR Non  Amer   Date Value Ref Range Status   01/15/2021 41 (L) >60 mL/min/1.73 Final     Alkaline Phosphatase   Date Value Ref Range Status   01/15/2021 241 (H) 39 - 117 U/L Final   06/01/2018 117 (H) 46 - 116 U/L Final     Total Protein   Date Value Ref Range Status   01/15/2021 6.8 6.0 - 8.5 g/dL Final   06/01/2018 7 6.4 - 8.2 g/dL Final     ALT (SGPT)   Date Value Ref Range Status   01/15/2021 31 1 - 33 U/L Final   06/01/2018 26 (L) 30 - 65 U/L Final     AST (SGOT)   Date Value Ref Range Status   01/15/2021 21 1 - 32 U/L Final   06/01/2018 19 15 - 37 U/L Final     Total Bilirubin   Date Value Ref Range Status   01/15/2021 0.2 0.0 - 1.2 mg/dL Final   06/01/2018 0.4 0 - 1.0 mg/dL Final     Albumin   Date Value Ref Range Status   01/15/2021 4.60 3.50 - 5.20 g/dL Final   06/01/2018 3.9 3.4 - 5.0 g/dL Final     Globulin   Date Value Ref Range Status   01/15/2021 2.2 gm/dL Final     Lab Results   Component Value Date    WBC  8.17 06/17/2021    HGB 12.2 06/17/2021    HCT 37.3 06/17/2021    MCV 89.2 06/17/2021     06/17/2021     Lab Results   Component Value Date    NEUTROABS 4.97 06/17/2021    IRON 64 06/17/2021    IRON 51 01/15/2021    IRON 84 07/09/2020    TIBC 283 (L) 06/17/2021    TIBC 307 01/15/2021    TIBC 294 (L) 07/09/2020    LABIRON 23 06/17/2021    LABIRON 17 (L) 01/15/2021    LABIRON 29 07/09/2020    FERRITIN 519.20 (H) 06/17/2021    FERRITIN 505.00 (H) 01/15/2021    FERRITIN 591.60 (H) 07/09/2020    [id number] 745 03/04/2021    UDBXYQNS92 837 07/08/2020    STYQIBCE15 498 10/07/2019    FOLATE >20.00 07/11/2019    FOLATE >20.00 07/05/2018    FOLATE >20.00 07/11/2017     No results found for: , LABCA2, AFPTM, HCGQUANT, , CHROMGRNA, [id number], CEA, REFLABREPO      PATHOLOGY:  * Cannot find OR log *         RADIOLOGY DATA :  No radiology results for the last 7 days        Assessment/Plan     1.  Elevated ferritin:  -Patient has been having chronic elevated ferritin since 2017.  -Believe her ferritin is elevated secondary to her arthritis plus fibromyalgia  -Her iron saturation is always normal.  It is very unlikely we are dealing with hemochromatosis due to normal iron saturation  -Recommend continuing with clinical surveillance.  -We will ask patient to return to clinic in 1 year with repeat CBC, iron studies and ferritin to be done on that day    2.  Left upper lobe carcinoid:  -Patient was diagnosed in March 2012 had a left upper lobectomy done with 14 lymph node that were negative.  -Most recent CT scan was on November 8, 2019 which was negative for any recurrence  -Patient states she is scheduled to get CT scan of chest done later this year.  She was encouraged to get CT scan done later this year to make sure her carcinoid tumor is not recurring.    3.  Health maintenance: Patient does not smoke.  Had a colonoscopy done in last 10 years    4. . Advance Care Planning   ACP discussion was held with the patient  during this visit. Patient has an advance directive in EMR which is still valid.                  PHQ-9 Total Score: 0   -Patient is not homicidal or suicidal.  No acute intervention required.    Myra Charles reports a pain score of 0.  Given her pain assessment as noted, treatment options were discussed and the following options were decided upon as a follow-up plan to address the patient's pain: continuation of current treatment plan for pain.         Mayur Neal MD  6/17/2021  17:06 CDT        Part of this note may be an electronic transcription/translation of spoken language to printed text using the Dragon Dictation System.          CC:

## 2021-06-18 ENCOUNTER — TELEPHONE (OUTPATIENT)
Dept: ONCOLOGY | Facility: HOSPITAL | Age: 71
End: 2021-06-18

## 2021-06-18 NOTE — TELEPHONE ENCOUNTER
----- Message from Mayur Neal MD sent at 6/17/2021  5:05 PM CDT -----  Please let patient know, her ferritin was stable at 519.  Iron saturation is normal at 23%.  We will continue with monitoring.  Thank you

## 2021-06-30 DIAGNOSIS — L70.8 OTHER ACNE: ICD-10-CM

## 2021-06-30 RX ORDER — FOLIC ACID 1 MG/1
1000 TABLET ORAL DAILY
Qty: 90 TABLET | Refills: 1 | Status: SHIPPED | OUTPATIENT
Start: 2021-06-30 | End: 2021-08-27 | Stop reason: SDUPTHER

## 2021-07-08 ENCOUNTER — APPOINTMENT (OUTPATIENT)
Dept: ONCOLOGY | Facility: HOSPITAL | Age: 71
End: 2021-07-08

## 2021-07-09 ENCOUNTER — APPOINTMENT (OUTPATIENT)
Dept: ONCOLOGY | Facility: CLINIC | Age: 71
End: 2021-07-09

## 2021-08-16 ENCOUNTER — OFFICE VISIT (OUTPATIENT)
Dept: FAMILY MEDICINE CLINIC | Facility: CLINIC | Age: 71
End: 2021-08-16

## 2021-08-16 VITALS
HEIGHT: 70 IN | TEMPERATURE: 97.1 F | HEART RATE: 73 BPM | OXYGEN SATURATION: 97 % | DIASTOLIC BLOOD PRESSURE: 74 MMHG | WEIGHT: 178.8 LBS | SYSTOLIC BLOOD PRESSURE: 112 MMHG | BODY MASS INDEX: 25.6 KG/M2

## 2021-08-16 DIAGNOSIS — L71.9 ROSACEA: ICD-10-CM

## 2021-08-16 DIAGNOSIS — K21.9 GASTROESOPHAGEAL REFLUX DISEASE WITHOUT ESOPHAGITIS: ICD-10-CM

## 2021-08-16 DIAGNOSIS — L82.1 SEBORRHEIC KERATOSIS: ICD-10-CM

## 2021-08-16 DIAGNOSIS — M79.672 LEFT FOOT PAIN: ICD-10-CM

## 2021-08-16 DIAGNOSIS — Z79.899 HIGH RISK MEDICATION USE: ICD-10-CM

## 2021-08-16 DIAGNOSIS — I34.1 MITRAL VALVE PROLAPSE: ICD-10-CM

## 2021-08-16 DIAGNOSIS — R79.0 ABNORMAL BLOOD LEVEL OF IRON: ICD-10-CM

## 2021-08-16 DIAGNOSIS — Z12.12 SCREENING FOR COLORECTAL CANCER: Primary | ICD-10-CM

## 2021-08-16 DIAGNOSIS — R79.89 ELEVATED FERRITIN: ICD-10-CM

## 2021-08-16 DIAGNOSIS — M85.88 OSTEOPENIA OF LUMBAR SPINE: ICD-10-CM

## 2021-08-16 DIAGNOSIS — Z12.11 SCREENING FOR COLORECTAL CANCER: Primary | ICD-10-CM

## 2021-08-16 DIAGNOSIS — K21.00 BILE REFLUX ESOPHAGITIS: ICD-10-CM

## 2021-08-16 DIAGNOSIS — Z12.31 SCREENING MAMMOGRAM, ENCOUNTER FOR: ICD-10-CM

## 2021-08-16 DIAGNOSIS — M81.0 AGE RELATED OSTEOPOROSIS, UNSPECIFIED PATHOLOGICAL FRACTURE PRESENCE: ICD-10-CM

## 2021-08-16 DIAGNOSIS — R00.2 PALPITATIONS: ICD-10-CM

## 2021-08-16 DIAGNOSIS — Z12.11 ENCOUNTER FOR SCREENING COLONOSCOPY: ICD-10-CM

## 2021-08-16 DIAGNOSIS — I34.0 NON-RHEUMATIC MITRAL REGURGITATION: ICD-10-CM

## 2021-08-16 DIAGNOSIS — C7A.090 MALIGNANT CARCINOID TUMOR OF LUNG (HCC): ICD-10-CM

## 2021-08-16 DIAGNOSIS — Z11.59 ENCOUNTER FOR HEPATITIS C SCREENING TEST FOR LOW RISK PATIENT: ICD-10-CM

## 2021-08-16 PROBLEM — Z87.81 HISTORY OF FRACTURE OF LEFT ANKLE: Status: ACTIVE | Noted: 2018-12-05

## 2021-08-16 PROBLEM — R74.8 ELEVATED ALKALINE PHOSPHATASE LEVEL: Status: ACTIVE | Noted: 2020-07-14

## 2021-08-16 PROBLEM — Z98.41 HISTORY OF BILATERAL CATARACT EXTRACTION: Status: ACTIVE | Noted: 2018-07-12

## 2021-08-16 PROBLEM — Z98.42 HISTORY OF BILATERAL CATARACT EXTRACTION: Status: ACTIVE | Noted: 2018-07-12

## 2021-08-16 PROBLEM — Z87.448 HISTORY OF ACUTE RENAL FAILURE: Status: ACTIVE | Noted: 2020-07-09

## 2021-08-16 PROBLEM — G89.29 CHRONIC LEFT-SIDED LOW BACK PAIN WITH LEFT-SIDED SCIATICA: Status: ACTIVE | Noted: 2018-02-28

## 2021-08-16 PROBLEM — G54.2 CERVICAL NERVE ROOT COMPRESSION: Status: ACTIVE | Noted: 2020-07-09

## 2021-08-16 PROBLEM — I50.32 CHRONIC DIASTOLIC CONGESTIVE HEART FAILURE: Status: ACTIVE | Noted: 2018-07-30

## 2021-08-16 PROBLEM — M72.0 DUPUYTREN'S CONTRACTURE OF RIGHT HAND: Status: ACTIVE | Noted: 2020-03-16

## 2021-08-16 PROBLEM — M19.90 OSTEOARTHRITIS: Status: ACTIVE | Noted: 2021-08-16

## 2021-08-16 PROBLEM — M54.42 CHRONIC LEFT-SIDED LOW BACK PAIN WITH LEFT-SIDED SCIATICA: Status: ACTIVE | Noted: 2018-02-28

## 2021-08-16 PROBLEM — M54.2 NECK PAIN: Status: ACTIVE | Noted: 2019-05-22

## 2021-08-16 PROBLEM — M50.30 DDD (DEGENERATIVE DISC DISEASE), CERVICAL: Status: ACTIVE | Noted: 2020-06-08

## 2021-08-16 PROBLEM — Z86.39 HISTORY OF IRON DEFICIENCY: Status: ACTIVE | Noted: 2019-05-31

## 2021-08-16 PROBLEM — Z91.81 HISTORY OF FALL WITHIN PAST 90 DAYS: Status: ACTIVE | Noted: 2019-11-18

## 2021-08-16 PROCEDURE — 99204 OFFICE O/P NEW MOD 45 MIN: CPT | Performed by: FAMILY MEDICINE

## 2021-08-16 RX ORDER — FLUTICASONE PROPIONATE 50 MCG
SPRAY, SUSPENSION (ML) NASAL
COMMUNITY
Start: 2021-05-03 | End: 2021-08-27 | Stop reason: SDUPTHER

## 2021-08-16 NOTE — PROGRESS NOTES
Chief Complaint  Establish Care    Subjective          Myra Charles presents to [facility] for     History of Present Illness    She states her [employer] is associated with Medicare and she gets yearly Medicare Wellness Exams.    Her   in 2021. She moved here from [place] which is 12 miles from [place].    She has a history of lung cancer.    She has mitral valve prolapse and has been following with cardiology in [place]. She would like to have a referral to a cardiologist here, closer to her new home.    She states she was diagnosed with fibromyalgia and has been following with Dr. Sonia Dominguez, Rheumatology at [facility]. She has Osteoprosis and has been taking Forteo injections nightly and she states she will finish this two year regimen next month.    She was seeing Dr. Sekou Leggett, Dermatology in [place] as needed for mole checks. She has never had skin cancer. She would like a referral to local dermatology for routine skin checks.    She states after she had her GB removed she had bile reflux. She states it took 6 months to recover from the pain from the bile reflux. She states she is due for her colonoscopy as it has been 10 years.    She states her iron levels went up to 900 ad she has been following with hematology, Dr. ELLEN Neal. She would like to continue following with hematology here closer to home.    She has HTN and takes Amlodipine 5 mg.    She states she felt like her heart was beating too fast at night and takes Metoprolol which she states controls that well.    She has RLS and states she no longer takes Requip. She states her iron levels are normal and she takes vitamin C and she no longer has RLS.    She has Vitamin B12 deficiency that is controlled with B12 tablets.    She has had her COVID-19 vaccines, Pfizer.    She ha d a cyst removed from under left 3rd toe and she gets injections in her foot due to  severe foot pain. She was seeing Podiatry Dr. Noah Rock. She would like to see a local Podiatrist.    Health Maintenance Due   Topic Date Due   • COLORECTAL CANCER SCREENING  Never done   • HEPATITIS C SCREENING  Never done   • ANNUAL WELLNESS VISIT  Never done        reports that she has never smoked. She has never used smokeless tobacco. She reports current alcohol use. She reports that she does not use drugs.    PHQ-9 Depression Screening  Little interest or pleasure in doing things?     Feeling down, depressed, or hopeless?     Trouble falling or staying asleep, or sleeping too much?     Feeling tired or having little energy?     Poor appetite or overeating?     Feeling bad about yourself - or that you are a failure or have let yourself or your family down?     Trouble concentrating on things, such as reading the newspaper or watching television?     Moving or speaking so slowly that other people could have noticed? Or the opposite - being so fidgety or restless that you have been moving around a lot more than usual?     Thoughts that you would be better off dead, or of hurting yourself in some way?     PHQ-9 Total Score     If you checked off any problems, how difficult have these problems made it for you to do your work, take care of things at home, or get along with other people?       Lab Results   Component Value Date    WBC 8.17 06/17/2021    HGB 12.2 06/17/2021    HCT 37.3 06/17/2021    MCV 89.2 06/17/2021     06/17/2021     Lab Results   Component Value Date    GLUCOSE 93 01/15/2021    BUN 23 01/15/2021    CREATININE 1.29 (H) 01/15/2021    EGFRIFNONA 41 (L) 01/15/2021    BCR 17.8 01/15/2021    K 4.4 01/15/2021    CO2 24.9 01/15/2021    CALCIUM 9.0 01/15/2021    ALBUMIN 4.60 01/15/2021    AST 21 01/15/2021    ALT 31 01/15/2021     Lab Results   Component Value Date    TSH 1.900 01/15/2021     No results found for: HGBA1C  Brief Urine Lab Results     None          BP Readings from Last 12  "Encounters:   08/16/21 112/74   06/17/21 149/70   06/09/21 128/60   01/15/21 130/74   10/22/20 132/74   09/30/20 126/78   07/10/20 135/60   01/15/20 124/76   11/14/19 122/80   07/12/19 131/65   06/11/19 151/66   05/22/19 126/78       Wt Readings from Last 12 Encounters:   08/16/21 81.1 kg (178 lb 12.8 oz)   06/17/21 80.6 kg (177 lb 12.8 oz)   06/09/21 81.2 kg (179 lb)   01/15/21 81.6 kg (179 lb 12.8 oz)   10/22/20 82.6 kg (182 lb 1.6 oz)   09/30/20 82.6 kg (182 lb)   07/10/20 80.6 kg (177 lb 11.2 oz)   02/05/20 80.7 kg (178 lb)   01/15/20 80.7 kg (178 lb)   01/08/20 82.6 kg (182 lb)   11/14/19 82.6 kg (182 lb)   07/12/19 84 kg (185 lb 3.2 oz)       Objective   Vital Signs:   /74 (BP Location: Left arm, Patient Position: Sitting, Cuff Size: Large Adult)   Pulse 73   Temp 97.1 °F (36.2 °C) (Temporal)   Ht 176.5 cm (69.5\")   Wt 81.1 kg (178 lb 12.8 oz)   SpO2 97%   BMI 26.03 kg/m²     Physical Exam  Vitals and nursing note reviewed.   Constitutional:       General: She is not in acute distress.     Appearance: Normal appearance. She is well-developed. She is not diaphoretic.   HENT:      Head: Normocephalic and atraumatic.      Right Ear: External ear normal.      Left Ear: External ear normal.      Nose: Nose normal.      Mouth/Throat:      Pharynx: No oropharyngeal exudate.   Eyes:      General: Lids are normal. No scleral icterus.        Right eye: No discharge.         Left eye: No discharge.   Neck:      Thyroid: No thyromegaly.      Trachea: Trachea normal. No tracheal deviation.   Cardiovascular:      Rate and Rhythm: Normal rate and regular rhythm.      Heart sounds: Normal heart sounds. No murmur heard.   No friction rub. No gallop.    Pulmonary:      Effort: Pulmonary effort is normal. No tachypnea, bradypnea or respiratory distress.      Breath sounds: Normal breath sounds. No stridor. No decreased breath sounds, wheezing or rales.   Chest:      Chest wall: No tenderness.   Musculoskeletal:      " Cervical back: Full passive range of motion without pain, normal range of motion and neck supple. No edema.   Lymphadenopathy:      Head:      Right side of head: No submental, submandibular, tonsillar, preauricular, posterior auricular or occipital adenopathy.      Left side of head: No submental, submandibular, tonsillar, preauricular, posterior auricular or occipital adenopathy.      Cervical: No cervical adenopathy.      Right cervical: No superficial, deep or posterior cervical adenopathy.     Left cervical: No superficial, deep or posterior cervical adenopathy.   Skin:     General: Skin is warm and dry.      Capillary Refill: Capillary refill takes less than 2 seconds.      Coloration: Skin is not pale.      Findings: No erythema or rash.      Nails: There is no clubbing.   Neurological:      Mental Status: She is alert and oriented to person, place, and time. She is not disoriented.      Deep Tendon Reflexes: Reflexes are normal and symmetric.   Psychiatric:         Behavior: Behavior normal.        Result Review :                 Assessment and Plan    Diagnoses and all orders for this visit:    1. Screening for colorectal cancer (Primary)    2. Encounter for screening colonoscopy  -     Ambulatory Referral For Screening Colonoscopy    3. Malignant carcinoid tumor of lung (CMS/HCC)  -     Ambulatory Referral to Pulmonology  -     Ambulatory Referral to Hematology / Oncology    4. Mitral valve prolapse  -     Ambulatory Referral to Cardiology    5. Seborrheic keratosis  -     Ambulatory Referral to Dermatology    6. Rosacea  -     Ambulatory Referral to Dermatology    7. Gastroesophageal reflux disease without esophagitis    8. Bile reflux esophagitis  -     Ambulatory Referral For Screening Colonoscopy    9. Abnormal blood level of iron  -     Ambulatory Referral to Hematology / Oncology    10. High risk medication use  -     CBC & Differential; Future  -     Comprehensive Metabolic Panel; Future  -      Lipid Panel With / Chol / HDL Ratio; Future  -     TSH; Future  -     UA / M With / Rflx Culture([facility] ONLY) - Urine, Clean Catch; Future    11. Osteopenia of lumbar spine  -     Vitamin D 25 Hydroxy; Future  -     Vitamin B12; Future    12. Age related osteoporosis, unspecified pathological fracture presence  -     Vitamin D 25 Hydroxy; Future  -     Vitamin B12; Future    13. Elevated ferritin  -     Ambulatory Referral to Hematology / Oncology    14. Palpitations  -     Ambulatory Referral to Cardiology    15. Non-rheumatic mitral regurgitation  -     Ambulatory Referral to Cardiology    16. Screening mammogram, encounter for  -     Mammo Screening Digital Tomosynthesis Bilateral With CAD; Future    17. Left foot pain  -     Ambulatory Referral to Podiatry    18. Encounter for hepatitis C screening test for low risk patient  -     Hepatitis C Antibody; Future         Follow Up   Return in about 3 months (around 11/16/2021) for Medicare Wellness.  Patient was given instructions and counseling regarding her condition or for health maintenance advice. Please see specific information pulled into the AVS if appropriate.

## 2021-08-16 NOTE — ASSESSMENT & PLAN NOTE
She follows with Dr. Heart with [facility]. Her thyroid is monitored once yearly by Dr. Heart.

## 2021-08-20 ENCOUNTER — PATIENT ROUNDING (BHMG ONLY) (OUTPATIENT)
Dept: FAMILY MEDICINE CLINIC | Facility: CLINIC | Age: 71
End: 2021-08-20

## 2021-08-20 NOTE — PROGRESS NOTES
"August 20, 2021    Hello, may I speak with Myra Charles?    My name is Abigail     I am  with EUFEMIA LACEY  [facility]  [address] [place] [id number] [phone].    Before we get started may I verify your date of birth? 1950    I am calling to officially welcome you to our practice and ask about your recent visit. Is this a good time to talk? Yes    Tell me about your visit with us. What things went well?  \"He was very nice and thorough\"        We're always looking for ways to make our patients' experiences even better. Do you have recommendations on ways we may improve?  No    Overall were you satisfied with your first visit to our practice? Yes        I appreciate you taking the time to speak with me today. Is there anything else I can do for you? No    Thank you, and have a great day.      "

## 2021-08-25 ENCOUNTER — TELEPHONE (OUTPATIENT)
Dept: GASTROENTEROLOGY | Facility: CLINIC | Age: 71
End: 2021-08-25

## 2021-08-27 DIAGNOSIS — E55.9 VITAMIN D DEFICIENCY: ICD-10-CM

## 2021-08-27 DIAGNOSIS — J30.2 SEASONAL ALLERGIES: ICD-10-CM

## 2021-08-27 DIAGNOSIS — Z00.00 ENCOUNTER FOR WELL ADULT EXAM WITHOUT ABNORMAL FINDINGS: Primary | ICD-10-CM

## 2021-08-27 DIAGNOSIS — M62.838 MUSCLE SPASM: ICD-10-CM

## 2021-08-27 DIAGNOSIS — K21.9 GASTROESOPHAGEAL REFLUX DISEASE WITHOUT ESOPHAGITIS: ICD-10-CM

## 2021-08-27 DIAGNOSIS — I10 ESSENTIAL HYPERTENSION: ICD-10-CM

## 2021-08-27 DIAGNOSIS — L70.8 OTHER ACNE: ICD-10-CM

## 2021-08-27 RX ORDER — MELATONIN
1000 DAILY
Qty: 90 TABLET | Refills: 1 | Status: SHIPPED | OUTPATIENT
Start: 2021-08-27

## 2021-08-27 RX ORDER — AMLODIPINE BESYLATE 5 MG/1
5 TABLET ORAL DAILY
Qty: 90 TABLET | Refills: 1 | Status: SHIPPED | OUTPATIENT
Start: 2021-08-27 | End: 2022-02-23

## 2021-08-27 RX ORDER — FLUTICASONE PROPIONATE 50 MCG
1 SPRAY, SUSPENSION (ML) NASAL DAILY
Qty: 16 G | Refills: 5 | Status: SHIPPED | OUTPATIENT
Start: 2021-08-27

## 2021-08-27 RX ORDER — FOLIC ACID 1 MG/1
1000 TABLET ORAL DAILY
Qty: 90 TABLET | Refills: 1 | Status: SHIPPED | OUTPATIENT
Start: 2021-08-27 | End: 2021-09-27

## 2021-08-27 RX ORDER — METOPROLOL SUCCINATE 25 MG/1
25 TABLET, EXTENDED RELEASE ORAL DAILY
Qty: 90 TABLET | Refills: 1 | Status: SHIPPED | OUTPATIENT
Start: 2021-08-27 | End: 2021-12-16 | Stop reason: SDUPTHER

## 2021-08-27 RX ORDER — ANTIOX #8/OM3/DHA/EPA/LUT/ZEAX 250-2.5 MG
1 CAPSULE ORAL 2 TIMES DAILY
Qty: 90 CAPSULE | Refills: 1 | Status: SHIPPED | OUTPATIENT
Start: 2021-08-27

## 2021-08-27 RX ORDER — FAMOTIDINE 20 MG/1
20 TABLET, FILM COATED ORAL NIGHTLY PRN
Qty: 90 TABLET | Refills: 1 | Status: SHIPPED | OUTPATIENT
Start: 2021-08-27 | End: 2021-12-16 | Stop reason: SDUPTHER

## 2021-08-27 RX ORDER — CYCLOBENZAPRINE HCL 10 MG
10 TABLET ORAL NIGHTLY PRN
Qty: 90 TABLET | Refills: 1 | Status: SHIPPED | OUTPATIENT
Start: 2021-08-27

## 2021-09-15 ENCOUNTER — APPOINTMENT (OUTPATIENT)
Dept: LAB | Facility: HOSPITAL | Age: 71
End: 2021-09-15

## 2021-09-15 ENCOUNTER — CONSULT (OUTPATIENT)
Dept: ONCOLOGY | Facility: CLINIC | Age: 71
End: 2021-09-15

## 2021-09-15 VITALS
SYSTOLIC BLOOD PRESSURE: 126 MMHG | HEIGHT: 69 IN | HEART RATE: 78 BPM | DIASTOLIC BLOOD PRESSURE: 79 MMHG | TEMPERATURE: 98.6 F | OXYGEN SATURATION: 99 % | WEIGHT: 179.3 LBS | BODY MASS INDEX: 26.56 KG/M2 | RESPIRATION RATE: 14 BRPM

## 2021-09-15 DIAGNOSIS — R91.1 LUNG NODULE, SOLITARY: Primary | ICD-10-CM

## 2021-09-15 DIAGNOSIS — C34.12 MALIGNANT NEOPLASM OF UPPER LOBE OF LEFT LUNG (HCC): Primary | ICD-10-CM

## 2021-09-15 DIAGNOSIS — R79.89 ELEVATED FERRITIN: Chronic | ICD-10-CM

## 2021-09-15 PROCEDURE — 99214 OFFICE O/P EST MOD 30 MIN: CPT | Performed by: INTERNAL MEDICINE

## 2021-09-15 NOTE — PROGRESS NOTES
Subjective     REASON FOR CONSULTATION: Elevated ferritin  Provide an opinion on any further workup or treatment                             REQUESTING PHYSICIAN: Dr. West    RECORDS OBTAINED:  Records of the patients history including those obtained from the referring provider were reviewed and summarized in detail.    HISTORY OF PRESENT ILLNESS:  The patient is a 70 y.o. year old female who is here for an opinion about the above issue.    History of Present Illness   This is a very pleasant 70-year-old lady who has relocated to [place] from [facility] after her  passed away earlier in 2021.  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  She is been undergoing annual then every 2-year CT chest imaging with her lung surgeon in [place] with no evidence of disease recurrence.  She has recently seen pulmonary medicine locally and reports they will schedule a surveillance CT of the chest in the near future.  The patient also has history of an elevated ferritin but a normal iron saturation followed by hematology in [place] and felt to be inflammatory and not genetic hemochromatosis given the normal iron saturation.  She has no known autoimmune disorders, no history of hepatitis/cirrhosis, no recent infections or febrile illnesses.  She does report fibromyalgia.    Past Medical History:   Diagnosis Date   • Arthritis    • Backache 02/17/2016    Low back pain, in sacral region      • Benign hypertension 02/17/2016   • Cortical senile cataract 02/17/2016   • Deep vein thrombosis (CMS/HCC)    • Essential (primary) hypertension 04/20/2016   • GERD (gastroesophageal reflux disease) 02/17/2016   • Hallux valgus     Deformity, w/inflammation      • Iron deficiency anemia    • Lung nodule, solitary 03/08/2012    Solitary nodule of lung - RIGHT upper lobe 15mm, PET positive      • Malignant carcinoid tumor of lung (CMS/HCC) 02/17/2016    Atypical  carcinoid tumor of the LEFT lung treated in March 2012     • Mitral valve prolapse 02/17/2016   • Osteoporosis 04/20/2016   • Primary fibromyalgia syndrome 02/17/2016   • Rosacea 02/17/2016   • Seborrheic keratosis     History of, upper and lower back      • 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 Enodscopic vein harvest on the left. Left lower extremity arteriogram.)   • CERVICAL CONIZATION      CONIZATION OF CERVIX 52099 (Excisional laser cone biopsy and vaporization of cervix.)   • CHOLECYSTECTOMY  09/07/2005    Cholecystectomy, laparoscopic (With intraoperative cholangiogram.)   • CYST REMOVAL      left 3rd toe   • EXCISION LESION  11/11/1998    REMOVE LESION BACK OR FLANK 24638 (Excision times two.)   • FOOT SURGERY  01/20/2009    Foot/toes surgery procedure (Soft tissue mass, left foot. Excision of)   • HERNIA REPAIR      Past history of umbilical herni repair.   • HYSTERECTOMY     • OTHER SURGICAL HISTORY  12/04/2012    Anesth, elbow area surgery (Manipulation of left elbow.)   • OTHER SURGICAL HISTORY  03/05/2012    Anesth, elbow area surgery (Excision of plate and screws from olecranon bursa. Retained plate and screws, olecranon bursitis of previous fracture left elbow.)   • OTHER SURGICAL HISTORY  10/16/2012    Treat elbow fracture (Open reduction and internal fixation.)   • THORACOSCOPY  03/14/2012    Thoracoscopy, surgical (Bronchoscopy,LVATS (wedge resect MARYBETH), LULobectomy Thoracic lymphadenectomy)   • THROAT SURGERY  07/25/2012    Throat  surgery procedure (Thyroplasty type I (vocal cord medialozation & larynooplasty) Left vocal cord paralysis. Dysphoria. [facility] by Dr Tk Heart)   • THYROID SURGERY  03/22/2016    Thyroid surgery (Right thyroid lobectomy and isthmusectomy.)   • TONSILLECTOMY  02/03/1956    Chronic tonsillitis   • VEIN LIGATION  09/28/2000    PHLEB VEINS - EXTREM (20+) 56292 (High ligation of ling saphenous vein, left, plus multiple phlebectomies, left lower extremity.)        Current Outpatient Medications on File Prior to Visit   Medication Sig Dispense Refill   • amLODIPine (NORVASC) 5 MG tablet Take 1 tablet by mouth Daily. 90 tablet 1   • ascorbic acid (VITAMIN C) 1000 MG tablet Take 1,000 mg by mouth Daily.     • ASPIRIN PO Take 81 mg by mouth Daily. YSP Aspirin oral  (unconfirmed      • Calcium Carbonate-Vitamin D (CALCIUM PLUS VITAMIN D PO) Take  by mouth Daily.     • cholecalciferol (VITAMIN D3) 25 MCG (1000 UT) tablet Take 1 tablet by mouth Daily. 90 tablet 1   • cyclobenzaprine (FLEXERIL) 10 MG tablet Take 1 tablet by mouth At Night As Needed for Muscle Spasms. 90 tablet 1   • famotidine (PEPCID) 20 MG tablet Take 1 tablet by mouth At Night As Needed for Heartburn. 90 tablet 1   • fluticasone (FLONASE) 50 MCG/ACT nasal spray 1 spray into the nostril(s) as directed by provider Daily. 16 g 5   • folic acid (FOLVITE) 1 MG tablet Take 1 tablet by mouth Daily. 90 tablet 1   • metoprolol succinate XL (TOPROL-XL) 25 MG 24 hr tablet Take 1 tablet by mouth Daily. 90 tablet 1   • multivitamins-minerals (PRESERVISION AREDS 2) capsule capsule Take 1 capsule by mouth 2 (Two) Times a Day. 90 capsule 1   • Teriparatide, Recombinant, (Forteo) 600 MCG/2.4ML injection INJECT 20 MCG UNDER THE SKIN DAILY (20 MCG=0.08 ML)     • vitamin B-12 (CYANOCOBALAMIN) 1000 MCG tablet Take 1,000 mcg by mouth 2 (Two) Times a Week.     • DULoxetine (CYMBALTA) 60 MG capsule Take 60 mg by mouth daily.       No current facility-administered  "medications on file prior to visit.        ALLERGIES:    Allergies   Allergen Reactions   • Raloxifene Swelling and Other (See Comments)     Caused pain   • Pregabalin Swelling and Other (See Comments)     swelling        Social History     Socioeconomic History   • Marital status:      Spouse name: Not on file   • Number of children: Not on file   • Years of education: Not on file   • Highest education level: Not on file   Tobacco Use   • Smoking status: Never Smoker   • Smokeless tobacco: Never Used   Vaping Use   • Vaping Use: Never used   Substance and Sexual Activity   • Alcohol use: Yes     Comment: OCCASIONAL WINE   • Drug use: No   • Sexual activity: Defer        Family History   Problem Relation Age of Onset   • Cancer Mother         myeloma   • Diabetes Mother    • Heart disease Mother    • Arthritis Mother    • Hypertension Mother    • Heart disease Father    • Arthritis Father    • Heart attack Father    • Stroke Father    • Hypertension Father    • Lung cancer Maternal Grandfather    • Lung cancer Other    • Diabetes Maternal Grandmother         Review of Systems   Constitutional: Positive for fatigue. Negative for activity change, diaphoresis, fever and unexpected weight change.   HENT: Positive for sinus pressure. Negative for facial swelling.    Respiratory: Positive for shortness of breath. Negative for cough.    Cardiovascular: Negative.    Gastrointestinal: Negative for blood in stool, nausea and vomiting.        Acid reflux   Endocrine: Negative.    Musculoskeletal: Positive for arthralgias.   Skin: Negative.    Allergic/Immunologic: Negative.    Neurological: Negative.    Hematological: Negative for adenopathy. Bruises/bleeds easily.          Objective     Vitals:    09/15/21 1439   BP: 126/79   Pulse: 78   Resp: 14   Temp: 98.6 °F (37 °C)   TempSrc: Infrared   SpO2: 99%   Weight: 81.3 kg (179 lb 4.8 oz)   Height: 175.6 cm (69.13\")  Comment: new    PainSc: 0-No pain     Current Status " 9/15/2021   ECOG score 0       Physical Exam    CONSTITUTIONAL: pleasant well-developed adult woman  LYMPH: no cervical or supraclavicular lad  CV: RRR, S1S2, no murmur  RESP: cta bilat, no wheezing, no rales  GI: soft, non-tender, no splenomegaly, +bs  MUSC: no edema, normal gait  NEURO: alert and oriented x3, normal strength  PSYCH: normal mood and affect      RECENT LABS:  Hematology WBC   Date Value Ref Range Status   08/19/2021 6.46 3.40 - 10.80 10*3/mm3 Final   10/07/2019 6.5 4.0 - 11.0 10*3/uL Final     RBC   Date Value Ref Range Status   08/19/2021 4.48 3.77 - 5.28 10*6/mm3 Final   10/07/2019 4.42 4.15 - 4.87 10*6/uL Final     Hemoglobin   Date Value Ref Range Status   08/19/2021 12.9 12.0 - 15.9 g/dL Final   06/17/2021 12.2 12.0 - 15.9 g/dL Final   10/07/2019 12.9 12.7 - 14.7 g/dL Final     Hematocrit   Date Value Ref Range Status   08/19/2021 40.6 34.0 - 46.6 % Final   06/17/2021 37.3 34.0 - 46.6 % Final   10/07/2019 41.1 37.9 - 43.9 % Final     Platelets   Date Value Ref Range Status   08/19/2021 226 140 - 450 10*3/mm3 Final   06/17/2021 216 140 - 450 10*3/mm3 Final   10/07/2019 205 150 - 450 10*3/uL Final        Lab Results   Component Value Date    GLUCOSE 93 01/15/2021    BUN 26 (H) 08/19/2021    CREATININE 1.51 (H) 08/19/2021    EGFRIFNONA 34 (L) 08/19/2021    EGFRIFAFRI 41 (L) 08/19/2021    BCR 17.2 08/19/2021    K 4.6 08/19/2021    CO2 25.1 08/19/2021    CALCIUM 9.3 08/19/2021    PROTENTOTREF 6.2 08/19/2021    ALBUMIN 4.40 08/19/2021    LABIL2 2.4 08/19/2021    AST 16 08/19/2021    ALT 16 08/19/2021     Ferritin 519  Iron saturation 23%    Assessment/Plan     *History of atypical carcinoid tumor of the lung 1.8 cm status post left upper lobectomy 3/14/2012 with negative lymph nodes and no evidence of disease recurrence.  She reports pulmonary medicine is ordering a surveillance CT chest.    *Elevated ferritin: Patient has had an elevated ferritin since at least 2017.  The iron saturation is normal  which argues against iron overload.  The elevated ferritin appears to be inflammatory.  I most often see this with liver disease such as steatohepatitis.  Her alkaline phosphatase is elevated.  I do not see any recent liver imaging.  She has an elevated creatinine so I do not want to give her IV contrast.  I ordered an ultrasound of the liver and we will call her with results.  Otherwise I think she can continue to have a yearly ferritin iron profile performed but my suspicion for biochemical iron overload/genetic hemochromatosis is low.      Thank you for allowing me to participate in the care of this pleasant patient.  I will see her back in 9 to 12 months with repeat CBC CMP ferritin iron profile.

## 2021-09-16 ENCOUNTER — PREP FOR SURGERY (OUTPATIENT)
Dept: OTHER | Facility: HOSPITAL | Age: 71
End: 2021-09-16

## 2021-09-16 DIAGNOSIS — Z12.11 SCREEN FOR COLON CANCER: Primary | ICD-10-CM

## 2021-09-23 ENCOUNTER — APPOINTMENT (OUTPATIENT)
Dept: ULTRASOUND IMAGING | Facility: HOSPITAL | Age: 71
End: 2021-09-23

## 2021-09-24 ENCOUNTER — TRANSCRIBE ORDERS (OUTPATIENT)
Dept: ADMINISTRATIVE | Facility: HOSPITAL | Age: 71
End: 2021-09-24

## 2021-09-24 DIAGNOSIS — C34.00 LUNG CANCER, MAIN BRONCHUS, UNSPECIFIED LATERALITY (HCC): Primary | ICD-10-CM

## 2021-09-24 DIAGNOSIS — Z90.2 STATUS POST LOBECTOMY OF LUNG: ICD-10-CM

## 2021-09-27 DIAGNOSIS — L70.8 OTHER ACNE: ICD-10-CM

## 2021-09-27 DIAGNOSIS — Z00.00 ENCOUNTER FOR WELL ADULT EXAM WITHOUT ABNORMAL FINDINGS: ICD-10-CM

## 2021-09-27 RX ORDER — FOLIC ACID 1 MG/1
1000 TABLET ORAL DAILY
Qty: 90 TABLET | Refills: 1 | Status: SHIPPED | OUTPATIENT
Start: 2021-09-27 | End: 2022-06-20 | Stop reason: SDUPTHER

## 2021-09-27 NOTE — TELEPHONE ENCOUNTER
Rx Refill Note  Requested Prescriptions     Pending Prescriptions Disp Refills   • folic acid (FOLVITE) 1 MG tablet [Pharmacy Med Name: FOLIC ACID 1000MCG TABLET] 90 tablet 1     Sig: TAKE 1 TABLET BY MOUTH DAILY.      Last office visit with prescribing clinician: 6/17/2021      Next office visit with prescribing clinician: 6/16/2022            Kylah Pedraza RN  09/27/21, 11:53 CDT

## 2021-09-30 ENCOUNTER — HOSPITAL ENCOUNTER (OUTPATIENT)
Dept: ULTRASOUND IMAGING | Facility: HOSPITAL | Age: 71
Discharge: HOME | End: 2021-09-30
Admitting: INTERNAL MEDICINE

## 2021-09-30 ENCOUNTER — TELEPHONE (OUTPATIENT)
Dept: ONCOLOGY | Facility: CLINIC | Age: 71
End: 2021-09-30

## 2021-09-30 DIAGNOSIS — R79.89 ELEVATED FERRITIN: Chronic | ICD-10-CM

## 2021-09-30 PROCEDURE — 76705 ECHO EXAM OF ABDOMEN: CPT

## 2021-09-30 NOTE — TELEPHONE ENCOUNTER
Called Ms. Charles and got her voicemail.  After checking her verbal release form, I left her a message that her liver u/s was normal.    ----- Message from Cruzito Souza MD sent at 9/30/2021  9:12 AM EDT -----  Please let her know that her liver u/s was normal

## 2021-10-01 NOTE — PROGRESS NOTES
RM:________    Referral Provider: Jefferson West Jr.* Jeff Shah MD    NEW PATIENT/ CONSULT  PREVIOUS CARDIOLOGIST:   CARDIAC TESTING:     : 1950   AGE: 70 y.o.    10/05/2021  REASON FOR VISIT/  CC:      WT: ____________ BP: __________L __________R/ HR_______________    CHEST PAIN: _____________    SOA: ____________PALPS: __________      LIGHTHEADED: ___________ FATIGUE: _______________ EDEMA______________  ALLERGIES:  Raloxifene and Pregabalin  SMOKING HISTORY  Social History     Tobacco Use   • Smoking status: Never Smoker   • Smokeless tobacco: Never Used   Vaping Use   • Vaping Use: Never used   Substance Use Topics   • Alcohol use: Yes     Comment: OCCASIONAL WINE   • Drug use: No          CHILDREN: _______________________       CAFFEINE USE________  ALCOHOL _____________  OCCUPATION_____________  Past Surgical History:   Procedure Laterality Date   • ARTERIAL BYPASS SURGERY  2012    Artery bypass graft (Left femoral to posterior tibial artery bypass. Enodscopic vein harvest on the left. Left lower extremity arteriogram.)   • CERVICAL CONIZATION      CONIZATION OF CERVIX 24964 (Excisional laser cone biopsy and vaporization of cervix.)   • CHOLECYSTECTOMY  2005    Cholecystectomy, laparoscopic (With intraoperative cholangiogram.)   • CYST REMOVAL      left 3rd toe   • EXCISION LESION  1998    REMOVE LESION BACK OR FLANK 10532 (Excision times two.)   • FOOT SURGERY  2009    Foot/toes surgery procedure (Soft tissue mass, left foot. Excision of)   • HERNIA REPAIR      Past history of umbilical herni repair.   • HYSTERECTOMY     • OTHER SURGICAL HISTORY  2012    Anesth, elbow area surgery (Manipulation of left elbow.)   • OTHER SURGICAL HISTORY  2012    Anesth, elbow area surgery (Excision of plate and screws from olecranon bursa. Retained plate and screws, olecranon bursitis of previous fracture left elbow.)   • OTHER SURGICAL HISTORY  10/16/2012     Treat elbow fracture (Open reduction and internal fixation.)   • THORACOSCOPY  03/14/2012    Thoracoscopy, surgical (Bronchoscopy,LVATS (wedge resect MARYBETH), LULobectomy Thoracic lymphadenectomy)   • THROAT SURGERY  07/25/2012    Throat surgery procedure (Thyroplasty type I (vocal cord medialozation & larynooplasty) Left vocal cord paralysis. Dysphoria. [facility] by Dr Tk Heart)   • THYROID SURGERY  03/22/2016    Thyroid surgery (Right thyroid lobectomy and isthmusectomy.)   • TONSILLECTOMY  02/03/1956    Chronic tonsillitis   • VEIN LIGATION  09/28/2000    PHLEB VEINS - EXTREM (20+) 12757 (High ligation of ling saphenous vein, left, plus multiple phlebectomies, left lower extremity.)                FAMILY HISTORY  HEART DISEASE  CHF  DIABETES  CARDIAC ARREST  STROKE  CANCER  ANEURYSM                                                             H/O: MI_____   STROKE________   GOUT_____   ANEMIA______     CAROTID________ HIV____ CAD_______ HYPERCHOL _____    H/O: CHF _____   RF____ DM___ HTN_______PVD____THYROID DISEASE_______    PMH: GI ____   HEPATITIS ___ KIDNEY DISEASE ___ LUNG DISEASE _______     SLEEP APNEA ____ BLOOD CLOTS ____ DVT ____ VEIN STRIPPING ___________     CANCER _________________________________ CHEMO/ RADIATION__________

## 2021-10-01 NOTE — PROGRESS NOTES
RM:________     PCP: Jeff Shah MD    : 1950  AGE: 70 y.o.  EST PATIENT   REASON FOR VISIT/  CC:    BP Readings from Last 3 Encounters:   09/15/21 126/79   21 112/74   21 149/70        WT: ____________ BP: __________L __________R HR______    CHEST PAIN: _____________    SOA: _____________PALPS: _______________     LIGHTHEADED: ___________FATIGUE: ________________ EDEMA __________    ALLERGIES:Raloxifene and Pregabalin SMOKING HISTORY:  Social History     Tobacco Use   • Smoking status: Never Smoker   • Smokeless tobacco: Never Used   Vaping Use   • Vaping Use: Never used   Substance Use Topics   • Alcohol use: Yes     Comment: OCCASIONAL WINE   • Drug use: No     CAFFEINE USE_________________  ALCOHOL ______________________    Below is the patient's most recent value for Albumin, ALT, AST, BUN, Calcium, Chloride, Cholesterol, CO2, Creatinine, GFR, Glucose, HDL, Hematocrit, Hemoglobin, Hemoglobin A1C, LDL, Magnesium, Phosphorus, Platelets, Potassium, PSA, Sodium, Triglycerides, TSH and WBC.   Lab Results   Component Value Date    ALBUMIN 4.40 2021    ALT 16 2021    AST 16 2021    BUN 26 (H) 2021    CALCIUM 9.3 2021     2021    CHOL 175 2020    CO2 25.1 2021    CREATININE 1.51 (H) 2021    GFRAA 60 10/19/2016    GFRNONAA 50 10/19/2016    GLU 88 2021    HDL 57 2021    HCT 40.6 2021    HGB 12.9 2021    LDL 86 2021    PHOS 3.7 2016     2021    K 4.6 2021     2021    TRIG 91 2021    TSH 1.800 2021    WBC 6.46 2021          NEW DIAGNOSIS/ SURGERY/ HOSP OR ED VISITS: ______________________    __________________________________________________________________      RECENT LABS OR DIAGNOSTIC TESTING:  _____________________________    __________________________________________________________________      ASSESSMENT/ PLAN:  _______________________________________________    __________________________________________________________________

## 2021-10-05 ENCOUNTER — OFFICE VISIT (OUTPATIENT)
Dept: CARDIOLOGY | Facility: CLINIC | Age: 71
End: 2021-10-05

## 2021-10-05 VITALS
DIASTOLIC BLOOD PRESSURE: 72 MMHG | HEART RATE: 79 BPM | OXYGEN SATURATION: 98 % | BODY MASS INDEX: 26.36 KG/M2 | RESPIRATION RATE: 16 BRPM | SYSTOLIC BLOOD PRESSURE: 110 MMHG | HEIGHT: 69 IN | WEIGHT: 178 LBS

## 2021-10-05 DIAGNOSIS — I34.1 MITRAL VALVE PROLAPSE: Primary | ICD-10-CM

## 2021-10-05 DIAGNOSIS — R00.2 PALPITATIONS: ICD-10-CM

## 2021-10-05 PROBLEM — Z91.81 HISTORY OF FALL WITHIN PAST 90 DAYS: Status: RESOLVED | Noted: 2019-11-18 | Resolved: 2021-10-05

## 2021-10-05 PROCEDURE — 99214 OFFICE O/P EST MOD 30 MIN: CPT | Performed by: INTERNAL MEDICINE

## 2021-10-05 PROCEDURE — 93000 ELECTROCARDIOGRAM COMPLETE: CPT | Performed by: INTERNAL MEDICINE

## 2021-10-05 NOTE — PROGRESS NOTES
Date of Office Visit: 10/05/21  Encounter Provider: Stephan Hernandez MD  Place of Service: [facility] CARDIOLOGY  Patient Name: Myra Charles  :1950    Chief Complaint   Patient presents with   • Establish Care   :     HPI:     Ms. Charles is 70 y.o. and presents today to establish care. She recently moved to the area.    She was previously seeing a cardiologist in [place]; she has moved here to be closer to her daughter.     She had a CT coronary angiogram in  that was completely normal; her calcium score was zero. She had an echo in 2020 that showed very mild anterior MVP without significant MR; the study was otherwise normal.      She is doing well. She has occasional palpitations which feel like small flutters. She does not have sustained tachycardia. She denies chest pain, lightheadedness, syncope, leg swelling, orthopnea, dyspnea.     Past Medical History:   Diagnosis Date   • Arthritis    • Backache 2016    Low back pain, in sacral region      • Benign hypertension 2016   • CKD (chronic kidney disease) stage 3, GFR 30-59 ml/min ([facility])    • Cortical senile cataract 2016   • Deep vein thrombosis ([facility])    • Essential (primary) hypertension 2016   • GERD (gastroesophageal reflux disease) 2016   • Hallux valgus     Deformity, w/inflammation      • History of coronary angiogram     CT cor angio , normal cors, [place] 0   • Iron deficiency anemia    • Lung nodule, solitary 2012    Solitary nodule of lung - RIGHT upper lobe 15mm, PET positive      • Malignant carcinoid tumor of lung (HCC) 2016    Atypical carcinoid tumor of the LEFT lung treated in 2012     • Mitral valve prolapse 2016   • Osteoporosis 2016   • Primary fibromyalgia syndrome 2016   • Rosacea 2016   • Seborrheic keratosis     History of, upper and lower back      • 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 Enodscopic vein harvest on the left. Left lower extremity arteriogram.)   • CERVICAL CONIZATION      CONIZATION OF CERVIX 41796 (Excisional laser cone biopsy and vaporization of cervix.)   • CHOLECYSTECTOMY  09/07/2005    Cholecystectomy, laparoscopic (With intraoperative cholangiogram.)   • CYST REMOVAL      left 3rd toe   • EXCISION LESION  11/11/1998    REMOVE LESION BACK OR FLANK 52629 (Excision times two.)   • FOOT SURGERY  01/20/2009    Foot/toes surgery procedure (Soft tissue mass, left foot. Excision of)   • HERNIA REPAIR      Past history of umbilical herni repair.   • HYSTERECTOMY     • OTHER SURGICAL HISTORY  12/04/2012    Anesth, elbow area surgery (Manipulation of left elbow.)   • OTHER SURGICAL HISTORY  03/05/2012    Anesth, elbow area surgery (Excision of plate and screws from olecranon bursa. Retained plate and screws, olecranon bursitis of previous fracture left elbow.)   • OTHER SURGICAL HISTORY  10/16/2012    Treat elbow fracture (Open reduction and internal fixation.)   • THORACOSCOPY  03/14/2012    Thoracoscopy, surgical (Bronchoscopy,LVATS (wedge resect MARYBETH), LULobectomy Thoracic lymphadenectomy)   • THROAT SURGERY  07/25/2012    Throat surgery procedure (Thyroplasty type I (vocal cord medialozation & larynooplasty) Left vocal cord paralysis. Dysphoria. [facility] by Dr Tk Heart)   • THYROID SURGERY  03/22/2016    Thyroid surgery (Right thyroid lobectomy and isthmusectomy.)   • TONSILLECTOMY  02/03/1956    Chronic tonsillitis   •  VEIN LIGATION  09/28/2000    PHLEB VEINS - EXTREM (20+) 86968 (High ligation of ling saphenous vein, left, plus multiple phlebectomies, left lower extremity.)       Social History     Socioeconomic History   • Marital status:      Spouse name: Not on file   • Number of children: Not on file   • Years of education: Not on file   • Highest education level: Not on file   Tobacco Use   • Smoking status: Never Smoker   • Smokeless tobacco: Never Used   Vaping Use   • Vaping Use: Never used   Substance and Sexual Activity   • Alcohol use: Yes     Comment: OCCASIONAL WINE   • Drug use: No   • Sexual activity: Defer       Family History   Problem Relation Age of Onset   • Cancer Mother         myeloma   • Diabetes Mother    • Heart disease Mother    • Arthritis Mother    • Hypertension Mother    • Heart disease Father    • Arthritis Father    • Heart attack Father    • Stroke Father    • Hypertension Father    • Lung cancer Maternal Grandfather    • Lung cancer Other    • Diabetes Maternal Grandmother        Review of Systems   Constitutional: Positive for malaise/fatigue.   Musculoskeletal: Positive for myalgias.   All other systems reviewed and are negative.      Allergies   Allergen Reactions   • Raloxifene Swelling and Other (See Comments)     Caused pain   • Pregabalin Swelling and Other (See Comments)     swelling         Current Outpatient Medications:   •  amLODIPine (NORVASC) 5 MG tablet, Take 1 tablet by mouth Daily., Disp: 90 tablet, Rfl: 1  •  ascorbic acid (VITAMIN C) 1000 MG tablet, Take 1,000 mg by mouth Daily., Disp: , Rfl:   •  ASPIRIN PO, Take 81 mg by mouth Daily. YSP Aspirin oral  (unconfirmed , Disp: , Rfl:   •  Calcium Carbonate-Vitamin D (CALCIUM PLUS VITAMIN D PO), Take  by mouth Daily., Disp: , Rfl:   •  cholecalciferol (VITAMIN D3) 25 MCG (1000 UT) tablet, Take 1 tablet by mouth Daily., Disp: 90 tablet, Rfl: 1  •  cyclobenzaprine (FLEXERIL) 10 MG tablet, Take 1 tablet by mouth At Night As  "Needed for Muscle Spasms., Disp: 90 tablet, Rfl: 1  •  DULoxetine (CYMBALTA) 60 MG capsule, Take 60 mg by mouth daily., Disp: , Rfl:   •  famotidine (PEPCID) 20 MG tablet, Take 1 tablet by mouth At Night As Needed for Heartburn., Disp: 90 tablet, Rfl: 1  •  fluticasone (FLONASE) 50 MCG/ACT nasal spray, 1 spray into the nostril(s) as directed by provider Daily., Disp: 16 g, Rfl: 5  •  folic acid (FOLVITE) 1 MG tablet, TAKE 1 TABLET BY MOUTH DAILY. , Disp: 90 tablet, Rfl: 1  •  metoprolol succinate XL (TOPROL-XL) 25 MG 24 hr tablet, Take 1 tablet by mouth Daily., Disp: 90 tablet, Rfl: 1  •  multivitamins-minerals (PRESERVISION AREDS 2) capsule capsule, Take 1 capsule by mouth 2 (Two) Times a Day., Disp: 90 capsule, Rfl: 1  •  Teriparatide, Recombinant, (Forteo) 600 MCG/2.4ML injection, INJECT 20 MCG UNDER THE SKIN DAILY (20 MCG=0.08 ML), Disp: , Rfl:   •  vitamin B-12 (CYANOCOBALAMIN) 1000 MCG tablet, Take 1,000 mcg by mouth 2 (Two) Times a Week., Disp: , Rfl:       Objective:     Vitals:    10/05/21 1416   BP: 110/72   Pulse: 79   Resp: 16   SpO2: 98%   Weight: 80.7 kg (178 lb)   Height: 175.3 cm (69\")     Body mass index is 26.29 kg/m².    Vitals reviewed.   Constitutional:       Appearance: Healthy appearance. Well-developed and not in distress.   Eyes:      Conjunctiva/sclera: Conjunctivae normal.   HENT:      Head: Normocephalic.      Nose: Nose normal.         Comments: Masked  Neck:      Vascular: No JVD. JVD normal.      Lymphadenopathy: No cervical adenopathy.   Pulmonary:      Effort: Pulmonary effort is normal.      Breath sounds: Normal breath sounds.   Cardiovascular:      Normal rate. Regular rhythm.      Murmurs: There is no murmur.      No gallop.   Pulses:     Intact distal pulses.   Edema:     Peripheral edema absent.   Abdominal:      Palpations: Abdomen is soft.      Tenderness: There is no abdominal tenderness.   Musculoskeletal: Normal range of motion.      Cervical back: Normal range of motion. " "Skin:     General: Skin is warm and dry.      Findings: No rash.   Neurological:      General: No focal deficit present.      Mental Status: Alert and oriented to person, place, and time.      Cranial Nerves: No cranial nerve deficit.   Psychiatric:         Behavior: Behavior normal.         Thought Content: Thought content normal.         Judgment: Judgment normal.           ECG 12 Lead    Date/Time: 10/5/2021 2:36 PM  Performed by: Stephan Hernandez MD  Authorized by: Stephan Hernandez MD   Comparison: compared with previous ECG   Similar to previous ECG  Rhythm: sinus rhythm  Conduction: non-specific intraventricular conduction delay  ST Segments: ST segments normal  T Waves: T waves normal  QRS axis: normal  Other: no other findings    Clinical impression: non-specific ECG              Assessment:       Diagnosis Plan   1. Mitral valve prolapse  ECG 12 Lead   2. Palpitations          Plan:       Mrs Charles has mild anterior MVP without significant MR; I reviewed her last echo. We'll repeat this every three years (next will be in 2023) unless her symptoms or exam change.      She has occasional palpitations which sound like benign ectopics. Her Epic Problem List states \"PACs\" but I can't find any documentation otherwise. However, as long as they remain mild and infrequent, I would not recommend further testing or treatment.     Sincerely,       Stephan Hernandez MD                "

## 2021-10-14 ENCOUNTER — TELEPHONE (OUTPATIENT)
Dept: FAMILY MEDICINE CLINIC | Facility: CLINIC | Age: 71
End: 2021-10-14

## 2021-10-14 DIAGNOSIS — M79.7 FIBROMYALGIA: ICD-10-CM

## 2021-10-14 DIAGNOSIS — M19.09 OSTEOARTHRITIS OF OTHER SITE, UNSPECIFIED OSTEOARTHRITIS TYPE: ICD-10-CM

## 2021-10-14 DIAGNOSIS — M81.0 AGE RELATED OSTEOPOROSIS, UNSPECIFIED PATHOLOGICAL FRACTURE PRESENCE: Primary | ICD-10-CM

## 2021-10-14 NOTE — TELEPHONE ENCOUNTER
----- Message from Gerard Rogers MA sent at 10/14/2021  1:05 PM EDT -----  Regarding: FW: Non-Urgent Medical Question  Contact: [phone]    ----- Message -----  From: Myra Casandra Charles  Sent: 10/14/2021   1:01 PM EDT  To: [facility] Clinical Pool  Subject: Non-Urgent Medical Question                      I saw Dr. Dominguez in [place] on Monday the 11th. You should be able to see the results. She gave me the name of a rheumatologist in [place], Dr. JAKE WEINSTEIN. She took me off Forteo and suggested I take the injections. Do you have a rheumatologist you prefer or is this one ok with you?   Also, should I take the CoVid booster shot? I took Pfizer. If so, can you give to me or do I need to go somewhere else? Should I be tested before I take the booster?  Thank you for assisting me with these questions.

## 2021-10-14 NOTE — TELEPHONE ENCOUNTER
I called the patient to discuss her situation, per her Sensumt message.  She states she would like to see the rheumatologist that Dr. Dominguez recommended, Dr. Blanco.  She states her current rheumatologist discontinued her Forteo and recommended she consider taking Prolia.  This patient states she would like to have the referral to rheumatology for her fibromyalgia, osteoporosis, and osteoarthritis.  I advised her to get the COVID-19 booster, which she said she would get at her pharmacy.  Also let her know that she would need to find a new PCP starting January 1, 2021.  She understands and agrees.

## 2021-11-01 ENCOUNTER — HOSPITAL ENCOUNTER (OUTPATIENT)
Dept: CT IMAGING | Facility: HOSPITAL | Age: 71
Discharge: HOME | End: 2021-11-01
Admitting: INTERNAL MEDICINE

## 2021-11-01 DIAGNOSIS — Z90.2 STATUS POST LOBECTOMY OF LUNG: ICD-10-CM

## 2021-11-01 DIAGNOSIS — C34.00 LUNG CANCER, MAIN BRONCHUS, UNSPECIFIED LATERALITY (HCC): ICD-10-CM

## 2021-11-01 PROCEDURE — 71250 CT THORAX DX C-: CPT

## 2021-11-08 ENCOUNTER — TELEMEDICINE (OUTPATIENT)
Dept: CARDIAC SURGERY | Facility: CLINIC | Age: 71
End: 2021-11-08

## 2021-11-08 DIAGNOSIS — G60.9 IDIOPATHIC PERIPHERAL NEUROPATHY: ICD-10-CM

## 2021-11-08 DIAGNOSIS — G89.29 CHRONIC LEFT-SIDED LOW BACK PAIN WITH LEFT-SIDED SCIATICA: ICD-10-CM

## 2021-11-08 DIAGNOSIS — E05.90 HYPERTHYROIDISM: ICD-10-CM

## 2021-11-08 DIAGNOSIS — E66.3 OVERWEIGHT (BMI 25.0-29.9): ICD-10-CM

## 2021-11-08 DIAGNOSIS — M54.42 CHRONIC LEFT-SIDED LOW BACK PAIN WITH LEFT-SIDED SCIATICA: ICD-10-CM

## 2021-11-08 DIAGNOSIS — M79.7 FIBROMYALGIA: ICD-10-CM

## 2021-11-08 DIAGNOSIS — N18.31 STAGE 3A CHRONIC KIDNEY DISEASE (HCC): ICD-10-CM

## 2021-11-08 DIAGNOSIS — E04.2 MULTINODULAR GOITER (NONTOXIC): ICD-10-CM

## 2021-11-08 DIAGNOSIS — C7A.090 MALIGNANT CARCINOID TUMOR OF LUNG (HCC): Primary | Chronic | ICD-10-CM

## 2021-11-08 DIAGNOSIS — I83.813 VARICOSE VEINS OF BOTH LOWER EXTREMITIES WITH PAIN: ICD-10-CM

## 2021-11-08 DIAGNOSIS — I10 ESSENTIAL HYPERTENSION: ICD-10-CM

## 2021-11-08 PROCEDURE — 99204 OFFICE O/P NEW MOD 45 MIN: CPT | Performed by: THORACIC SURGERY (CARDIOTHORACIC VASCULAR SURGERY)

## 2021-11-15 ENCOUNTER — OFFICE VISIT (OUTPATIENT)
Dept: FAMILY MEDICINE CLINIC | Facility: CLINIC | Age: 71
End: 2021-11-15

## 2021-11-15 VITALS
OXYGEN SATURATION: 98 % | RESPIRATION RATE: 16 BRPM | BODY MASS INDEX: 26.07 KG/M2 | DIASTOLIC BLOOD PRESSURE: 80 MMHG | TEMPERATURE: 98.1 F | HEART RATE: 78 BPM | HEIGHT: 69 IN | SYSTOLIC BLOOD PRESSURE: 136 MMHG | WEIGHT: 176 LBS

## 2021-11-15 DIAGNOSIS — Z76.89 ENCOUNTER TO ESTABLISH CARE: Primary | ICD-10-CM

## 2021-11-15 DIAGNOSIS — I10 ESSENTIAL HYPERTENSION: ICD-10-CM

## 2021-11-15 DIAGNOSIS — M79.7 FIBROMYALGIA: ICD-10-CM

## 2021-11-15 DIAGNOSIS — N18.32 STAGE 3B CHRONIC KIDNEY DISEASE (HCC): ICD-10-CM

## 2021-11-15 PROCEDURE — 99213 OFFICE O/P EST LOW 20 MIN: CPT | Performed by: NURSE PRACTITIONER

## 2021-11-15 NOTE — PROGRESS NOTES
Patient ID: Myra Charles is a 70 y.o. female     Patient Care Team:  Head, ASHLEY Da Silva as PCP - General (Nurse Practitioner)  Mayur Neal MD as Consulting Physician (Hematology and Oncology)  Damian Rock DPM as Consulting Physician (Podiatry)  Jeri Newton MA (Inactive) as Medical Assistant  Jefferson West Jr. DO as Referring Physician (Family Medicine)    Subjective     Chief Complaint   Patient presents with   • Establish Care   • Hypertension       History of Present Illness    Myra Charles presents to [facility] today to establish care with our practice. Previous PCP was Dr. West.    She denies any new problems or concerns.  Colonoscopy scheduled for January.  Mammogram is also scheduled.   Rheumatology scheduled for April with Dr. Blanco.. Followed by rheumatology for management of fibromyalgia and osteoporosis. She takes Cymbalta 60 mg daily for management of fibromyalgia pain. This is managed per rheumatology.  Hypertension: Currently managed with amlodipine 5 mg daily along with metoprolol XL 25 mg daily.  She already has appointment scheduled for next month for AWV.  She is followed by Dr. Hernandez for management of mitral valve prolapse which is been stable.  She is also followed by Dr. Souza with hematology due to elevated ferritin levels.  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 Repeat CT scans of chest have been stable. She is followed by [facility] for continued surveillance.    She denies any complaints of fever, chills, cough, chest pain, shortness of air, abdominal pain, nausea, or any other concerns.     The following portions of the patient's history were reviewed and updated as appropriate: allergies, current medications, past family history, past medical history, past social history, past surgical history and problem list.       Review of Systems    Constitutional: Negative.   HENT: Negative.    Eyes: Negative.    Cardiovascular: Negative.    Respiratory: Negative.    Endocrine: Negative.    Hematologic/Lymphatic: Negative.    Skin: Negative.    Musculoskeletal: Negative.    Gastrointestinal: Negative.    Genitourinary: Negative.    Neurological: Negative.    Psychiatric/Behavioral: Negative.        Vitals:    11/15/21 1315   BP: 136/80   Pulse: 78   Resp: 16   Temp: 98.1 °F (36.7 °C)   SpO2: 98%       Documented weights    11/15/21 1315   Weight: 79.8 kg (176 lb)     Body mass index is 25.99 kg/m².    Results for orders placed or performed in visit on 11/15/21   Comprehensive Metabolic Panel    Specimen: Blood   Result Value Ref Range    Glucose 77 65 - 99 mg/dL    BUN 32 (H) 8 - 27 mg/dL    Creatinine 1.46 (H) 0.57 - 1.00 mg/dL    eGFR Non African Am 36 (L) >59 mL/min/1.73    eGFR  Am 42 (L) >59 mL/min/1.73    BUN/Creatinine Ratio 22 12 - 28    Sodium 137 134 - 144 mmol/L    Potassium 4.5 3.5 - 5.2 mmol/L    Chloride 101 96 - 106 mmol/L    Total CO2 23 20 - 29 mmol/L    Calcium 8.9 8.7 - 10.3 mg/dL    Total Protein 6.5 6.0 - 8.5 g/dL    Albumin 4.4 3.8 - 4.8 g/dL    Globulin 2.1 1.5 - 4.5 g/dL    A/G Ratio 2.1 1.2 - 2.2    Total Bilirubin 0.3 0.0 - 1.2 mg/dL    Alkaline Phosphatase 160 (H) 44 - 121 IU/L    AST (SGOT) 16 0 - 40 IU/L    ALT (SGPT) 16 0 - 32 IU/L           Objective     Physical Exam  Constitutional:       Appearance: She is well-developed.   HENT:      Head: Normocephalic and atraumatic.      Right Ear: Tympanic membrane normal.      Left Ear: Tympanic membrane normal.   Eyes:      Pupils: Pupils are equal, round, and reactive to light.   Cardiovascular:      Rate and Rhythm: Normal rate and regular rhythm.      Heart sounds: Normal heart sounds. No murmur heard.      Pulmonary:      Effort: Pulmonary effort is normal.      Breath sounds: Normal breath sounds. No wheezing, rhonchi or rales.   Abdominal:      General: Bowel sounds are  normal.      Palpations: Abdomen is soft.      Tenderness: There is no abdominal tenderness.   Musculoskeletal:         General: No tenderness. Normal range of motion.      Cervical back: Normal range of motion and neck supple.   Skin:     General: Skin is warm and dry.      Findings: No erythema or rash.   Neurological:      Mental Status: She is alert and oriented to person, place, and time.   Psychiatric:         Behavior: Behavior normal.            Assessment/Plan     Assessment/Plan     Diagnoses and all orders for this visit:    1. Encounter to establish care (Primary)    2. Stage 3b chronic kidney disease (HCC)  -     Comprehensive Metabolic Panel    3. Essential hypertension    4. Fibromyalgia          Summary:  Myra Charles presents office today to establish care with our practice. Blood pressure currently stable at 136/80. We will continue amlodipine 5 mg daily as directed. Along with metoprolol XL 25 mg daily. Reviewed most recent labs completed on August 19, 2021 all labs appear stable except for CMP shows decreased GFR. She states she suffers from chronic kidney disease. She has had this further evaluated and further testing was stable. Denies any problems. Her sodium and potassium are normal. We will recheck CMP in office today and notify her of results. Currently no changes in medications at this time. Follow-up with specialist as scheduled. I will see her back in office next month for her AWV.    In the meantime, instructed to contact us sooner for any problems or concerns.    Follow Up:  Return if symptoms worsen or fail to improve, for Next scheduled follow up, Medicare Wellness.    Patient was given instructions and counseling regarding condition or for health maintenance advice.  Please see specific information pulled into the AVS if appropriate.      Patient was wearing facemask when I entered the room and throughout our encounter. Protective equipment was worn throughout this patient  encounter including a face mask, eye protection,  and gloves. Hand hygiene was performed before donning protective equipment and after removal when leaving the room.     Yvette Muñiz, APRN  Family Medicine  [facility]  11/16/21  18:13 EST

## 2021-11-16 LAB
ALBUMIN SERPL-MCNC: 4.4 G/DL (ref 3.8–4.8)
ALBUMIN/GLOB SERPL: 2.1 {RATIO} (ref 1.2–2.2)
ALP SERPL-CCNC: 160 IU/L (ref 44–121)
ALT SERPL-CCNC: 16 IU/L (ref 0–32)
AST SERPL-CCNC: 16 IU/L (ref 0–40)
BILIRUB SERPL-MCNC: 0.3 MG/DL (ref 0–1.2)
BUN SERPL-MCNC: 32 MG/DL (ref 8–27)
BUN/CREAT SERPL: 22 (ref 12–28)
CALCIUM SERPL-MCNC: 8.9 MG/DL (ref 8.7–10.3)
CHLORIDE SERPL-SCNC: 101 MMOL/L (ref 96–106)
CO2 SERPL-SCNC: 23 MMOL/L (ref 20–29)
CREAT SERPL-MCNC: 1.46 MG/DL (ref 0.57–1)
GLOBULIN SER CALC-MCNC: 2.1 G/DL (ref 1.5–4.5)
GLUCOSE SERPL-MCNC: 77 MG/DL (ref 65–99)
POTASSIUM SERPL-SCNC: 4.5 MMOL/L (ref 3.5–5.2)
PROT SERPL-MCNC: 6.5 G/DL (ref 6–8.5)
SODIUM SERPL-SCNC: 137 MMOL/L (ref 134–144)

## 2021-11-18 ENCOUNTER — HOSPITAL ENCOUNTER (OUTPATIENT)
Dept: MAMMOGRAPHY | Facility: HOSPITAL | Age: 71
Discharge: HOME | End: 2021-11-18
Admitting: FAMILY MEDICINE

## 2021-11-18 DIAGNOSIS — Z12.31 SCREENING MAMMOGRAM, ENCOUNTER FOR: ICD-10-CM

## 2021-11-18 PROCEDURE — 77063 BREAST TOMOSYNTHESIS BI: CPT

## 2021-11-18 PROCEDURE — 77067 SCR MAMMO BI INCL CAD: CPT

## 2021-12-15 DIAGNOSIS — Z01.818 OTHER SPECIFIED PRE-OPERATIVE EXAMINATION: Primary | ICD-10-CM

## 2021-12-16 ENCOUNTER — OFFICE VISIT (OUTPATIENT)
Dept: FAMILY MEDICINE CLINIC | Facility: CLINIC | Age: 71
End: 2021-12-16

## 2021-12-16 VITALS
DIASTOLIC BLOOD PRESSURE: 80 MMHG | BODY MASS INDEX: 25.92 KG/M2 | WEIGHT: 175 LBS | HEIGHT: 69 IN | SYSTOLIC BLOOD PRESSURE: 130 MMHG | RESPIRATION RATE: 16 BRPM | HEART RATE: 82 BPM | TEMPERATURE: 98.2 F | OXYGEN SATURATION: 99 %

## 2021-12-16 DIAGNOSIS — Z00.00 MEDICARE ANNUAL WELLNESS VISIT, SUBSEQUENT: Primary | ICD-10-CM

## 2021-12-16 DIAGNOSIS — K21.9 GASTROESOPHAGEAL REFLUX DISEASE WITHOUT ESOPHAGITIS: ICD-10-CM

## 2021-12-16 DIAGNOSIS — I10 ESSENTIAL HYPERTENSION: ICD-10-CM

## 2021-12-16 PROCEDURE — G0439 PPPS, SUBSEQ VISIT: HCPCS | Performed by: NURSE PRACTITIONER

## 2021-12-16 RX ORDER — FAMOTIDINE 20 MG/1
20 TABLET, FILM COATED ORAL 2 TIMES DAILY
Qty: 180 TABLET | Refills: 3 | Status: SHIPPED | OUTPATIENT
Start: 2021-12-16 | End: 2022-10-14 | Stop reason: SDUPTHER

## 2021-12-16 RX ORDER — METOPROLOL SUCCINATE 25 MG/1
25 TABLET, EXTENDED RELEASE ORAL 2 TIMES DAILY
Qty: 180 TABLET | Refills: 3 | Status: SHIPPED | OUTPATIENT
Start: 2021-12-16 | End: 2022-10-14 | Stop reason: SDUPTHER

## 2021-12-16 NOTE — PROGRESS NOTES
The ABCs of the Annual Wellness Visit  Subsequent Medicare Wellness Visit    Chief Complaint   Patient presents with   • Medicare Wellness-subsequent      Subjective    History of Present Illness:  Myra Charles is a 71 y.o. female who presents for a Subsequent Medicare Wellness Visit.    The following portions of the patient's history were reviewed and   updated as appropriate: allergies, current medications, past family history, past medical history, past social history, past surgical history and problem list.    Compared to one year ago, the patient feels her physical   health is the same.    Compared to one year ago, the patient feels her mental   health is the same except   in April.  Feels she is managing.      Recent Hospitalizations:  NO    Current Medical Providers:  Patient Care Team:  Head, ASHLEY Da Silva as PCP - General (Nurse Practitioner)  Mayur Neal MD as Consulting Physician (Hematology and Oncology)  Damian Rock DPM as Consulting Physician (Podiatry)  Jeri Newton MA (Inactive) as Medical Assistant  Jefferson West Jr., DO as Referring Physician (Family Medicine)    Outpatient Medications Prior to Visit   Medication Sig Dispense Refill   • amLODIPine (NORVASC) 5 MG tablet Take 1 tablet by mouth Daily. 90 tablet 1   • ascorbic acid (VITAMIN C) 1000 MG tablet Take 500 mg by mouth Daily.     • ASPIRIN PO Take 81 mg by mouth Daily. YSP Aspirin oral  (unconfirmed      • Calcium Carbonate-Vitamin D (CALCIUM PLUS VITAMIN D PO) Take  by mouth Daily.     • cholecalciferol (VITAMIN D3) 25 MCG (1000 UT) tablet Take 1 tablet by mouth Daily. 90 tablet 1   • cyclobenzaprine (FLEXERIL) 10 MG tablet Take 1 tablet by mouth At Night As Needed for Muscle Spasms. 90 tablet 1   • DULoxetine (CYMBALTA) 60 MG capsule Take 60 mg by mouth daily.     • fluticasone (FLONASE) 50 MCG/ACT nasal spray 1 spray into the nostril(s) as directed by provider Daily. 16 g 5   • folic acid (FOLVITE) 1 MG  tablet TAKE 1 TABLET BY MOUTH DAILY.  90 tablet 1   • multivitamins-minerals (PRESERVISION AREDS 2) capsule capsule Take 1 capsule by mouth 2 (Two) Times a Day. 90 capsule 1   • vitamin B-12 (CYANOCOBALAMIN) 1000 MCG tablet Take 1,000 mcg by mouth 2 (Two) Times a Week.     • famotidine (PEPCID) 20 MG tablet Take 1 tablet by mouth At Night As Needed for Heartburn. (Patient taking differently: Take 20 mg by mouth 2 (Two) Times a Day.) 90 tablet 1   • metoprolol succinate XL (TOPROL-XL) 25 MG 24 hr tablet Take 1 tablet by mouth Daily. (Patient taking differently: Take 25 mg by mouth 2 (Two) Times a Day.) 90 tablet 1     No facility-administered medications prior to visit.       No opioid medication identified on active medication list. I have reviewed chart for other potential  high risk medication/s and harmful drug interactions in the elderly.          Aspirin is on active medication list. Aspirin use is indicated based on review of current medical condition/s. Pros and cons of this therapy have been discussed today. Benefits of this medication outweigh potential harm.  Patient has been encouraged to continue taking this medication.  .      Patient Active Problem List   Diagnosis   • Nonexudative age-related macular degeneration, bilateral, early dry stage   • Cortical age-related cataract   • Hyperthyroidism   • Palpitations   • Multinodular goiter (nontoxic)   • Osteoporosis   • B12 deficiency   • Malignant neoplasm of upper lobe of left lung (HCC)   • Idiopathic peripheral neuropathy   • Gastroesophageal reflux disease without esophagitis   • Elevated ferritin   • Abnormal blood level of iron   • Fibromyalgia   • History of deep venous thrombosis   • History of partial thyroidectomy   • Hoarseness or changing voice   • Encounter for general adult medical examination without abnormal findings   • Osteopenia   • PAC (premature atrial contraction)   • Restless legs syndrome   • Screen for colon cancer   • Medicare  "annual wellness visit, subsequent   • Vitreous floaters   • Vitreous detachment   • Vitamin D deficiency   • Varicose veins of both lower extremities with pain   • 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   • 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   • Seborrheic keratosis   • Rosacea   • Fibrositis   • Mitral valve prolapse   • Malignant carcinoid tumor of lung (HCC)   • Lung nodule, solitary   • Iron deficiency anemia   • Hallux valgus   • GERD (gastroesophageal reflux disease)   • Essential hypertension   • Cortical senile cataract   • Backache   • Neck pain   • CKD (chronic kidney disease) stage 3, GFR 30-59 ml/min ([facility])   • Overweight (BMI 25.0-29.9)   • Cervical nerve root compression   • Chronic left-sided low back pain with left-sided sciatica   • DDD (degenerative disc disease), cervical   • Dupuytren's contracture of right hand   • Elevated alkaline phosphatase level   • History of acute renal failure   • History of bilateral cataract extraction   • History of fracture of left ankle   • History of iron deficiency   • History of malignant carcinoid tumor of bronchus and lung   • Non-rheumatic mitral regurgitation   • Osteoarthritis   • Bile reflux esophagitis   • Left foot pain     Advance Care Planning  Advance Directive is on file.  ACP discussion was held with the patient during this visit. Patient has an advance directive in EMR which is still valid.           Objective    Vitals:    12/16/21 1036   BP: 130/80   BP Location: Left arm   Patient Position: Sitting   Cuff Size: Adult   Pulse: 82   Resp: 16   Temp: 98.2 °F (36.8 °C)   SpO2: 99%   Weight: 79.4 kg (175 lb)   Height: 175.3 cm (69\")   PainSc: 0-No pain     BMI Readings from Last 1 Encounters:   12/16/21 25.84 kg/m²   BMI is above normal parameters. Recommendations include: educational material    Does the patient have evidence of cognitive impairment? NO    Physical Exam            HEALTH RISK " ASSESSMENT    Smoking Status:  Social History     Tobacco Use   Smoking Status Never Smoker   Smokeless Tobacco Never Used     Alcohol Consumption:  Social History     Substance and Sexual Activity   Alcohol Use Yes    Comment: OCCASIONAL WINE     Fall Risk Screen:    YORDANADI Fall Risk Assessment was completed, and patient is at HIGH risk for falls. Assessment completed on:12/16/2021    Depression Screening:  PHQ-2/PHQ-9 Depression Screening 12/16/2021   Little interest or pleasure in doing things 0   Feeling down, depressed, or hopeless 0   Trouble falling or staying asleep, or sleeping too much 0   Feeling tired or having little energy 0   Poor appetite or overeating 0   Feeling bad about yourself - or that you are a failure or have let yourself or your family down 0   Trouble concentrating on things, such as reading the newspaper or watching television 0   Moving or speaking so slowly that other people could have noticed. Or the opposite - being so fidgety or restless that you have been moving around a lot more than usual 0   Thoughts that you would be better off dead, or of hurting yourself in some way 0   Total Score 0       Health Habits and Functional and Cognitive Screening:  Functional & Cognitive Status 12/16/2021   Do you have difficulty preparing food and eating? No   Do you have difficulty bathing yourself, getting dressed or grooming yourself? No   Do you have difficulty using the toilet? No   Do you have difficulty moving around from place to place? No   Do you have trouble with steps or getting out of a bed or a chair? No   Current Diet Well Balanced Diet   Dental Exam Up to date   Eye Exam Up to date   Exercise (times per week) 0 times per week   Do you need help using the phone?  No   Are you deaf or do you have serious difficulty hearing?  No   Do you need help with transportation? No   Do you need help shopping? No   Do you need help preparing meals?  No   Do you need help with housework?  No   Do  you need help with laundry? No   Do you need help taking your medications? No   Do you need help managing money? No   Do you ever drive or ride in a car without wearing a seat belt? No   Have you felt unusual stress, anger or loneliness in the last month? No   Who do you live with? Alone   If you need help, do you have trouble finding someone available to you? No   Do you have difficulty concentrating, remembering or making decisions? No       Age-appropriate Screening Schedule:  Refer to the list below for future screening recommendations based on patient's age, sex and/or medical conditions. Orders for these recommended tests are listed in the plan section. The patient has been provided with a written plan.    Health Maintenance   Topic Date Due   • DXA SCAN  10/11/2023   • MAMMOGRAM  11/18/2023   • TDAP/TD VACCINES (2 - Td or Tdap) 04/18/2024   • INFLUENZA VACCINE  Completed   • ZOSTER VACCINE  Completed              Assessment/Plan   CMS Preventative Services Quick Reference  Risk Factors Identified During Encounter  Fall Risk-High or Moderate  Immunizations Discussed/Encouraged (specific Immunizations; COVID19  The above risks/problems have been discussed with the patient.  Follow up actions/plans if indicated are seen below in the Assessment/Plan Section.  Pertinent information has been shared with the patient in the After Visit Summary.    Diagnoses and all orders for this visit:    1. Medicare annual wellness visit, subsequent (Primary)    2. Essential hypertension  -     metoprolol succinate XL (TOPROL-XL) 25 MG 24 hr tablet; Take 1 tablet by mouth 2 (Two) Times a Day.  Dispense: 180 tablet; Refill: 3    3. Gastroesophageal reflux disease without esophagitis  -     famotidine (PEPCID) 20 MG tablet; Take 1 tablet by mouth 2 (Two) Times a Day.  Dispense: 180 tablet; Refill: 3      Patient Counseling:  --Nutrition: Stressed importance of moderation in sodium/caffeine intake, saturated fat and cholesterol.   Discussed caloric balance, sufficient intake of fresh fruits, vegetables, fiber,   calcium, iron.  --Discussed the new recommendation against daily use of baby aspirin for primary prevention in low risk patients.  --Exercise: Stressed the importance of regular exercise by incorporating into daily routine.    --Substance Abuse: Discussed cessation/primary prevention of tobacco, alcohol, or other drug use; driving or other dangerous activities under the influence.    --Dental health: Discussed importance of regular tooth brushing, flossing, and dental visits.  -- Suggested having eyes and vision checked if needed or past due.  --Immunizations reviewed.  --Discussed benefits of screening colonoscopy.      Follow Up:   Return in about 6 months (around 6/16/2022) for Recheck on HTN and kidney disease.  Will obtain non-fasting labs same day.  .     An After Visit Summary and PPPS were made available to the patient.    Patient was wearing face mask when I entered the room and throughout our encounter. Protective equipment was worn throughout this patient encounter including a face mask, eye protection, and gloves. Hand hygiene was performed before donning protective equipment and after removal when leaving the room.     Yvette Muñiz, APRN

## 2021-12-31 ENCOUNTER — ANESTHESIA EVENT (OUTPATIENT)
Dept: PERIOP | Facility: HOSPITAL | Age: 71
End: 2021-12-31

## 2022-01-03 ENCOUNTER — LAB (OUTPATIENT)
Dept: LAB | Facility: HOSPITAL | Age: 72
End: 2022-01-03

## 2022-01-03 DIAGNOSIS — Z01.818 OTHER SPECIFIED PRE-OPERATIVE EXAMINATION: ICD-10-CM

## 2022-01-03 LAB — SARS-COV-2 RNA PNL SPEC NAA+PROBE: NOT DETECTED

## 2022-01-03 PROCEDURE — 87635 SARS-COV-2 COVID-19 AMP PRB: CPT | Performed by: INTERNAL MEDICINE

## 2022-01-03 PROCEDURE — C9803 HOPD COVID-19 SPEC COLLECT: HCPCS

## 2022-01-05 ENCOUNTER — HOSPITAL ENCOUNTER (OUTPATIENT)
Facility: HOSPITAL | Age: 72
Setting detail: HOSPITAL OUTPATIENT SURGERY
Discharge: HOME | End: 2022-01-05
Attending: INTERNAL MEDICINE | Admitting: NURSE ANESTHETIST, CERTIFIED REGISTERED

## 2022-01-05 ENCOUNTER — ANESTHESIA (OUTPATIENT)
Dept: PERIOP | Facility: HOSPITAL | Age: 72
End: 2022-01-05

## 2022-01-05 VITALS
RESPIRATION RATE: 14 BRPM | TEMPERATURE: 98.1 F | SYSTOLIC BLOOD PRESSURE: 168 MMHG | BODY MASS INDEX: 25.53 KG/M2 | OXYGEN SATURATION: 99 % | HEIGHT: 69 IN | HEART RATE: 64 BPM | WEIGHT: 172.4 LBS | DIASTOLIC BLOOD PRESSURE: 79 MMHG

## 2022-01-05 DIAGNOSIS — Z12.11 SCREEN FOR COLON CANCER: ICD-10-CM

## 2022-01-05 PROCEDURE — 88305 TISSUE EXAM BY PATHOLOGIST: CPT | Performed by: INTERNAL MEDICINE

## 2022-01-05 PROCEDURE — 45380 COLONOSCOPY AND BIOPSY: CPT | Performed by: INTERNAL MEDICINE

## 2022-01-05 PROCEDURE — 25010000002 PROPOFOL 10 MG/ML EMULSION: Performed by: NURSE ANESTHETIST, CERTIFIED REGISTERED

## 2022-01-05 RX ORDER — ONDANSETRON 2 MG/ML
4 INJECTION INTRAMUSCULAR; INTRAVENOUS ONCE AS NEEDED
Status: DISCONTINUED | OUTPATIENT
Start: 2022-01-05 | End: 2022-01-05 | Stop reason: HOSPADM

## 2022-01-05 RX ORDER — LIDOCAINE HYDROCHLORIDE 10 MG/ML
0.5 INJECTION, SOLUTION EPIDURAL; INFILTRATION; INTRACAUDAL; PERINEURAL ONCE AS NEEDED
Status: DISCONTINUED | OUTPATIENT
Start: 2022-01-05 | End: 2022-01-05 | Stop reason: HOSPADM

## 2022-01-05 RX ORDER — LIDOCAINE HYDROCHLORIDE 20 MG/ML
INJECTION, SOLUTION INFILTRATION; PERINEURAL AS NEEDED
Status: DISCONTINUED | OUTPATIENT
Start: 2022-01-05 | End: 2022-01-05 | Stop reason: SURG

## 2022-01-05 RX ORDER — SODIUM CHLORIDE, SODIUM LACTATE, POTASSIUM CHLORIDE, CALCIUM CHLORIDE 600; 310; 30; 20 MG/100ML; MG/100ML; MG/100ML; MG/100ML
9 INJECTION, SOLUTION INTRAVENOUS CONTINUOUS PRN
Status: DISCONTINUED | OUTPATIENT
Start: 2022-01-05 | End: 2022-01-05 | Stop reason: HOSPADM

## 2022-01-05 RX ORDER — PROPOFOL 10 MG/ML
VIAL (ML) INTRAVENOUS AS NEEDED
Status: DISCONTINUED | OUTPATIENT
Start: 2022-01-05 | End: 2022-01-05 | Stop reason: SURG

## 2022-01-05 RX ORDER — SODIUM CHLORIDE, SODIUM LACTATE, POTASSIUM CHLORIDE, CALCIUM CHLORIDE 600; 310; 30; 20 MG/100ML; MG/100ML; MG/100ML; MG/100ML
100 INJECTION, SOLUTION INTRAVENOUS CONTINUOUS
Status: DISCONTINUED | OUTPATIENT
Start: 2022-01-05 | End: 2022-01-05 | Stop reason: HOSPADM

## 2022-01-05 RX ORDER — SODIUM CHLORIDE 9 MG/ML
40 INJECTION, SOLUTION INTRAVENOUS AS NEEDED
Status: DISCONTINUED | OUTPATIENT
Start: 2022-01-05 | End: 2022-01-05 | Stop reason: HOSPADM

## 2022-01-05 RX ORDER — SODIUM CHLORIDE 0.9 % (FLUSH) 0.9 %
10 SYRINGE (ML) INJECTION AS NEEDED
Status: DISCONTINUED | OUTPATIENT
Start: 2022-01-05 | End: 2022-01-05 | Stop reason: HOSPADM

## 2022-01-05 RX ORDER — SODIUM CHLORIDE 0.9 % (FLUSH) 0.9 %
10 SYRINGE (ML) INJECTION EVERY 12 HOURS SCHEDULED
Status: DISCONTINUED | OUTPATIENT
Start: 2022-01-05 | End: 2022-01-05 | Stop reason: HOSPADM

## 2022-01-05 RX ADMIN — PROPOFOL 50 MG: 10 INJECTION, EMULSION INTRAVENOUS at 14:49

## 2022-01-05 RX ADMIN — LIDOCAINE HYDROCHLORIDE 100 MG: 20 INJECTION, SOLUTION INFILTRATION; PERINEURAL at 14:47

## 2022-01-05 RX ADMIN — PROPOFOL 40 MG: 10 INJECTION, EMULSION INTRAVENOUS at 15:04

## 2022-01-05 RX ADMIN — PROPOFOL 40 MG: 10 INJECTION, EMULSION INTRAVENOUS at 14:58

## 2022-01-05 RX ADMIN — PROPOFOL 40 MG: 10 INJECTION, EMULSION INTRAVENOUS at 15:01

## 2022-01-05 RX ADMIN — PROPOFOL 40 MG: 10 INJECTION, EMULSION INTRAVENOUS at 14:52

## 2022-01-05 RX ADMIN — SODIUM CHLORIDE, POTASSIUM CHLORIDE, SODIUM LACTATE AND CALCIUM CHLORIDE 9 ML/HR: 600; 310; 30; 20 INJECTION, SOLUTION INTRAVENOUS at 14:00

## 2022-01-05 RX ADMIN — PROPOFOL 50 MG: 10 INJECTION, EMULSION INTRAVENOUS at 14:47

## 2022-01-05 RX ADMIN — PROPOFOL 40 MG: 10 INJECTION, EMULSION INTRAVENOUS at 15:08

## 2022-01-05 RX ADMIN — PROPOFOL 40 MG: 10 INJECTION, EMULSION INTRAVENOUS at 14:55

## 2022-01-05 NOTE — ANESTHESIA POSTPROCEDURE EVALUATION
Patient: Myra Charles    Procedure Summary     Date: 01/05/22 Room / Location: formerly [facility] ENDOSCOPY 1 /  [facility]    Anesthesia Start: 1443 Anesthesia Stop: 1517    Procedure: COLONOSCOPY WITH POLYPECTOMY (N/A ) Diagnosis:       Screen for colon cancer      Colon polyp      (Screen for colon cancer [Z12.11])    Surgeons: Augustine Menjivar MD Provider: Jeri Page CRNA    Anesthesia Type: MAC ASA Status: 2          Anesthesia Type: MAC    Vitals  Vitals Value Taken Time   /72 01/05/22 1521   Temp 98.1 °F (36.7 °C) 01/05/22 1521   Pulse 70 01/05/22 1521   Resp 14 01/05/22 1521   SpO2 100 % 01/05/22 1521           Post Anesthesia Care and Evaluation    Patient location during evaluation: PHASE II  Patient participation: complete - patient participated  Level of consciousness: awake  Pain score: 0  Pain management: adequate  Airway patency: patent  Anesthetic complications: No anesthetic complications  PONV Status: none  Cardiovascular status: acceptable  Respiratory status: acceptable  Hydration status: acceptable

## 2022-01-05 NOTE — H&P
Patient Care Team:  Head, ASHLEY Da Silva as PCP - General (Nurse Practitioner)  Mayur Neal MD as Consulting Physician (Hematology and Oncology)  Damian Rock DPM as Consulting Physician (Podiatry)  Jeri eNwton MA (Inactive) as Medical Assistant  Jefferson West Jr. DO as Referring Physician (Family Medicine)    CHIEF COMPLAINT: Screening CRC    HISTORY OF PRESENT ILLNESS:  Last exam was 2011    Past Medical History:   Diagnosis Date   • Arthritis    • Backache 02/17/2016    Low back pain, in sacral region      • Benign hypertension 02/17/2016   • CKD (chronic kidney disease) stage 3, GFR 30-59 ml/min (HCC)    • Cortical senile cataract 02/17/2016   • Deep vein thrombosis (HCC)    • Essential (primary) hypertension 04/20/2016   • GERD (gastroesophageal reflux disease) 02/17/2016   • Hallux valgus     Deformity, w/inflammation      • History of coronary angiogram     CT cor angio 2019, normal cors, Agatston 0   • Iron deficiency anemia    • Lung nodule, solitary 03/08/2012    Solitary nodule of lung - RIGHT upper lobe 15mm, PET positive      • Malignant carcinoid tumor of lung (HCC) 02/17/2016    Atypical carcinoid tumor of the LEFT lung treated in March 2012     • Mitral valve prolapse 02/17/2016   • Osteoporosis 04/20/2016   • Primary fibromyalgia syndrome 02/17/2016   • Rosacea 02/17/2016   • Seborrheic keratosis     History of, upper and lower back      • 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 04/20/2016   • 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 02/17/2016    Paralysis of vocal cords and larynx, unilateral - LEFT side      • Varicose vein    • Vitamin D deficiency 04/20/2016   • Vitreous detachment 12/01/2014   • Vitreous floaters 12/01/2014     Past Surgical History:   Procedure Laterality Date   • ARTERIAL BYPASS SURGERY  09/25/2012    Artery bypass graft (Left femoral to posterior tibial artery bypass. Enodscopic vein harvest on the left. Left  lower extremity arteriogram.)   • CERVICAL CONIZATION      CONIZATION OF CERVIX 43319 (Excisional laser cone biopsy and vaporization of cervix.)   • CHOLECYSTECTOMY  09/07/2005    Cholecystectomy, laparoscopic (With intraoperative cholangiogram.)   • CYST REMOVAL      left 3rd toe   • EXCISION LESION  11/11/1998    REMOVE LESION BACK OR FLANK 99680 (Excision times two.)   • FOOT SURGERY  01/20/2009    Foot/toes surgery procedure (Soft tissue mass, left foot. Excision of)   • HERNIA REPAIR      Past history of umbilical herni repair.   • HYSTERECTOMY     • OTHER SURGICAL HISTORY  12/04/2012    Anesth, elbow area surgery (Manipulation of left elbow.)   • OTHER SURGICAL HISTORY  03/05/2012    Anesth, elbow area surgery (Excision of plate and screws from olecranon bursa. Retained plate and screws, olecranon bursitis of previous fracture left elbow.)   • OTHER SURGICAL HISTORY  10/16/2012    Treat elbow fracture (Open reduction and internal fixation.)   • THORACOSCOPY  03/14/2012    Thoracoscopy, surgical (Bronchoscopy,LVATS (wedge resect MARYBETH), LULobectomy Thoracic lymphadenectomy)   • THROAT SURGERY  07/25/2012    Throat surgery procedure (Thyroplasty type I (vocal cord medialozation & larynooplasty) Left vocal cord paralysis. Dysphoria. [facility] by Dr Tk [facility])   • THYROID SURGERY  03/22/2016    Thyroid surgery (Right thyroid lobectomy and isthmusectomy.)   • TONSILLECTOMY  02/03/1956    Chronic tonsillitis   • VEIN LIGATION  09/28/2000    PHLEB VEINS - EXTREM (20+) 16136 (High ligation of ling saphenous vein, left, plus multiple phlebectomies, left lower extremity.)     Family History   Problem Relation Age of Onset   • Cancer Mother         myeloma   • Diabetes Mother    • Heart disease Mother    • Arthritis Mother    • Hypertension Mother    • Heart disease Father    • Arthritis Father    • Heart attack Father    • Stroke Father    • Hypertension Father    • Lung cancer Maternal Grandfather    • Lung  cancer Other    • Diabetes Maternal Grandmother    • Breast cancer Neg Hx      Social History     Tobacco Use   • Smoking status: Never Smoker   • Smokeless tobacco: Never Used   Vaping Use   • Vaping Use: Never used   Substance Use Topics   • Alcohol use: Yes     Comment: OCCASIONAL WINE   • Drug use: No     Medications Prior to Admission   Medication Sig Dispense Refill Last Dose   • amLODIPine (NORVASC) 5 MG tablet Take 1 tablet by mouth Daily. 90 tablet 1    • ascorbic acid (VITAMIN C) 1000 MG tablet Take 500 mg by mouth Daily.      • ASPIRIN PO Take 81 mg by mouth Daily. YSP Aspirin oral  (unconfirmed       • Calcium Carbonate-Vitamin D (CALCIUM PLUS VITAMIN D PO) Take  by mouth Daily.      • cholecalciferol (VITAMIN D3) 25 MCG (1000 UT) tablet Take 1 tablet by mouth Daily. 90 tablet 1    • cyclobenzaprine (FLEXERIL) 10 MG tablet Take 1 tablet by mouth At Night As Needed for Muscle Spasms. 90 tablet 1    • DULoxetine (CYMBALTA) 60 MG capsule Take 60 mg by mouth daily.      • famotidine (PEPCID) 20 MG tablet Take 1 tablet by mouth 2 (Two) Times a Day. 180 tablet 3    • fluticasone (FLONASE) 50 MCG/ACT nasal spray 1 spray into the nostril(s) as directed by provider Daily. 16 g 5    • folic acid (FOLVITE) 1 MG tablet TAKE 1 TABLET BY MOUTH DAILY.  90 tablet 1    • metoprolol succinate XL (TOPROL-XL) 25 MG 24 hr tablet Take 1 tablet by mouth 2 (Two) Times a Day. 180 tablet 3    • multivitamins-minerals (PRESERVISION AREDS 2) capsule capsule Take 1 capsule by mouth 2 (Two) Times a Day. 90 capsule 1    • vitamin B-12 (CYANOCOBALAMIN) 1000 MCG tablet Take 1,000 mcg by mouth 2 (Two) Times a Week.        Allergies:  Raloxifene and Pregabalin    REVIEW OF SYSTEMS:  Please see the above history of present illness for pertinent positives and negatives.  The remainder of the patient's systems have been reviewed and are negative.     Vital Signs            Physical Exam:  Physical Exam   Constitutional: Patient appears  well-developed and well-nourished and in no acute distress   HEENT:   Head: Normocephalic and atraumatic.   Eyes:  Pupils are equal, round, and reactive to light. EOM are intact. Sclerae are anicteric and non-injected.  Mouth and Throat: Patient has moist mucous membranes. Oropharynx is clear of any erythema or exudate.     Neck: Neck supple. No JVD present. No thyromegaly present. No lymphadenopathy present.  Cardiovascular: Regular rate, regular rhythm, S1 normal and S2 normal.  Exam reveals no gallop and no friction rub.  No murmur heard.  Pulmonary/Chest: Lungs are clear to auscultation bilaterally. No respiratory distress. No wheezes. No rhonchi. No rales.   Abdominal: Soft. Bowel sounds are normal. No distension and no mass. There is no hepatosplenomegaly. There is no tenderness.   Musculoskeletal: Normal Muscle tone  Extremities: No edema. Pulses are palpable in all 4 extremities.  Neurological: Patient is alert and oriented to person, place, and time. Cranial nerves II-XII are grossly intact with no focal deficits.  Skin: Skin is warm. No rash noted. Nails show no clubbing.  No cyanosis or erythema.    Debilities/Disabilities Identified: None  Emotional Behavior: Appropriate     Results Review:   I reviewed the patient's new clinical results.    Lab Results (most recent)     None          Imaging Results (Most Recent)     None        reviewed    ECG/EMG Results (most recent)     None        reviewed    Assessment/Plan   Screening CRC/  colonoscopy      I discussed the patient's findings and my recommendations with patient.     Augustine Menjivar MD  01/05/22  13:41 EST    Time: 10 min prior to procedure.

## 2022-01-05 NOTE — ANESTHESIA PREPROCEDURE EVALUATION
Anesthesia Evaluation     Patient summary reviewed and Nursing notes reviewed   no history of anesthetic complications:  NPO Solid Status: > 8 hours  NPO Liquid Status: > 2 hours           Airway   Mallampati: II  TM distance: >3 FB  Neck ROM: full  No difficulty expected  Dental - normal exam     Pulmonary - normal exam   (+) lung cancer ( 2012 left lobectomy), COPD mild,   Cardiovascular   Exercise tolerance: good (4-7 METS)    ECG reviewed  Rhythm: regular  Rate: normal    (+) hypertension well controlled less than 2 medications, valvular problems/murmurs MVP, DVT resolved,       Neuro/Psych  (+) numbness,     (-) psychiatric history  GI/Hepatic/Renal/Endo    (+)  GERD well controlled,  renal disease CRI,     Musculoskeletal     (+) myalgias, neck pain,   Abdominal    Substance History      OB/GYN negative ob/gyn ROS         Other   arthritis,    history of cancer remission                    Anesthesia Plan    ASA 2     MAC     intravenous induction     Anesthetic plan, all risks, benefits, and alternatives have been provided, discussed and informed consent has been obtained with: patient.  Use of blood products discussed with patient  Consented to blood products.

## 2022-01-07 LAB
LAB AP CASE REPORT: NORMAL
PATH REPORT.FINAL DX SPEC: NORMAL
PATH REPORT.GROSS SPEC: NORMAL

## 2022-02-22 DIAGNOSIS — I10 ESSENTIAL HYPERTENSION: ICD-10-CM

## 2022-02-23 RX ORDER — AMLODIPINE BESYLATE 5 MG/1
TABLET ORAL
Qty: 90 TABLET | Refills: 1 | Status: SHIPPED | OUTPATIENT
Start: 2022-02-23 | End: 2022-06-16 | Stop reason: SDUPTHER

## 2022-06-16 ENCOUNTER — OFFICE VISIT (OUTPATIENT)
Dept: FAMILY MEDICINE CLINIC | Facility: CLINIC | Age: 72
End: 2022-06-16

## 2022-06-16 VITALS
BODY MASS INDEX: 26.36 KG/M2 | TEMPERATURE: 98 F | HEIGHT: 69 IN | HEART RATE: 82 BPM | SYSTOLIC BLOOD PRESSURE: 110 MMHG | DIASTOLIC BLOOD PRESSURE: 60 MMHG | OXYGEN SATURATION: 100 % | WEIGHT: 178 LBS | RESPIRATION RATE: 16 BRPM

## 2022-06-16 DIAGNOSIS — E55.9 VITAMIN D DEFICIENCY: ICD-10-CM

## 2022-06-16 DIAGNOSIS — I10 ESSENTIAL HYPERTENSION: Primary | ICD-10-CM

## 2022-06-16 DIAGNOSIS — M79.7 FIBROMYALGIA: ICD-10-CM

## 2022-06-16 DIAGNOSIS — N18.32 STAGE 3B CHRONIC KIDNEY DISEASE: ICD-10-CM

## 2022-06-16 PROCEDURE — 99213 OFFICE O/P EST LOW 20 MIN: CPT | Performed by: NURSE PRACTITIONER

## 2022-06-16 RX ORDER — AMLODIPINE BESYLATE 5 MG/1
5 TABLET ORAL DAILY
Qty: 90 TABLET | Refills: 3 | Status: SHIPPED | OUTPATIENT
Start: 2022-06-16

## 2022-06-16 RX ORDER — MULTIVIT WITH MINERALS/LUTEIN
3000 TABLET ORAL DAILY
COMMUNITY
End: 2022-06-16

## 2022-06-16 NOTE — PROGRESS NOTES
Answers for HPI/ROS submitted by the patient on 6/9/2022  Please describe your symptoms.: Blood Pressure, Blood work, Follow UP  Have you had these symptoms before?: Yes  How long have you been having these symptoms?: Greater than 2 weeks  What is the primary reason for your visit?: Other    Patient ID: Myra Charles is a 71 y.o. female     Patient Care Team:  Head, ASHLEY Da Silva as PCP - General (Nurse Practitioner)  Mayur Neal MD as Consulting Physician (Hematology and Oncology)  Damian Rock DPM as Consulting Physician (Podiatry)  Jeri Newton MA (Inactive) as Medical Assistant  Jefferson West Jr. DO as Referring Physician (Family Medicine)    Subjective     Chief Complaint   Patient presents with   • Hypertension       History of Present Illness    Myra Charles presents to [facility] today for continued management concerning hypertension.  This is currently managed with metoprolol XL 25 mg twice a day along with amlodipine 5 mg daily.  She is also followed by cardiology.  She is followed by rheumatology for management of osteoarthritis and fibromyalgia.  She is followed by oncology, Dr. 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  She also has been followed for elevated ferritin level.  Upcoming appointments scheduled with both.    Denies any new problems or concerns.    Health Habits:  Pap:  N/A s/p hysterectomy  Mammogram: 11/18/2021  Dexa: 10/11/2021  osteopenia  Colonoscopy: 1/5/2022      She denies any complaints of fever, chills, cough, chest pain, shortness of air, abdominal pain, nausea, or any other concerns.     The following portions of the patient's history were reviewed and updated as appropriate: allergies, current medications, past family history, past medical history, past social history, past surgical history and problem list.       ROS    Vitals:    06/16/22 1337   BP: 110/60    Pulse: 82   Resp: 16   Temp: 98 °F (36.7 °C)   SpO2: 100%       Documented weights    06/16/22 1337   Weight: 80.7 kg (178 lb)     Body mass index is 26.29 kg/m².    Results for orders placed or performed during the hospital encounter of 01/15/22   COVID-19,[facility] ROUTINE, NP/OP SWAB IN TRANSPORT MEDIA OR ESWAB 72 HR TAT - Swab, Nasopharynx    Specimen: Nasopharynx; Swab   Result Value Ref Range    SARS-CoV-2, CAROLYN Not Detected Not Detected           Objective     Physical Exam  Constitutional:       Appearance: She is well-developed.   HENT:      Head: Normocephalic and atraumatic.      Right Ear: Tympanic membrane normal.      Left Ear: Tympanic membrane normal.   Eyes:      Pupils: Pupils are equal, round, and reactive to light.   Cardiovascular:      Rate and Rhythm: Normal rate and regular rhythm.      Heart sounds: Normal heart sounds. No murmur heard.  Pulmonary:      Effort: Pulmonary effort is normal.      Breath sounds: Normal breath sounds. No wheezing, rhonchi or rales.   Abdominal:      Palpations: Abdomen is soft.   Musculoskeletal:         General: No tenderness. Normal range of motion.      Cervical back: Normal range of motion and neck supple.      Right lower leg: No edema.      Left lower leg: No edema.   Skin:     General: Skin is warm and dry.      Findings: No erythema or rash.   Neurological:      Mental Status: She is alert and oriented to person, place, and time.   Psychiatric:         Behavior: Behavior normal.            Assessment & Plan     Assessment/Plan     Diagnoses and all orders for this visit:    1. Essential hypertension (Primary)  -     amLODIPine (NORVASC) 5 MG tablet; Take 1 tablet by mouth Daily.  Dispense: 90 tablet; Refill: 3  -     Comprehensive Metabolic Panel; Future  -     TSH Rfx On Abnormal To Free T4; Future  -     Lipid Panel With / Chol / HDL Ratio; Future  -     UA / M With / Rflx Culture([facility] ONLY) - Urine, Clean Catch; Future    2. Stage 3b chronic kidney  disease (HCC)  -     Comprehensive Metabolic Panel; Future    3. Fibromyalgia    4. Vitamin D deficiency  -     Vitamin D 25 Hydroxy; Future          Summary:  Myra Charles has been doing well since she was last seen.  Denies any new problems or concerns.  She brought CMP results from her rheumatology office.  Her BUN has improved currently at 21.  Creatinine stayed about the same at 1.53.  GFR of 36.  Sodium and potassium normal.  Blood pressure currently stable at 110/60.  No changes in medications at this time.  Follow-up with specialist as scheduled.    In the meantime, instructed to contact us sooner for any problems or concerns.    Follow Up:  Return in about 6 months (around 12/16/2022) for Recheck, Medicare Wellness with fasting labs the week before. .    Patient was given instructions and counseling regarding condition or for health maintenance advice.  Please see specific information pulled into the AVS if appropriate.      Patient was wearing facemask when I entered the room and throughout our encounter. Protective equipment was worn throughout this patient encounter including a face mask, eye protection,  and gloves. Hand hygiene was performed before donning protective equipment and after removal when leaving the room.     Yvette Muñiz, APRN  Family Medicine  [facility]  06/16/22  14:08 EDT

## 2022-06-20 DIAGNOSIS — L70.8 OTHER ACNE: ICD-10-CM

## 2022-06-20 DIAGNOSIS — Z00.00 ENCOUNTER FOR WELL ADULT EXAM WITHOUT ABNORMAL FINDINGS: ICD-10-CM

## 2022-06-20 RX ORDER — FOLIC ACID 1 MG/1
1000 TABLET ORAL DAILY
Qty: 90 TABLET | Refills: 1 | Status: SHIPPED | OUTPATIENT
Start: 2022-06-20 | End: 2022-06-20 | Stop reason: ALTCHOICE

## 2022-06-20 RX ORDER — FOLIC ACID 1 MG/1
1 TABLET ORAL DAILY
Qty: 90 TABLET | Refills: 3 | Status: SHIPPED | OUTPATIENT
Start: 2022-06-20

## 2022-06-20 RX ORDER — FOLIC ACID 1 MG/1
1000 TABLET ORAL DAILY
Qty: 90 TABLET | Refills: 1 | Status: SHIPPED | OUTPATIENT
Start: 2022-06-20 | End: 2022-06-20 | Stop reason: SDUPTHER

## 2022-06-21 NOTE — PROGRESS NOTES
Subjective     REASON FOR CONSULTATION: Elevated ferritin  Provide an opinion on any further workup or treatment                             REQUESTING PHYSICIAN: Dr. West    RECORDS OBTAINED:  Records of the patients history including those obtained from the referring provider were reviewed and summarized in detail.    HISTORY OF PRESENT ILLNESS:  The patient is a 71 y.o. year old female who is here for an opinion about the above issue.    History of Present Illness   This is a very pleasant 71-year-old lady who has relocated to [place] from [facility] after her  passed away earlier in 2021.  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  She is been undergoing annual then every 2-year CT chest imaging with her lung surgeon in [place] with no evidence of disease recurrence.  She has recently seen pulmonary medicine locally and reports they will schedule a surveillance CT of the chest in the near future.  The patient also has history of an elevated ferritin but a normal iron saturation followed by hematology in [place] and felt to be inflammatory and not genetic hemochromatosis given the normal iron saturation.  She has no known autoimmune disorders, no history of hepatitis/cirrhosis, no recent infections or febrile illnesses.  She does report fibromyalgia.  Patient returned today for follow-up and lab review.  She reports no new or unusual symptoms, no recent hospitalizations, blood donations etc.  Her rheumatologist has noted mild renal insufficiency with a creatinine 1.5 reviewed on outside labs from rheumatology.    Past Medical History:   Diagnosis Date   • Arthritis    • Backache 02/17/2016    Low back pain, in sacral region      • Benign hypertension 02/17/2016   • CKD (chronic kidney disease) stage 3, GFR 30-59 ml/min (HCC)    • Cortical senile cataract 02/17/2016   • Deep vein thrombosis (HCC)    • Essential (primary)  hypertension 04/20/2016   • GERD (gastroesophageal reflux disease) 02/17/2016   • Hallux valgus     Deformity, w/inflammation      • History of coronary angiogram     CT cor angio 2019, normal cors, Agatston 0   • Iron deficiency anemia    • Lung nodule, solitary 03/08/2012    Solitary nodule of lung - RIGHT upper lobe 15mm, PET positive      • Malignant carcinoid tumor of lung (HCC) 02/17/2016    Atypical carcinoid tumor of the LEFT lung treated in March 2012     • Mitral valve prolapse 02/17/2016   • Osteoporosis 04/20/2016   • Primary fibromyalgia syndrome 02/17/2016   • Rosacea 02/17/2016   • Seborrheic keratosis     History of, upper and lower back      • 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 Enodscopic vein harvest on the left. Left lower extremity arteriogram.)   • CERVICAL CONIZATION      CONIZATION OF CERVIX 18882 (Excisional laser cone biopsy and vaporization of cervix.)   • CHOLECYSTECTOMY  09/07/2005    Cholecystectomy, laparoscopic (With intraoperative cholangiogram.)   • COLONOSCOPY W/ POLYPECTOMY N/A 1/5/2022    Procedure: COLONOSCOPY WITH POLYPECTOMY;  Surgeon: Augustine Menjivar MD;  Location: [facility];  Service: Gastroenterology;  Laterality: N/A;  Rectal polyp   • CYST REMOVAL      left 3rd toe   • EXCISION LESION  11/11/1998    REMOVE LESION BACK OR FLANK 75140 (Excision times two.)   • FOOT SURGERY  01/20/2009    Foot/toes surgery procedure (Soft tissue mass, left foot. Excision of)   • HERNIA REPAIR      Past history of  umbilical herni repair.   • HYSTERECTOMY     • OTHER SURGICAL HISTORY  12/04/2012    Anesth, elbow area surgery (Manipulation of left elbow.)   • OTHER SURGICAL HISTORY  03/05/2012    Anesth, elbow area surgery (Excision of plate and screws from olecranon bursa. Retained plate and screws, olecranon bursitis of previous fracture left elbow.)   • OTHER SURGICAL HISTORY  10/16/2012    Treat elbow fracture (Open reduction and internal fixation.)   • THORACOSCOPY  03/14/2012    Thoracoscopy, surgical (Bronchoscopy,LVATS (wedge resect MARYBETH), LULobectomy Thoracic lymphadenectomy)   • THROAT SURGERY  07/25/2012    Throat surgery procedure (Thyroplasty type I (vocal cord medialozation & larynooplasty) Left vocal cord paralysis. Dysphoria. [facility] by Dr Tk Heart)   • THYROID SURGERY  03/22/2016    Thyroid surgery (Right thyroid lobectomy and isthmusectomy.)   • TONSILLECTOMY  02/03/1956    Chronic tonsillitis   • VEIN LIGATION  09/28/2000    PHLEB VEINS - EXTREM (20+) 02336 (High ligation of ling saphenous vein, left, plus multiple phlebectomies, left lower extremity.)        Current Outpatient Medications on File Prior to Visit   Medication Sig Dispense Refill   • amLODIPine (NORVASC) 5 MG tablet Take 1 tablet by mouth Daily. 90 tablet 3   • ASPIRIN PO Take 81 mg by mouth Daily. YSP Aspirin oral  (unconfirmed     • cholecalciferol (VITAMIN D3) 25 MCG (1000 UT) tablet Take 1 tablet by mouth Daily. (Patient taking differently: Take 2,000 Units by mouth.) 90 tablet 1   • cyclobenzaprine (FLEXERIL) 10 MG tablet Take 1 tablet by mouth At Night As Needed for Muscle Spasms. 90 tablet 1   • DULoxetine (CYMBALTA) 60 MG capsule Take 60 mg by mouth daily.     • famotidine (PEPCID) 20 MG tablet Take 1 tablet by mouth 2 (Two) Times a Day. 180 tablet 3   • fluticasone (FLONASE) 50 MCG/ACT nasal spray 1 spray into the nostril(s) as directed by provider Daily. 16 g 5   • folic acid (FOLVITE) 1 MG tablet Take 1 tablet by  mouth Daily. 90 tablet 3   • metoprolol succinate XL (TOPROL-XL) 25 MG 24 hr tablet Take 1 tablet by mouth 2 (Two) Times a Day. 180 tablet 3   • multivitamins-minerals (PRESERVISION AREDS 2) capsule capsule Take 1 capsule by mouth 2 (Two) Times a Day. 90 capsule 1   • vitamin B-12 (CYANOCOBALAMIN) 1000 MCG tablet Take 1,000 mcg by mouth 2 (Two) Times a Week.     • ascorbic acid (VITAMIN C) 1000 MG tablet Take 500 mg by mouth Daily.     • Calcium Carbonate-Vitamin D (CALCIUM PLUS VITAMIN D PO) Take 2,000 Int'l Units/L by mouth Daily.       No current facility-administered medications on file prior to visit.        ALLERGIES:    Allergies   Allergen Reactions   • Raloxifene Swelling and Other (See Comments)     Caused pain   • Latex Hives and Itching   • Pregabalin Swelling and Other (See Comments)     swelling        Social History     Socioeconomic History   • Marital status:    Tobacco Use   • Smoking status: Never Smoker   • Smokeless tobacco: Never Used   Vaping Use   • Vaping Use: Never used   Substance and Sexual Activity   • Alcohol use: Yes     Comment: OCCASIONAL WINE   • Drug use: No   • Sexual activity: Defer        Family History   Problem Relation Age of Onset   • Cancer Mother         myeloma   • Diabetes Mother    • Heart disease Mother    • Arthritis Mother    • Hypertension Mother    • Heart disease Father    • Arthritis Father    • Heart attack Father    • Stroke Father    • Hypertension Father    • Lung cancer Maternal Grandfather    • Lung cancer Other    • Diabetes Maternal Grandmother    • Breast cancer Neg Hx         Review of Systems   Constitutional: Positive for fatigue. Negative for activity change, diaphoresis, fever and unexpected weight change.   HENT: Negative for facial swelling and sinus pressure.    Respiratory: Positive for shortness of breath. Negative for cough.    Cardiovascular: Negative.    Gastrointestinal: Negative for blood in stool, nausea and vomiting.        Acid  "reflux   Endocrine: Negative.    Musculoskeletal: Positive for arthralgias.   Skin: Negative.    Allergic/Immunologic: Negative.    Neurological: Negative.    Hematological: Negative for adenopathy. Bruises/bleeds easily.          Objective     Vitals:    06/22/22 1332   BP: 134/78   Pulse: 69   Resp: 14   Temp: 98.4 °F (36.9 °C)   TempSrc: Infrared   SpO2: 98%   Weight: 81.9 kg (180 lb 9.6 oz)   Height: 175.3 cm (69.02\")   PainSc:   2   PainLoc: Foot  Comment: feet and legs     Current Status 6/22/2022   ECOG score 0       Physical Exam    CONSTITUTIONAL: pleasant well-developed adult woman  LYMPH: no cervical or supraclavicular lad  CV: RRR, S1S2, no murmur  RESP: cta bilat, no wheezing, no rales  GI: soft, non-tender, no splenomegaly, +bs  MUSC: no edema, normal gait  NEURO: alert and oriented x3, normal strength  PSYCH: normal mood and affect  Exam is unchanged-6/22/2022    RECENT LABS:  Hematology WBC   Date Value Ref Range Status   06/22/2022 7.38 3.40 - 10.80 10*3/mm3 Final   08/19/2021 6.46 3.40 - 10.80 10*3/mm3 Final   10/07/2019 6.5 4.0 - 11.0 10*3/uL Final     RBC   Date Value Ref Range Status   06/22/2022 4.11 3.77 - 5.28 10*6/mm3 Final   08/19/2021 4.48 3.77 - 5.28 10*6/mm3 Final   10/07/2019 4.42 4.15 - 4.87 10*6/uL Final     Hemoglobin   Date Value Ref Range Status   06/22/2022 11.8 (L) 12.0 - 15.9 g/dL Final   10/07/2019 12.9 12.7 - 14.7 g/dL Final     Hematocrit   Date Value Ref Range Status   06/22/2022 38.0 34.0 - 46.6 % Final   10/07/2019 41.1 37.9 - 43.9 % Final     Platelets   Date Value Ref Range Status   06/22/2022 199 140 - 450 10*3/mm3 Final   10/07/2019 205 150 - 450 10*3/uL Final        Lab Results   Component Value Date    GLUCOSE 77 11/15/2021    BUN 32 (H) 11/15/2021    CREATININE 1.46 (H) 11/15/2021    EGFRIFNONA 36 (L) 11/15/2021    EGFRIFAFRI 42 (L) 11/15/2021    BCR 22 11/15/2021    K 4.5 11/15/2021    CO2 23 11/15/2021    CALCIUM 8.9 11/15/2021    PROTENTOTREF 6.5 11/15/2021    " ALBUMIN 4.4 11/15/2021    LABIL2 2.1 11/15/2021    AST 16 11/15/2021    ALT 16 11/15/2021     Ferritin 282  Iron saturation 21%    Assessment & Plan     *History of atypical carcinoid tumor of the lung 1.8 cm status post left upper lobectomy 3/14/2012 with negative lymph nodes and no evidence of disease recurrence.  Pulmonary medicine is ordering surveillance scans.    *Elevated ferritin: Patient has had an elevated ferritin since at least 2017.  The iron saturation is normal which argues against iron overload.  The elevated ferritin appears to be inflammatory.  The ferritin is improved today to 82 and iron saturation remains stable 21%    *Normocytic anemia  · Hemoglobin 11.8 likely secondary to CKD 3 (recent creatinine 1.5)    Hematology plan/recommendations:  1. Given the anemia and creatinine I will check an immunofixation today to evaluate paraproteinemias  2. Otherwise follow-up 12 months CBC ferritin iron profile B12 and folate

## 2022-06-22 ENCOUNTER — LAB (OUTPATIENT)
Dept: LAB | Facility: HOSPITAL | Age: 72
End: 2022-06-22

## 2022-06-22 ENCOUNTER — OFFICE VISIT (OUTPATIENT)
Dept: ONCOLOGY | Facility: CLINIC | Age: 72
End: 2022-06-22

## 2022-06-22 VITALS
WEIGHT: 180.6 LBS | BODY MASS INDEX: 26.75 KG/M2 | OXYGEN SATURATION: 98 % | HEART RATE: 69 BPM | SYSTOLIC BLOOD PRESSURE: 134 MMHG | HEIGHT: 69 IN | DIASTOLIC BLOOD PRESSURE: 78 MMHG | TEMPERATURE: 98.4 F | RESPIRATION RATE: 14 BRPM

## 2022-06-22 DIAGNOSIS — R79.89 ELEVATED FERRITIN: Chronic | ICD-10-CM

## 2022-06-22 DIAGNOSIS — R79.89 ELEVATED FERRITIN: ICD-10-CM

## 2022-06-22 DIAGNOSIS — C34.12 MALIGNANT NEOPLASM OF UPPER LOBE OF LEFT LUNG: Primary | ICD-10-CM

## 2022-06-22 DIAGNOSIS — D64.9 ANEMIA, UNSPECIFIED TYPE: ICD-10-CM

## 2022-06-22 DIAGNOSIS — E53.8 B12 DEFICIENCY: ICD-10-CM

## 2022-06-22 LAB
BASOPHILS # BLD AUTO: 0.04 10*3/MM3 (ref 0–0.2)
BASOPHILS NFR BLD AUTO: 0.5 % (ref 0–1.5)
DEPRECATED RDW RBC AUTO: 42.8 FL (ref 37–54)
EOSINOPHIL # BLD AUTO: 0.14 10*3/MM3 (ref 0–0.4)
EOSINOPHIL NFR BLD AUTO: 1.9 % (ref 0.3–6.2)
ERYTHROCYTE [DISTWIDTH] IN BLOOD BY AUTOMATED COUNT: 12.4 % (ref 12.3–15.4)
FERRITIN SERPL-MCNC: 282 NG/ML (ref 13–150)
HCT VFR BLD AUTO: 38 % (ref 34–46.6)
HGB BLD-MCNC: 11.8 G/DL (ref 12–15.9)
IMM GRANULOCYTES # BLD AUTO: 0.02 10*3/MM3 (ref 0–0.05)
IMM GRANULOCYTES NFR BLD AUTO: 0.3 % (ref 0–0.5)
IRON 24H UR-MRATE: 51 MCG/DL (ref 37–145)
IRON SATN MFR SERPL: 21 % (ref 20–50)
LYMPHOCYTES # BLD AUTO: 2.28 10*3/MM3 (ref 0.7–3.1)
LYMPHOCYTES NFR BLD AUTO: 30.9 % (ref 19.6–45.3)
MCH RBC QN AUTO: 28.7 PG (ref 26.6–33)
MCHC RBC AUTO-ENTMCNC: 31.1 G/DL (ref 31.5–35.7)
MCV RBC AUTO: 92.5 FL (ref 79–97)
MONOCYTES # BLD AUTO: 0.61 10*3/MM3 (ref 0.1–0.9)
MONOCYTES NFR BLD AUTO: 8.3 % (ref 5–12)
NEUTROPHILS NFR BLD AUTO: 4.29 10*3/MM3 (ref 1.7–7)
NEUTROPHILS NFR BLD AUTO: 58.1 % (ref 42.7–76)
PLATELET # BLD AUTO: 199 10*3/MM3 (ref 140–450)
PMV BLD AUTO: 9.2 FL (ref 6–12)
RBC # BLD AUTO: 4.11 10*6/MM3 (ref 3.77–5.28)
TIBC SERPL-MCNC: 238 MCG/DL (ref 298–536)
UIBC SERPL-MCNC: 187 MCG/DL (ref 112–346)
WBC NRBC COR # BLD: 7.38 10*3/MM3 (ref 3.4–10.8)

## 2022-06-22 PROCEDURE — 83550 IRON BINDING TEST: CPT

## 2022-06-22 PROCEDURE — 82784 ASSAY IGA/IGD/IGG/IGM EACH: CPT

## 2022-06-22 PROCEDURE — 83540 ASSAY OF IRON: CPT

## 2022-06-22 PROCEDURE — 82728 ASSAY OF FERRITIN: CPT

## 2022-06-22 PROCEDURE — 36415 COLL VENOUS BLD VENIPUNCTURE: CPT

## 2022-06-22 PROCEDURE — 85025 COMPLETE CBC W/AUTO DIFF WBC: CPT

## 2022-06-22 PROCEDURE — 99213 OFFICE O/P EST LOW 20 MIN: CPT | Performed by: INTERNAL MEDICINE

## 2022-06-22 PROCEDURE — 86334 IMMUNOFIX E-PHORESIS SERUM: CPT

## 2022-06-23 LAB
IGA SERPL-MCNC: 83 MG/DL (ref 64–422)
IGG SERPL-MCNC: 457 MG/DL (ref 586–1602)
IGM SERPL-MCNC: 465 MG/DL (ref 26–217)
PROT PATTERN SERPL IFE-IMP: ABNORMAL

## 2022-06-24 ENCOUNTER — PATIENT MESSAGE (OUTPATIENT)
Dept: ONCOLOGY | Facility: CLINIC | Age: 72
End: 2022-06-24

## 2022-08-12 ENCOUNTER — LAB (OUTPATIENT)
Dept: LAB | Facility: HOSPITAL | Age: 72
End: 2022-08-12

## 2022-08-12 ENCOUNTER — TRANSCRIBE ORDERS (OUTPATIENT)
Dept: ADMINISTRATIVE | Facility: HOSPITAL | Age: 72
End: 2022-08-12

## 2022-08-12 DIAGNOSIS — R79.89 ELEVATED FERRITIN: Chronic | ICD-10-CM

## 2022-08-12 DIAGNOSIS — D64.9 ANEMIA, UNSPECIFIED TYPE: ICD-10-CM

## 2022-08-12 DIAGNOSIS — E05.31 THYROTOXICOSIS FROM ECTOPIC THYROID NODULE WITH MENTION OF THYROTOXIC CRISIS OR STORM: ICD-10-CM

## 2022-08-12 DIAGNOSIS — E05.31 THYROTOXICOSIS FROM ECTOPIC THYROID NODULE WITH MENTION OF THYROTOXIC CRISIS OR STORM: Primary | ICD-10-CM

## 2022-08-12 DIAGNOSIS — C34.12 MALIGNANT NEOPLASM OF UPPER LOBE OF LEFT LUNG: ICD-10-CM

## 2022-08-12 DIAGNOSIS — E53.8 B12 DEFICIENCY: ICD-10-CM

## 2022-08-12 LAB
T3FREE SERPL-MCNC: 2.62 PG/ML (ref 2–4.4)
TSH SERPL DL<=0.05 MIU/L-ACNC: 1.52 UIU/ML (ref 0.27–4.2)
VIT B12 BLD-MCNC: 852 PG/ML (ref 211–946)

## 2022-08-12 PROCEDURE — 84443 ASSAY THYROID STIM HORMONE: CPT

## 2022-08-12 PROCEDURE — 36415 COLL VENOUS BLD VENIPUNCTURE: CPT

## 2022-08-12 PROCEDURE — 84481 FREE ASSAY (FT-3): CPT

## 2022-08-12 PROCEDURE — 82607 VITAMIN B-12: CPT

## 2022-08-19 ENCOUNTER — TELEMEDICINE (OUTPATIENT)
Dept: FAMILY MEDICINE CLINIC | Facility: CLINIC | Age: 72
End: 2022-08-19

## 2022-08-19 DIAGNOSIS — U07.1 COVID-19: Primary | ICD-10-CM

## 2022-08-19 PROCEDURE — 99213 OFFICE O/P EST LOW 20 MIN: CPT | Performed by: NURSE PRACTITIONER

## 2022-08-19 RX ORDER — ALBUTEROL SULFATE 90 UG/1
2 AEROSOL, METERED RESPIRATORY (INHALATION) EVERY 4 HOURS PRN
Qty: 18 G | Refills: 0 | Status: SHIPPED | OUTPATIENT
Start: 2022-08-19

## 2022-08-19 RX ORDER — HYDROCODONE BITARTRATE AND HOMATROPINE METHYLBROMIDE ORAL SOLUTION 5; 1.5 MG/5ML; MG/5ML
5 LIQUID ORAL EVERY 6 HOURS PRN
Qty: 120 ML | Refills: 0 | Status: SHIPPED | OUTPATIENT
Start: 2022-08-19 | End: 2022-11-01 | Stop reason: ALTCHOICE

## 2022-08-19 NOTE — PROGRESS NOTES
Myra Charles is a 71 y.o. who presents today for an E-visit with complaints of Covid positive.      You have chosen to receive care through a telehealth visit.  Do you consent to use a video/audio connection for your medical care today? Yes    Myra Charles was located at their residence.  ASHLEY Ramirez was located at [facility] office    Participants:  Patient and provider    Start time:  1427   End time:  1440  Time spent caring for the patient was 11 - 20 min.    Chief Complaint   Patient presents with   • Exposure To Known Illness       Upper Respiratory Infection: Patient complains of symptoms of a URI. Symptoms include congestion, cough, fever and sore throat. Onset of symptoms was 3 days ago, gradually worsening since that time. She also c/o achiness, congestion and non productive cough for the past 3 days .  She is drinking moderate amounts of fluids. Evaluation to date: none. Treatment to date: Mucinex.  Ill contacts at home or school or work discussed. Granddaughter tested positive for Covid on Sunday.    Headache, sore throat, and cough.  Tested positive on 8/17/2022.      COVID Ryan-Suc (PFR 12+) (Covid-19 (Pfizer) Gray Cap)6/27/2022      COVID-19 mRNA (PFR) (COVID-19 (PFIZER) PURPLE CAP)10/25/2021, 2/25/2021, 1/26/2021      The following portions of the patient's history were reviewed and updated as appropriate: allergies, current medications, past family history, past medical history, past social history, past surgical history and problem list.        There were no vitals filed for this visit.     Patient reported: O2 sats 92% at lowest.  B/P 137/82.  Temp:  Tmax 100.  Now 97.8.      Gen: Mildly ill appearing, alert, congested, NAD      Assessment & Plan   Diagnoses and all orders for this visit:    1. COVID-19 (Primary)  -     HYDROcodone Bit-Homatrop MBr (HYCODAN) 5-1.5 MG/5ML solution; Take 5 mL by mouth Every 6 (Six) Hours As Needed for Cough.  Dispense: 120 mL; Refill:  0  -     albuterol sulfate  (90 Base) MCG/ACT inhaler; Inhale 2 puffs Every 4 (Four) Hours As Needed for Wheezing or Shortness of Air.  Dispense: 18 g; Refill: 0    GFR 34 when last checked.  Does not want to take anything that will harm kidneys. Hold Paxlovid. Condition has gradually improved since onset and she has had 4 Covid vaccines.  Continue current supportive measures.  If condition worsens, advised to report to ER for further evaluation.  Patient verbalized understanding.       Diagnosis Plan   1. COVID-19  HYDROcodone Bit-Homatrop MBr (HYCODAN) 5-1.5 MG/5ML solution    albuterol sulfate  (90 Base) MCG/ACT inhaler          Tylenol or Advil as needed for pain, fever, muscle aches  Plenty of fluids  Hand washing discussed  Off work or school note given if needed.  Warm tea for throat.  Pros and cons of antibiotic use discussed.  Instructed to notify us if symptoms worsen or do not improve.      Yvette Head, APRN  Family Practice  [facility]

## 2022-09-13 ENCOUNTER — HOSPITAL ENCOUNTER (OUTPATIENT)
Dept: GENERAL RADIOLOGY | Facility: HOSPITAL | Age: 72
Discharge: HOME | End: 2022-09-13
Admitting: NURSE PRACTITIONER

## 2022-09-13 DIAGNOSIS — R06.02 SHORTNESS OF BREATH: ICD-10-CM

## 2022-09-13 DIAGNOSIS — R06.02 SHORTNESS OF BREATH: Primary | ICD-10-CM

## 2022-09-13 PROCEDURE — 71046 X-RAY EXAM CHEST 2 VIEWS: CPT

## 2022-10-14 DIAGNOSIS — K21.9 GASTROESOPHAGEAL REFLUX DISEASE WITHOUT ESOPHAGITIS: ICD-10-CM

## 2022-10-14 DIAGNOSIS — I10 ESSENTIAL HYPERTENSION: ICD-10-CM

## 2022-10-17 RX ORDER — FAMOTIDINE 20 MG/1
20 TABLET, FILM COATED ORAL 2 TIMES DAILY
Qty: 180 TABLET | Refills: 0 | Status: SHIPPED | OUTPATIENT
Start: 2022-10-17 | End: 2023-01-17

## 2022-10-17 RX ORDER — METOPROLOL SUCCINATE 25 MG/1
25 TABLET, EXTENDED RELEASE ORAL 2 TIMES DAILY
Qty: 180 TABLET | Refills: 0 | Status: SHIPPED | OUTPATIENT
Start: 2022-10-17 | End: 2023-01-17

## 2022-10-17 RX ORDER — DULOXETIN HYDROCHLORIDE 60 MG/1
60 CAPSULE, DELAYED RELEASE ORAL DAILY
Qty: 90 CAPSULE | Refills: 0 | Status: SHIPPED | OUTPATIENT
Start: 2022-10-17 | End: 2023-01-09

## 2022-11-01 ENCOUNTER — OFFICE VISIT (OUTPATIENT)
Dept: CARDIOLOGY | Facility: CLINIC | Age: 72
End: 2022-11-01

## 2022-11-01 VITALS
RESPIRATION RATE: 16 BRPM | DIASTOLIC BLOOD PRESSURE: 70 MMHG | SYSTOLIC BLOOD PRESSURE: 116 MMHG | HEIGHT: 69 IN | BODY MASS INDEX: 26.07 KG/M2 | OXYGEN SATURATION: 96 % | HEART RATE: 72 BPM | WEIGHT: 176 LBS

## 2022-11-01 DIAGNOSIS — I49.1 PAC (PREMATURE ATRIAL CONTRACTION): ICD-10-CM

## 2022-11-01 DIAGNOSIS — I34.1 MITRAL VALVE PROLAPSE: Primary | ICD-10-CM

## 2022-11-01 DIAGNOSIS — N18.31 STAGE 3A CHRONIC KIDNEY DISEASE: ICD-10-CM

## 2022-11-01 DIAGNOSIS — I10 ESSENTIAL HYPERTENSION: ICD-10-CM

## 2022-11-01 PROCEDURE — 93000 ELECTROCARDIOGRAM COMPLETE: CPT | Performed by: INTERNAL MEDICINE

## 2022-11-01 PROCEDURE — 99214 OFFICE O/P EST MOD 30 MIN: CPT | Performed by: INTERNAL MEDICINE

## 2022-11-01 RX ORDER — DENOSUMAB 60 MG/ML
INJECTION SUBCUTANEOUS
COMMUNITY
Start: 2022-04-04

## 2022-11-01 RX ORDER — ASPIRIN 81 MG/1
1 TABLET ORAL DAILY
COMMUNITY

## 2022-11-01 RX ORDER — FLUTICASONE FUROATE AND VILANTEROL 100; 25 UG/1; UG/1
1 POWDER RESPIRATORY (INHALATION)
COMMUNITY
End: 2022-12-08

## 2022-11-01 NOTE — PROGRESS NOTES
Date of Office Visit: 22  Encounter Provider: Stephan Hernandez MD  Place of Service: [facility]  Patient Name: Myra Charles  :1950    Chief Complaint   Patient presents with   • Mitral Valve Prolapse   :     HPI:     Ms. Charles is 71 y.o. and presents today to follow up. I have reviewed prior notes and there are no changes except for any new updates described below. I have also reviewed any information entered into the medical record by the patient or by ancillary staff.     She had a CT coronary angiogram in  that was completely normal; her calcium score was zero. She had an echo in 2020 that showed very mild anterior MVP without significant MR; the study was otherwise normal.      She is doing well. She has occasional palpitations which feel like small flutters. She does not have sustained tachycardia. She denies chest pain, lightheadedness, syncope, leg swelling, orthopnea, dyspnea.  She's very active and healthy.     Past Medical History:   Diagnosis Date   • Arthritis    • Backache 2016    Low back pain, in sacral region      • Benign hypertension 2016   • CKD (chronic kidney disease) stage 3, GFR 30-59 ml/min ([facility])    • Cortical senile cataract 2016   • Deep vein thrombosis ([facility])    • Essential (primary) hypertension 2016   • GERD (gastroesophageal reflux disease) 2016   • Hallux valgus     Deformity, w/inflammation      • History of coronary angiogram     CT cor angio , normal cors, Agatston 0   • Iron deficiency anemia    • Lung nodule, solitary 2012    Solitary nodule of lung - RIGHT upper lobe 15mm, PET positive      • Malignant carcinoid tumor of lung (HCC) 2016    Atypical carcinoid tumor of the LEFT lung treated in 2012     • Mitral valve prolapse 2016   • Osteoporosis 2016   • Primary fibromyalgia syndrome 2016   • Rosacea 2016   • Seborrheic keratosis      History of, upper and lower back      • 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 Enodscopic vein harvest on the left. Left lower extremity arteriogram.)   • CERVICAL CONIZATION      CONIZATION OF CERVIX 10571 (Excisional laser cone biopsy and vaporization of cervix.)   • CHOLECYSTECTOMY  09/07/2005    Cholecystectomy, laparoscopic (With intraoperative cholangiogram.)   • COLONOSCOPY W/ POLYPECTOMY N/A 1/5/2022    Procedure: COLONOSCOPY WITH POLYPECTOMY;  Surgeon: Augustine Menjivar MD;  Location: [facility];  Service: Gastroenterology;  Laterality: N/A;  Rectal polyp   • CYST REMOVAL      left 3rd toe   • EXCISION LESION  11/11/1998    REMOVE LESION BACK OR FLANK 38960 (Excision times two.)   • FOOT SURGERY  01/20/2009    Foot/toes surgery procedure (Soft tissue mass, left foot. Excision of)   • HERNIA REPAIR      Past history of umbilical herni repair.   • HYSTERECTOMY     • OTHER SURGICAL HISTORY  12/04/2012    Anesth, elbow area surgery (Manipulation of left elbow.)   • OTHER SURGICAL HISTORY  03/05/2012    Anesth, elbow area surgery (Excision of plate and screws from olecranon bursa. Retained plate and screws, olecranon bursitis of previous fracture left elbow.)   • OTHER SURGICAL HISTORY  10/16/2012    Treat elbow fracture (Open reduction and internal fixation.)   • THORACOSCOPY  03/14/2012    Thoracoscopy, surgical (Bronchoscopy,LVATS (wedge resect MARYBETH), LULobectomy Thoracic lymphadenectomy)   • THROAT SURGERY  07/25/2012    Throat surgery  procedure (Thyroplasty type I (vocal cord medialozation & larynooplasty) Left vocal cord paralysis. Dysphoria. Psychiatric by Dr Tk Heart)   • THYROID SURGERY  03/22/2016    Thyroid surgery (Right thyroid lobectomy and isthmusectomy.)   • TONSILLECTOMY  02/03/1956    Chronic tonsillitis   • VEIN LIGATION  09/28/2000    PHLEB VEINS - EXTREM (20+) 28726 (High ligation of ling saphenous vein, left, plus multiple phlebectomies, left lower extremity.)       Social History     Socioeconomic History   • Marital status:    Tobacco Use   • Smoking status: Never   • Smokeless tobacco: Never   Vaping Use   • Vaping Use: Never used   Substance and Sexual Activity   • Alcohol use: Yes     Comment: OCCASIONAL WINE   • Drug use: No   • Sexual activity: Defer       Family History   Problem Relation Age of Onset   • Cancer Mother         myeloma   • Diabetes Mother    • Heart disease Mother    • Arthritis Mother    • Hypertension Mother    • Heart disease Father    • Arthritis Father    • Heart attack Father    • Stroke Father    • Hypertension Father    • Lung cancer Maternal Grandfather    • Lung cancer Other    • Diabetes Maternal Grandmother    • Breast cancer Neg Hx        Review of Systems   Constitutional: Positive for malaise/fatigue.   Respiratory: Positive for shortness of breath.    Musculoskeletal: Positive for myalgias.   All other systems reviewed and are negative.      Allergies   Allergen Reactions   • Raloxifene Swelling and Other (See Comments)     Caused pain   • Latex Hives and Itching   • Pregabalin Swelling and Other (See Comments)     swelling         Current Outpatient Medications:   •  albuterol sulfate  (90 Base) MCG/ACT inhaler, Inhale 2 puffs Every 4 (Four) Hours As Needed for Wheezing or Shortness of Air., Disp: 18 g, Rfl: 0  •  amLODIPine (NORVASC) 5 MG tablet, Take 1 tablet by mouth Daily., Disp: 90 tablet, Rfl: 3  •  Ascorbic Acid 500 MG capsule, , Disp: , Rfl:   •  aspirin  "81 MG EC tablet, Take 1 tablet by mouth Daily., Disp: , Rfl:   •  Calcium-Vitamin D-Vitamin K 500-1000-40 MG-UNT-MCG chewable tablet, 1qd, Disp: , Rfl:   •  cholecalciferol (VITAMIN D3) 25 MCG (1000 UT) tablet, Take 1 tablet by mouth Daily. (Patient taking differently: Take 2 tablets by mouth.), Disp: 90 tablet, Rfl: 1  •  cyclobenzaprine (FLEXERIL) 10 MG tablet, Take 1 tablet by mouth At Night As Needed for Muscle Spasms., Disp: 90 tablet, Rfl: 1  •  denosumab (Prolia) 60 MG/ML solution prefilled syringe syringe, , Disp: , Rfl:   •  DULoxetine (CYMBALTA) 60 MG capsule, Take 1 capsule by mouth Daily., Disp: 90 capsule, Rfl: 0  •  famotidine (PEPCID) 20 MG tablet, Take 1 tablet by mouth 2 (Two) Times a Day., Disp: 180 tablet, Rfl: 0  •  fluticasone (FLONASE) 50 MCG/ACT nasal spray, 1 spray into the nostril(s) as directed by provider Daily., Disp: 16 g, Rfl: 5  •  Fluticasone Furoate-Vilanterol 100-25 MCG/ACT aerosol powder , Inhale 1 puff Daily., Disp: , Rfl:   •  folic acid (FOLVITE) 1 MG tablet, Take 1 tablet by mouth Daily., Disp: 90 tablet, Rfl: 3  •  metoprolol succinate XL (TOPROL-XL) 25 MG 24 hr tablet, Take 1 tablet by mouth 2 (Two) Times a Day., Disp: 180 tablet, Rfl: 0  •  multivitamins-minerals (PRESERVISION AREDS 2) capsule capsule, Take 1 capsule by mouth 2 (Two) Times a Day., Disp: 90 capsule, Rfl: 1  •  vitamin B-12 (CYANOCOBALAMIN) 1000 MCG tablet, Take 1,000 mcg by mouth 2 (Two) Times a Week., Disp: , Rfl:       Objective:     Vitals:    11/01/22 1047   BP: 116/70   Pulse: 72   Resp: 16   SpO2: 96%   Weight: 79.8 kg (176 lb)   Height: 175.3 cm (69\")     Body mass index is 25.99 kg/m².    Vitals reviewed.   Constitutional:       Appearance: Healthy appearance. Well-developed and not in distress.   Eyes:      Conjunctiva/sclera: Conjunctivae normal.   HENT:      Head: Normocephalic.      Nose: Nose normal.         Comments: Masked  Neck:      Vascular: No JVD. JVD normal.      Lymphadenopathy: No " "cervical adenopathy.   Pulmonary:      Effort: Pulmonary effort is normal.      Breath sounds: Normal breath sounds.   Cardiovascular:      Normal rate. Regular rhythm.      Murmurs: There is no murmur.   Pulses:     Intact distal pulses.   Edema:     Peripheral edema absent.   Abdominal:      Palpations: Abdomen is soft.      Tenderness: There is no abdominal tenderness.   Musculoskeletal: Normal range of motion.      Cervical back: Normal range of motion. Skin:     General: Skin is warm and dry.      Findings: No rash.   Neurological:      General: No focal deficit present.      Mental Status: Alert and oriented to person, place, and time.      Cranial Nerves: No cranial nerve deficit.   Psychiatric:         Behavior: Behavior normal.         Thought Content: Thought content normal.         Judgment: Judgment normal.         ECG 12 Lead    Date/Time: 11/1/2022 11:12 AM  Performed by: Stephan Hernandez MD  Authorized by: Stephan Hernandez MD   Comparison: compared with previous ECG   Similar to previous ECG  Rhythm: sinus rhythm  Conduction: non-specific intraventricular conduction delay  ST Segments: ST segments normal  T Waves: T waves normal  QRS axis: normal  Other: no other findings    Clinical impression: non-specific ECG            Assessment:       Diagnosis Plan   1. Mitral valve prolapse        2. PAC (premature atrial contraction)        3. Essential hypertension        4. Stage 3a chronic kidney disease (HCC)           Plan:       Mrs Charles has mild anterior MVP without significant MR; I reviewed her last echo from 2020. We'll repeat this every three years (next will be in 2023) unless her symptoms or exam change.      She has occasional palpitations which sound like benign ectopics. Her Epic Problem List states \"PACs\" but I can't find any documentation otherwise. However, as long as they remain mild and infrequent, I would not recommend further testing or treatment.     Sincerely,       Stephan Hernandez, " MD

## 2022-11-11 DIAGNOSIS — Z12.31 ENCOUNTER FOR SCREENING MAMMOGRAM FOR MALIGNANT NEOPLASM OF BREAST: Primary | ICD-10-CM

## 2022-12-02 ENCOUNTER — APPOINTMENT (OUTPATIENT)
Dept: MAMMOGRAPHY | Facility: HOSPITAL | Age: 72
End: 2022-12-02

## 2022-12-09 ENCOUNTER — HOSPITAL ENCOUNTER (OUTPATIENT)
Dept: MAMMOGRAPHY | Facility: HOSPITAL | Age: 72
End: 2022-12-09

## 2022-12-28 ENCOUNTER — HOSPITAL ENCOUNTER (OUTPATIENT)
Dept: MAMMOGRAPHY | Facility: HOSPITAL | Age: 72
Discharge: HOME | End: 2022-12-28
Admitting: NURSE PRACTITIONER

## 2022-12-28 DIAGNOSIS — Z12.31 ENCOUNTER FOR SCREENING MAMMOGRAM FOR MALIGNANT NEOPLASM OF BREAST: ICD-10-CM

## 2022-12-28 PROCEDURE — 77063 BREAST TOMOSYNTHESIS BI: CPT

## 2022-12-28 PROCEDURE — 77067 SCR MAMMO BI INCL CAD: CPT

## 2022-12-30 ENCOUNTER — TELEPHONE (OUTPATIENT)
Dept: FAMILY MEDICINE CLINIC | Facility: CLINIC | Age: 72
End: 2022-12-30

## 2022-12-30 NOTE — TELEPHONE ENCOUNTER
LVM informing pt that she can come into office for visit on 1/6/23. Advised that she continue to monitor for symptoms. If symptoms develop advised she call the office.

## 2022-12-30 NOTE — TELEPHONE ENCOUNTER
Caller: Myra Charles    Relationship: Self    Best call back number: [phone]    What is the best time to reach you: ANYTIME     Who are you requesting to speak with (clinical staff, provider,  specific staff member): LIMA MELLO     What was the call regarding: PATIENT STATES SHE HAS BEEN EXPOSED TO COVID BECAUSE HER GRANDDAUGHTER HAS COVID.    PATIENT STATES SHE DOES NOT HAVE COVID AND DOES NOT HAVE ANY SYMPTOMS BUT WOULD LIKE TO KNOW IF IT IS OKAY FOR HER TO STILL COME IN FOR HER 1/6/23 APPOINTMENT.     Do you require a callback: YES

## 2023-01-06 ENCOUNTER — OFFICE VISIT (OUTPATIENT)
Dept: FAMILY MEDICINE CLINIC | Facility: CLINIC | Age: 73
End: 2023-01-06
Payer: COMMERCIAL

## 2023-01-06 VITALS
TEMPERATURE: 97.8 F | OXYGEN SATURATION: 99 % | HEART RATE: 73 BPM | WEIGHT: 178 LBS | DIASTOLIC BLOOD PRESSURE: 84 MMHG | HEIGHT: 69 IN | SYSTOLIC BLOOD PRESSURE: 122 MMHG | BODY MASS INDEX: 26.36 KG/M2

## 2023-01-06 DIAGNOSIS — Z00.00 MEDICARE ANNUAL WELLNESS VISIT, SUBSEQUENT: Primary | ICD-10-CM

## 2023-01-06 DIAGNOSIS — I10 ESSENTIAL HYPERTENSION: ICD-10-CM

## 2023-01-06 DIAGNOSIS — K21.9 GASTROESOPHAGEAL REFLUX DISEASE WITHOUT ESOPHAGITIS: ICD-10-CM

## 2023-01-06 DIAGNOSIS — M79.7 FIBROMYALGIA: ICD-10-CM

## 2023-01-06 DIAGNOSIS — N18.32 STAGE 3B CHRONIC KIDNEY DISEASE: ICD-10-CM

## 2023-01-06 PROCEDURE — 99213 OFFICE O/P EST LOW 20 MIN: CPT | Performed by: NURSE PRACTITIONER

## 2023-01-06 NOTE — PROGRESS NOTES
The ABCs of the Annual Wellness Visit  Subsequent Medicare Wellness Visit    Chief Complaint   Patient presents with   • Medicare Wellness-subsequent      Subjective    History of Present Illness:  Myra Charles is a 72 y.o. female who presents for a Subsequent Medicare Wellness Visit.    Along with continued management concerning hypertension.  This is currently managed with metoprolol XL 25 mg twice a day along with amlodipine 5 mg daily.  She is also followed by cardiology.  She is followed by rheumatology for management of osteoarthritis and fibromyalgia.  She is followed by oncology, Dr. 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  She also has been followed for elevated ferritin level.  Upcoming appointments scheduled with both.      The following portions of the patient's history were reviewed and   updated as appropriate: allergies, current medications, past family history, past medical history, past social history, past surgical history and problem list.    Compared to one year ago, the patient feels her physical   health is the same.    Compared to one year ago, the patient feels her mental   health is better.    Recent Hospitalizations:  She was not admitted to the hospital during the last year.       Current Medical Providers:  Patient Care Team:  Head, ASHLEY Da Silva as PCP - General (Nurse Practitioner)  Mayur Neal MD as Consulting Physician (Hematology and Oncology)  Damian Rock DPM as Consulting Physician (Podiatry)  Jeri Newton MA (Inactive) as Medical Assistant  Jefferson West Jr., DO as Referring Physician (Family Medicine)    Outpatient Medications Prior to Visit   Medication Sig Dispense Refill   • albuterol sulfate  (90 Base) MCG/ACT inhaler Inhale 2 puffs Every 4 (Four) Hours As Needed for Wheezing or Shortness of Air. 18 g 0   • amLODIPine (NORVASC) 5 MG tablet Take 1 tablet by mouth Daily. 90 tablet 3   • Ascorbic  Acid 500 MG capsule      • aspirin 81 MG EC tablet Take 1 tablet by mouth Daily.     • Breo Ellipta 100-25 MCG/ACT aerosol powder       • Calcium-Vitamin D-Vitamin K 500-1000-40 MG-UNT-MCG chewable tablet 1qd     • cholecalciferol (VITAMIN D3) 25 MCG (1000 UT) tablet Take 1 tablet by mouth Daily. (Patient taking differently: Take 2,000 Units by mouth.) 90 tablet 1   • cyclobenzaprine (FLEXERIL) 10 MG tablet Take 1 tablet by mouth At Night As Needed for Muscle Spasms. 90 tablet 1   • denosumab (Prolia) 60 MG/ML solution prefilled syringe syringe      • DULoxetine (CYMBALTA) 60 MG capsule Take 1 capsule by mouth Daily. 90 capsule 0   • famotidine (PEPCID) 20 MG tablet Take 1 tablet by mouth 2 (Two) Times a Day. 180 tablet 0   • fluticasone (FLONASE) 50 MCG/ACT nasal spray 1 spray into the nostril(s) as directed by provider Daily. 16 g 5   • folic acid (FOLVITE) 1 MG tablet Take 1 tablet by mouth Daily. 90 tablet 3   • metoprolol succinate XL (TOPROL-XL) 25 MG 24 hr tablet Take 1 tablet by mouth 2 (Two) Times a Day. 180 tablet 0   • multivitamins-minerals (PRESERVISION AREDS 2) capsule capsule Take 1 capsule by mouth 2 (Two) Times a Day. 90 capsule 1   • vitamin B-12 (CYANOCOBALAMIN) 1000 MCG tablet Take 1,000 mcg by mouth 2 (Two) Times a Week.     • fluticasone (FLOVENT HFA) 110 MCG/ACT inhaler Inhale 1 puff 2 (Two) Times a Day.       No facility-administered medications prior to visit.       No opioid medication identified on active medication list. I have reviewed chart for other potential  high risk medication/s and harmful drug interactions in the elderly.          Aspirin is on active medication list. Aspirin use is indicated based on review of current medical condition/s. Pros and cons of this therapy have been discussed today. Benefits of this medication outweigh potential harm.  Patient has been encouraged to continue taking this medication.  .      Patient Active Problem List   Diagnosis   • Nonexudative  age-related macular degeneration, bilateral, early dry stage   • Cortical age-related cataract   • Hyperthyroidism   • Multinodular goiter (nontoxic)   • Osteoporosis   • B12 deficiency   • Malignant neoplasm of upper lobe of left lung (HCC)   • Idiopathic peripheral neuropathy   • Gastroesophageal reflux disease without esophagitis   • Elevated ferritin   • Abnormal blood level of iron   • Fibromyalgia   • History of deep venous thrombosis   • History of partial thyroidectomy   • Hoarseness or changing voice   • Encounter for general adult medical examination without abnormal findings   • Osteopenia   • PAC (premature atrial contraction)   • Restless legs syndrome   • Screen for colon cancer   • Medicare annual wellness visit, subsequent   • Vitreous floaters   • Vitreous detachment   • Vitamin D deficiency   • Varicose veins of both lower extremities with pain   • 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   • 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   • Seborrheic keratosis   • Rosacea   • Fibrositis   • Mitral valve prolapse   • Malignant carcinoid tumor of lung (HCC)   • Lung nodule, solitary   • Iron deficiency anemia   • Hallux valgus   • GERD (gastroesophageal reflux disease)   • Essential hypertension   • Cortical senile cataract   • Backache   • Neck pain   • CKD (chronic kidney disease) stage 3, GFR 30-59 ml/min ([facility])   • Overweight (BMI 25.0-29.9)   • Cervical nerve root compression   • Chronic left-sided low back pain with left-sided sciatica   • DDD (degenerative disc disease), cervical   • Dupuytren's contracture of right hand   • Elevated alkaline phosphatase level   • History of acute renal failure   • History of bilateral cataract extraction   • History of fracture of left ankle   • History of iron deficiency   • History of malignant carcinoid tumor of bronchus and lung   • Non-rheumatic mitral regurgitation   • Osteoarthritis   • Bile reflux esophagitis    • Left foot pain   • Anemia     Advance Care Planning  Advance Directive is on file.  ACP discussion was held with the patient during this visit. Patient has an advance directive in EMR which is still valid.           Objective    Vitals:    01/06/23 1031   BP: 122/84   Pulse: 73   Temp: 97.8 °F (36.6 °C)   SpO2: 99%   Weight: 80.7 kg (178 lb)   Height: 176.5 cm (69.49\")     Estimated body mass index is 25.92 kg/m² as calculated from the following:    Height as of this encounter: 176.5 cm (69.49\").    Weight as of this encounter: 80.7 kg (178 lb).    BMI is >= 25 and <30. (Overweight) The following options were offered after discussion;: exercise counseling/recommendations and nutrition counseling/recommendations      Does the patient have evidence of cognitive impairment? No    Physical Exam  Constitutional:       Appearance: She is well-developed.   HENT:      Head: Normocephalic and atraumatic.      Right Ear: Tympanic membrane normal.      Left Ear: Tympanic membrane normal.   Eyes:      Pupils: Pupils are equal, round, and reactive to light.   Cardiovascular:      Rate and Rhythm: Normal rate and regular rhythm.      Heart sounds: Normal heart sounds. No murmur heard.  Pulmonary:      Effort: Pulmonary effort is normal.      Breath sounds: Normal breath sounds. No wheezing, rhonchi or rales.   Abdominal:      General: Bowel sounds are normal.      Palpations: Abdomen is soft.      Tenderness: There is no abdominal tenderness.   Musculoskeletal:         General: No tenderness. Normal range of motion.      Cervical back: Normal range of motion and neck supple.   Skin:     General: Skin is warm and dry.      Findings: No erythema or rash.   Neurological:      Mental Status: She is alert and oriented to person, place, and time.   Psychiatric:         Behavior: Behavior normal.       Lab Results   Component Value Date    CHLPL 185 12/16/2022    TRIG 156 (H) 12/16/2022    HDL 55 12/16/2022     (H) 12/16/2022     VLDL 27 12/16/2022            HEALTH RISK ASSESSMENT    Smoking Status:  Social History     Tobacco Use   Smoking Status Never   Smokeless Tobacco Never     Alcohol Consumption:  Social History     Substance and Sexual Activity   Alcohol Use Yes    Comment: OCCASIONAL WINE     Fall Risk Screen:    GILBERT Fall Risk Assessment was completed, and patient is at LOW risk for falls.Assessment completed on:1/6/2023    Depression Screening:  PHQ-2/PHQ-9 Depression Screening 1/6/2023   Retired PHQ-9 Total Score -   Retired Total Score -   Little Interest or Pleasure in Doing Things 0-->not at all   Feeling Down, Depressed or Hopeless 0-->not at all   PHQ-9: Brief Depression Severity Measure Score 0       Health Habits and Functional and Cognitive Screening:  Functional & Cognitive Status 1/6/2023   Do you have difficulty preparing food and eating? No   Do you have difficulty bathing yourself, getting dressed or grooming yourself? No   Do you have difficulty using the toilet? No   Do you have difficulty moving around from place to place? No   Do you have trouble with steps or getting out of a bed or a chair? No   Current Diet Well Balanced Diet   Dental Exam Up to date   Eye Exam Up to date   Exercise (times per week) 0 times per week   Current Exercises Include No Regular Exercise   Do you need help using the phone?  No   Are you deaf or do you have serious difficulty hearing?  No   Do you need help with transportation? No   Do you need help shopping? No   Do you need help preparing meals?  No   Do you need help with housework?  No   Do you need help with laundry? No   Do you need help taking your medications? No   Do you need help managing money? No   Do you ever drive or ride in a car without wearing a seat belt? No   Have you felt unusual stress, anger or loneliness in the last month? -   Who do you live with? -   If you need help, do you have trouble finding someone available to you? -   Do you have difficulty  concentrating, remembering or making decisions? -       Age-appropriate Screening Schedule:  Refer to the list below for future screening recommendations based on patient's age, sex and/or medical conditions. Orders for these recommended tests are listed in the plan section. The patient has been provided with a written plan.    Health Maintenance   Topic Date Due   • DXA SCAN  10/11/2023   • TDAP/TD VACCINES (3 - Td or Tdap) 04/18/2024   • MAMMOGRAM  12/28/2024   • INFLUENZA VACCINE  Completed   • ZOSTER VACCINE  Completed              Assessment & Plan   CMS Preventative Services Quick Reference  Risk Factors Identified During Encounter  Immunizations Discussed/Encouraged: Influenza and COVID19  Polypharmacy: Medication List reviewed  The above risks/problems have been discussed with the patient.  Follow up actions/plans if indicated are seen below in the Assessment/Plan Section.  Pertinent information has been shared with the patient in the After Visit Summary.    Diagnoses and all orders for this visit:    1. Medicare annual wellness visit, subsequent (Primary)    2. Essential hypertension  -     CBC (No Diff); Future  -     Comprehensive Metabolic Panel; Future  -     Lipid Panel With / Chol / HDL Ratio; Future  -     TSH Rfx On Abnormal To Free T4; Future    3. Gastroesophageal reflux disease without esophagitis    4. Stage 3b chronic kidney disease (HCC)  -     CBC (No Diff); Future  -     Comprehensive Metabolic Panel; Future  -     Lipid Panel With / Chol / HDL Ratio; Future  -     TSH Rfx On Abnormal To Free T4; Future    5. Fibromyalgia  -     CBC (No Diff); Future  -     Comprehensive Metabolic Panel; Future        Follow Up:   Return in about 6 months (around 7/6/2023) for Recheck with fasting labs the week before.  .     An After Visit Summary and PPPS were made available to the patient.

## 2023-01-09 RX ORDER — DULOXETIN HYDROCHLORIDE 60 MG/1
CAPSULE, DELAYED RELEASE ORAL
Qty: 90 CAPSULE | Refills: 0 | Status: SHIPPED | OUTPATIENT
Start: 2023-01-09 | End: 2023-04-07

## 2023-01-16 DIAGNOSIS — K21.9 GASTROESOPHAGEAL REFLUX DISEASE WITHOUT ESOPHAGITIS: ICD-10-CM

## 2023-01-16 DIAGNOSIS — I10 ESSENTIAL HYPERTENSION: ICD-10-CM

## 2023-01-17 RX ORDER — METOPROLOL SUCCINATE 25 MG/1
TABLET, EXTENDED RELEASE ORAL
Qty: 180 TABLET | Refills: 1 | Status: SHIPPED | OUTPATIENT
Start: 2023-01-17

## 2023-01-17 RX ORDER — FAMOTIDINE 20 MG/1
TABLET, FILM COATED ORAL
Qty: 180 TABLET | Refills: 1 | Status: SHIPPED | OUTPATIENT
Start: 2023-01-17

## 2023-02-16 ENCOUNTER — OFFICE VISIT (OUTPATIENT)
Dept: FAMILY MEDICINE CLINIC | Facility: CLINIC | Age: 73
End: 2023-02-16
Payer: COMMERCIAL

## 2023-02-16 VITALS
HEART RATE: 77 BPM | HEIGHT: 69 IN | WEIGHT: 178 LBS | SYSTOLIC BLOOD PRESSURE: 118 MMHG | TEMPERATURE: 98.2 F | OXYGEN SATURATION: 99 % | BODY MASS INDEX: 26.36 KG/M2 | DIASTOLIC BLOOD PRESSURE: 80 MMHG

## 2023-02-16 DIAGNOSIS — R59.1 LYMPHADENOPATHY OF HEAD AND NECK: Primary | ICD-10-CM

## 2023-02-16 PROCEDURE — 99213 OFFICE O/P EST LOW 20 MIN: CPT | Performed by: NURSE PRACTITIONER

## 2023-02-16 NOTE — PROGRESS NOTES
Answers for HPI/ROS submitted by the patient on 2/9/2023  Please describe your symptoms.: I have a knot on the back of my head. It has caused my head to be sore. It is also down in my neck. Plus episode I had two weeks again with shortness of breath and pain across my shoulders. My heart rate.  Have you had these symptoms before?: No  How long have you been having these symptoms?: 1-2 weeks  Please list any medications you are currently taking for this condition.: Metroprolol, Amlopodine, Inhaler  Please describe any probable cause for these symptoms. : Don’t know.  What is the primary reason for your visit?: Other    Patient ID: Myra Charles is a 72 y.o. female     Patient Care Team:  Yvette Muñiz APRN as PCP - General (Nurse Practitioner)  Mayur Neal MD as Consulting Physician (Hematology and Oncology)  Damian Rock DPM as Consulting Physician (Podiatry)  Jeri Newton MA (Inactive) as Medical Assistant  Jefferson West Jr., DO as Referring Physician (Family Medicine)    Subjective     Chief Complaint   Patient presents with   • Cyst     Top of head/ lower neck line- went to  over the weekend, unsure of what cause is. Knots have since gone away   • Itching     Neck - no rash present, using benadryl    • Shoulder Pain     Sunday had tightness/ pain all across shoulder blades        History of Present Illness    Myra Charles presents to [facility] today for evaluation concerning nodule to the top of her head along with an inflamed lymph nodes to the right side of neck.  She presented to the urgent care on February 11, 2023.  States she initially noticed a knot to the back of her head on the right side before going to bed 1 night.  When she woke up the next day, the knot was gone however noticed enlarged painful nodules to the right side back of her neck.  She also has had some stiffness to same area of neck.  She also had itching to area.  During  "evaluation at the urgent care, unknown cause of her symptoms.  Advised home to rest, heating pad, and Tylenol as needed.  Her symptoms have gradually improved.  She also mentioned a week prior to being seen in the urgent care she had noticed worsening shortness of breath along with pain across her shoulder blades.  She states she did not \"feel well\" at that time.  Symptoms lasted for about 1 day and had resolved.  No further incidents since that time.  Currently she is asymptomatic of all previous symptoms except for slight stiffness to neck on the right side along with slight itching.  However no rash noted.    She denies any complaints of fever, chills, cough, chest pain, shortness of air, abdominal pain, nausea, or any other concerns.     The following portions of the patient's history were reviewed and updated as appropriate: allergies, current medications, past family history, past medical history, past social history, past surgical history and problem list.       ROS    Vitals:    02/16/23 0806   BP: 118/80   Pulse: 77   Temp: 98.2 °F (36.8 °C)   SpO2: 99%       Documented weights    02/16/23 0806   Weight: 80.7 kg (178 lb)     Body mass index is 26.29 kg/m².    Results for orders placed or performed in visit on 12/16/22   Comprehensive Metabolic Panel    Specimen: Blood   Result Value Ref Range    Glucose 80 65 - 99 mg/dL    BUN 34 (H) 8 - 23 mg/dL    Creatinine 1.36 (H) 0.57 - 1.00 mg/dL    EGFR Result 41.5 (L) >60.0 mL/min/1.73    BUN/Creatinine Ratio 25.0 7.0 - 25.0    Sodium 143 136 - 145 mmol/L    Potassium 4.4 3.5 - 5.2 mmol/L    Chloride 105 98 - 107 mmol/L    Total CO2 28.7 22.0 - 29.0 mmol/L    Calcium 8.8 8.6 - 10.5 mg/dL    Total Protein 6.3 6.0 - 8.5 g/dL    Albumin 4.50 3.50 - 5.20 g/dL    Globulin 1.8 gm/dL    A/G Ratio 2.5 g/dL    Total Bilirubin 0.2 0.0 - 1.2 mg/dL    Alkaline Phosphatase 206 (H) 39 - 117 U/L    AST (SGOT) 14 1 - 32 U/L    ALT (SGPT) 54 (H) 1 - 33 U/L   TSH Rfx On Abnormal To " Free T4    Specimen: Blood   Result Value Ref Range    TSH 2.060 0.270 - 4.200 uIU/mL   Lipid Panel With / Chol / HDL Ratio    Specimen: Blood   Result Value Ref Range    Total Cholesterol 185 0 - 200 mg/dL    Triglycerides 156 (H) 0 - 150 mg/dL    HDL Cholesterol 55 40 - 60 mg/dL    VLDL Cholesterol [place] 27 5 - 40 mg/dL    LDL Chol Calc (NIH) 103 (H) 0 - 100 mg/dL    Chol/HDL Ratio 3.36    UA / M With / Rflx Culture([facility] ONLY) - Urine, Clean Catch    Specimen: Urine, Clean Catch   Result Value Ref Range    Specific Gravity, UA 1.024 1.005 - 1.030    pH, UA 6.5 5.0 - 7.5    Color, UA Yellow Yellow    Appearance, UA Clear Clear    Leukocytes, UA Negative Negative    Protein 1+ (A) Negative/Trace    Glucose, UA Negative Negative    Ketones Negative Negative    Blood, UA Negative Negative    Bilirubin, UA Negative Negative    Urobilinogen, UA 0.2 0.2 - 1.0 mg/dL    Nitrite, UA Negative Negative    Microscopic Examination See below:     Urinalysis Reflex Comment    Vitamin D 25 Hydroxy    Specimen: Blood   Result Value Ref Range    25 Hydroxy, Vitamin D 45.2 30.0 - 100.0 ng/ml   Microscopic Examination -   Result Value Ref Range    WBC, UA 0-5 0 - 5 /hpf    RBC, UA 0-2 0 - 2 /hpf    Epithelial Cells (non renal) 0-10 0 - 10 /hpf    Casts Present (A) None seen /lpf    Cast Type Hyaline casts N/A    Bacteria, UA None seen None seen/Few /hpf           Objective     Physical Exam  Vitals reviewed.   Constitutional:       General: She is not in acute distress.  HENT:      Head: Normocephalic and atraumatic.      Comments: No nodules or masses noted to head  Cardiovascular:      Rate and Rhythm: Normal rate and regular rhythm.      Heart sounds: No murmur heard.  Pulmonary:      Effort: Pulmonary effort is normal.      Breath sounds: Normal breath sounds. No wheezing.   Musculoskeletal:      Cervical back: Normal range of motion. Muscular tenderness (right) present.      Right lower leg: No edema.      Left lower leg: No  edema.   Lymphadenopathy:      Head:      Right side of head: No submental, submandibular, tonsillar, preauricular, posterior auricular or occipital adenopathy.      Left side of head: No submental, submandibular, tonsillar, preauricular, posterior auricular or occipital adenopathy.      Cervical: No cervical adenopathy.      Upper Body:      Right upper body: No supraclavicular adenopathy.      Left upper body: No supraclavicular adenopathy.   Skin:     General: Skin is warm and dry.      Findings: No erythema.   Neurological:      Mental Status: She is alert and oriented to person, place, and time.   Psychiatric:         Mood and Affect: Mood normal.         Behavior: Behavior normal.            Assessment & Plan     Assessment/Plan     Diagnoses and all orders for this visit:    1. Lymphadenopathy of head and neck (Primary) - resolved   Symptoms have resolved except for lingering stiffness to right side of neck.  Continue to monitor.  Continue heating pad and Tylenol as needed.  If symptoms should reoccur, instructed to let us know.  Concerning her SOA and pain between shoulder blades, unsure if related.  Reassuring symptoms resolved and not reoccurred.  Possibly related to viral illness.  Continue to monitor.  If symptoms return, let us know.  If starts having chest pain, SOA, or any other concerns, report to ER for further evaluation.      In the meantime, instructed to contact us sooner for any problems or concerns.    Follow Up:  Return for Next scheduled follow up.    Patient was given instructions and counseling regarding condition or for health maintenance advice.  Please see specific information pulled into the AVS if appropriate.      Patient was wearing facemask when I entered the room and throughout our encounter. Protective equipment was worn throughout this patient encounter including a face mask.  Hand hygiene was performed before donning protective equipment and after removal when leaving the room.      Yvette MCCRACKEN Head, APRN  Family Medicine  [facility] [place]  02/16/23  10:13 EST

## 2023-04-07 RX ORDER — DULOXETIN HYDROCHLORIDE 60 MG/1
CAPSULE, DELAYED RELEASE ORAL
Qty: 90 CAPSULE | Refills: 0 | Status: SHIPPED | OUTPATIENT
Start: 2023-04-07

## 2023-06-01 ENCOUNTER — OFFICE VISIT (OUTPATIENT)
Dept: FAMILY MEDICINE CLINIC | Facility: CLINIC | Age: 73
End: 2023-06-01

## 2023-06-01 VITALS
DIASTOLIC BLOOD PRESSURE: 80 MMHG | HEIGHT: 69 IN | OXYGEN SATURATION: 99 % | SYSTOLIC BLOOD PRESSURE: 120 MMHG | WEIGHT: 180 LBS | BODY MASS INDEX: 26.66 KG/M2 | TEMPERATURE: 97.7 F | HEART RATE: 76 BPM

## 2023-06-01 DIAGNOSIS — R42 LIGHTHEADED: ICD-10-CM

## 2023-06-01 DIAGNOSIS — R79.89 ELEVATED LIVER FUNCTION TESTS: ICD-10-CM

## 2023-06-01 DIAGNOSIS — L50.9 URTICARIA: ICD-10-CM

## 2023-06-01 DIAGNOSIS — N18.32 STAGE 3B CHRONIC KIDNEY DISEASE: Primary | ICD-10-CM

## 2023-06-01 PROCEDURE — 99213 OFFICE O/P EST LOW 20 MIN: CPT | Performed by: NURSE PRACTITIONER

## 2023-06-01 RX ORDER — HYDROXYZINE HYDROCHLORIDE 25 MG/1
25 TABLET, FILM COATED ORAL EVERY 8 HOURS PRN
Qty: 30 TABLET | Refills: 0 | Status: SHIPPED | OUTPATIENT
Start: 2023-06-01

## 2023-06-01 NOTE — PROGRESS NOTES
Answers for HPI/ROS submitted by the patient on 5/30/2023  Please describe your symptoms.: My head itches all the time. Just don’t feel well.  Have you had these symptoms before?: No  How long have you been having these symptoms?: Greater than 2 weeks  What is the primary reason for your visit?: Other    Patient ID: Myra Charles is a 72 y.o. female     Patient Care Team:  Head, ASHLEY Da Silva as PCP - General (Nurse Practitioner)  Mayur Neal MD as Consulting Physician (Hematology and Oncology)  Damian Rock DPM as Consulting Physician (Podiatry)  Jeri Newton MA (Inactive) as Medical Assistant  Jefferson West Jr. DO as Referring Physician (Family Medicine)    Subjective     Chief Complaint   Patient presents with   • Dizziness   • head itching       History of Present Illness    Myra Charles presents to [facility] today for scalp itching.      Head itching over 3 months. Seen . Recommended tea tree oil which did not help. Changed shampoos without improvement.    Intermittent light headed.    Intermittent sweating for no reason.   Felt hot last night.  Increased stressors over the past 2 years.    No new pets.    No new medications.      She denies any complaints of fever, chills, cough, chest pain, shortness of air, abdominal pain, nausea, or any other concerns.     The following portions of the patient's history were reviewed and updated as appropriate: allergies, current medications, past family history, past medical history, past social history, past surgical history and problem list.       ROS    Vitals:    06/01/23 1121   BP: 120/80   Pulse: 76   Temp: 97.7 °F (36.5 °C)   SpO2: 99%       Documented weights    06/01/23 1121   Weight: 81.6 kg (180 lb)     Body mass index is 26.58 kg/m².    Results for orders placed or performed in visit on 12/16/22   Comprehensive Metabolic Panel    Specimen: Blood   Result Value Ref Range    Glucose  80 65 - 99 mg/dL    BUN 34 (H) 8 - 23 mg/dL    Creatinine 1.36 (H) 0.57 - 1.00 mg/dL    EGFR Result 41.5 (L) >60.0 mL/min/1.73    BUN/Creatinine Ratio 25.0 7.0 - 25.0    Sodium 143 136 - 145 mmol/L    Potassium 4.4 3.5 - 5.2 mmol/L    Chloride 105 98 - 107 mmol/L    Total CO2 28.7 22.0 - 29.0 mmol/L    Calcium 8.8 8.6 - 10.5 mg/dL    Total Protein 6.3 6.0 - 8.5 g/dL    Albumin 4.50 3.50 - 5.20 g/dL    Globulin 1.8 gm/dL    A/G Ratio 2.5 g/dL    Total Bilirubin 0.2 0.0 - 1.2 mg/dL    Alkaline Phosphatase 206 (H) 39 - 117 U/L    AST (SGOT) 14 1 - 32 U/L    ALT (SGPT) 54 (H) 1 - 33 U/L   TSH Rfx On Abnormal To Free T4    Specimen: Blood   Result Value Ref Range    TSH 2.060 0.270 - 4.200 uIU/mL   Lipid Panel With / Chol / HDL Ratio    Specimen: Blood   Result Value Ref Range    Total Cholesterol 185 0 - 200 mg/dL    Triglycerides 156 (H) 0 - 150 mg/dL    HDL Cholesterol 55 40 - 60 mg/dL    VLDL Cholesterol Kend[place] 27 5 - 40 mg/dL    LDL Chol Calc (NIH) 103 (H) 0 - 100 mg/dL    Chol/HDL Ratio 3.36    UA / M With / Rflx Culture([facility] ONLY) - Urine, Clean Catch    Specimen: Urine, Clean Catch   Result Value Ref Range    Specific Gravity, UA 1.024 1.005 - 1.030    pH, UA 6.5 5.0 - 7.5    Color, UA Yellow Yellow    Appearance, UA Clear Clear    Leukocytes, UA Negative Negative    Protein 1+ (A) Negative/Trace    Glucose, UA Negative Negative    Ketones Negative Negative    Blood, UA Negative Negative    Bilirubin, UA Negative Negative    Urobilinogen, UA 0.2 0.2 - 1.0 mg/dL    Nitrite, UA Negative Negative    Microscopic Examination See below:     Urinalysis Reflex Comment    Vitamin D 25 Hydroxy    Specimen: Blood   Result Value Ref Range    25 Hydroxy, Vitamin D 45.2 30.0 - 100.0 ng/ml   Microscopic Examination -   Result Value Ref Range    WBC, UA 0-5 0 - 5 /hpf    RBC, UA 0-2 0 - 2 /hpf    Epithelial Cells (non renal) 0-10 0 - 10 /hpf    Casts Present (A) None seen /lpf    Cast Type Hyaline casts N/A    Bacteria, UA None  seen None seen/Few /hpf           Objective     Physical Exam  Vitals reviewed.   Constitutional:       General: She is not in acute distress.  HENT:      Head: Normocephalic and atraumatic.      Right Ear: Tympanic membrane normal.      Left Ear: Tympanic membrane normal.   Eyes:      Extraocular Movements: Extraocular movements intact.      Right eye: No nystagmus.      Left eye: No nystagmus.      Pupils: Pupils are equal, round, and reactive to light.   Cardiovascular:      Rate and Rhythm: Normal rate and regular rhythm.      Heart sounds: No murmur heard.     Comments: B/P laying down 124/86.  Sitting 130/80.    Pulmonary:      Effort: Pulmonary effort is normal.      Breath sounds: Normal breath sounds. No wheezing.   Abdominal:      Palpations: Abdomen is soft.   Musculoskeletal:      Right lower leg: No edema.      Left lower leg: No edema.   Skin:     General: Skin is warm and dry.      Findings: No erythema or rash.   Neurological:      Mental Status: She is alert and oriented to person, place, and time.            Assessment & Plan     Assessment/Plan     Diagnoses and all orders for this visit:    1. Stage 3b chronic kidney disease (Primary)  -     CBC & Differential  -     Comprehensive Metabolic Panel    2. Elevated liver function tests  -     CBC & Differential  -     Comprehensive Metabolic Panel    3. Urticaria  -     CBC & Differential  -     Comprehensive Metabolic Panel  -     hydrOXYzine (ATARAX) 25 MG tablet; Take 1 tablet by mouth Every 8 (Eight) Hours As Needed for Itching.  Dispense: 30 tablet; Refill: 0    4. Lightheaded  -     CBC & Differential  -     Comprehensive Metabolic Panel          Summary:  Myra Charles main complaints today of scalp itching.  No rash noted.  No dandruff or dry scalp noted.  She has previous abnormal kidney function.  Advised can possibly be cause of unexplained itching.  Recheck levels today along with CBC.  Results will determine further  recommendations.    Exam appears stable concerning lightheadedness.  Use Flonase nasal spray as directed. Increase fluids.       In the meantime, instructed to contact us sooner for any problems or concerns.    Follow Up:  Return if symptoms worsen or fail to improve.    Patient was given instructions and counseling regarding condition or for health maintenance advice.  Please see specific information pulled into the AVS if appropriate.          Yvette Muñiz, APRN  Family Medicine  [facility] [place]  06/01/23  15:59 EDT

## 2023-06-02 DIAGNOSIS — L50.9 URTICARIA: ICD-10-CM

## 2023-06-02 DIAGNOSIS — N18.32 STAGE 3B CHRONIC KIDNEY DISEASE: Primary | ICD-10-CM

## 2023-06-02 LAB
ALBUMIN SERPL-MCNC: 4.4 G/DL (ref 3.5–5.2)
ALBUMIN/GLOB SERPL: 2.3 G/DL
ALP SERPL-CCNC: 99 U/L (ref 39–117)
ALT SERPL-CCNC: 21 U/L (ref 1–33)
AST SERPL-CCNC: 17 U/L (ref 1–32)
BASOPHILS # BLD AUTO: 0.08 10*3/MM3 (ref 0–0.2)
BASOPHILS NFR BLD AUTO: 1 % (ref 0–1.5)
BILIRUB SERPL-MCNC: 0.3 MG/DL (ref 0–1.2)
BUN SERPL-MCNC: 25 MG/DL (ref 8–23)
BUN/CREAT SERPL: 17.5 (ref 7–25)
CALCIUM SERPL-MCNC: 9.4 MG/DL (ref 8.6–10.5)
CHLORIDE SERPL-SCNC: 107 MMOL/L (ref 98–107)
CO2 SERPL-SCNC: 26.9 MMOL/L (ref 22–29)
CREAT SERPL-MCNC: 1.43 MG/DL (ref 0.57–1)
EGFRCR SERPLBLD CKD-EPI 2021: 39 ML/MIN/1.73
EOSINOPHIL # BLD AUTO: 0.16 10*3/MM3 (ref 0–0.4)
EOSINOPHIL NFR BLD AUTO: 2.1 % (ref 0.3–6.2)
ERYTHROCYTE [DISTWIDTH] IN BLOOD BY AUTOMATED COUNT: 12.5 % (ref 12.3–15.4)
GLOBULIN SER CALC-MCNC: 1.9 GM/DL
GLUCOSE SERPL-MCNC: 56 MG/DL (ref 65–99)
HCT VFR BLD AUTO: 39 % (ref 34–46.6)
HGB BLD-MCNC: 12.9 G/DL (ref 12–15.9)
IMM GRANULOCYTES # BLD AUTO: 0.02 10*3/MM3 (ref 0–0.05)
IMM GRANULOCYTES NFR BLD AUTO: 0.3 % (ref 0–0.5)
LYMPHOCYTES # BLD AUTO: 2.36 10*3/MM3 (ref 0.7–3.1)
LYMPHOCYTES NFR BLD AUTO: 30.6 % (ref 19.6–45.3)
MCH RBC QN AUTO: 29.6 PG (ref 26.6–33)
MCHC RBC AUTO-ENTMCNC: 33.1 G/DL (ref 31.5–35.7)
MCV RBC AUTO: 89.4 FL (ref 79–97)
MONOCYTES # BLD AUTO: 0.71 10*3/MM3 (ref 0.1–0.9)
MONOCYTES NFR BLD AUTO: 9.2 % (ref 5–12)
NEUTROPHILS # BLD AUTO: 4.37 10*3/MM3 (ref 1.7–7)
NEUTROPHILS NFR BLD AUTO: 56.8 % (ref 42.7–76)
NRBC BLD AUTO-RTO: 0 /100 WBC (ref 0–0.2)
PLATELET # BLD AUTO: 243 10*3/MM3 (ref 140–450)
POTASSIUM SERPL-SCNC: 4.6 MMOL/L (ref 3.5–5.2)
PROT SERPL-MCNC: 6.3 G/DL (ref 6–8.5)
RBC # BLD AUTO: 4.36 10*6/MM3 (ref 3.77–5.28)
SODIUM SERPL-SCNC: 144 MMOL/L (ref 136–145)
WBC # BLD AUTO: 7.7 10*3/MM3 (ref 3.4–10.8)

## 2023-07-24 ENCOUNTER — TRANSCRIBE ORDERS (OUTPATIENT)
Dept: ADMINISTRATIVE | Facility: HOSPITAL | Age: 73
End: 2023-07-24
Payer: COMMERCIAL

## 2023-07-24 ENCOUNTER — LAB (OUTPATIENT)
Dept: LAB | Facility: HOSPITAL | Age: 73
End: 2023-07-24
Payer: MEDICARE

## 2023-07-24 DIAGNOSIS — N18.32 CHRONIC KIDNEY DISEASE (CKD) STAGE G3B/A1, MODERATELY DECREASED GLOMERULAR FILTRATION RATE (GFR) BETWEEN 30-44 ML/MIN/1.73 SQUARE METER AND ALBUMINURIA CREATININE RATIO LESS THAN 30 MG/G (CMS/H*: ICD-10-CM

## 2023-07-24 DIAGNOSIS — N18.32 CHRONIC KIDNEY DISEASE (CKD) STAGE G3B/A1, MODERATELY DECREASED GLOMERULAR FILTRATION RATE (GFR) BETWEEN 30-44 ML/MIN/1.73 SQUARE METER AND ALBUMINURIA CREATININE RATIO LESS THAN 30 MG/G (CMS/H*: Primary | ICD-10-CM

## 2023-07-24 LAB
ALBUMIN SERPL-MCNC: 4.6 G/DL (ref 3.5–5.2)
ALBUMIN UR-MCNC: 2 MG/DL
ANION GAP SERPL CALCULATED.3IONS-SCNC: 13 MMOL/L (ref 5–15)
BACTERIA UR QL AUTO: NORMAL /HPF
BILIRUB UR QL STRIP: NEGATIVE
BUN SERPL-MCNC: 29 MG/DL (ref 8–23)
BUN/CREAT SERPL: 17.1 (ref 7–25)
CALCIUM SPEC-SCNC: 9.1 MG/DL (ref 8.6–10.5)
CHLORIDE SERPL-SCNC: 103 MMOL/L (ref 98–107)
CLARITY UR: CLEAR
CO2 SERPL-SCNC: 22 MMOL/L (ref 22–29)
COLOR UR: YELLOW
CREAT SERPL-MCNC: 1.7 MG/DL (ref 0.57–1)
CREAT UR-MCNC: 203.7 MG/DL
CREAT UR-MCNC: 203.7 MG/DL
EGFRCR SERPLBLD CKD-EPI 2021: 31.7 ML/MIN/1.73
GLUCOSE SERPL-MCNC: 155 MG/DL (ref 65–99)
GLUCOSE UR STRIP-MCNC: NEGATIVE MG/DL
HGB UR QL STRIP.AUTO: NEGATIVE
HYALINE CASTS UR QL AUTO: NORMAL /LPF
KETONES UR QL STRIP: ABNORMAL
LEUKOCYTE ESTERASE UR QL STRIP.AUTO: ABNORMAL
MICROALBUMIN/CREAT UR: 9.8 MG/G
NITRITE UR QL STRIP: NEGATIVE
PH UR STRIP.AUTO: 5.5 [PH] (ref 5–8)
PHOSPHATE SERPL-MCNC: 3.8 MG/DL (ref 2.5–4.5)
POTASSIUM SERPL-SCNC: 4.4 MMOL/L (ref 3.5–5.2)
PROT ?TM UR-MCNC: 16.2 MG/DL
PROT UR QL STRIP: ABNORMAL
PROT/CREAT UR: 79.5 MG/G CREA (ref 0–200)
RBC # UR STRIP: NORMAL /HPF
REF LAB TEST METHOD: NORMAL
SODIUM SERPL-SCNC: 138 MMOL/L (ref 136–145)
SP GR UR STRIP: 1.02 (ref 1–1.03)
SQUAMOUS #/AREA URNS HPF: NORMAL /HPF
UROBILINOGEN UR QL STRIP: ABNORMAL
WBC # UR STRIP: NORMAL /HPF

## 2023-07-24 PROCEDURE — 82570 ASSAY OF URINE CREATININE: CPT

## 2023-07-24 PROCEDURE — 81001 URINALYSIS AUTO W/SCOPE: CPT

## 2023-07-24 PROCEDURE — 82043 UR ALBUMIN QUANTITATIVE: CPT

## 2023-07-24 PROCEDURE — 80069 RENAL FUNCTION PANEL: CPT

## 2023-07-24 PROCEDURE — 84156 ASSAY OF PROTEIN URINE: CPT

## 2023-07-24 PROCEDURE — 36415 COLL VENOUS BLD VENIPUNCTURE: CPT

## 2023-08-07 DIAGNOSIS — I10 ESSENTIAL HYPERTENSION: ICD-10-CM

## 2023-08-07 RX ORDER — DULOXETIN HYDROCHLORIDE 60 MG/1
60 CAPSULE, DELAYED RELEASE ORAL DAILY
Qty: 90 CAPSULE | Refills: 1 | Status: SHIPPED | OUTPATIENT
Start: 2023-08-07 | End: 2023-08-07 | Stop reason: SDUPTHER

## 2023-08-07 RX ORDER — DULOXETIN HYDROCHLORIDE 60 MG/1
60 CAPSULE, DELAYED RELEASE ORAL DAILY
Qty: 90 CAPSULE | Refills: 1 | Status: SHIPPED | OUTPATIENT
Start: 2023-08-07 | End: 2023-08-08

## 2023-08-07 RX ORDER — AMLODIPINE BESYLATE 5 MG/1
5 TABLET ORAL DAILY
Qty: 90 TABLET | Refills: 0 | Status: SHIPPED | OUTPATIENT
Start: 2023-08-07

## 2023-08-07 RX ORDER — AMLODIPINE BESYLATE 5 MG/1
5 TABLET ORAL DAILY
Qty: 5 TABLET | Refills: 0 | Status: SHIPPED | OUTPATIENT
Start: 2023-08-07 | End: 2023-08-07 | Stop reason: SDUPTHER

## 2023-08-08 RX ORDER — DULOXETIN HYDROCHLORIDE 60 MG/1
CAPSULE, DELAYED RELEASE ORAL
Qty: 90 CAPSULE | Refills: 0 | Status: SHIPPED | OUTPATIENT
Start: 2023-08-08

## 2023-08-29 ENCOUNTER — TRANSCRIBE ORDERS (OUTPATIENT)
Dept: ADMINISTRATIVE | Facility: HOSPITAL | Age: 73
End: 2023-08-29
Payer: COMMERCIAL

## 2023-08-29 ENCOUNTER — LAB (OUTPATIENT)
Dept: LAB | Facility: HOSPITAL | Age: 73
End: 2023-08-29
Payer: MEDICARE

## 2023-08-29 DIAGNOSIS — E04.1 THYROID NODULE: Primary | ICD-10-CM

## 2023-08-29 DIAGNOSIS — E04.1 THYROID NODULE: ICD-10-CM

## 2023-08-29 LAB
T3FREE SERPL-MCNC: 2.81 PG/ML (ref 2–4.4)
T4 FREE SERPL-MCNC: 1.47 NG/DL (ref 0.93–1.7)
TSH SERPL DL<=0.05 MIU/L-ACNC: 1.4 UIU/ML (ref 0.27–4.2)

## 2023-08-29 PROCEDURE — 84443 ASSAY THYROID STIM HORMONE: CPT

## 2023-08-29 PROCEDURE — 84481 FREE ASSAY (FT-3): CPT

## 2023-08-29 PROCEDURE — 36415 COLL VENOUS BLD VENIPUNCTURE: CPT

## 2023-08-29 PROCEDURE — 84439 ASSAY OF FREE THYROXINE: CPT

## 2023-09-12 DIAGNOSIS — I10 ESSENTIAL HYPERTENSION: ICD-10-CM

## 2023-09-12 RX ORDER — AMLODIPINE BESYLATE 5 MG/1
5 TABLET ORAL DAILY
Qty: 90 TABLET | Refills: 0 | Status: SHIPPED | OUTPATIENT
Start: 2023-09-12

## 2023-10-08 DIAGNOSIS — I10 ESSENTIAL HYPERTENSION: ICD-10-CM

## 2023-10-09 RX ORDER — AMLODIPINE BESYLATE 5 MG/1
5 TABLET ORAL DAILY
Qty: 90 TABLET | Refills: 0 | Status: SHIPPED | OUTPATIENT
Start: 2023-10-09

## 2023-10-09 RX ORDER — DULOXETIN HYDROCHLORIDE 60 MG/1
60 CAPSULE, DELAYED RELEASE ORAL DAILY
Qty: 90 CAPSULE | Refills: 0 | Status: SHIPPED | OUTPATIENT
Start: 2023-10-09

## 2023-10-31 ENCOUNTER — HOSPITAL ENCOUNTER (OUTPATIENT)
Dept: CARDIOLOGY | Facility: HOSPITAL | Age: 73
Discharge: HOME | End: 2023-10-31
Admitting: INTERNAL MEDICINE
Payer: MEDICARE

## 2023-10-31 VITALS
WEIGHT: 175 LBS | HEIGHT: 68 IN | SYSTOLIC BLOOD PRESSURE: 145 MMHG | BODY MASS INDEX: 26.52 KG/M2 | DIASTOLIC BLOOD PRESSURE: 60 MMHG | HEART RATE: 76 BPM

## 2023-10-31 DIAGNOSIS — I34.1 MITRAL VALVE PROLAPSE: ICD-10-CM

## 2023-10-31 LAB
AORTIC DIMENSIONLESS INDEX: 0.6 (DI)
ASCENDING AORTA: 2.9 CM
BH CV ECHO MEAS - ACS: 1.88 CM
BH CV ECHO MEAS - AO MAX PG: 7.4 MMHG
BH CV ECHO MEAS - AO MEAN PG: 4.2 MMHG
BH CV ECHO MEAS - AO ROOT DIAM: 2.6 CM
BH CV ECHO MEAS - AO V2 MAX: 136.4 CM/SEC
BH CV ECHO MEAS - AO V2 VTI: 27.7 CM
BH CV ECHO MEAS - AVA(I,D): 2.21 CM2
BH CV ECHO MEAS - EDV(CUBED): 53.5 ML
BH CV ECHO MEAS - EDV(MOD-SP2): 39 ML
BH CV ECHO MEAS - EDV(MOD-SP4): 37 ML
BH CV ECHO MEAS - EF(MOD-BP): 65.8 %
BH CV ECHO MEAS - EF(MOD-SP2): 61.5 %
BH CV ECHO MEAS - EF(MOD-SP4): 64.9 %
BH CV ECHO MEAS - ESV(CUBED): 15.6 ML
BH CV ECHO MEAS - ESV(MOD-SP2): 15 ML
BH CV ECHO MEAS - ESV(MOD-SP4): 13 ML
BH CV ECHO MEAS - FS: 33.7 %
BH CV ECHO MEAS - IVS/LVPW: 1.29 CM
BH CV ECHO MEAS - IVSD: 1.2 CM
BH CV ECHO MEAS - LA DIMENSION: 3.7 CM
BH CV ECHO MEAS - LV DIASTOLIC VOL/BSA (35-75): 19.2 CM2
BH CV ECHO MEAS - LV MASS(C)D: 173.2 GRAMS
BH CV ECHO MEAS - LV MAX PG: 3.4 MMHG
BH CV ECHO MEAS - LV MEAN PG: 1.43 MMHG
BH CV ECHO MEAS - LV SYSTOLIC VOL/BSA (12-30): 6.7 CM2
BH CV ECHO MEAS - LV V1 MAX: 92 CM/SEC
BH CV ECHO MEAS - LV V1 VTI: 18 CM
BH CV ECHO MEAS - LVIDD: 3.8 CM
BH CV ECHO MEAS - LVIDS: 2.5 CM
BH CV ECHO MEAS - LVOT AREA: 3.4 CM2
BH CV ECHO MEAS - LVOT DIAM: 2.08 CM
BH CV ECHO MEAS - LVPWD: 1.15 CM
BH CV ECHO MEAS - MV A DUR: 0.14 SEC
BH CV ECHO MEAS - MV A MAX VEL: 112.3 CM/SEC
BH CV ECHO MEAS - MV DEC SLOPE: 360.4 CM/SEC2
BH CV ECHO MEAS - MV DEC TIME: 0.29 SEC
BH CV ECHO MEAS - MV E MAX VEL: 77.5 CM/SEC
BH CV ECHO MEAS - MV E/A: 0.69
BH CV ECHO MEAS - MV MAX PG: 5.6 MMHG
BH CV ECHO MEAS - MV MEAN PG: 1.9 MMHG
BH CV ECHO MEAS - MV P1/2T: 99.1 MSEC
BH CV ECHO MEAS - MV V2 VTI: 39.3 CM
BH CV ECHO MEAS - MVA(P1/2T): 2.22 CM2
BH CV ECHO MEAS - MVA(VTI): 1.55 CM2
BH CV ECHO MEAS - PA ACC TIME: 0.18 SEC
BH CV ECHO MEAS - PA V2 MAX: 102.7 CM/SEC
BH CV ECHO MEAS - QP/QS: 1.6
BH CV ECHO MEAS - RAP SYSTOLE: 3 MMHG
BH CV ECHO MEAS - RV MAX PG: 3.7 MMHG
BH CV ECHO MEAS - RV V1 MAX: 95.6 CM/SEC
BH CV ECHO MEAS - RV V1 VTI: 22.1 CM
BH CV ECHO MEAS - RVDD: 2.7 CM
BH CV ECHO MEAS - RVOT DIAM: 2.37 CM
BH CV ECHO MEAS - RVSP: 34 MMHG
BH CV ECHO MEAS - SI(MOD-SP2): 12.4 ML/M2
BH CV ECHO MEAS - SI(MOD-SP4): 12.4 ML/M2
BH CV ECHO MEAS - SV(LVOT): 61 ML
BH CV ECHO MEAS - SV(MOD-SP2): 24 ML
BH CV ECHO MEAS - SV(MOD-SP4): 24 ML
BH CV ECHO MEAS - SV(RVOT): 97.5 ML
BH CV ECHO MEAS - TAPSE (>1.6): 2.13 CM
BH CV ECHO MEAS - TR MAX PG: 31 MMHG
BH CV ECHO MEAS - TR MAX VEL: 278 CM/SEC
BH CV XLRA - RV BASE: 2.21 CM
BH CV XLRA - RV LENGTH: 5.1 CM
BH CV XLRA - RV MID: 2.36 CM
LEFT ATRIUM VOLUME INDEX: 16.8 ML/M2
SINUS: 2.45 CM
STJ: 2.6 CM

## 2023-10-31 PROCEDURE — 93306 TTE W/DOPPLER COMPLETE: CPT

## 2023-10-31 PROCEDURE — 93306 TTE W/DOPPLER COMPLETE: CPT | Performed by: INTERNAL MEDICINE

## 2023-10-31 NOTE — PROGRESS NOTES
Please let her know that this looks completely stable.  I was doing this because she has a history of mitral valve prolapse and mitral regurgitation.  She has truly minimal prolapse and an extremely mild amount of regurgitation.  She has an appointment with me in a few weeks and we can discuss further.

## 2023-11-14 NOTE — PROGRESS NOTES
RM:________     PCP: Yvette Muñiz APRN    : 1950  AGE: 72 y.o.  EST PATIENT     REASON FOR VISIT/  CC:        BP Readings from Last 3 Encounters:   10/31/23 145/60   10/25/23 129/78   23 120/78      Wt Readings from Last 3 Encounters:   10/31/23 79.4 kg (175 lb)   10/25/23 79.4 kg (175 lb)   23 80.3 kg (177 lb)        WT: ____________ BP: __________L __________R HR______    CHEST PAIN: _____________    SOA: _____________PALPS: _______________     LIGHTHEADED: ___________FATIGUE: ________________ EDEMA __________    ALLERGIES:Raloxifene, Gabapentin, Latex, and Pregabalin SMOKING HISTORY:  Social History     Tobacco Use    Smoking status: Never    Smokeless tobacco: Never   Vaping Use    Vaping Use: Never used   Substance Use Topics    Alcohol use: Yes     Comment: Maybe every two to three weeks    Drug use: No     CAFFEINE USE_________________  ALCOHOL ______________________

## 2023-11-21 ENCOUNTER — OFFICE VISIT (OUTPATIENT)
Dept: CARDIOLOGY | Facility: CLINIC | Age: 73
End: 2023-11-21
Payer: COMMERCIAL

## 2023-11-21 VITALS
WEIGHT: 178 LBS | BODY MASS INDEX: 26.98 KG/M2 | OXYGEN SATURATION: 100 % | HEIGHT: 68 IN | SYSTOLIC BLOOD PRESSURE: 142 MMHG | DIASTOLIC BLOOD PRESSURE: 82 MMHG | HEART RATE: 70 BPM

## 2023-11-21 DIAGNOSIS — I10 ESSENTIAL HYPERTENSION: ICD-10-CM

## 2023-11-21 DIAGNOSIS — N18.31 STAGE 3A CHRONIC KIDNEY DISEASE: ICD-10-CM

## 2023-11-21 DIAGNOSIS — I34.1 MITRAL VALVE PROLAPSE: Primary | ICD-10-CM

## 2023-11-21 RX ORDER — AMLODIPINE BESYLATE 5 MG/1
7.5 TABLET ORAL DAILY
Qty: 135 TABLET | Refills: 3 | Status: SHIPPED | OUTPATIENT
Start: 2023-11-21

## 2023-11-21 NOTE — PROGRESS NOTES
Date of Office Visit: 23  Encounter Provider: Stephan Hernandez MD  Place of Service: [facility] CARDIOLOGY  Patient Name: Myra Charles  :1950    Chief Complaint   Patient presents with    Mitral Valve Prolapse   :     HPI:     Ms. Charles is 72 y.o. and presents today to follow up. I have reviewed prior notes and there are no changes except for any new updates described below. I have also reviewed any information entered into the medical record by the patient or by ancillary staff.     She had a CT coronary angiogram in  that was completely normal; her calcium score was zero. She had an echo in 2020 that showed very mild anterior MVP without significant MR; the study was otherwise normal.  A follow up echo in  was unchanged.     She is doing well. She has occasional palpitations which feel like small flutters. She does not have sustained tachycardia. She denies chest pain, lightheadedness, syncope, leg swelling, orthopnea, dyspnea. Her BP at home is consistently 140s/80s. She follows with Dr Pablo Pan for CKD3.     Past Medical History:   Diagnosis Date    Arthritis     Asthma     Backache 2016    Low back pain, in sacral region       Benign hypertension 2016    CKD (chronic kidney disease) stage 3, GFR 30-59 ml/min     COPD (chronic obstructive pulmonary disease)     Coronary artery disease     Cortical senile cataract 2016    Deep vein thrombosis     Essential (primary) hypertension 2016    GERD (gastroesophageal reflux disease) 2016    Hallux valgus     Deformity, w/inflammation       History of coronary angiogram     CT cor angio , normal cors, Agatston 0    Iron deficiency anemia     Lung nodule, solitary 2012    Solitary nodule of lung - RIGHT upper lobe 15mm, PET positive       Malignant carcinoid tumor of lung 2016    Atypical carcinoid tumor of the LEFT lung treated in 2012      Mitral valve  prolapse 02/17/2016    Osteoporosis 04/20/2016    Primary fibromyalgia syndrome 02/17/2016    Rosacea 02/17/2016    Seborrheic keratosis     History of, upper and lower back       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 Enodscopic vein harvest on the left. Left lower extremity arteriogram.)    BREAST CYST EXCISION      pt unsure of laterality    CERVICAL CONIZATION      CONIZATION OF CERVIX 64277 (Excisional laser cone biopsy and vaporization of cervix.)    CHOLECYSTECTOMY  09/07/2005    Cholecystectomy, laparoscopic (With intraoperative cholangiogram.)    COLONOSCOPY      COLONOSCOPY W/ POLYPECTOMY N/A 01/05/2022    Procedure: COLONOSCOPY WITH POLYPECTOMY;  Surgeon: Augustine Menjivar MD;  Location: [facility];  Service: Gastroenterology;  Laterality: N/A;  Rectal polyp    CYST REMOVAL      left 3rd toe    EXCISION LESION  11/11/1998    REMOVE LESION BACK OR FLANK 46563 (Excision times two.)    FOOT SURGERY  01/20/2009    Foot/toes surgery procedure (Soft tissue mass, left foot. Excision of)    HERNIA REPAIR      Past history of umbilical herni repair.    HYSTERECTOMY      OTHER SURGICAL HISTORY  12/04/2012    Anesth, elbow area surgery (Manipulation of left elbow.)    OTHER SURGICAL HISTORY  03/05/2012    Anesth, elbow area surgery (Excision of plate and screws from olecranon bursa. Retained plate and screws, olecranon bursitis of previous fracture left elbow.)    OTHER SURGICAL HISTORY  10/16/2012    Treat elbow fracture  (Open reduction and internal fixation.)    THORACOSCOPY  03/14/2012    Thoracoscopy, surgical (Bronchoscopy,LVATS (wedge resect MARYBETH), LULobectomy Thoracic lymphadenectomy)    THROAT SURGERY  07/25/2012    Throat surgery procedure (Thyroplasty type I (vocal cord medialozation & larynooplasty) Left vocal cord paralysis. Dysphoria. [facility] by Dr Tk Heart)    THYROID SURGERY  03/22/2016    Thyroid surgery (Right thyroid lobectomy and isthmusectomy.)    TONSILLECTOMY  02/03/1956    Chronic tonsillitis    VEIN LIGATION  09/28/2000    PHLEB VEINS - EXTREM (20+) 57852 (High ligation of ling saphenous vein, left, plus multiple phlebectomies, left lower extremity.)       Social History     Socioeconomic History    Marital status:    Tobacco Use    Smoking status: Never    Smokeless tobacco: Never   Vaping Use    Vaping Use: Never used   Substance and Sexual Activity    Alcohol use: Yes     Alcohol/week: 2.0 standard drinks of alcohol     Types: 2 Glasses of wine per week     Comment: Maybe every two to three weeks    Drug use: Never    Sexual activity: Not Currently     Partners: Male     Birth control/protection: Surgical       Family History   Problem Relation Age of Onset    Cancer Mother         myeloma    Diabetes Mother     Heart disease Mother     Arthritis Mother     Hypertension Mother     Heart attack Mother     Heart disease Father     Arthritis Father     Heart attack Father     Stroke Father     Hypertension Father     Vision loss Father     Lung cancer Maternal Grandfather     Lung cancer Other     Diabetes Maternal Grandmother     Breast cancer Neg Hx        Review of Systems   Constitutional: Positive for malaise/fatigue.   Respiratory:  Positive for shortness of breath.    Musculoskeletal:  Positive for myalgias.   All other systems reviewed and are negative.      Allergies   Allergen Reactions    Raloxifene Swelling, Other (See Comments) and Unknown - High Severity     Caused pain     Gabapentin Other (See Comments) and Unknown - High Severity     HA/sedation    Latex Hives and Itching    Pregabalin Swelling, Other (See Comments) and Unknown - High Severity     swelling         Current Outpatient Medications:     albuterol sulfate  (90 Base) MCG/ACT inhaler, Inhale 2 puffs Every 4 (Four) Hours As Needed for Wheezing or Shortness of Air., Disp: 18 g, Rfl: 0    amLODIPine (NORVASC) 5 MG tablet, Take 1.5 tablets by mouth Daily., Disp: 135 tablet, Rfl: 3    Ascorbic Acid 500 MG capsule, , Disp: , Rfl:     aspirin 81 MG EC tablet, Take 1 tablet by mouth Daily., Disp: , Rfl:     Breo Ellipta 100-25 MCG/ACT aerosol powder , , Disp: , Rfl:     Calcium-Phosphorus-Vitamin D 250-107-500 MG-MG-UNIT chewable tablet, Chew 1 tablet Daily., Disp: , Rfl:     Calcium-Vitamin D-Vitamin K 500-1000-40 MG-UNT-MCG chewable tablet, 1qd, Disp: , Rfl:     cholecalciferol (VITAMIN D3) 25 MCG (1000 UT) tablet, Take 1 tablet by mouth Daily. (Patient taking differently: Take 2 tablets by mouth.), Disp: 90 tablet, Rfl: 1    cyclobenzaprine (FLEXERIL) 10 MG tablet, Take 1 tablet by mouth At Night As Needed for Muscle Spasms., Disp: 90 tablet, Rfl: 1    denosumab (Prolia) 60 MG/ML solution prefilled syringe syringe, , Disp: , Rfl:     DULoxetine (CYMBALTA) 60 MG capsule, Take 1 capsule by mouth Daily., Disp: 90 capsule, Rfl: 0    famotidine (PEPCID) 20 MG tablet, TAKE 1 TABLET TWICE A DAY, Disp: 180 tablet, Rfl: 1    fluticasone (FLONASE) 50 MCG/ACT nasal spray, 1 spray into the nostril(s) as directed by provider Daily., Disp: 16 g, Rfl: 5    folic acid (FOLVITE) 1 MG tablet, Take 1 tablet by mouth Daily., Disp: 90 tablet, Rfl: 3    levocetirizine (XYZAL) 5 MG tablet, , Disp: , Rfl:     magnesium oxide (MAG-OX) 400 MG tablet, Take 1 tablet by mouth Daily., Disp: , Rfl:     metoprolol succinate XL (TOPROL-XL) 25 MG 24 hr tablet, TAKE 1 TABLET TWICE A DAY, Disp: 180 tablet, Rfl: 1    multivitamins-minerals (PRESERVISION  "AREDS 2) capsule capsule, Take 1 capsule by mouth 2 (Two) Times a Day., Disp: 90 capsule, Rfl: 1    vitamin B-12 (CYANOCOBALAMIN) 1000 MCG tablet, Take 1 tablet by mouth 2 (Two) Times a Week., Disp: , Rfl:       Objective:     Vitals:    11/21/23 1444   BP: 142/82   BP Location: Left arm   Patient Position: Sitting   Cuff Size: Adult   Pulse: 70   SpO2: 100%   Weight: 80.7 kg (178 lb)   Height: 172.7 cm (68\")       Body mass index is 27.06 kg/m².    Vitals reviewed.   Constitutional:       Appearance: Healthy appearance. Well-developed and not in distress.   Eyes:      Conjunctiva/sclera: Conjunctivae normal.   HENT:      Head: Normocephalic.      Nose: Nose normal.   Neck:      Vascular: No JVD. JVD normal.      Lymphadenopathy: No cervical adenopathy.   Pulmonary:      Effort: Pulmonary effort is normal.      Breath sounds: Normal breath sounds.   Cardiovascular:      Normal rate. Regular rhythm.      Murmurs: There is no murmur.   Pulses:     Intact distal pulses.   Edema:     Peripheral edema absent.   Abdominal:      Palpations: Abdomen is soft.      Tenderness: There is no abdominal tenderness.   Musculoskeletal: Normal range of motion.      Cervical back: Normal range of motion. Skin:     General: Skin is warm and dry.      Findings: No rash.   Neurological:      General: No focal deficit present.      Mental Status: Alert and oriented to person, place, and time.      Cranial Nerves: No cranial nerve deficit.   Psychiatric:         Behavior: Behavior normal.         Thought Content: Thought content normal.         Judgment: Judgment normal.         ECG 12 Lead    Date/Time: 11/21/2023 3:17 PM  Performed by: Stephan Hernandez MD    Authorized by: Stephan Hernandez MD  Comparison: compared with previous ECG   Similar to previous ECG  Rhythm: sinus rhythm  Conduction: conduction normal  ST Segments: ST segments normal  T Waves: T waves normal  QRS axis: normal  Other: no other findings    Clinical impression: normal " ECG          Assessment:       Diagnosis Plan   1. Mitral valve prolapse        2. Essential hypertension  amLODIPine (NORVASC) 5 MG tablet      3. Stage 3a chronic kidney disease             Plan:       Mrs Charles has mild anterior MVP without significant MR. Her last echo was in October 2023.  This will be repeated in 2026 unless her exam or symptoms change.    Her blood pressure is just a little too high.  I would like her pressure to be in the 130s over 70s to help prevent worsening of her CKD.  I have increased amlodipine to 7.5 mg daily.    Sincerely,       Stephan Hernandez MD

## 2023-12-01 DIAGNOSIS — M79.7 FIBROMYALGIA: Primary | ICD-10-CM

## 2023-12-01 RX ORDER — DULOXETIN HYDROCHLORIDE 60 MG/1
60 CAPSULE, DELAYED RELEASE ORAL DAILY
Qty: 90 CAPSULE | Refills: 0 | Status: SHIPPED | OUTPATIENT
Start: 2023-12-01

## 2023-12-04 ENCOUNTER — TRANSCRIBE ORDERS (OUTPATIENT)
Dept: ADMINISTRATIVE | Facility: HOSPITAL | Age: 73
End: 2023-12-04
Payer: COMMERCIAL

## 2023-12-04 ENCOUNTER — LAB (OUTPATIENT)
Dept: LAB | Facility: HOSPITAL | Age: 73
End: 2023-12-04
Payer: MEDICARE

## 2023-12-04 DIAGNOSIS — I12.9 HYPERTENSIVE NEPHROPATHY: ICD-10-CM

## 2023-12-04 DIAGNOSIS — N18.32 CHRONIC KIDNEY DISEASE (CKD) STAGE G3B/A1, MODERATELY DECREASED GLOMERULAR FILTRATION RATE (GFR) BETWEEN 30-44 ML/MIN/1.73 SQUARE METER AND ALBUMINURIA CREATININE RATIO LESS THAN 30 MG/G (CMS/H*: ICD-10-CM

## 2023-12-04 DIAGNOSIS — N18.32 CHRONIC KIDNEY DISEASE (CKD) STAGE G3B/A1, MODERATELY DECREASED GLOMERULAR FILTRATION RATE (GFR) BETWEEN 30-44 ML/MIN/1.73 SQUARE METER AND ALBUMINURIA CREATININE RATIO LESS THAN 30 MG/G (CMS/H*: Primary | ICD-10-CM

## 2023-12-04 LAB
ALBUMIN SERPL-MCNC: 4.6 G/DL (ref 3.5–5.2)
ALBUMIN UR-MCNC: <1.2 MG/DL
ANION GAP SERPL CALCULATED.3IONS-SCNC: 11 MMOL/L (ref 5–15)
BACTERIA UR QL AUTO: NORMAL /HPF
BILIRUB UR QL STRIP: NEGATIVE
BUN SERPL-MCNC: 30 MG/DL (ref 8–23)
BUN/CREAT SERPL: 22.1 (ref 7–25)
CALCIUM SPEC-SCNC: 9.1 MG/DL (ref 8.6–10.5)
CHLORIDE SERPL-SCNC: 101 MMOL/L (ref 98–107)
CLARITY UR: CLEAR
CO2 SERPL-SCNC: 25 MMOL/L (ref 22–29)
COLOR UR: YELLOW
CREAT SERPL-MCNC: 1.36 MG/DL (ref 0.57–1)
CREAT UR-MCNC: 111.5 MG/DL
CREAT UR-MCNC: 111.5 MG/DL
EGFRCR SERPLBLD CKD-EPI 2021: 41.2 ML/MIN/1.73
GLUCOSE SERPL-MCNC: 107 MG/DL (ref 65–99)
GLUCOSE UR STRIP-MCNC: NEGATIVE MG/DL
HGB UR QL STRIP.AUTO: NEGATIVE
HYALINE CASTS UR QL AUTO: NORMAL /LPF
KETONES UR QL STRIP: NEGATIVE
LEUKOCYTE ESTERASE UR QL STRIP.AUTO: NEGATIVE
MICROALBUMIN/CREAT UR: NORMAL MG/G{CREAT}
NITRITE UR QL STRIP: NEGATIVE
PH UR STRIP.AUTO: 6 [PH] (ref 5–8)
PHOSPHATE SERPL-MCNC: 4.2 MG/DL (ref 2.5–4.5)
POTASSIUM SERPL-SCNC: 4.1 MMOL/L (ref 3.5–5.2)
PROT ?TM UR-MCNC: 7.4 MG/DL
PROT UR QL STRIP: NEGATIVE
PROT/CREAT UR: 66.4 MG/G CREA (ref 0–200)
RBC # UR STRIP: NORMAL /HPF
REF LAB TEST METHOD: NORMAL
SODIUM SERPL-SCNC: 137 MMOL/L (ref 136–145)
SP GR UR STRIP: 1.02 (ref 1–1.03)
SQUAMOUS #/AREA URNS HPF: NORMAL /HPF
UROBILINOGEN UR QL STRIP: NORMAL
WBC # UR STRIP: NORMAL /HPF

## 2023-12-04 PROCEDURE — 80069 RENAL FUNCTION PANEL: CPT

## 2023-12-04 PROCEDURE — 36415 COLL VENOUS BLD VENIPUNCTURE: CPT

## 2023-12-04 PROCEDURE — 81001 URINALYSIS AUTO W/SCOPE: CPT

## 2023-12-04 PROCEDURE — 82570 ASSAY OF URINE CREATININE: CPT

## 2023-12-04 PROCEDURE — 82043 UR ALBUMIN QUANTITATIVE: CPT

## 2023-12-04 PROCEDURE — 84156 ASSAY OF PROTEIN URINE: CPT

## 2024-01-03 ENCOUNTER — APPOINTMENT (OUTPATIENT)
Dept: BONE DENSITY | Facility: HOSPITAL | Age: 74
End: 2024-01-03
Payer: COMMERCIAL

## 2024-01-03 ENCOUNTER — HOSPITAL ENCOUNTER (OUTPATIENT)
Dept: MAMMOGRAPHY | Facility: HOSPITAL | Age: 74
Discharge: HOME | End: 2024-01-03
Payer: COMMERCIAL

## 2024-01-03 DIAGNOSIS — M81.0 AGE-RELATED OSTEOPOROSIS WITHOUT CURRENT PATHOLOGICAL FRACTURE: ICD-10-CM

## 2024-01-03 DIAGNOSIS — Z12.39 ENCOUNTER FOR SCREENING FOR MALIGNANT NEOPLASM OF BREAST, UNSPECIFIED SCREENING MODALITY: ICD-10-CM

## 2024-01-03 PROCEDURE — 77063 BREAST TOMOSYNTHESIS BI: CPT

## 2024-01-03 PROCEDURE — 77067 SCR MAMMO BI INCL CAD: CPT

## 2024-01-03 PROCEDURE — 77080 DXA BONE DENSITY AXIAL: CPT

## 2024-01-09 DIAGNOSIS — E05.90 HYPERTHYROIDISM: ICD-10-CM

## 2024-01-09 DIAGNOSIS — I10 ESSENTIAL HYPERTENSION: Primary | ICD-10-CM

## 2024-01-09 DIAGNOSIS — Z00.00 MEDICARE ANNUAL WELLNESS VISIT, SUBSEQUENT: ICD-10-CM

## 2024-01-22 ENCOUNTER — OFFICE VISIT (OUTPATIENT)
Dept: FAMILY MEDICINE CLINIC | Facility: CLINIC | Age: 74
End: 2024-01-22
Payer: MEDICARE

## 2024-01-22 VITALS
HEIGHT: 68 IN | TEMPERATURE: 97.8 F | DIASTOLIC BLOOD PRESSURE: 82 MMHG | HEART RATE: 73 BPM | BODY MASS INDEX: 27.13 KG/M2 | WEIGHT: 179 LBS | SYSTOLIC BLOOD PRESSURE: 120 MMHG | OXYGEN SATURATION: 99 %

## 2024-01-22 DIAGNOSIS — N18.32 STAGE 3B CHRONIC KIDNEY DISEASE: ICD-10-CM

## 2024-01-22 DIAGNOSIS — I10 ESSENTIAL HYPERTENSION: ICD-10-CM

## 2024-01-22 DIAGNOSIS — Z00.00 MEDICARE ANNUAL WELLNESS VISIT, SUBSEQUENT: Primary | ICD-10-CM

## 2024-01-22 DIAGNOSIS — K21.9 GASTROESOPHAGEAL REFLUX DISEASE WITHOUT ESOPHAGITIS: ICD-10-CM

## 2024-01-22 DIAGNOSIS — M79.7 FIBROMYALGIA: ICD-10-CM

## 2024-01-22 DIAGNOSIS — M81.0 AGE-RELATED OSTEOPOROSIS WITHOUT CURRENT PATHOLOGICAL FRACTURE: ICD-10-CM

## 2024-01-22 PROCEDURE — 1170F FXNL STATUS ASSESSED: CPT | Performed by: NURSE PRACTITIONER

## 2024-01-22 PROCEDURE — 99213 OFFICE O/P EST LOW 20 MIN: CPT | Performed by: NURSE PRACTITIONER

## 2024-01-22 PROCEDURE — 3074F SYST BP LT 130 MM HG: CPT | Performed by: NURSE PRACTITIONER

## 2024-01-22 PROCEDURE — G0439 PPPS, SUBSEQ VISIT: HCPCS | Performed by: NURSE PRACTITIONER

## 2024-01-22 PROCEDURE — 3079F DIAST BP 80-89 MM HG: CPT | Performed by: NURSE PRACTITIONER

## 2024-01-22 RX ORDER — METOPROLOL SUCCINATE 25 MG/1
25 TABLET, EXTENDED RELEASE ORAL 2 TIMES DAILY
Qty: 180 TABLET | Refills: 1 | Status: SHIPPED | OUTPATIENT
Start: 2024-01-22

## 2024-01-22 RX ORDER — SCOLOPAMINE TRANSDERMAL SYSTEM 1 MG/1
1 PATCH, EXTENDED RELEASE TRANSDERMAL
Qty: 4 EACH | Refills: 0 | Status: SHIPPED | OUTPATIENT
Start: 2024-01-22

## 2024-01-22 RX ORDER — DULOXETIN HYDROCHLORIDE 60 MG/1
60 CAPSULE, DELAYED RELEASE ORAL DAILY
Qty: 90 CAPSULE | Refills: 1 | Status: SHIPPED | OUTPATIENT
Start: 2024-01-22

## 2024-01-22 RX ORDER — FAMOTIDINE 20 MG/1
20 TABLET, FILM COATED ORAL 2 TIMES DAILY
Qty: 180 TABLET | Refills: 1 | Status: SHIPPED | OUTPATIENT
Start: 2024-01-22

## 2024-01-22 NOTE — PROGRESS NOTES
The ABCs of the Annual Wellness Visit  Subsequent Medicare Wellness Visit      Chief Complaint   Patient presents with    Medicare Wellness-subsequent    Hypertension    Chronic Kidney Disease       Subjective    Myra Charles is a 73 y.o. female who presents for a Subsequent Medicare Wellness Visit.    The following portions of the patient's history were reviewed and   updated as appropriate: allergies, current medications, past family history, past medical history, past social history, past surgical history, and problem list.    Compared to one year ago, the patient feels her physical   health is the same.    Compared to one year ago, the patient feels her mental   health is the same.    Recent Hospitalizations:  She was not admitted to the hospital during the last year.       Current Medical Providers:  Patient Care Team:  Head, ASHLEY Da Silva as PCP - General (Nurse Practitioner)  Mayur Neal MD as Consulting Physician (Hematology and Oncology)  Damian Rock DPM as Consulting Physician (Podiatry)  Jeri Newton MA (Inactive) as Medical Assistant  Jfeferson West Jr., DO as Referring Physician (Family Medicine)  Cruzito Souza MD as Consulting Physician (Hematology and Oncology)    Outpatient Medications Prior to Visit   Medication Sig Dispense Refill    albuterol sulfate  (90 Base) MCG/ACT inhaler Inhale 2 puffs Every 4 (Four) Hours As Needed for Wheezing or Shortness of Air. 18 g 0    amLODIPine (NORVASC) 5 MG tablet Take 1.5 tablets by mouth Daily. 135 tablet 3    Ascorbic Acid 500 MG capsule       aspirin 81 MG EC tablet Take 1 tablet by mouth Daily.      Breo Ellipta 100-25 MCG/ACT aerosol powder        Calcium-Vitamin D-Vitamin K 500-1000-40 MG-UNT-MCG chewable tablet 1qd      cholecalciferol (VITAMIN D3) 25 MCG (1000 UT) tablet Take 1 tablet by mouth Daily. (Patient taking differently: Take 2 tablets by mouth.) 90 tablet 1    cyclobenzaprine (FLEXERIL) 10 MG tablet Take 1  tablet by mouth At Night As Needed for Muscle Spasms. 90 tablet 1    denosumab (Prolia) 60 MG/ML solution prefilled syringe syringe       fluticasone (FLONASE) 50 MCG/ACT nasal spray 1 spray into the nostril(s) as directed by provider Daily. 16 g 5    folic acid (FOLVITE) 1 MG tablet Take 1 tablet by mouth Daily. 90 tablet 3    levocetirizine (XYZAL) 5 MG tablet       magnesium oxide (MAG-OX) 400 MG tablet Take 1 tablet by mouth Daily.      multivitamins-minerals (PRESERVISION AREDS 2) capsule capsule Take 1 capsule by mouth 2 (Two) Times a Day. 90 capsule 1    vitamin B-12 (CYANOCOBALAMIN) 1000 MCG tablet Take 1 tablet by mouth 2 (Two) Times a Week.      DULoxetine (CYMBALTA) 60 MG capsule Take 1 capsule by mouth Daily. 90 capsule 0    famotidine (PEPCID) 20 MG tablet TAKE 1 TABLET TWICE A  tablet 1    metoprolol succinate XL (TOPROL-XL) 25 MG 24 hr tablet TAKE 1 TABLET TWICE A  tablet 1    Calcium-Phosphorus-Vitamin D 250-107-500 MG-MG-UNIT chewable tablet Chew 1 tablet Daily. (Patient not taking: Reported on 1/22/2024)       No facility-administered medications prior to visit.       No opioid medication identified on active medication list. I have reviewed chart for other potential  high risk medication/s and harmful drug interactions in the elderly.        Aspirin is on active medication list. Aspirin use is indicated based on review of current medical condition/s. Pros and cons of this therapy have been discussed today. Benefits of this medication outweigh potential harm.  Patient has been encouraged to continue taking this medication.  .      Patient Active Problem List   Diagnosis    Nonexudative age-related macular degeneration, bilateral, early dry stage    Cortical age-related cataract    Hyperthyroidism    Multinodular goiter (nontoxic)    Osteoporosis    B12 deficiency    Malignant neoplasm of upper lobe of left lung    Idiopathic peripheral neuropathy    Gastroesophageal reflux disease  without esophagitis    Elevated ferritin    Abnormal blood level of iron    Fibromyalgia    History of deep venous thrombosis    History of partial thyroidectomy    Hoarseness or changing voice    Encounter for general adult medical examination without abnormal findings    Osteopenia    PAC (premature atrial contraction)    Restless legs syndrome    Screen for colon cancer    Medicare annual wellness visit, subsequent    Vitreous floaters    Vitreous detachment    Vitamin D deficiency    Varicose veins of both lower extremities with pain    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    Seborrheic keratosis    Rosacea    Fibrositis    Mitral valve prolapse    Malignant carcinoid tumor of lung    Lung nodule, solitary    Iron deficiency anemia    Hallux valgus    GERD (gastroesophageal reflux disease)    Essential hypertension    Cortical senile cataract    Backache    Neck pain    CKD (chronic kidney disease) stage 3, GFR 30-59 ml/min    Overweight (BMI 25.0-29.9)    Cervical nerve root compression    Chronic left-sided low back pain with left-sided sciatica    DDD (degenerative disc disease), cervical    Dupuytren's contracture of right hand    Elevated alkaline phosphatase level    History of acute renal failure    History of bilateral cataract extraction    History of fracture of left ankle    History of iron deficiency    History of malignant carcinoid tumor of bronchus and lung    Non-rheumatic mitral regurgitation    Osteoarthritis    Bile reflux esophagitis    Left foot pain    Anemia     Advance Care Planning   Advance Care Planning     Advance Directive is on file.  ACP discussion was held with the patient during this visit. Patient has an advance directive in EMR which is still valid.      Objective    Vitals:    01/22/24 1421   BP: 120/82   BP Location: Left arm   Patient Position: Sitting   Cuff Size: Adult   Pulse: 73  "  Temp: 97.8 °F (36.6 °C)   SpO2: 99%   Weight: 81.2 kg (179 lb)   Height: 172.7 cm (68\")     Estimated body mass index is 27.22 kg/m² as calculated from the following:    Height as of this encounter: 172.7 cm (68\").    Weight as of this encounter: 81.2 kg (179 lb).    BMI is >= 25 and <30. (Overweight) The following options were offered after discussion;: exercise counseling/recommendations, nutrition counseling/recommendations, and information on healthy weight added to patient's after visit summary     Does the patient have evidence of cognitive impairment? No    Lab Results   Component Value Date    CHLPL 198 2024    TRIG 79 2024    HDL 67 (H) 2024     (H) 2024    VLDL 14 2024        HEALTH RISK ASSESSMENT    Smoking Status:  Social History     Tobacco Use   Smoking Status Never   Smokeless Tobacco Never     Alcohol Consumption:  Social History     Substance and Sexual Activity   Alcohol Use Yes    Alcohol/week: 2.0 standard drinks of alcohol    Types: 2 Glasses of wine per week    Comment: Maybe every two to three weeks     Fall Risk Screen:    GILBERT Fall Risk Assessment was completed, and patient is at MODERATE risk for falls. Assessment completed on:2024    Depression Screenin/22/2024     2:28 PM   PHQ-2/PHQ-9 Depression Screening   Little Interest or Pleasure in Doing Things 0-->not at all   Feeling Down, Depressed or Hopeless 0-->not at all   PHQ-9: Brief Depression Severity Measure Score 0       Health Habits and Functional and Cognitive Screenin/22/2024     2:27 PM   Functional & Cognitive Status   Do you have difficulty preparing food and eating? No   Do you have difficulty bathing yourself, getting dressed or grooming yourself? No   Do you have difficulty using the toilet? No   Do you have difficulty moving around from place to place? No   Do you have trouble with steps or getting out of a bed or a chair? No   Current Diet Well Balanced Diet "   Dental Exam Up to date   Eye Exam Up to date   Exercise (times per week) 4 times per week   Current Exercises Include Walking   Do you need help using the phone?  No   Are you deaf or do you have serious difficulty hearing?  No   Do you need help to go to places out of walking distance? No   Do you need help shopping? No   Do you need help preparing meals?  No   Do you need help with housework?  No   Do you need help with laundry? No   Do you need help taking your medications? No   Do you need help managing money? No   Do you ever drive or ride in a car without wearing a seat belt? No   Have you felt unusual stress, anger or loneliness in the last month? No   Who do you live with? Child   If you need help, do you have trouble finding someone available to you? No   Have you been bothered in the last four weeks by sexual problems? No   Do you have difficulty concentrating, remembering or making decisions? No       Age-appropriate Screening Schedule:  Refer to the list below for future screening recommendations based on patient's age, sex and/or medical conditions. Orders for these recommended tests are listed in the plan section. The patient has been provided with a written plan.    Health Maintenance   Topic Date Due    TDAP/TD VACCINES (3 - Td or Tdap) 04/18/2024    ANNUAL WELLNESS VISIT  01/22/2025    BMI FOLLOWUP  01/22/2025    MAMMOGRAM  01/03/2026    DXA SCAN  01/03/2026    COLORECTAL CANCER SCREENING  01/05/2032    HEPATITIS C SCREENING  Completed    COVID-19 Vaccine  Completed    INFLUENZA VACCINE  Completed    Pneumococcal Vaccine 65+  Completed    ZOSTER VACCINE  Completed                  CMS Preventative Services Quick Reference  Risk Factors Identified During Encounter  Fall Risk-High or Moderate: Information on Fall Prevention Shared in After Visit Summary  Dental Screening Recommended  Vision Screening Recommended  The above risks/problems have been discussed with the patient.  Pertinent information  has been shared with the patient in the After Visit Summary.  An After Visit Summary and PPPS were made available to the patient.    Follow Up:   Next Medicare Wellness visit to be scheduled in 1 year.       Additional E&M Note during same encounter follows:  Patient has multiple medical problems which are significant and separately identifiable that require additional work above and beyond the Medicare Wellness Visit.      Chief Complaint  Medicare Wellness-subsequent, Hypertension, and Chronic Kidney Disease    Subjective        HPI  Myra Charles is also being seen today for concerning hypertension, fibromyalgia, and chronic kidney disease.  Since she was last seen, she feels she has been doing well.  She was referred to nephrology due to worsening kidney function..   Seen nephrology on 12/11/23.  Underwent renal ultrasound which was stable. US Renal Bilateral (07/12/2023 16:27) She has a follow-up scheduled with nephrology on 7/1/2024.  She always limits her salt intake.  She has been attempting to increase fluids.  No NSAID use.  Blood pressure has been stable.  She is followed by ENT for continued management of her thyroid surgery.  She is followed by pulmonary for continued management of COPD, history of lung cancer, and pulmonary nodules.  Most recent chest x-ray she was told was stable.  She is followed by hematology due to elevated ferritin levels.  This has been stable and instructed follow-up in 2 years.  Fibromyalgia: Currently managed with duloxetine.  She is on Prolia injections for osteoporosis. DEXA Bone Density Axial (01/03/2024 09:37)   Mammo Screening Digital Tomosynthesis Bilateral With CAD (01/03/2024 09:09)  Colonoscopy is up-to-date. 1/5/2022.    Scheduled for cruise this summer to [place] with her daughter.  Requesting scopolamine patches.  Has never been on a cruise before and worried about motion sickness.       Objective   Vital Signs:  /82 (BP Location: Left arm, Patient  "Position: Sitting, Cuff Size: Adult)   Pulse 73   Temp 97.8 °F (36.6 °C)   Ht 172.7 cm (68\")   Wt 81.2 kg (179 lb)   SpO2 99%   BMI 27.22 kg/m²     Physical Exam  Constitutional:       Appearance: She is well-developed.   HENT:      Head: Normocephalic and atraumatic.      Right Ear: Tympanic membrane normal.      Left Ear: Tympanic membrane normal.   Eyes:      Pupils: Pupils are equal, round, and reactive to light.   Cardiovascular:      Rate and Rhythm: Normal rate and regular rhythm.      Heart sounds: Normal heart sounds. No murmur heard.  Pulmonary:      Effort: Pulmonary effort is normal.      Breath sounds: Normal breath sounds. No wheezing, rhonchi or rales.   Abdominal:      General: Bowel sounds are normal.      Palpations: Abdomen is soft.      Tenderness: There is no abdominal tenderness.   Musculoskeletal:         General: No tenderness. Normal range of motion.      Cervical back: Normal range of motion and neck supple.   Skin:     General: Skin is warm and dry.      Findings: No erythema or rash.   Neurological:      Mental Status: She is alert and oriented to person, place, and time.   Psychiatric:         Behavior: Behavior normal.          The following data was reviewed by: ASHLEY Ramirez on 01/22/2024:  Common labs          7/24/2023    16:47 7/24/2023    17:46 12/4/2023    13:18 1/17/2024    09:03   Common Labs   Glucose  155  107  75    BUN  29  30  26    Creatinine  1.70  1.36  1.48    Sodium  138  137  140    Potassium  4.4  4.1  4.2    Chloride  103  101  102    Calcium  9.1  9.1  9.1    Total Protein    6.2    Albumin  4.6  4.6  4.5    Total Bilirubin    0.3    Alkaline Phosphatase    102    AST (SGOT)    19    ALT (SGPT)    22    WBC    7.21    Hemoglobin    12.4    Hematocrit    38.9    Platelets    231    Total Cholesterol    198    Triglycerides    79    HDL Cholesterol    67    LDL Cholesterol     117    Microalbumin, Urine 2.0   <1.2             Summary:  Myra Guajardo " Melvin has been doing well since she was last seen.  Reviewed recent labs along with patient which all remained stable.  Her kidney function has remained at baseline.  Blood pressure stable at 120/82.  No changes to medications at this time.  Follow-up with specialist as scheduled.  Follow-up in 6 months for next recheck appointment with fasting labs.  In the meantime, instructed contact us sooner for any problems or concerns.     Assessment and Plan   Diagnoses and all orders for this visit:    1. Medicare annual wellness visit, subsequent (Primary)    2. Stage 3b chronic kidney disease  -     CBC (No Diff); Future  -     Comprehensive Metabolic Panel; Future  -     Lipid Panel With / Chol / HDL Ratio; Future  -     TSH Rfx On Abnormal To Free T4; Future  -     UA / M With / Rflx Culture([facility] ONLY) - Urine, Clean Catch; Future    3. Fibromyalgia  -     CBC (No Diff); Future  -     Comprehensive Metabolic Panel; Future  -     Lipid Panel With / Chol / HDL Ratio; Future  -     TSH Rfx On Abnormal To Free T4; Future  -     UA / M With / Rflx Culture([facility] ONLY) - Urine, Clean Catch; Future  -     DULoxetine (CYMBALTA) 60 MG capsule; Take 1 capsule by mouth Daily.  Dispense: 90 capsule; Refill: 1    4. Age-related osteoporosis without current pathological fracture    5. Essential hypertension  -     CBC (No Diff); Future  -     Comprehensive Metabolic Panel; Future  -     Lipid Panel With / Chol / HDL Ratio; Future  -     TSH Rfx On Abnormal To Free T4; Future  -     UA / M With / Rflx Culture([facility] ONLY) - Urine, Clean Catch; Future  -     metoprolol succinate XL (TOPROL-XL) 25 MG 24 hr tablet; Take 1 tablet by mouth 2 (Two) Times a Day.  Dispense: 180 tablet; Refill: 1    6. Gastroesophageal reflux disease without esophagitis  -     famotidine (PEPCID) 20 MG tablet; Take 1 tablet by mouth 2 (Two) Times a Day.  Dispense: 180 tablet; Refill: 1    Other orders  -     Scopolamine 1 MG/3DAYS patch; Place 1 patch on  the skin as directed by provider Every 72 (Seventy-Two) Hours.  Dispense: 4 each; Refill: 0             Follow Up   Return in about 6 months (around 7/22/2024) for Recheck on HTN with fasting labs week before.  .  Patient was given instructions and counseling regarding her condition or for health maintenance advice. Please see specific information pulled into the AVS if appropriate.     Yvette Muñiz, APRN

## 2024-04-09 ENCOUNTER — APPOINTMENT (OUTPATIENT)
Dept: CT IMAGING | Facility: HOSPITAL | Age: 74
End: 2024-04-09
Payer: COMMERCIAL

## 2024-04-09 ENCOUNTER — OFFICE VISIT (OUTPATIENT)
Dept: FAMILY MEDICINE CLINIC | Facility: CLINIC | Age: 74
End: 2024-04-09
Payer: COMMERCIAL

## 2024-04-09 ENCOUNTER — HOSPITAL ENCOUNTER (EMERGENCY)
Facility: HOSPITAL | Age: 74
Discharge: HOME | End: 2024-04-09
Attending: EMERGENCY MEDICINE | Admitting: EMERGENCY MEDICINE
Payer: COMMERCIAL

## 2024-04-09 VITALS
OXYGEN SATURATION: 98 % | HEIGHT: 68 IN | HEART RATE: 75 BPM | WEIGHT: 178 LBS | BODY MASS INDEX: 26.98 KG/M2 | TEMPERATURE: 97.7 F | SYSTOLIC BLOOD PRESSURE: 120 MMHG | DIASTOLIC BLOOD PRESSURE: 68 MMHG

## 2024-04-09 VITALS
BODY MASS INDEX: 26.96 KG/M2 | OXYGEN SATURATION: 99 % | DIASTOLIC BLOOD PRESSURE: 78 MMHG | WEIGHT: 177.91 LBS | TEMPERATURE: 97.3 F | RESPIRATION RATE: 16 BRPM | HEIGHT: 68 IN | SYSTOLIC BLOOD PRESSURE: 148 MMHG | HEART RATE: 68 BPM

## 2024-04-09 DIAGNOSIS — I95.1 ORTHOSTATIC HYPOTENSION: Primary | ICD-10-CM

## 2024-04-09 DIAGNOSIS — N18.9 CHRONIC KIDNEY DISEASE, UNSPECIFIED CKD STAGE: ICD-10-CM

## 2024-04-09 DIAGNOSIS — I95.1 ORTHOSTATIC HYPOTENSION: ICD-10-CM

## 2024-04-09 DIAGNOSIS — R42 DIZZINESS: ICD-10-CM

## 2024-04-09 DIAGNOSIS — R07.9 CHEST PAIN, UNSPECIFIED TYPE: Primary | ICD-10-CM

## 2024-04-09 DIAGNOSIS — Z86.718 HISTORY OF DVT (DEEP VEIN THROMBOSIS): ICD-10-CM

## 2024-04-09 LAB
ALBUMIN SERPL-MCNC: 4.5 G/DL (ref 3.5–5.2)
ALBUMIN/GLOB SERPL: 1.7 G/DL
ALP SERPL-CCNC: 127 U/L (ref 39–117)
ALT SERPL W P-5'-P-CCNC: 22 U/L (ref 1–33)
ANION GAP SERPL CALCULATED.3IONS-SCNC: 12.9 MMOL/L (ref 5–15)
AST SERPL-CCNC: 20 U/L (ref 1–32)
BASOPHILS # BLD AUTO: 0.09 10*3/MM3 (ref 0–0.2)
BASOPHILS NFR BLD AUTO: 1 % (ref 0–1.5)
BILIRUB SERPL-MCNC: <0.2 MG/DL (ref 0–1.2)
BUN SERPL-MCNC: 29 MG/DL (ref 8–23)
BUN/CREAT SERPL: 18.6 (ref 7–25)
CALCIUM SPEC-SCNC: 8.8 MG/DL (ref 8.6–10.5)
CHLORIDE SERPL-SCNC: 107 MMOL/L (ref 98–107)
CO2 SERPL-SCNC: 24.1 MMOL/L (ref 22–29)
CREAT SERPL-MCNC: 1.56 MG/DL (ref 0.57–1)
D DIMER PPP FEU-MCNC: 2.68 MCGFEU/ML (ref 0–0.73)
DEPRECATED RDW RBC AUTO: 40.3 FL (ref 37–54)
EGFRCR SERPLBLD CKD-EPI 2021: 35 ML/MIN/1.73
EOSINOPHIL # BLD AUTO: 0.2 10*3/MM3 (ref 0–0.4)
EOSINOPHIL NFR BLD AUTO: 2.3 % (ref 0.3–6.2)
ERYTHROCYTE [DISTWIDTH] IN BLOOD BY AUTOMATED COUNT: 12.3 % (ref 12.3–15.4)
GEN 5 2HR TROPONIN T REFLEX: 12 NG/L
GLOBULIN UR ELPH-MCNC: 2.7 GM/DL
GLUCOSE SERPL-MCNC: 86 MG/DL (ref 65–99)
HCT VFR BLD AUTO: 42.9 % (ref 34–46.6)
HGB BLD-MCNC: 13.4 G/DL (ref 12–15.9)
IMM GRANULOCYTES # BLD AUTO: 0.03 10*3/MM3 (ref 0–0.05)
IMM GRANULOCYTES NFR BLD AUTO: 0.3 % (ref 0–0.5)
LYMPHOCYTES # BLD AUTO: 2.58 10*3/MM3 (ref 0.7–3.1)
LYMPHOCYTES NFR BLD AUTO: 29.9 % (ref 19.6–45.3)
MAGNESIUM SERPL-MCNC: 2.2 MG/DL (ref 1.6–2.4)
MCH RBC QN AUTO: 27.8 PG (ref 26.6–33)
MCHC RBC AUTO-ENTMCNC: 31.2 G/DL (ref 31.5–35.7)
MCV RBC AUTO: 89 FL (ref 79–97)
MONOCYTES # BLD AUTO: 0.94 10*3/MM3 (ref 0.1–0.9)
MONOCYTES NFR BLD AUTO: 10.9 % (ref 5–12)
NEUTROPHILS NFR BLD AUTO: 4.8 10*3/MM3 (ref 1.7–7)
NEUTROPHILS NFR BLD AUTO: 55.6 % (ref 42.7–76)
NRBC BLD AUTO-RTO: 0 /100 WBC (ref 0–0.2)
PLATELET # BLD AUTO: 263 10*3/MM3 (ref 140–450)
PMV BLD AUTO: 9.3 FL (ref 6–12)
POTASSIUM SERPL-SCNC: 3.7 MMOL/L (ref 3.5–5.2)
PROT SERPL-MCNC: 7.2 G/DL (ref 6–8.5)
QT INTERVAL: 426 MS
QTC INTERVAL: 483 MS
RBC # BLD AUTO: 4.82 10*6/MM3 (ref 3.77–5.28)
SODIUM SERPL-SCNC: 144 MMOL/L (ref 136–145)
TROPONIN T DELTA: 0 NG/L
TROPONIN T SERPL HS-MCNC: 12 NG/L
WBC NRBC COR # BLD AUTO: 8.64 10*3/MM3 (ref 3.4–10.8)

## 2024-04-09 PROCEDURE — 93005 ELECTROCARDIOGRAM TRACING: CPT

## 2024-04-09 PROCEDURE — 36415 COLL VENOUS BLD VENIPUNCTURE: CPT

## 2024-04-09 PROCEDURE — 71275 CT ANGIOGRAPHY CHEST: CPT

## 2024-04-09 PROCEDURE — 99285 EMERGENCY DEPT VISIT HI MDM: CPT

## 2024-04-09 PROCEDURE — 25810000003 SODIUM CHLORIDE 0.9 % SOLUTION: Performed by: EMERGENCY MEDICINE

## 2024-04-09 PROCEDURE — 83735 ASSAY OF MAGNESIUM: CPT | Performed by: EMERGENCY MEDICINE

## 2024-04-09 PROCEDURE — 85025 COMPLETE CBC W/AUTO DIFF WBC: CPT | Performed by: EMERGENCY MEDICINE

## 2024-04-09 PROCEDURE — 93000 ELECTROCARDIOGRAM COMPLETE: CPT | Performed by: NURSE PRACTITIONER

## 2024-04-09 PROCEDURE — 80053 COMPREHEN METABOLIC PANEL: CPT | Performed by: EMERGENCY MEDICINE

## 2024-04-09 PROCEDURE — 84484 ASSAY OF TROPONIN QUANT: CPT | Performed by: EMERGENCY MEDICINE

## 2024-04-09 PROCEDURE — 99214 OFFICE O/P EST MOD 30 MIN: CPT | Performed by: NURSE PRACTITIONER

## 2024-04-09 PROCEDURE — 93010 ELECTROCARDIOGRAM REPORT: CPT | Performed by: INTERNAL MEDICINE

## 2024-04-09 PROCEDURE — 85379 FIBRIN DEGRADATION QUANT: CPT | Performed by: EMERGENCY MEDICINE

## 2024-04-09 PROCEDURE — 25510000001 IOPAMIDOL PER 1 ML: Performed by: EMERGENCY MEDICINE

## 2024-04-09 RX ADMIN — SODIUM CHLORIDE 1000 ML: 9 INJECTION, SOLUTION INTRAVENOUS at 16:29

## 2024-04-09 RX ADMIN — IOPAMIDOL 95 ML: 755 INJECTION, SOLUTION INTRAVENOUS at 18:09

## 2024-04-09 NOTE — ED TRIAGE NOTES
Patient to ED via PV from PCP. Patient reports that she was orthostatic positive at here PCP's appointment and her provider told her that her EKG had changes from her last.

## 2024-04-09 NOTE — DISCHARGE INSTRUCTIONS
Stay well-hydrated, continue current medications, follow-up with PCP and cardiology as discussed, ED return for worsening symptoms as needed.

## 2024-04-09 NOTE — ED PROVIDER NOTES
EMERGENCY DEPARTMENT ENCOUNTER    Room Number:  13/13  PCP: Yvette Muñiz APRN  Historian: Patient      HPI:  Chief Complaint: Positional lightheadedness and dizziness  A complete HPI/ROS/PMH/PSH/SH/FH are unobtainable due to: None    Context: Myra Charles is a 73 y.o. female who presents to the ED via private vehicle from doctor's office c/o acute change in EKG, orthostatic hypotension.  Patient has had intermittent issues with her blood pressure both high and low recently worsened positional change.  Has had some intermittent chest discomfort and shortness of breath.  No recent travel or recent surgeries, no significant new swelling or pain in her legs.  History of DVT but not on anticoagulation.  No fevers, chills, cough.      MEDICAL RECORD REVIEW    External (non-ED) record review: EKG reviewed from earlier today in the primary care provider office, patient with a sinus rhythm but showing signs of incomplete right bundle branch block, no STEMI              PAST MEDICAL HISTORY  Active Ambulatory Problems     Diagnosis Date Noted    Nonexudative age-related macular degeneration, bilateral, early dry stage 08/10/2016    Cortical age-related cataract 08/10/2016    Hyperthyroidism 10/14/2016    Multinodular goiter (nontoxic) 11/02/2016    Osteoporosis 11/02/2016    B12 deficiency 08/19/2013    Malignant neoplasm of upper lobe of left lung 11/26/2016    Idiopathic peripheral neuropathy 11/26/2016    Gastroesophageal reflux disease without esophagitis 07/19/2011    Elevated ferritin 06/20/2017    Abnormal blood level of iron 04/14/2016    Fibromyalgia 10/24/2012    History of deep venous thrombosis 12/13/2016    History of partial thyroidectomy 02/24/2011    Hoarseness or changing voice 07/28/2016    Encounter for general adult medical examination without abnormal findings 12/15/2014    Osteopenia 10/24/2012    PAC (premature atrial contraction) 04/19/2013    Restless legs syndrome 10/14/2009    Screen for  colon cancer 04/28/2015    Medicare annual wellness visit, subsequent 12/15/2014    Vitreous floaters 12/01/2014    Vitreous detachment 12/01/2014    Vitamin D deficiency 04/20/2016    Varicose veins of both lower extremities with pain     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 02/17/2016    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 04/20/2016    Seborrheic keratosis     Rosacea 02/17/2016    Fibrositis 02/17/2016    Mitral valve prolapse 02/17/2016    Malignant carcinoid tumor of lung 02/17/2016    Lung nodule, solitary 03/08/2012    Iron deficiency anemia     Hallux valgus     GERD (gastroesophageal reflux disease) 02/17/2016    Essential hypertension 02/24/2011    Cortical senile cataract 02/17/2016    Backache 02/17/2016    Neck pain 05/22/2019    CKD (chronic kidney disease) stage 3, GFR 30-59 ml/min 11/29/2019    Overweight (BMI 25.0-29.9) 11/29/2019    Cervical nerve root compression 07/09/2020    Chronic left-sided low back pain with left-sided sciatica 02/28/2018    DDD (degenerative disc disease), cervical 06/08/2020    Dupuytren's contracture of right hand 03/16/2020    Elevated alkaline phosphatase level 07/14/2020    History of acute renal failure 07/09/2020    History of bilateral cataract extraction 07/12/2018    History of fracture of left ankle 12/05/2018    History of iron deficiency 05/31/2019    History of malignant carcinoid tumor of bronchus and lung 04/24/2012    Non-rheumatic mitral regurgitation 07/30/2018    Osteoarthritis 08/16/2021    Bile reflux esophagitis 08/16/2021    Left foot pain 08/16/2021    Anemia 06/22/2022     Resolved Ambulatory Problems     Diagnosis Date Noted    Essential hypertension 10/14/2016    Palpitations 10/14/2016    Carcinoid tumor of lung 06/20/2017    Benign hypertension 02/17/2016    History of fall within past 90 days 11/18/2019     Past Medical History:   Diagnosis Date    Arthritis     Asthma     COPD (chronic  obstructive pulmonary disease)     Coronary artery disease     Deep vein thrombosis     Essential (primary) hypertension 04/20/2016    History of coronary angiogram     Primary fibromyalgia syndrome 02/17/2016    Varicose vein          PAST SURGICAL HISTORY  Past Surgical History:   Procedure Laterality Date    ADENOIDECTOMY  2/3/1956    ARTERIAL BYPASS SURGERY  09/25/2012    Artery bypass graft (Left femoral to posterior tibial artery bypass. Enodscopic vein harvest on the left. Left lower extremity arteriogram.)    BREAST CYST EXCISION      pt unsure of laterality    CERVICAL CONIZATION      CONIZATION OF CERVIX 86645 (Excisional laser cone biopsy and vaporization of cervix.)    CHOLECYSTECTOMY  09/07/2005    Cholecystectomy, laparoscopic (With intraoperative cholangiogram.)    COLONOSCOPY      COLONOSCOPY W/ POLYPECTOMY N/A 01/05/2022    Procedure: COLONOSCOPY WITH POLYPECTOMY;  Surgeon: Augustine Menjivar MD;  Location: [facility];  Service: Gastroenterology;  Laterality: N/A;  Rectal polyp    CYST REMOVAL      left 3rd toe    EXCISION LESION  11/11/1998    REMOVE LESION BACK OR FLANK 24569 (Excision times two.)    FOOT SURGERY  01/20/2009    Foot/toes surgery procedure (Soft tissue mass, left foot. Excision of)    HERNIA REPAIR      Past history of umbilical herni repair.    HYSTERECTOMY      OTHER SURGICAL HISTORY  12/04/2012    Anesth, elbow area surgery (Manipulation of left elbow.)    OTHER SURGICAL HISTORY  03/05/2012    Anesth, elbow area surgery (Excision of plate and screws from olecranon bursa. Retained plate and screws, olecranon bursitis of previous fracture left elbow.)    OTHER SURGICAL HISTORY  10/16/2012    Treat elbow fracture (Open reduction and internal fixation.)    THORACOSCOPY  03/14/2012    Thoracoscopy, surgical (Bronchoscopy,LVATS (wedge resect MARYBETH), LULobectomy Thoracic lymphadenectomy)    THROAT SURGERY  07/25/2012    Throat surgery procedure (Thyroplasty type I (vocal cord  medialozation & larynooplasty) Left vocal cord paralysis. Dysphoria. [facility] by Dr Tk Heart)    THYROID SURGERY  03/22/2016    Thyroid surgery (Right thyroid lobectomy and isthmusectomy.)    TONSILLECTOMY  02/03/1956    Chronic tonsillitis    VEIN LIGATION  09/28/2000    PHLEB VEINS - EXTREM (20+) 87465 (High ligation of ling saphenous vein, left, plus multiple phlebectomies, left lower extremity.)         FAMILY HISTORY  Family History   Problem Relation Age of Onset    Cancer Mother         myeloma    Diabetes Mother     Heart disease Mother     Arthritis Mother     Hypertension Mother     Heart attack Mother     Heart disease Father     Arthritis Father     Heart attack Father     Stroke Father     Hypertension Father     Vision loss Father     Lung cancer Maternal Grandfather     Lung cancer Other     Diabetes Maternal Grandmother     Breast cancer Neg Hx          SOCIAL HISTORY  Social History     Socioeconomic History    Marital status:    Tobacco Use    Smoking status: Never    Smokeless tobacco: Never   Vaping Use    Vaping status: Never Used   Substance and Sexual Activity    Alcohol use: Yes     Alcohol/week: 2.0 standard drinks of alcohol     Types: 2 Glasses of wine per week     Comment: Maybe every two to three weeks    Drug use: Never    Sexual activity: Not Currently     Partners: Male     Birth control/protection: Surgical         ALLERGIES  Raloxifene, Gabapentin, Latex, and Pregabalin        REVIEW OF SYSTEMS  Review of Systems     All systems reviewed and negative except for those discussed in HPI.       PHYSICAL EXAM    I have reviewed the triage vital signs and nursing notes.    ED Triage Vitals   Temp Heart Rate Resp BP SpO2   04/09/24 1546 04/09/24 1546 04/09/24 1546 04/09/24 1605 04/09/24 1546   97.3 °F (36.3 °C) 92 16 142/60 99 %      Temp src Heart Rate Source Patient Position BP Location FiO2 (%)   -- -- 04/09/24 1612 -- --     Lying         Physical Exam  General:  No acute distress, nontoxic  HEENT: Mucous membranes moist, atraumatic, EOMI  Neck: Full ROM  Pulm: Symmetric chest rise, nonlabored, lungs CTAB  Cardiovascular: Regular rate and rhythm, intact distal pulses  GI: Soft, nontender, nondistended, no rebound, no guarding, bowel sounds present  MSK: Full ROM, no deformity  Skin: Warm, dry  Neuro: Awake, alert, oriented x 4, GCS 15, moving all extremities, no focal deficits  Psych: Calm, cooperative        LAB RESULTS  Recent Results (from the past 24 hour(s))   ECG 12 Lead Other; ekg changes from PCP    Collection Time: 04/09/24  3:51 PM   Result Value Ref Range    QT Interval 426 ms    QTC Interval 483 ms   Comprehensive Metabolic Panel    Collection Time: 04/09/24  4:05 PM    Specimen: Blood   Result Value Ref Range    Glucose 86 65 - 99 mg/dL    BUN 29 (H) 8 - 23 mg/dL    Creatinine 1.56 (H) 0.57 - 1.00 mg/dL    Sodium 144 136 - 145 mmol/L    Potassium 3.7 3.5 - 5.2 mmol/L    Chloride 107 98 - 107 mmol/L    CO2 24.1 22.0 - 29.0 mmol/L    Calcium 8.8 8.6 - 10.5 mg/dL    Total Protein 7.2 6.0 - 8.5 g/dL    Albumin 4.5 3.5 - 5.2 g/dL    ALT (SGPT) 22 1 - 33 U/L    AST (SGOT) 20 1 - 32 U/L    Alkaline Phosphatase 127 (H) 39 - 117 U/L    Total Bilirubin <0.2 0.0 - 1.2 mg/dL    Globulin 2.7 gm/dL    A/G Ratio 1.7 g/dL    BUN/Creatinine Ratio 18.6 7.0 - 25.0    Anion Gap 12.9 5.0 - 15.0 mmol/L    eGFR 35.0 (L) >60.0 mL/min/1.73   D-dimer, Quantitative    Collection Time: 04/09/24  4:05 PM    Specimen: Blood   Result Value Ref Range    D-Dimer, Quantitative 2.68 (H) 0.00 - 0.73 MCGFEU/mL   High Sensitivity Troponin T    Collection Time: 04/09/24  4:05 PM    Specimen: Blood   Result Value Ref Range    HS Troponin T 12 <14 ng/L   Magnesium    Collection Time: 04/09/24  4:05 PM    Specimen: Blood   Result Value Ref Range    Magnesium 2.2 1.6 - 2.4 mg/dL   CBC Auto Differential    Collection Time: 04/09/24  4:05 PM    Specimen: Blood   Result Value Ref Range    WBC 8.64 3.40 -  10.80 10*3/mm3    RBC 4.82 3.77 - 5.28 10*6/mm3    Hemoglobin 13.4 12.0 - 15.9 g/dL    Hematocrit 42.9 34.0 - 46.6 %    MCV 89.0 79.0 - 97.0 fL    MCH 27.8 26.6 - 33.0 pg    MCHC 31.2 (L) 31.5 - 35.7 g/dL    RDW 12.3 12.3 - 15.4 %    RDW-SD 40.3 37.0 - 54.0 fl    MPV 9.3 6.0 - 12.0 fL    Platelets 263 140 - 450 10*3/mm3    Neutrophil % 55.6 42.7 - 76.0 %    Lymphocyte % 29.9 19.6 - 45.3 %    Monocyte % 10.9 5.0 - 12.0 %    Eosinophil % 2.3 0.3 - 6.2 %    Basophil % 1.0 0.0 - 1.5 %    Immature Grans % 0.3 0.0 - 0.5 %    Neutrophils, Absolute 4.80 1.70 - 7.00 10*3/mm3    Lymphocytes, Absolute 2.58 0.70 - 3.10 10*3/mm3    Monocytes, Absolute 0.94 (H) 0.10 - 0.90 10*3/mm3    Eosinophils, Absolute 0.20 0.00 - 0.40 10*3/mm3    Basophils, Absolute 0.09 0.00 - 0.20 10*3/mm3    Immature Grans, Absolute 0.03 0.00 - 0.05 10*3/mm3    nRBC 0.0 0.0 - 0.2 /100 WBC   High Sensitivity Troponin T 2Hr    Collection Time: 04/09/24  6:30 PM    Specimen: Blood   Result Value Ref Range    HS Troponin T 12 <14 ng/L    Troponin T Delta 0 >=-4 - <+4 ng/L       Ordered the above labs and independently interpreted results. My findings will be discussed in the medical decision making section below        RADIOLOGY  CT Angiogram Chest Pulmonary Embolism    Result Date: 4/9/2024  CT ANGIOGRAM OF THE CHEST. MULTIPLE CORONAL, SAGITTAL, AND 3-D RECONSTRUCTIONS.  HISTORY: Generalized weakness. Hypertension.  TECHNIQUE: Radiation dose reduction techniques were utilized, including automated exposure control and exposure modulation based on body size. CT angiogram of the chest was performed following the administration of IV contrast. Coronal, sagittal, and 3-D reconstruction images were obtained.  COMPARISON: CT chest 11/01/2021  FINDINGS: There are no demonstrated coronary arterial calcifications. There is enlargement of the thyroid gland. There appears to be a left lobe low-density nodule that measures 1.5 cm not clearly demonstrated on the previous  CT. There is also an isthmus nodule measuring approximately 1.2 cm and this was demonstrated on previous thyroid sonogram. There has been previous resection of the right lobe of the thyroid gland.  There is no intrapulmonary arterial filling defect to diagnose a pulmonary embolus. There is no evidence of mediastinal or hilar or axillary jaylan enlargement. There are postoperative changes related to left upper lobectomy and there is mild subpleural interstitial disease along the anterior aspect of the left lung similar to the previous exam.      1. No evidence for pulmonary thromboembolic disease. 2. Postoperative changes on the left related to left upper lobectomy. 3. Previous right lobe thyroidectomy. 1.5 cm left lobe low-density nodule not evident on previous chest CT 11/01/2021. Recommend further evaluation with thyroid sonogram.  Radiation dose reduction techniques were utilized, including automated exposure control and exposure modulation based on body size.        Ordered the above noted radiological studies.  Independently interpreted by me and my independent review of findings can be found in the ED Course.  See dictation for official radiology interpretation.      PROCEDURES    Procedures      EKG - Per my independent interpretation at 1600:    EKG Time: 1551  Rhythm/Rate: Sinus rhythm with rate of 77  Normal axis  Incomplete right bundle branch block  No acute ischemic changes  No STEMI       No emergent changes compared to November 21, 2023      MEDICATIONS GIVEN IN ER    Medications   sodium chloride 0.9 % bolus 1,000 mL (0 mL Intravenous Stopped 4/9/24 1904)   iopamidol (ISOVUE-370) 76 % injection 100 mL (95 mL Intravenous Given 4/9/24 1809)         PROGRESS, DATA ANALYSIS, CONSULTS, AND MEDICAL DECISION MAKING    Please note that this section constitutes my independent interpretation of clinical data including lab results, radiology, EKG's.  This constitutes my independent professional opinion regarding  differential diagnosis and management of this patient.  It may include any factors such as history from outside sources, review of external records, social determinants of health, management of medications, response to those treatments, and discussions with other providers.    Differential Diagnosis and Plan: Initial concern for orthostatic hypotension, dehydration, renal failure, electrolyte abnormalities, PE, heart failure, among others.  Plan for labs, chest x-ray, EKG, reevaluation with results.    Additional sources:  - Discussed/ obtained information from independent historians:       - (Social Determinants of Health): None     - Shared decision making:  Patient and daughter at bedside fully updated on and in agreement with the course and plan moving forward    ED Course as of 04/09/24 1930 Tue Apr 09, 2024   1617 WBC: 8.64 [DC]   1617 Hemoglobin: 13.4 [DC]   1617 Platelets: 263 [DC]   1637 D-Dimer, Quant(!): 2.68 [DC]   1650 HS Troponin T: 12 [DC]   1650 Magnesium: 2.2 [DC]   1650 Creatinine(!): 1.56  1.48 two months ago [DC]   1650 Sodium: 144 [DC]   1650 Potassium: 3.7 [DC]   1650 ALT (SGPT): 22 [DC]   1650 AST (SGOT): 20 [DC]   1650 Alkaline Phosphatase(!): 127 [DC]   1650 Total Bilirubin: <0.2 [DC]   1815 CT Angiogram Chest Pulmonary Embolism  Per my independent interpretation of the CT angiogram of the chest no overtly obvious pulmonary embolism although a small distal finding could be missed and will defer to final radiology report [DC]   1858 CT Angiogram Chest Pulmonary Embolism  Radiology report reviewed, no evidence of PE, 1.5 cm left thyroid nodule with outpatient ultrasound recommended [DC]   1909 HS Troponin T: 12 [DC]   1909 Troponin T Delta: 0 [DC]      ED Course User Index  [DC] Galileo Navarrete MD       Hospitalization Considered?:     Orders Placed During This Visit:  Orders Placed This Encounter   Procedures    CT Angiogram Chest Pulmonary Embolism    Comprehensive Metabolic Panel     D-dimer, Quantitative    High Sensitivity Troponin T    Magnesium    CBC Auto Differential    High Sensitivity Troponin T 2Hr    Orthostatic Vitals    ECG 12 Lead Other; ekg changes from PCP    CBC & Differential       Additional orders considered but not placed:      Independent interpretation of labs, radiology studies, and discussions with consultants: See ED Course        AS OF 19:30 EDT VITALS:    BP - 139/77  HR - 82  TEMP - 97.3 °F (36.3 °C)  02 SATS - 99%          DIAGNOSIS  Final diagnoses:   Orthostatic hypotension   Chronic kidney disease, unspecified CKD stage   History of DVT (deep vein thrombosis)         DISPOSITION  ED Disposition       ED Disposition   Discharge    Condition   Stable    Comment   --                Please note that portions of this document were completed with a voice recognition program.    Note Disclaimer: At [facility], we believe that sharing information builds trust and better relationships. You are receiving this note because you recently visited [facility]. It is possible you will see health information before a provider has talked with you about it. This kind of information can be easy to misunderstand. To help you fully understand what it means for your health, we urge you to discuss this note with your provider.                       Galileo Navarrete MD  04/09/24 5299

## 2024-04-09 NOTE — PROGRESS NOTES
"Answers submitted by the patient for this visit:  Other (Submitted on 4/2/2024)  Please describe your symptoms.: BP drop  Have you had these symptoms before?: Yes  How long have you been having these symptoms?: 5-7 days  Primary Reason for Visit (Submitted on 4/2/2024)  What is the primary reason for your visit?: Other  Patient ID: Myra Charles is a 73 y.o. female     Patient Care Team:  Head, ASHLEY Da Silva as PCP - General (Nurse Practitioner)  Mayur Neal MD as Consulting Physician (Hematology and Oncology)  Damian Rock DPM as Consulting Physician (Podiatry)  Jeri Newton MA (Inactive) as Medical Assistant  Jefferson West Jr., DO as Referring Physician (Family Medicine)  Cruzito Souza MD as Consulting Physician (Hematology and Oncology)    Subjective     Chief Complaint   Patient presents with    blood pressure issues     Light headed    Chest Pain       Myra Charles presents to [facility] today for complaints of chest pain and near syncope.      Chest Pain   This is a new problem. The current episode started in the past 7 days. The onset quality is gradual. The problem occurs intermittently. The problem has been unchanged. The pain is at a severity of 3/10. The quality of the pain is described as burning and heavy. Associated symptoms include back pain, dizziness, exertional chest pressure, malaise/fatigue, nausea and near-syncope. Pertinent negatives include no fever, numbness, syncope or weakness.       B/P fluctuating from 105/58 to 154/101.  Had 1 reading of 180/149?  Heart rate has been 60-90's.    3/26/24:  Working at [employer] and \"got hot\", chest pain, could not breath well.  Had to use rescue inhaler which helped her breath better.  Took B/P at home and was 100/64. Had an episode a month before this with high B/P and then dropped low and then back to normal.     Last Thursday, felt sick all day.  Taking a shower got faint, sick to " stomach, dry heaves, felt bad all day.  Was having pain to chest pain and pain across back.      Increased stressors with family.        She denies any complaints of fever, chills, cough, chest pain, shortness of air, abdominal pain, nausea, or any other concerns.     The following portions of the patient's history were reviewed and updated as appropriate: allergies, current medications, past family history, past medical history, past social history, past surgical history and problem list.       Review of Systems   Constitutional: Positive for malaise/fatigue. Negative for fever.   Cardiovascular:  Positive for chest pain, dyspnea on exertion and near-syncope. Negative for syncope.   Musculoskeletal:  Positive for back pain.   Gastrointestinal:  Positive for nausea.   Neurological:  Positive for dizziness. Negative for numbness and weakness.       Vitals:    04/09/24 1339   BP: 120/68   Pulse: 75   Temp: 97.7 °F (36.5 °C)   SpO2: 98%       Documented weights    04/09/24 1339   Weight: 80.7 kg (178 lb)     Body mass index is 27.06 kg/m².    Results for orders placed or performed in visit on 01/12/24   CBC (No Diff)    Specimen: Blood   Result Value Ref Range    WBC 7.21 3.40 - 10.80 10*3/mm3    RBC 4.49 3.77 - 5.28 10*6/mm3    Hemoglobin 12.4 12.0 - 15.9 g/dL    Hematocrit 38.9 34.0 - 46.6 %    MCV 86.6 79.0 - 97.0 fL    MCH 27.6 26.6 - 33.0 pg    MCHC 31.9 31.5 - 35.7 g/dL    RDW 12.3 12.3 - 15.4 %    Platelets 231 140 - 450 10*3/mm3   Comprehensive Metabolic Panel    Specimen: Blood   Result Value Ref Range    Glucose 75 65 - 99 mg/dL    BUN 26 (H) 8 - 23 mg/dL    Creatinine 1.48 (H) 0.57 - 1.00 mg/dL    EGFR Result 37.2 (L) >60.0 mL/min/1.73    BUN/Creatinine Ratio 17.6 7.0 - 25.0    Sodium 140 136 - 145 mmol/L    Potassium 4.2 3.5 - 5.2 mmol/L    Chloride 102 98 - 107 mmol/L    Total CO2 24.5 22.0 - 29.0 mmol/L    Calcium 9.1 8.6 - 10.5 mg/dL    Total Protein 6.2 6.0 - 8.5 g/dL    Albumin 4.5 3.5 - 5.2 g/dL     Globulin 1.7 gm/dL    A/G Ratio 2.6 g/dL    Total Bilirubin 0.3 0.0 - 1.2 mg/dL    Alkaline Phosphatase 102 39 - 117 U/L    AST (SGOT) 19 1 - 32 U/L    ALT (SGPT) 22 1 - 33 U/L   Lipid Panel With / Chol / HDL Ratio    Specimen: Blood   Result Value Ref Range    Total Cholesterol 198 0 - 200 mg/dL    Triglycerides 79 0 - 150 mg/dL    HDL Cholesterol 67 (H) 40 - 60 mg/dL    VLDL Cholesterol [place] 14 5 - 40 mg/dL    LDL Chol Calc (NIH) 117 (H) 0 - 100 mg/dL    Chol/HDL Ratio 2.96    TSH Rfx On Abnormal To Free T4    Specimen: Blood   Result Value Ref Range    TSH 2.090 0.270 - 4.200 uIU/mL   UA / M With / Rflx Culture([facility] ONLY) - Urine, Clean Catch    Specimen: Urine, Clean Catch   Result Value Ref Range    Specific Gravity, UA 1.019 1.005 - 1.030    pH, UA 6.5 5.0 - 7.5    Color, UA Yellow Yellow    Appearance, UA Clear Clear    Leukocytes, UA 1+ (A) Negative    Protein Trace Negative/Trace    Glucose, UA Negative Negative    Ketones Negative Negative    Blood, UA Negative Negative    Bilirubin, UA Negative Negative    Urobilinogen, UA 0.2 0.2 - 1.0 mg/dL    Nitrite, UA Negative Negative    Microscopic Examination See below:     Urinalysis Reflex Comment    Microscopic Examination -   Result Value Ref Range    WBC, UA 0-5 0 - 5 /hpf    RBC, UA 0-2 0 - 2 /hpf    Epithelial Cells (non renal) 0-10 0 - 10 /hpf    Casts Present (A) None seen /lpf    Cast Type Hyaline casts N/A    Bacteria, UA None seen None seen/Few /hpf   Urine Culture, Routine -   Result Value Ref Range    Urine Culture Final report     Result 1 No growth        Vitals:    04/09/24 1430 04/09/24 1431 04/09/24 1432   Orthostatic BP: 130/64 120/80 90/62   Orthostatic Pulse: 74 81 80   Patient Position: Lying Sitting Standing           Objective     Physical Exam  Vitals reviewed.   Constitutional:       General: She is not in acute distress.     Appearance: She is well-developed.      Comments: Appears fatigued   HENT:      Head: Normocephalic and  atraumatic.      Right Ear: Tympanic membrane normal.      Left Ear: Tympanic membrane normal.      Mouth/Throat:      Mouth: Mucous membranes are moist.      Pharynx: No posterior oropharyngeal erythema.   Eyes:      Extraocular Movements: Extraocular movements intact.      Pupils: Pupils are equal, round, and reactive to light.   Cardiovascular:      Rate and Rhythm: Normal rate and regular rhythm.      Heart sounds: Normal heart sounds. No murmur heard.  Pulmonary:      Effort: Pulmonary effort is normal.      Breath sounds: Normal breath sounds. No wheezing, rhonchi or rales.   Abdominal:      General: Bowel sounds are normal.      Palpations: Abdomen is soft.      Tenderness: There is no abdominal tenderness.   Musculoskeletal:         General: No tenderness. Normal range of motion.      Cervical back: Normal range of motion and neck supple.      Right lower leg: No edema.      Left lower leg: No edema.   Skin:     General: Skin is warm and dry.      Findings: No erythema or rash.   Neurological:      Mental Status: She is oriented to person, place, and time.      Comments: Dizziness when going from supine to sitting and sitting to standing.     Psychiatric:         Mood and Affect: Mood normal.         Behavior: Behavior normal.            ECG 12 Lead    Date/Time: 4/9/2024 3:02 PM  Performed by: Yvette Muñiz APRN    Authorized by: Yvette Muñiz APRN  Comparison: compared with previous ECG from 11/22/2023  Comparison to previous ECG: NSR on previous  Rhythm: sinus rhythm  Ectopy: bigeminy  Ectopy comments: Frequent SVT  Rate: normal  Conduction: incomplete right bundle branch block  ST Segments: ST segments normal  T Waves: T waves normal  Other: no other findings    Clinical impression: abnormal EKG       SCANNED EKG (11/21/2023)      Assessment & Plan     Assessment/Plan     Diagnoses and all orders for this visit:    1. Chest pain, unspecified type (Primary)  -     ECG 12 Lead    2. Orthostatic  hypotension    3. Dizziness        Summary:  Myra Charles presented office today for intermittent chest pain and dizziness since March 26, 2024.  She felt possibly related to fluctuating blood pressure.  EKG completed in office which shows changes from previous which was completed in November.  Orthostatics completed and she dropped 30 points with systolic blood pressure when going from sitting to standing.  She was symptomatic with dizziness with position changes.  Currently she does not have any chest pain.  Due to these findings, recommend further evaluation in the emergency room.  She is contacting her daughter to drive her to the ER.    Follow Up:  Go directly to ER.    In the meantime, instructed to contact us sooner for any problems or concerns.    Patient was given instructions and counseling regarding condition or for health maintenance advice.  Please see specific information pulled into the AVS if appropriate.          Yvette Muñiz, ASHLEY  Family Medicine  [facility] [place]  04/09/24  15:08 EDT

## 2024-04-11 DIAGNOSIS — E04.1 LEFT THYROID NODULE: Primary | ICD-10-CM

## 2024-04-17 ENCOUNTER — OFFICE VISIT (OUTPATIENT)
Dept: CARDIOLOGY | Facility: CLINIC | Age: 74
End: 2024-04-17
Payer: COMMERCIAL

## 2024-04-17 VITALS
DIASTOLIC BLOOD PRESSURE: 72 MMHG | HEART RATE: 76 BPM | BODY MASS INDEX: 26.98 KG/M2 | SYSTOLIC BLOOD PRESSURE: 120 MMHG | WEIGHT: 178 LBS | HEIGHT: 68 IN | OXYGEN SATURATION: 96 %

## 2024-04-17 DIAGNOSIS — I34.1 MITRAL VALVE PROLAPSE: ICD-10-CM

## 2024-04-17 DIAGNOSIS — I10 ESSENTIAL HYPERTENSION: Primary | ICD-10-CM

## 2024-04-17 DIAGNOSIS — N18.31 STAGE 3A CHRONIC KIDNEY DISEASE: ICD-10-CM

## 2024-04-17 NOTE — PROGRESS NOTES
CARDIOLOGY        Patient Name: Myra Charles  :1950  Age: 73 y.o.  Primary Cardiologist: Stephan Hernandez MD  Encounter Provider:  Ace Gandhi PA-C    Date of Service: 24      CHIEF COMPLAINT / REASON FOR OFFICE VISIT     Hospital follow-up     HISTORY OF PRESENT ILLNESS       HPI  Myra Charles is a 73 y.o. female who presents today for hospital follow-up.     Pt has a  history significant for MVP, HTN, and CKD presents to the office for hosptial follow-up. Pt was seen in the ER on 24.  Patient has labs, EKG's, and CTA of the chest.  Patient was instructed to follow-up in the office.  Patient mentions that over the past few months she has had 3 bouts of feeling lightheaded when standing for prolonged period time.  Patient feels as if she was going to pass out.  Patient has felt sweaty with sensation.  Patient is a states at rest and it goes away.  Patient denies any recent illness.  Patient denies any chest pain, new shortness of breath, palpitations,edema to legs, or orthopnea.  Patient denies any recent changes in medications other than her increase of her amlodipine to 7.5.  Patient takes in the morning.      Below is a brief summary of patient's pertinent cardiac history:  She had a CT coronary angiogram in  that was completely normal; her calcium score was zero. She had an echo in 2020 that showed very mild anterior MVP without significant MR; the study was otherwise normal.  A follow up echo in  was unchanged.      She is doing well. She has occasional palpitations which feel like small flutters. She does not have sustained tachycardia. She denies chest pain, lightheadedness, syncope, leg swelling, orthopnea, dyspnea. Her BP at home is consistently 140s/80s. She follows with Dr Pablo Pan for CKD3.    The following portions of the patient's history were reviewed and updated as appropriate: allergies, current medications, past family history, past  "medical history, past social history, past surgical history and problem list.      VITAL SIGNS     Visit Vitals  /72   Pulse 76   Ht 172.7 cm (68\")   Wt 80.7 kg (178 lb)   SpO2 96%   BMI 27.06 kg/m²       @[email or web address]  Wt Readings from Last 3 Encounters:   04/17/24 80.7 kg (178 lb)   04/09/24 80.7 kg (177 lb 14.6 oz)   04/09/24 80.7 kg (178 lb)     Body mass index is 27.06 kg/m².        PHYSICAL EXAMINATION     Constitutional:       General: Not in acute distress.     Appearance: Not in distress.   Pulmonary:      Effort: Pulmonary effort is normal.      Breath sounds: Normal breath sounds.   Cardiovascular:      Normal rate. Regular rhythm.      Murmurs: There is no murmur.   Pulses:     Intact distal pulses.   Edema:     Peripheral edema absent.   Skin:     General: Skin is warm.   Neurological:      Mental Status: Alert and easily aroused.   Psychiatric:         Behavior: Behavior is cooperative.           REVIEWED DATA     Procedures    Cardiac Procedures:    Transthoracic echo on 10/31/2023  Interpretation Summary      Left ventricular systolic function is normal. Calculated left ventricular EF = 65.8% Normal left ventricular cavity size and wall thickness noted. All left ventricular wall segments contract normally. Left ventricular diastolic function is consistent with (grade I) impaired relaxation.    There is mild mitral valve prolapse of the anterior mitral leaflet. Trace to mild mitral valve regurgitation is present. No significant mitral valve stenosis is present.     Ultrasound renal bilaterally on 7/12/2023  IMPRESSION:     1. No hydronephrosis or shadowing stone on either side.  2. Imaging findings supportive of provided history of chronic renal  parenchymal disease.  3. Bladder not well distended or evaluated. Trace postvoid residual     Lipid Panel          7/7/2023    10:32 1/17/2024    09:03   Lipid Panel   Total Cholesterol 183  198    Triglycerides 75  79    HDL Cholesterol 69  67  " "  VLDL Cholesterol 14  14    LDL Cholesterol  100  117        Lab Results   Component Value Date     04/09/2024     01/17/2024    K 3.7 04/09/2024    K 4.2 01/17/2024     04/09/2024     01/17/2024    CO2 24.1 04/09/2024    CO2 24.5 01/17/2024    BUN 29 (H) 04/09/2024    BUN 26 (H) 01/17/2024    CREATININE 1.56 (H) 04/09/2024    CREATININE 1.48 (H) 01/17/2024    EGFRIFNONA 36 (L) 11/15/2021    EGFRIFNONA 34 (L) 08/19/2021    EGFRIFAFRI 42 (L) 11/15/2021    EGFRIFAFRI 41 (L) 08/19/2021    GLUCOSE 86 04/09/2024    GLUCOSE 75 01/17/2024    CALCIUM 8.8 04/09/2024    CALCIUM 9.1 01/17/2024    PROTENTOTREF 6.2 01/17/2024    PROTENTOTREF 6.5 07/07/2023    ALBUMIN 4.5 04/09/2024    ALBUMIN 4.5 01/17/2024    BILITOT <0.2 04/09/2024    BILITOT 0.3 01/17/2024    AST 20 04/09/2024    AST 19 01/17/2024    ALT 22 04/09/2024    ALT 22 01/17/2024     Lab Results   Component Value Date    WBC 8.64 04/09/2024    WBC 7.21 01/17/2024    HGB 13.4 04/09/2024    HGB 12.4 01/17/2024    HCT 42.9 04/09/2024    HCT 38.9 01/17/2024    MCV 89.0 04/09/2024    MCV 86.6 01/17/2024     04/09/2024     01/17/2024     No results found for: \"PROBNP\", \"BNP\"  Lab Results   Component Value Date    TROPONINT 12 04/09/2024     Lab Results   Component Value Date    TSH 2.090 01/17/2024    TSH 1.400 08/29/2023             ASSESSMENT & PLAN     Diagnoses and all orders for this visit:    1. Essential hypertension (Primary)   Blood pressure well-controlled in office today.  I discussed to reduce her amlodipine back to 5 mg and take it at night.  Patient to record her blood pressures over the next 2 weeks and report back to the office.    2. Mitral valve prolapse   Recent echo mild anterior MVP without significant MR.    3. Stage 3a chronic kidney disease   Baseline per labs done in the ER.    Patient looks well overall.  Patient's symptoms are likely related to orthostasis.  Will try reducing back to her dose of Norvasc at 5 " mg and to try taking at night.  Patient will log her blood pressure over the next 2 weeks and report back to the office.  Patient told to call with any questions or concerns.    Return in about 3 months (around 7/17/2024) for Dr. Hernandez.    Future Appointments         Provider Department Center    4/18/2024 4:30 PM [facility] ULTRASOUND [facility]    7/15/2024 9:20 AM [facility] [place] PRIMARY CARE [facility]    7/22/2024 3:45 PM Head, ASHLEY Da Silva [facility] PRIMARY CARE [facility]    7/23/2024 1:15 PM Stephan Hernandez MD [facility] CARDIOLOGY [facility]                MEDICATIONS         Discharge Medications            Accurate as of April 17, 2024  2:46 PM. If you have any questions, ask your nurse or doctor.                Changes to Medications        Instructions Start Date   cholecalciferol 25 MCG (1000 UT) tablet  What changed:   how much to take  when to take this   1,000 Units, Oral, Daily             Continue These Medications        Instructions Start Date   albuterol sulfate  (90 Base) MCG/ACT inhaler  Commonly known as: PROVENTIL HFA;VENTOLIN HFA;PROAIR HFA   2 puffs, Inhalation, Every 4 Hours PRN      amLODIPine 5 MG tablet  Commonly known as: NORVASC   7.5 mg, Oral, Daily      Ascorbic Acid 500 MG capsule   No dose, route, or frequency recorded.      aspirin 81 MG EC tablet   1 tablet, Oral, Daily      Breo Ellipta 100-25 MCG/ACT aerosol powder   Generic drug: Fluticasone Furoate-Vilanterol   No dose, route, or frequency recorded.      Calcium-Phosphorus-Vitamin D 250-107-500 MG-MG-UNIT chewable tablet   1 tablet, Oral, Daily      cyclobenzaprine 10 MG tablet  Commonly known as: FLEXERIL   10 mg, Oral, Nightly PRN      DULoxetine 60 MG capsule  Commonly known as: CYMBALTA   60 mg, Oral, Daily      famotidine 20 MG tablet  Commonly known as: PEPCID   20 mg, Oral, 2 Times Daily      fluticasone 50 MCG/ACT nasal spray  Commonly known as:  FLONASE   1 spray, Nasal, Daily      folic acid 1 MG tablet  Commonly known as: FOLVITE   1 mg, Oral, Daily      magnesium oxide 400 MG tablet  Commonly known as: MAG-OX   400 mg, Oral, Daily      metoprolol succinate XL 25 MG 24 hr tablet  Commonly known as: TOPROL-XL   25 mg, Oral, 2 Times Daily      multivitamins-minerals capsule capsule   1 capsule, Oral, 2 Times Daily      Prolia 60 MG/ML solution prefilled syringe syringe  Generic drug: denosumab   No dose, route, or frequency recorded.      Scopolamine 1 MG/3DAYS patch   1 patch, Transdermal, Every 72 Hours      vitamin B-12 1000 MCG tablet  Commonly known as: CYANOCOBALAMIN   1,000 mcg, Oral, 2 Times Weekly                   **Dragon Disclaimer:   Much of this encounter note is an electronic transcription/translation of spoken language to printed text. The electronic translation of spoken language may permit erroneous, or at times, nonsensical words or phrases to be inadvertently transcribed. Although I have reviewed the note for such errors, some may still exist.

## 2024-04-18 ENCOUNTER — HOSPITAL ENCOUNTER (OUTPATIENT)
Dept: ULTRASOUND IMAGING | Facility: HOSPITAL | Age: 74
Discharge: HOME | End: 2024-04-18
Admitting: NURSE PRACTITIONER
Payer: MEDICARE

## 2024-04-18 DIAGNOSIS — E04.1 LEFT THYROID NODULE: ICD-10-CM

## 2024-04-18 PROCEDURE — 76536 US EXAM OF HEAD AND NECK: CPT

## 2024-04-22 DIAGNOSIS — E04.2 MULTIPLE THYROID NODULES: Primary | ICD-10-CM

## 2024-05-17 ENCOUNTER — TELEPHONE (OUTPATIENT)
Dept: CARDIOLOGY | Facility: CLINIC | Age: 74
End: 2024-05-17
Payer: COMMERCIAL

## 2024-05-17 NOTE — TELEPHONE ENCOUNTER
Feli spoke with her. She is feeling fatigued and lightheaded. It sounds like she is having orthostatic symptoms. She is going to come in next week for orthostatic BP check and EKG. I wonder if she needs a monitor.     Thanks!  ASHLEY Mckeon

## 2024-05-18 ENCOUNTER — HOSPITAL ENCOUNTER (OUTPATIENT)
Dept: CARDIOLOGY | Facility: HOSPITAL | Age: 74
Discharge: HOME | End: 2024-05-18
Payer: COMMERCIAL

## 2024-05-18 ENCOUNTER — APPOINTMENT (OUTPATIENT)
Dept: GENERAL RADIOLOGY | Facility: HOSPITAL | Age: 74
End: 2024-05-18
Payer: COMMERCIAL

## 2024-05-18 ENCOUNTER — HOSPITAL ENCOUNTER (EMERGENCY)
Facility: HOSPITAL | Age: 74
Discharge: HOME | End: 2024-05-18
Attending: EMERGENCY MEDICINE
Payer: MEDICARE

## 2024-05-18 VITALS
RESPIRATION RATE: 16 BRPM | SYSTOLIC BLOOD PRESSURE: 159 MMHG | WEIGHT: 175 LBS | TEMPERATURE: 97.9 F | HEART RATE: 64 BPM | BODY MASS INDEX: 25.92 KG/M2 | HEIGHT: 69 IN | DIASTOLIC BLOOD PRESSURE: 91 MMHG | OXYGEN SATURATION: 98 %

## 2024-05-18 DIAGNOSIS — R42 DIZZINESS: Primary | ICD-10-CM

## 2024-05-18 DIAGNOSIS — R55 POSTURAL DIZZINESS WITH PRESYNCOPE: ICD-10-CM

## 2024-05-18 DIAGNOSIS — R53.83 OTHER FATIGUE: ICD-10-CM

## 2024-05-18 DIAGNOSIS — R42 DIZZINESS: ICD-10-CM

## 2024-05-18 DIAGNOSIS — R42 POSTURAL DIZZINESS WITH PRESYNCOPE: ICD-10-CM

## 2024-05-18 LAB
ALBUMIN SERPL-MCNC: 4 G/DL (ref 3.5–5.2)
ALBUMIN/GLOB SERPL: 2 G/DL
ALP SERPL-CCNC: 93 U/L (ref 39–117)
ALT SERPL W P-5'-P-CCNC: 28 U/L (ref 1–33)
ANION GAP SERPL CALCULATED.3IONS-SCNC: 12.2 MMOL/L (ref 5–15)
AST SERPL-CCNC: 24 U/L (ref 1–32)
BASOPHILS # BLD AUTO: 0.08 10*3/MM3 (ref 0–0.2)
BASOPHILS NFR BLD AUTO: 1.1 % (ref 0–1.5)
BILIRUB SERPL-MCNC: 0.2 MG/DL (ref 0–1.2)
BILIRUB UR QL STRIP: NEGATIVE
BUN SERPL-MCNC: 32 MG/DL (ref 8–23)
BUN/CREAT SERPL: 23.5 (ref 7–25)
CALCIUM SPEC-SCNC: 8.5 MG/DL (ref 8.6–10.5)
CHLORIDE SERPL-SCNC: 105 MMOL/L (ref 98–107)
CLARITY UR: CLEAR
CO2 SERPL-SCNC: 21.8 MMOL/L (ref 22–29)
COLOR UR: YELLOW
CREAT SERPL-MCNC: 1.36 MG/DL (ref 0.57–1)
DEPRECATED RDW RBC AUTO: 41.3 FL (ref 37–54)
EGFRCR SERPLBLD CKD-EPI 2021: 41.2 ML/MIN/1.73
EOSINOPHIL # BLD AUTO: 0.15 10*3/MM3 (ref 0–0.4)
EOSINOPHIL NFR BLD AUTO: 2 % (ref 0.3–6.2)
ERYTHROCYTE [DISTWIDTH] IN BLOOD BY AUTOMATED COUNT: 12.7 % (ref 12.3–15.4)
GEN 5 2HR TROPONIN T REFLEX: 10 NG/L
GLOBULIN UR ELPH-MCNC: 2 GM/DL
GLUCOSE SERPL-MCNC: 84 MG/DL (ref 65–99)
GLUCOSE UR STRIP-MCNC: NEGATIVE MG/DL
HCT VFR BLD AUTO: 38.6 % (ref 34–46.6)
HGB BLD-MCNC: 12.2 G/DL (ref 12–15.9)
HGB UR QL STRIP.AUTO: NEGATIVE
IMM GRANULOCYTES # BLD AUTO: 0.04 10*3/MM3 (ref 0–0.05)
IMM GRANULOCYTES NFR BLD AUTO: 0.5 % (ref 0–0.5)
INR PPP: 1 (ref 0.9–1.1)
KETONES UR QL STRIP: NEGATIVE
LEUKOCYTE ESTERASE UR QL STRIP.AUTO: NEGATIVE
LYMPHOCYTES # BLD AUTO: 1.87 10*3/MM3 (ref 0.7–3.1)
LYMPHOCYTES NFR BLD AUTO: 25.5 % (ref 19.6–45.3)
MAGNESIUM SERPL-MCNC: 2.3 MG/DL (ref 1.6–2.4)
MCH RBC QN AUTO: 28.7 PG (ref 26.6–33)
MCHC RBC AUTO-ENTMCNC: 31.6 G/DL (ref 31.5–35.7)
MCV RBC AUTO: 90.8 FL (ref 79–97)
MONOCYTES # BLD AUTO: 0.63 10*3/MM3 (ref 0.1–0.9)
MONOCYTES NFR BLD AUTO: 8.6 % (ref 5–12)
NEUTROPHILS NFR BLD AUTO: 4.57 10*3/MM3 (ref 1.7–7)
NEUTROPHILS NFR BLD AUTO: 62.3 % (ref 42.7–76)
NITRITE UR QL STRIP: NEGATIVE
NRBC BLD AUTO-RTO: 0 /100 WBC (ref 0–0.2)
PH UR STRIP.AUTO: 7 [PH] (ref 4.5–8)
PLATELET # BLD AUTO: 211 10*3/MM3 (ref 140–450)
PMV BLD AUTO: 9.5 FL (ref 6–12)
POTASSIUM SERPL-SCNC: 4.3 MMOL/L (ref 3.5–5.2)
PROT SERPL-MCNC: 6 G/DL (ref 6–8.5)
PROT UR QL STRIP: NEGATIVE
PROTHROMBIN TIME: 13.2 SECONDS (ref 12.1–15)
RBC # BLD AUTO: 4.25 10*6/MM3 (ref 3.77–5.28)
SODIUM SERPL-SCNC: 139 MMOL/L (ref 136–145)
SP GR UR STRIP: 1.01 (ref 1–1.03)
TROPONIN T DELTA: 0 NG/L
TROPONIN T SERPL HS-MCNC: 10 NG/L
UROBILINOGEN UR QL STRIP: NORMAL
WBC NRBC COR # BLD AUTO: 7.34 10*3/MM3 (ref 3.4–10.8)

## 2024-05-18 PROCEDURE — 85025 COMPLETE CBC W/AUTO DIFF WBC: CPT | Performed by: EMERGENCY MEDICINE

## 2024-05-18 PROCEDURE — 84484 ASSAY OF TROPONIN QUANT: CPT | Performed by: EMERGENCY MEDICINE

## 2024-05-18 PROCEDURE — 83735 ASSAY OF MAGNESIUM: CPT | Performed by: EMERGENCY MEDICINE

## 2024-05-18 PROCEDURE — 71045 X-RAY EXAM CHEST 1 VIEW: CPT

## 2024-05-18 PROCEDURE — 93005 ELECTROCARDIOGRAM TRACING: CPT | Performed by: EMERGENCY MEDICINE

## 2024-05-18 PROCEDURE — 93225 XTRNL ECG REC<48 HRS REC: CPT

## 2024-05-18 PROCEDURE — 93226 XTRNL ECG REC<48 HR SCAN A/R: CPT

## 2024-05-18 PROCEDURE — 99284 EMERGENCY DEPT VISIT MOD MDM: CPT

## 2024-05-18 PROCEDURE — 80053 COMPREHEN METABOLIC PANEL: CPT | Performed by: EMERGENCY MEDICINE

## 2024-05-18 PROCEDURE — 36415 COLL VENOUS BLD VENIPUNCTURE: CPT

## 2024-05-18 PROCEDURE — 81003 URINALYSIS AUTO W/O SCOPE: CPT | Performed by: EMERGENCY MEDICINE

## 2024-05-18 PROCEDURE — 85610 PROTHROMBIN TIME: CPT | Performed by: EMERGENCY MEDICINE

## 2024-05-18 RX ORDER — ASPIRIN 81 MG/1
324 TABLET, CHEWABLE ORAL ONCE
Status: DISCONTINUED | OUTPATIENT
Start: 2024-05-18 | End: 2024-05-18

## 2024-05-18 RX ORDER — NITROGLYCERIN 0.4 MG/1
0.4 TABLET SUBLINGUAL
Status: DISCONTINUED | OUTPATIENT
Start: 2024-05-18 | End: 2024-05-18 | Stop reason: HOSPADM

## 2024-05-18 RX ORDER — ASPIRIN 81 MG/1
243 TABLET, CHEWABLE ORAL ONCE
Status: COMPLETED | OUTPATIENT
Start: 2024-05-18 | End: 2024-05-18

## 2024-05-18 RX ADMIN — NITROGLYCERIN 0.4 MG: 0.4 TABLET SUBLINGUAL at 12:29

## 2024-05-18 RX ADMIN — ASPIRIN 81 MG CHEWABLE TABLET 243 MG: 81 TABLET CHEWABLE at 12:28

## 2024-05-18 NOTE — ED PROVIDER NOTES
Subjective   History of Present Illness  73-year-old female presents emergency room complaining of chest pain.  Patient states that she was in the shower just prior to arrival when she felt lightheaded nauseous and had heaviness in her chest and pain going across both shoulders.  She states that she has been having episodes of this off and on for several months and has seen her doctors for same who have been adjusting her blood pressure medicine secondary to orthostatic hypotension.  Patient states that this lasted for a minute or so until she got out of the shower and sat down and rested however she still feels some heaviness in her chest and across her shoulders.      Review of Systems   Constitutional:  Positive for fatigue. Negative for activity change, appetite change, chills, diaphoresis and fever.   HENT:  Negative for congestion, rhinorrhea and sore throat.    Eyes:  Negative for photophobia and visual disturbance.   Respiratory:  Negative for cough and shortness of breath.    Cardiovascular:  Positive for chest pain. Negative for palpitations and leg swelling.   Gastrointestinal:  Positive for nausea. Negative for abdominal distention, abdominal pain, diarrhea and vomiting.   Genitourinary:  Negative for dysuria and flank pain.   Musculoskeletal:  Negative for arthralgias and back pain.   Skin:  Negative for rash.   Neurological:  Positive for light-headedness. Negative for dizziness, weakness and headaches.   Psychiatric/Behavioral:  Negative for agitation and behavioral problems.        Past Medical History:   Diagnosis Date    Arthritis     Asthma     Backache 02/17/2016    Low back pain, in sacral region       Benign hypertension 02/17/2016    CKD (chronic kidney disease) stage 3, GFR 30-59 ml/min     COPD (chronic obstructive pulmonary disease)     Coronary artery disease     Cortical senile cataract 02/17/2016    Deep vein thrombosis     Essential (primary) hypertension 04/20/2016    GERD  (gastroesophageal reflux disease) 02/17/2016    Hallux valgus     Deformity, w/inflammation       History of coronary angiogram     CT cor angio 2019, normal cors, Agatston 0    Iron deficiency anemia     Lung nodule, solitary 03/08/2012    Solitary nodule of lung - RIGHT upper lobe 15mm, PET positive       Malignant carcinoid tumor of lung 02/17/2016    Atypical carcinoid tumor of the LEFT lung treated in March 2012      Mitral valve prolapse 02/17/2016    Osteoporosis 04/20/2016    Primary fibromyalgia syndrome 02/17/2016    Rosacea 02/17/2016    Seborrheic keratosis     History of, upper and lower back       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 04/20/2016    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 02/17/2016    Paralysis of vocal cords and larynx, unilateral - LEFT side       Varicose vein     Vitamin D deficiency 04/20/2016    Vitreous detachment 12/01/2014    Vitreous floaters 12/01/2014       Allergies   Allergen Reactions    Raloxifene Swelling, Other (See Comments) and Unknown - High Severity     Caused pain    Gabapentin Other (See Comments) and Unknown - High Severity     HA/sedation    Latex Hives and Itching    Pregabalin Swelling, Other (See Comments) and Unknown - High Severity     swelling       Past Surgical History:   Procedure Laterality Date    ADENOIDECTOMY  2/3/1956    ARTERIAL BYPASS SURGERY  09/25/2012    Artery bypass graft (Left femoral to posterior tibial artery bypass. Enodscopic vein harvest on the left. Left lower extremity arteriogram.)    BREAST CYST EXCISION      pt unsure of laterality    CERVICAL CONIZATION      CONIZATION OF CERVIX 76242 (Excisional laser cone biopsy and vaporization of cervix.)    CHOLECYSTECTOMY  09/07/2005    Cholecystectomy, laparoscopic (With intraoperative cholangiogram.)    COLONOSCOPY      COLONOSCOPY W/ POLYPECTOMY N/A 01/05/2022    Procedure: COLONOSCOPY WITH POLYPECTOMY;  Surgeon: Augustine Menjivar  MD Art;  Location: [facility];  Service: Gastroenterology;  Laterality: N/A;  Rectal polyp    CYST REMOVAL      left 3rd toe    EXCISION LESION  11/11/1998    REMOVE LESION BACK OR FLANK 97304 (Excision times two.)    FOOT SURGERY  01/20/2009    Foot/toes surgery procedure (Soft tissue mass, left foot. Excision of)    HERNIA REPAIR      Past history of umbilical herni repair.    HYSTERECTOMY      OTHER SURGICAL HISTORY  12/04/2012    Anesth, elbow area surgery (Manipulation of left elbow.)    OTHER SURGICAL HISTORY  03/05/2012    Anesth, elbow area surgery (Excision of plate and screws from olecranon bursa. Retained plate and screws, olecranon bursitis of previous fracture left elbow.)    OTHER SURGICAL HISTORY  10/16/2012    Treat elbow fracture (Open reduction and internal fixation.)    THORACOSCOPY  03/14/2012    Thoracoscopy, surgical (Bronchoscopy,LVATS (wedge resect MARYBETH), LULobectomy Thoracic lymphadenectomy)    THROAT SURGERY  07/25/2012    Throat surgery procedure (Thyroplasty type I (vocal cord medialozation & larynooplasty) Left vocal cord paralysis. Dysphoria. [facility] by Dr Tk Heart)    THYROID SURGERY  03/22/2016    Thyroid surgery (Right thyroid lobectomy and isthmusectomy.)    TONSILLECTOMY  02/03/1956    Chronic tonsillitis    VEIN LIGATION  09/28/2000    PHLEB VEINS - EXTREM (20+) 14703 (High ligation of ling saphenous vein, left, plus multiple phlebectomies, left lower extremity.)       Family History   Problem Relation Age of Onset    Cancer Mother         myeloma    Diabetes Mother     Heart disease Mother     Arthritis Mother     Hypertension Mother     Heart attack Mother     Heart disease Father     Arthritis Father     Heart attack Father     Stroke Father     Hypertension Father     Vision loss Father     Lung cancer Maternal Grandfather     Lung cancer Other     Diabetes Maternal Grandmother     Breast cancer Neg Hx        Social History     Socioeconomic History     Marital status:    Tobacco Use    Smoking status: Never    Smokeless tobacco: Never   Vaping Use    Vaping status: Never Used   Substance and Sexual Activity    Alcohol use: Yes     Alcohol/week: 2.0 standard drinks of alcohol     Types: 2 Glasses of wine per week     Comment: Maybe every two to three weeks    Drug use: Never    Sexual activity: Not Currently     Partners: Male     Birth control/protection: Surgical           Objective   Physical Exam  Vitals and nursing note reviewed.   Constitutional:       General: She is not in acute distress.     Appearance: Normal appearance. She is not ill-appearing, toxic-appearing or diaphoretic.      Comments: 73-year-old female in no acute distress   HENT:      Head: Normocephalic and atraumatic.      Nose: Nose normal.      Mouth/Throat:      Mouth: Mucous membranes are moist.   Eyes:      Conjunctiva/sclera: Conjunctivae normal.   Cardiovascular:      Rate and Rhythm: Normal rate and regular rhythm.      Pulses: Normal pulses.      Heart sounds: Normal heart sounds.   Pulmonary:      Effort: Pulmonary effort is normal.      Breath sounds: Normal breath sounds. No decreased breath sounds.   Abdominal:      General: Abdomen is flat. There is no distension.      Tenderness: There is no abdominal tenderness.   Musculoskeletal:         General: No swelling. Normal range of motion.      Cervical back: Normal range of motion and neck supple.      Right lower leg: No edema.      Left lower leg: No edema.   Skin:     General: Skin is warm and dry.   Neurological:      General: No focal deficit present.      Mental Status: She is alert and oriented to person, place, and time.   Psychiatric:         Mood and Affect: Mood normal.         Procedures           ED Course  ED Course as of 05/18/24 1454   Sat May 18, 2024   1255 Chest x-ray:  IMPRESSION:  Impression:  1.An acute pulmonary process is not apparent.        Electronically Signed: Charli Foote MD    5/18/2024 12:20 PM  EDT    Workstation ID: [id number]   [BH]   1255 Glucose: 84 [BH]   1256 BUN(!): 32 [BH]   1256 Creatinine(!): 1.36 [BH]   1256 Sodium: 139 [BH]   1256 Potassium: 4.3 [BH]   1256 Chloride: 105 [BH]   1256 CO2(!): 21.8 [BH]   1256 Calcium(!): 8.5 [BH]   1256 WBC: 7.34 [BH]   1256 Hemoglobin: 12.2 [BH]   1256 Hematocrit: 38.6 [BH]   1256 Platelets: 211 [BH]   1256 Protime: 13.2 [BH]   1256 HS Troponin T: 10 [BH]   1434 HS Troponin T: 10 [BH]   1434 Troponin T Delta: 0 [BH]   1434 Orthostatic vital signs,  Laying blood pressure 147/70 with a heart rate of 61  Sitting blood pressure 163/88 with a heart rate of 62   Standing blood pressure 164/91 with a heart rate of 74   [BH]   1438 Patient is not orthostatic and cardiac enzymes are normal x 2.  Patient has elevated BUN and creatinine which are stable compared to previous labs and urinalysis is normal.  CBC is within normal limits and pro time and INR also within normal limits [BH]   1449 Upon reviewing patient's chart there is a recent injury from her cardiology office where they wanted her to follow-up next week for serial blood pressure readings as well as a Holter monitor placement.   [BH]   1450 I spoke with patient concerning lab and radiologic findings in addition to her vital sign and orthostasis findings.  Patient states she feels somewhat better since being in the emergency room.  Her labs point to no acute cardiac event and her orthostatic vital signs are also within normal limits.  However patient has had fatigue and some symptoms which could be attributed to anginal equivalents.  Her cardiac enzymes are normal and her EKG is normal today and patient states that she does not want to be admitted to the hospital and have to wait possibly for cardiac catheterization until Monday and as such would like to follow-up with her cardiologist next week.  I will fit patient for a 48-hour Holter monitor which she can hopefully turn in on Monday and have available by the  time she sees her cardiologist next week.  I instructed patient to return to the emergency room if she starts to have any more of her episodes or symptoms or any other new persistent or worsening symptoms and both patient and family member understand and agree with plan. []      ED Course User Index  [] Stefan Heart MD                                             Medical Decision Making  My differential diagnosis for chest pain includes but is not limited to:  Muscle strain, costochondritis, myositis, pleurisy, rib fracture, intercostal neuritis, herpes zoster, tumor, myocardial infarction, coronary syndrome, unstable angina, angina, aortic dissection, mitral valve prolapse, pericarditis, palpitations, pulmonary embolus, pneumonia, pneumothorax, lung cancer, GERD, esophagitis, esophageal spasm     Amount and/or Complexity of Data Reviewed  Labs: ordered. Decision-making details documented in ED Course.  Radiology: ordered. Decision-making details documented in ED Course.  ECG/medicine tests: ordered. Decision-making details documented in ED Course.    Risk  OTC drugs.  Prescription drug management.        Final diagnoses:   Dizziness   Postural dizziness with presyncope   Other fatigue       ED Disposition  ED Disposition       ED Disposition   Discharge    Condition   Stable    Comment   --               Stephan Hernandez MD  [address] [address]  [phone]    Schedule an appointment as soon as possible for a visit       [facility] EMERGENCY DEPARTMENT  [address], [place] [address] [phone]  Go to   As needed         Medication List        Changed      cholecalciferol 25 MCG (1000 UT) tablet  Commonly known as: VITAMIN D3  Take 1 tablet by mouth Daily.  What changed:   how much to take  when to take this                 Stefan Heart MD  05/18/24 [id number]

## 2024-05-19 LAB
QT INTERVAL: 394 MS
QTC INTERVAL: 438 MS

## 2024-05-20 ENCOUNTER — TELEPHONE (OUTPATIENT)
Dept: CARDIOLOGY | Facility: CLINIC | Age: 74
End: 2024-05-20
Payer: COMMERCIAL

## 2024-05-20 DIAGNOSIS — I34.1 MITRAL VALVE PROLAPSE: ICD-10-CM

## 2024-05-20 DIAGNOSIS — I10 ESSENTIAL HYPERTENSION: ICD-10-CM

## 2024-05-20 DIAGNOSIS — R07.2 PRECORDIAL PAIN: Primary | ICD-10-CM

## 2024-05-20 RX ORDER — ISOSORBIDE MONONITRATE 30 MG/1
30 TABLET, EXTENDED RELEASE ORAL DAILY
Qty: 30 TABLET | Refills: 11 | Status: SHIPPED | OUTPATIENT
Start: 2024-05-20

## 2024-05-20 RX ORDER — AMLODIPINE BESYLATE 5 MG/1
2.5 TABLET ORAL NIGHTLY
Start: 2024-05-20

## 2024-05-20 NOTE — TELEPHONE ENCOUNTER
Notified patient of results/recommendations. Patient verbalized understanding. FU scheduled.     Rekha Navarrete RN  Triage [facility]

## 2024-05-20 NOTE — TELEPHONE ENCOUNTER
I see she was not orthostatic in the ER.     Triage,  Can you confirm her medications and clarify what about the changes is not helping? Has she ever taking nitroglycerin, and if so, did she have any issues with it? Does she have trouble with headaches?    She did have normal coronary CTA in 2019 but I have ordered stress test to see what happens with exertion. I would like for her to try to walk on the treadmill. I also do not want her to hold her beta blocker before stress test.     Myranda,   Can you order the stress test?    Thanks!  Celestina

## 2024-05-20 NOTE — TELEPHONE ENCOUNTER
Spoke to patient. She is currently taking metoprolol 25mg BID and amlodipine 5mg nightly. She said that Ace decreased her amlodipine from 7.5mg to 5mg and told her to start taking it at night instead of in the morning. This is the change she is referring to that does not seem to be working. She said she did get a nitro in the ER on Saturday and had a slight headache. She does not have H/A issues normally though. She said she will try to walk on the treadmill as well, but she has COPD d/t having part of her lung removed so sometimes she can get SOA because of this.     Rekha Navarrete RN  Triage [facility]

## 2024-05-20 NOTE — TELEPHONE ENCOUNTER
Reduce amlodipine to 2.5 mg nightly.   I'm sending in 30 mg Imdur which is long acting nitroglycerin to see if this helps with her chest pain. It may cause a headache for the first few days but will get better. If she can't tolerate the headache, have her call the office.     Can we also get her scheduled to see Ace in 2 weeks?    Thanks!  Celestina Butcher, APRN

## 2024-05-20 NOTE — TELEPHONE ENCOUNTER
Patient called me this morning to inform she went into the ED on Saturday. They did some orthostatics & placed a Holter monitor that will come off this afternoon. She checked her blood pressure this morning   126/73 HR:61 - laying     133/94 HR:70 - sitting  126/87 HR:85 -standing  When patient exerts herself she feels a pain go across her back her chest feels heavy she gets lightheaded & fatigued  Patient states Medication changes she does not feel they are helping she felt better taking her medicine in the morning vs night.    I advised patient if symptoms worsen she will need to go to the ED.

## 2024-05-24 NOTE — TELEPHONE ENCOUNTER
Patient called the office, she has been able to tolerate the headache throughout the day she's been having trouble sleeping at night she said it's when it tends to bother her more she is wondering if she is able to take any sort of pain reliever like tylenol?

## 2024-05-30 ENCOUNTER — HOSPITAL ENCOUNTER (OUTPATIENT)
Dept: NUCLEAR MEDICINE | Facility: HOSPITAL | Age: 74
Discharge: HOME | End: 2024-05-30
Payer: COMMERCIAL

## 2024-05-30 ENCOUNTER — HOSPITAL ENCOUNTER (OUTPATIENT)
Dept: NUCLEAR MEDICINE | Facility: HOSPITAL | Age: 74
Discharge: HOME | End: 2024-05-30
Payer: MEDICARE

## 2024-05-30 ENCOUNTER — HOSPITAL ENCOUNTER (OUTPATIENT)
Dept: CARDIOLOGY | Facility: HOSPITAL | Age: 74
Discharge: HOME | End: 2024-05-30
Payer: MEDICARE

## 2024-05-30 VITALS — BODY MASS INDEX: 25.93 KG/M2 | HEIGHT: 69 IN | WEIGHT: 175.04 LBS

## 2024-05-30 DIAGNOSIS — I34.1 MITRAL VALVE PROLAPSE: ICD-10-CM

## 2024-05-30 DIAGNOSIS — R07.2 PRECORDIAL PAIN: ICD-10-CM

## 2024-05-30 LAB
BH CV NUCLEAR PRIOR STUDY: 2
BH CV REST NUCLEAR ISOTOPE DOSE: 11.1 MCI
BH CV STRESS BP STAGE 1: NORMAL
BH CV STRESS BP STAGE 2: NORMAL
BH CV STRESS COMMENTS STAGE 1: NORMAL
BH CV STRESS DOSE REGADENOSON STAGE 1: 0.4
BH CV STRESS DURATION MIN STAGE 1: 3
BH CV STRESS DURATION MIN STAGE 2: 1
BH CV STRESS DURATION SEC STAGE 1: 0
BH CV STRESS DURATION SEC STAGE 2: 0
BH CV STRESS GRADE STAGE 1: 0
BH CV STRESS GRADE STAGE 2: 5
BH CV STRESS HR STAGE 1: 118
BH CV STRESS HR STAGE 2: 121
BH CV STRESS METS STAGE 1: 2.3
BH CV STRESS METS STAGE 2: 2.9
BH CV STRESS NUCLEAR ISOTOPE DOSE: 33.2 MCI
BH CV STRESS PROTOCOL 1: NORMAL
BH CV STRESS RECOVERY BP: NORMAL MMHG
BH CV STRESS RECOVERY HR: 110 BPM
BH CV STRESS SPEED STAGE 1: 1.7
BH CV STRESS SPEED STAGE 2: 1.7
BH CV STRESS STAGE 1: 1
BH CV STRESS STAGE 2: 2
LV EF NUC BP: 81 %
MAXIMAL PREDICTED HEART RATE: 147 BPM
PERCENT MAX PREDICTED HR: 82.99 %
STRESS BASELINE BP: NORMAL MMHG
STRESS BASELINE HR: 79 BPM
STRESS O2 SAT REST: 100 %
STRESS PERCENT HR: 98 %
STRESS POST ESTIMATED WORKLOAD: 1 METS
STRESS POST EXERCISE DUR MIN: 4 MIN
STRESS POST EXERCISE DUR SEC: 0 SEC
STRESS POST O2 SAT PEAK: 100 %
STRESS POST PEAK BP: NORMAL MMHG
STRESS POST PEAK HR: 122 BPM
STRESS TARGET HR: 125 BPM

## 2024-05-30 PROCEDURE — A9500 TC99M SESTAMIBI: HCPCS | Performed by: NURSE PRACTITIONER

## 2024-05-30 PROCEDURE — 93017 CV STRESS TEST TRACING ONLY: CPT

## 2024-05-30 PROCEDURE — 78452 HT MUSCLE IMAGE SPECT MULT: CPT

## 2024-05-30 PROCEDURE — 0 TECHNETIUM SESTAMIBI: Performed by: NURSE PRACTITIONER

## 2024-05-30 PROCEDURE — 25010000002 REGADENOSON 0.4 MG/5ML SOLUTION: Performed by: NURSE PRACTITIONER

## 2024-05-30 RX ORDER — REGADENOSON 0.08 MG/ML
0.4 INJECTION, SOLUTION INTRAVENOUS
Status: COMPLETED | OUTPATIENT
Start: 2024-05-30 | End: 2024-05-30

## 2024-05-30 RX ADMIN — TECHNETIUM TC 99M SESTAMIBI 1 DOSE: 1 INJECTION INTRAVENOUS at 08:55

## 2024-05-30 RX ADMIN — TECHNETIUM TC 99M SESTAMIBI 1 DOSE: 1 INJECTION INTRAVENOUS at 07:30

## 2024-05-30 RX ADMIN — REGADENOSON 0.4 MG: 0.08 INJECTION, SOLUTION INTRAVENOUS at 08:55

## 2024-06-04 ENCOUNTER — OFFICE VISIT (OUTPATIENT)
Dept: CARDIOLOGY | Facility: CLINIC | Age: 74
End: 2024-06-04
Payer: COMMERCIAL

## 2024-06-04 VITALS
HEART RATE: 80 BPM | BODY MASS INDEX: 26.82 KG/M2 | OXYGEN SATURATION: 100 % | RESPIRATION RATE: 16 BRPM | WEIGHT: 181.1 LBS | SYSTOLIC BLOOD PRESSURE: 96 MMHG | HEIGHT: 69 IN | DIASTOLIC BLOOD PRESSURE: 56 MMHG

## 2024-06-04 DIAGNOSIS — I10 ESSENTIAL HYPERTENSION: ICD-10-CM

## 2024-06-04 DIAGNOSIS — N18.31 STAGE 3A CHRONIC KIDNEY DISEASE: ICD-10-CM

## 2024-06-04 DIAGNOSIS — I34.1 MITRAL VALVE PROLAPSE: ICD-10-CM

## 2024-06-04 DIAGNOSIS — R42 DIZZINESS: Primary | ICD-10-CM

## 2024-06-04 DIAGNOSIS — R07.2 PRECORDIAL PAIN: ICD-10-CM

## 2024-06-04 PROCEDURE — 99214 OFFICE O/P EST MOD 30 MIN: CPT | Performed by: PHYSICIAN ASSISTANT

## 2024-06-04 NOTE — PROGRESS NOTES
CARDIOLOGY        Patient Name: Myra Charles  :1950  Age: 73 y.o.  Primary Cardiologist: Stephan Hernandez MD  Encounter Provider:  Ace Gandhi PA-C    Date of Service: 24            CHIEF COMPLAINT / REASON FOR OFFICE VISIT     ER follow-up      HISTORY OF PRESENT ILLNESS       HPI  Myra Charles is a 73 y.o. female who presents today for ER follow-up.     Pt has a  history significant for MVP, HTN, and CKD presents to the office for hosptial follow-up.     Pt was seen in the ER on 24.  Patient has labs, EKG's, and CTA of the chest.  Patient was instructed to follow-up in the office.  On 2024 she mentionsthat over the past few months she has had 3 bouts of feeling lightheaded when standing for prolonged period time.  Patient feels as if she was going to pass out.  Patient has felt sweaty with sensation.  Patient is a states at rest and it goes away.  Patient denies any recent illness.  Patient denies any chest pain, new shortness of breath, palpitations,edema to legs, or orthopnea.  Patient denies any recent changes in medications other than her increase of her amlodipine to 7.5.  Patient takes in the morning.  Her amlodipine was decreased to 5 mg at that time.     Patient returned back to the ER on 2024 for dizziness, chest pain, and postural dizziness with presyncope.  Patient had a workup including cardiac labs that were negative.  Patient was not found to be orthostatic at that time.  Patient was to follow-up in the office.  Patient had a outpatient Holter monitor and stress test ordered.  Stress test was normal.  Patient had 3% PACs burden otherwise benign Holter monitor.  Patient started on Imdur 30 mg and had another reduction in her amlodipine to 2.5 mg.    Patient has not had any chest pain, palpitations, new shortness of breath, or leg edema.  Patient still having bouts of lightheadedness mainly when she gets up quickly.  Patient occasionally uses  "compression stockings.  Patient is concerned that her  had similar symptoms and was diagnosed with Lewy body dementia.      Below is a brief summary of patient's pertinent cardiac history:  She had a CT coronary angiogram in 2019 that was completely normal; her calcium score was zero. She had an echo in October 2020 that showed very mild anterior MVP without significant MR; the study was otherwise normal.  A follow up echo in 2023 was unchanged.      She is doing well. She has occasional palpitations which feel like small flutters. She does not have sustained tachycardia. She denies chest pain, lightheadedness, syncope, leg swelling, orthopnea, dyspnea. Her BP at home is consistently 140s/80s. She follows with Dr Pablo Pan for CKD3.    The following portions of the patient's history were reviewed and updated as appropriate: allergies, current medications, past family history, past medical history, past social history, past surgical history and problem list.      VITAL SIGNS     Visit Vitals  BP 96/56 (BP Location: Left arm, Patient Position: Sitting, Cuff Size: Adult)   Pulse 80   Resp 16   Ht 175.3 cm (69\")   Wt 82.1 kg (181 lb 1.6 oz)   SpO2 100%   BMI 26.74 kg/m²       @[email or web address]  Wt Readings from Last 3 Encounters:   06/04/24 82.1 kg (181 lb 1.6 oz)   05/30/24 79.4 kg (175 lb 0.7 oz)   05/18/24 79.4 kg (175 lb)     Body mass index is 26.74 kg/m².        PHYSICAL EXAMINATION     Constitutional:       General: Awake. Not in acute distress.     Appearance: Healthy appearance. Not in distress.   Neck:      Vascular: No carotid bruit or JVD.   Pulmonary:      Effort: Pulmonary effort is normal.      Breath sounds: Normal breath sounds.   Cardiovascular:      Normal rate. Regular rhythm.      Murmurs: There is no murmur.   Musculoskeletal:      Cervical back: Neck supple. Neurological:      Mental Status: Alert.   Psychiatric:         Behavior: Behavior is cooperative.           REVIEWED DATA "     Procedures    Cardiac Procedures:    Holter monitor on 5/22/2024  Interpretation Summary       A relatively benign monitor study.    Premature atrial contractions occured occasionally. 3% of all beats are PACs.    There were diary entries for dyspnea and lightheadedness; these correlate with sinus rhythm.     Stress test on 5/30/2024  Interpretation Summary         Myocardial perfusion imaging indicates a normal myocardial perfusion study with no evidence of ischemia. Impressions are consistent with a low risk study.    Left ventricular ejection fraction is hyperdynamic (Calculated EF > 70%).    There is no prior study available for comparison.     Transthoracic echo on 10/31/2023  Interpretation Summary       Left ventricular systolic function is normal. Calculated left ventricular EF = 65.8% Normal left ventricular cavity size and wall thickness noted. All left ventricular wall segments contract normally. Left ventricular diastolic function is consistent with (grade I) impaired relaxation.    There is mild mitral valve prolapse of the anterior mitral leaflet. Trace to mild mitral valve regurgitation is present. No significant mitral valve stenosis is present.     Ultrasound renal bilaterally on 7/12/2023  IMPRESSION:     1. No hydronephrosis or shadowing stone on either side.  2. Imaging findings supportive of provided history of chronic renal  parenchymal disease.  3. Bladder not well distended or evaluated. Trace postvoid residual       Lipid Panel          7/7/2023    10:32 1/17/2024    09:03   Lipid Panel   Total Cholesterol 183  198    Triglycerides 75  79    HDL Cholesterol 69  67    VLDL Cholesterol 14  14    LDL Cholesterol  100  117        Lab Results   Component Value Date     05/18/2024     04/09/2024    K 4.3 05/18/2024    K 3.7 04/09/2024     05/18/2024     04/09/2024    CO2 21.8 (L) 05/18/2024    CO2 24.1 04/09/2024    BUN 32 (H) 05/18/2024    BUN 29 (H) 04/09/2024     "CREATININE 1.36 (H) 05/18/2024    CREATININE 1.56 (H) 04/09/2024    EGFRIFNONA 36 (L) 11/15/2021    EGFRIFNONA 34 (L) 08/19/2021    EGFRIFAFRI 42 (L) 11/15/2021    EGFRIFAFRI 41 (L) 08/19/2021    GLUCOSE 84 05/18/2024    GLUCOSE 86 04/09/2024    CALCIUM 8.5 (L) 05/18/2024    CALCIUM 8.8 04/09/2024    PROTENTOTREF 6.2 01/17/2024    PROTENTOTREF 6.5 07/07/2023    ALBUMIN 4.0 05/18/2024    ALBUMIN 4.5 04/09/2024    BILITOT 0.2 05/18/2024    BILITOT <0.2 04/09/2024    AST 24 05/18/2024    AST 20 04/09/2024    ALT 28 05/18/2024    ALT 22 04/09/2024     Lab Results   Component Value Date    WBC 7.34 05/18/2024    WBC 8.64 04/09/2024    HGB 12.2 05/18/2024    HGB 13.4 04/09/2024    HCT 38.6 05/18/2024    HCT 42.9 04/09/2024    MCV 90.8 05/18/2024    MCV 89.0 04/09/2024     05/18/2024     04/09/2024     No results found for: \"PROBNP\", \"BNP\"  Lab Results   Component Value Date    TROPONINT 10 05/18/2024     Lab Results   Component Value Date    TSH 2.090 01/17/2024    TSH 1.400 08/29/2023             ASSESSMENT & PLAN     Diagnoses and all orders for this visit:     1. Essential hypertension (Primary)              Blood pressure low in office today. Pt states has been running 110's at home.  Patient to stop taking her Imdur.  Patient wanted to continue her Norvasc 2.5 mg at night.  Patient told to continue monitoring her symptoms and blood pressure.  Will get a carotid duplex to rule out any neck pathology.     2. Mitral valve prolapse              Recent echo mild anterior MVP without significant MR.     3. Stage 3a chronic kidney disease              Baseline per labs done in the ER.     Patient looks well overall.  Patient's symptoms are likely related to orthostasis.  Patient to wear compression stockings and stay well-hydrated.  Patient to contact the office in 2 weeks to see how her blood pressure is doing and symptoms.  We may need to stop her Norvasc if she is willing.  Will obtain a carotid duplex due " to her symptoms.  Patient has follow-up with Dr. Hernandez next month.    Return for Next scheduled follow up, Dr. Hernandez.    Future Appointments         Provider Department Center    7/15/2024 9:20 AM [facility]    7/22/2024 3:30 PM Head, ASHLEY Da Silva [facility]    7/23/2024 1:15 PM Stephan Hernandez MD [facility] CARDIOLOGY [facility]                MEDICATIONS         Discharge Medications            Accurate as of June 4, 2024  8:40 AM. If you have any questions, ask your nurse or doctor.                Changes to Medications        Instructions Start Date   cholecalciferol 25 MCG (1000 UT) tablet  Commonly known as: VITAMIN D3  What changed:   how much to take  when to take this   1,000 Units, Oral, Daily             Continue These Medications        Instructions Start Date   albuterol sulfate  (90 Base) MCG/ACT inhaler  Commonly known as: PROVENTIL HFA;VENTOLIN HFA;PROAIR HFA   2 puffs, Inhalation, Every 4 Hours PRN      amLODIPine 5 MG tablet  Commonly known as: NORVASC   2.5 mg, Oral, Nightly      Ascorbic Acid 500 MG capsule   No dose, route, or frequency recorded.      aspirin 81 MG EC tablet   1 tablet, Oral, Daily      Breo Ellipta 100-25 MCG/ACT aerosol powder   Generic drug: Fluticasone Furoate-Vilanterol   No dose, route, or frequency recorded.      Calcium-Phosphorus-Vitamin D 250-107-500 MG-MG-UNIT chewable tablet   1 tablet, Oral, Daily      cyclobenzaprine 10 MG tablet  Commonly known as: FLEXERIL   10 mg, Oral, Nightly PRN      DULoxetine 60 MG capsule  Commonly known as: CYMBALTA   60 mg, Oral, Daily      famotidine 20 MG tablet  Commonly known as: PEPCID   20 mg, Oral, 2 Times Daily      fluticasone 50 MCG/ACT nasal spray  Commonly known as: FLONASE   1 spray, Nasal, Daily      folic acid 1 MG tablet  Commonly known as: FOLVITE   1 mg, Oral, Daily      magnesium oxide 400 MG tablet  Commonly known as:  MAG-OX   400 mg, Oral, Daily      metoprolol succinate XL 25 MG 24 hr tablet  Commonly known as: TOPROL-XL   25 mg, Oral, 2 Times Daily      multivitamins-minerals capsule capsule   1 capsule, Oral, 2 Times Daily      Prolia 60 MG/ML solution prefilled syringe syringe  Generic drug: denosumab   No dose, route, or frequency recorded.      vitamin B-12 1000 MCG tablet  Commonly known as: CYANOCOBALAMIN   1,000 mcg, Oral, 2 Times Weekly                   **Dragon Disclaimer:   Much of this encounter note is an electronic transcription/translation of spoken language to printed text. The electronic translation of spoken language may permit erroneous, or at times, nonsensical words or phrases to be inadvertently transcribed. Although I have reviewed the note for such errors, some may still exist.

## 2024-06-10 ENCOUNTER — HOSPITAL ENCOUNTER (OUTPATIENT)
Dept: ULTRASOUND IMAGING | Facility: HOSPITAL | Age: 74
Discharge: HOME | End: 2024-06-10
Admitting: PHYSICIAN ASSISTANT
Payer: MEDICARE

## 2024-06-10 ENCOUNTER — TELEPHONE (OUTPATIENT)
Dept: CARDIOLOGY | Facility: CLINIC | Age: 74
End: 2024-06-10
Payer: COMMERCIAL

## 2024-06-10 DIAGNOSIS — I34.1 MITRAL VALVE PROLAPSE: ICD-10-CM

## 2024-06-10 DIAGNOSIS — I10 ESSENTIAL HYPERTENSION: ICD-10-CM

## 2024-06-10 DIAGNOSIS — N18.31 STAGE 3A CHRONIC KIDNEY DISEASE: ICD-10-CM

## 2024-06-10 DIAGNOSIS — R42 DIZZINESS: ICD-10-CM

## 2024-06-10 DIAGNOSIS — R07.2 PRECORDIAL PAIN: ICD-10-CM

## 2024-06-10 PROCEDURE — 93880 EXTRACRANIAL BILAT STUDY: CPT

## 2024-06-10 NOTE — TELEPHONE ENCOUNTER
----- Message from Ace Gandhi sent at 6/10/2024  2:22 PM EDT -----  Please call patient and let her know that she has no evidence of flow limiting stenosis to either carotids. Thank you.

## 2024-06-10 NOTE — TELEPHONE ENCOUNTER
Called and left VM, will continue to try to reach pt.    HUB- please put patient straight through to triage    Mari Uribe RN  Triage RN  06/10/24 14:54 EDT

## 2024-06-11 NOTE — TELEPHONE ENCOUNTER
Pt called back. Went over results. She verbalized understanding.    Thank you,    April Dickson, RN  Triage [facility]  06/11/24 10:35 EDT

## 2024-06-11 NOTE — TELEPHONE ENCOUNTER
Called and left a VM. Will continue to try to reach pt.    April Dickson RN  Triage [facility]  06/11/24 10:27 EDT

## 2024-06-25 ENCOUNTER — TRANSCRIBE ORDERS (OUTPATIENT)
Dept: ADMINISTRATIVE | Facility: HOSPITAL | Age: 74
End: 2024-06-25
Payer: COMMERCIAL

## 2024-06-25 ENCOUNTER — LAB (OUTPATIENT)
Dept: LAB | Facility: HOSPITAL | Age: 74
End: 2024-06-25
Payer: MEDICARE

## 2024-06-25 DIAGNOSIS — N18.32 CHRONIC KIDNEY DISEASE (CKD) STAGE G3B/A1, MODERATELY DECREASED GLOMERULAR FILTRATION RATE (GFR) BETWEEN 30-44 ML/MIN/1.73 SQUARE METER AND ALBUMINURIA CREATININE RATIO LESS THAN 30 MG/G (CMS/H*: Primary | ICD-10-CM

## 2024-06-25 DIAGNOSIS — N18.32 CHRONIC KIDNEY DISEASE (CKD) STAGE G3B/A1, MODERATELY DECREASED GLOMERULAR FILTRATION RATE (GFR) BETWEEN 30-44 ML/MIN/1.73 SQUARE METER AND ALBUMINURIA CREATININE RATIO LESS THAN 30 MG/G (CMS/H*: ICD-10-CM

## 2024-06-25 LAB
ALBUMIN SERPL-MCNC: 4.1 G/DL (ref 3.5–5.2)
ALBUMIN UR-MCNC: <1.2 MG/DL
ANION GAP SERPL CALCULATED.3IONS-SCNC: 11.5 MMOL/L (ref 5–15)
BACTERIA UR QL AUTO: NORMAL /HPF
BILIRUB UR QL STRIP: NEGATIVE
BUN SERPL-MCNC: 26 MG/DL (ref 8–23)
BUN/CREAT SERPL: 18.2 (ref 7–25)
CALCIUM SPEC-SCNC: 9 MG/DL (ref 8.6–10.5)
CHLORIDE SERPL-SCNC: 102 MMOL/L (ref 98–107)
CLARITY UR: CLEAR
CO2 SERPL-SCNC: 23.5 MMOL/L (ref 22–29)
COLOR UR: YELLOW
CREAT SERPL-MCNC: 1.43 MG/DL (ref 0.57–1)
CREAT UR-MCNC: 75.6 MG/DL
CREAT UR-MCNC: 75.6 MG/DL
EGFRCR SERPLBLD CKD-EPI 2021: 38.8 ML/MIN/1.73
GLUCOSE SERPL-MCNC: 57 MG/DL (ref 65–99)
GLUCOSE UR STRIP-MCNC: NEGATIVE MG/DL
HGB UR QL STRIP.AUTO: NEGATIVE
HYALINE CASTS UR QL AUTO: NORMAL /LPF
KETONES UR QL STRIP: NEGATIVE
LEUKOCYTE ESTERASE UR QL STRIP.AUTO: ABNORMAL
MICROALBUMIN/CREAT UR: NORMAL MG/G{CREAT}
NITRITE UR QL STRIP: NEGATIVE
PH UR STRIP.AUTO: 6.5 [PH] (ref 5–8)
PHOSPHATE SERPL-MCNC: 4.3 MG/DL (ref 2.5–4.5)
POTASSIUM SERPL-SCNC: 3.9 MMOL/L (ref 3.5–5.2)
PROT ?TM UR-MCNC: 6.1 MG/DL
PROT UR QL STRIP: NEGATIVE
PROT/CREAT UR: 80.7 MG/G CREA (ref 0–200)
RBC # UR STRIP: NORMAL /HPF
REF LAB TEST METHOD: NORMAL
SODIUM SERPL-SCNC: 137 MMOL/L (ref 136–145)
SP GR UR STRIP: 1.01 (ref 1–1.03)
SQUAMOUS #/AREA URNS HPF: NORMAL /HPF
UROBILINOGEN UR QL STRIP: ABNORMAL
WBC # UR STRIP: NORMAL /HPF

## 2024-06-25 PROCEDURE — 81001 URINALYSIS AUTO W/SCOPE: CPT

## 2024-06-25 PROCEDURE — 84156 ASSAY OF PROTEIN URINE: CPT

## 2024-06-25 PROCEDURE — 80069 RENAL FUNCTION PANEL: CPT

## 2024-06-25 PROCEDURE — 82043 UR ALBUMIN QUANTITATIVE: CPT

## 2024-06-25 PROCEDURE — 82570 ASSAY OF URINE CREATININE: CPT

## 2024-06-25 PROCEDURE — 36415 COLL VENOUS BLD VENIPUNCTURE: CPT

## 2024-07-02 DIAGNOSIS — E05.90 HYPERTHYROIDISM: ICD-10-CM

## 2024-07-02 DIAGNOSIS — E55.9 VITAMIN D DEFICIENCY: ICD-10-CM

## 2024-07-02 DIAGNOSIS — E53.8 B12 DEFICIENCY: ICD-10-CM

## 2024-07-02 DIAGNOSIS — I10 ESSENTIAL HYPERTENSION: Primary | ICD-10-CM

## 2024-07-05 DIAGNOSIS — I10 ESSENTIAL HYPERTENSION: ICD-10-CM

## 2024-07-05 DIAGNOSIS — K21.9 GASTROESOPHAGEAL REFLUX DISEASE WITHOUT ESOPHAGITIS: ICD-10-CM

## 2024-07-06 RX ORDER — METOPROLOL SUCCINATE 25 MG/1
25 TABLET, EXTENDED RELEASE ORAL 2 TIMES DAILY
Qty: 180 TABLET | Refills: 0 | Status: SHIPPED | OUTPATIENT
Start: 2024-07-06

## 2024-07-06 RX ORDER — FAMOTIDINE 20 MG/1
20 TABLET, FILM COATED ORAL 2 TIMES DAILY
Qty: 180 TABLET | Refills: 0 | Status: SHIPPED | OUTPATIENT
Start: 2024-07-06

## 2024-07-22 ENCOUNTER — OFFICE VISIT (OUTPATIENT)
Dept: FAMILY MEDICINE CLINIC | Facility: CLINIC | Age: 74
End: 2024-07-22
Payer: COMMERCIAL

## 2024-07-22 VITALS
WEIGHT: 184.5 LBS | TEMPERATURE: 99.3 F | HEART RATE: 68 BPM | BODY MASS INDEX: 27.33 KG/M2 | OXYGEN SATURATION: 98 % | HEIGHT: 69 IN | DIASTOLIC BLOOD PRESSURE: 68 MMHG | SYSTOLIC BLOOD PRESSURE: 138 MMHG

## 2024-07-22 DIAGNOSIS — Z23 IMMUNIZATION DUE: ICD-10-CM

## 2024-07-22 DIAGNOSIS — N18.32 STAGE 3B CHRONIC KIDNEY DISEASE: ICD-10-CM

## 2024-07-22 DIAGNOSIS — I10 ESSENTIAL HYPERTENSION: Primary | ICD-10-CM

## 2024-07-22 DIAGNOSIS — K21.9 GASTROESOPHAGEAL REFLUX DISEASE WITHOUT ESOPHAGITIS: ICD-10-CM

## 2024-07-22 DIAGNOSIS — M79.7 FIBROMYALGIA: ICD-10-CM

## 2024-07-22 PROCEDURE — 99214 OFFICE O/P EST MOD 30 MIN: CPT | Performed by: NURSE PRACTITIONER

## 2024-07-22 PROCEDURE — 90715 TDAP VACCINE 7 YRS/> IM: CPT | Performed by: NURSE PRACTITIONER

## 2024-07-22 PROCEDURE — 90471 IMMUNIZATION ADMIN: CPT | Performed by: NURSE PRACTITIONER

## 2024-07-22 NOTE — PROGRESS NOTES
"Answers submitted by the patient for this visit:  Other (Submitted on 7/15/2024)  Please describe your symptoms.: Follow up. blood pressure  Have you had these symptoms before?: Yes  How long have you been having these symptoms?: Greater than 2 weeks  Please list any medications you are currently taking for this condition.: Amlodipine  Primary Reason for Visit (Submitted on 7/15/2024)  What is the primary reason for your visit?: Other      Patient ID: Myra Charles is a 73 y.o. female     Patient Care Team:  Head, ASHLEY Da Silva as PCP - General (Nurse Practitioner)  Damian Rock DPM as Consulting Physician (Podiatry)  Cruzito Souza MD as Consulting Physician (Hematology and Oncology)  Cordell Overton MD as Consulting Physician (Nephrology)  Stephan Hernandez MD as Consulting Physician (Cardiology)  Otto, Augustine Cordova MD as Consulting Physician (Gastroenterology)  Sonia Dominguez MD as Consulting Physician (Rheumatology)    Subjective     Chief Complaint   Patient presents with    Hypertension       History of Present Illness    Myra Charles presents to [facility] today for continued management concerning HTN.  .     Followed by nephrology, Dr. Pablo Pan for management of chronic kidney disease stage III.  Blood pressure has been at goal.  No lower extremity swelling.    CT angio chest completed 4/9/24 which incidentally found 1.5 cm left thyroid nodule.  Found multiple thyroid nodules on ultrasound.  Followed by ENT.  Had bx and was benign.       B/P dropped 88/50.  Keeps having intermittent episodes of drop in B/P.    B/P log from home has been running 100 to 158 systolic.  Diastolic range from 60 to 80's. Heart rate has been as low as 54.  Mainly stays around 60 to 80's.   Episode last Thursday, B/P was 86/66 and heart rate was read as \"low\". Had a pain in back when this occurred. Has been evaluated in ER X 2 occurrence due to B/P.  " Appt with Dr. Hernandez tomorrow for further evaluation.      Blood sugars have been stable 102, 104 fasting.  188 after eating.     GERD: Managed with Pepcid 20 mg twice a day.  Fibromyalgia: Managed with duloxetine 60 mg daily.    She denies any complaints of fever, chills, cough, chest pain, shortness of air, abdominal pain, nausea, or any other concerns.     The following portions of the patient's history were reviewed and updated as appropriate: allergies, current medications, past family history, past medical history, past social history, past surgical history and problem list.       ROS    Vitals:    07/22/24 1537   BP: 138/68   Pulse: 68   Temp: 99.3 °F (37.4 °C)   SpO2: 98%       Documented weights    07/22/24 1537   Weight: 83.7 kg (184 lb 8 oz)     Body mass index is 27.23 kg/m².    Results for orders placed or performed in visit on 07/07/24   CBC (No Diff)    Specimen: Blood   Result Value Ref Range    WBC 7.6 3.4 - 10.8 x10E3/uL    RBC 4.33 3.77 - 5.28 x10E6/uL    Hemoglobin 12.4 11.1 - 15.9 g/dL    Hematocrit 38.7 34.0 - 46.6 %    MCV 89 79 - 97 fL    MCH 28.6 26.6 - 33.0 pg    MCHC 32.0 31.5 - 35.7 g/dL    RDW 12.3 11.7 - 15.4 %    Platelets 238 150 - 450 x10E3/uL   Vitamin D,25-Hydroxy    Specimen: Blood   Result Value Ref Range    25 Hydroxy, Vitamin D 38.7 30.0 - 100.0 ng/mL   TSH Rfx On Abnormal To Free T4    Specimen: Blood   Result Value Ref Range    TSH 2.200 0.450 - 4.500 uIU/mL   Comprehensive Metabolic Panel    Specimen: Blood   Result Value Ref Range    Glucose 78 70 - 99 mg/dL    BUN 26 8 - 27 mg/dL    Creatinine 1.38 (H) 0.57 - 1.00 mg/dL    EGFR Result 40 (L) >59 mL/min/1.73    BUN/Creatinine Ratio 19 12 - 28    Sodium 140 134 - 144 mmol/L    Potassium 4.7 3.5 - 5.2 mmol/L    Chloride 103 96 - 106 mmol/L    Total CO2 25 20 - 29 mmol/L    Calcium 9.1 8.7 - 10.3 mg/dL    Total Protein 6.0 6.0 - 8.5 g/dL    Albumin 4.2 3.8 - 4.8 g/dL    Globulin 1.8 1.5 - 4.5 g/dL    Total Bilirubin 0.3 0.0 -  1.2 mg/dL    Alkaline Phosphatase 112 44 - 121 IU/L    AST (SGOT) 25 0 - 40 IU/L    ALT (SGPT) 30 0 - 32 IU/L   Vitamin B12    Specimen: Blood   Result Value Ref Range    Vitamin B-12 830 232 - 1,245 pg/mL   Folate    Specimen: Blood   Result Value Ref Range    Folate >20.0 >3.0 ng/mL   Lipid Panel   Result Value Ref Range    Total Cholesterol CANCELED mg/dL    Triglycerides CANCELED     HDL Cholesterol CANCELED     VLDL Cholesterol [place] CANCELED mg/dL   Written Authorization   Result Value Ref Range    Written Authorization Comment    Specimen Status Report   Result Value Ref Range    Specimen Status CANCELED          US Thyroid (04/18/2024 17:58)    CT Angiogram Chest Pulmonary Embolism (04/09/2024 18:07)    Objective     Physical Exam  Vitals reviewed.   Constitutional:       General: She is not in acute distress.  HENT:      Head: Normocephalic and atraumatic.   Cardiovascular:      Rate and Rhythm: Normal rate and regular rhythm.      Heart sounds: No murmur heard.  Pulmonary:      Effort: Pulmonary effort is normal.      Breath sounds: Normal breath sounds. No wheezing.   Musculoskeletal:      Right lower leg: No edema.      Left lower leg: No edema.   Neurological:      Mental Status: She is alert and oriented to person, place, and time.   Psychiatric:         Mood and Affect: Mood normal.         Behavior: Behavior normal.            Assessment & Plan     Assessment/Plan     Diagnoses and all orders for this visit:    1. Essential hypertension (Primary)  -     CBC (No Diff); Future  -     Comprehensive Metabolic Panel; Future  -     Lipid Panel With / Chol / HDL Ratio; Future  -     TSH Rfx On Abnormal To Free T4; Future    2. Immunization due  -     Tdap Vaccine Greater Than or Equal To 8yo IM    3. Gastroesophageal reflux disease without esophagitis  -     CBC (No Diff); Future  -     Comprehensive Metabolic Panel; Future    4. Stage 3b chronic kidney disease  -     Comprehensive Metabolic Panel;  Future    5. Fibromyalgia          Summary:  Myra Charles has had issues with fluctuating blood pressure.  Currently blood pressure readings stable at 138/68.  She has follow-up appointment scheduled with cardiology to determine further recommendations.  No changes in blood pressure medication at this time.  Reassuring last visit with nephrology was stable.  Reviewed recent labs along with patient which all remained stable.  However lipid panel is still pending.  Will see if able to add to existing blood work.  Will notify of results.    Follow Up:  Return in about 6 months (around 1/22/2025) for Recheck, Medicare Wellness with fasting labs week before.  .    In the meantime, instructed to contact us sooner for any problems or concerns.    Patient was given instructions and counseling regarding condition or for health maintenance advice.  Please see specific information pulled into the AVS if appropriate.          Yvette Muñiz, APRN  Family Medicine  [facility] [place]  07/23/24  12:56 EDT

## 2024-07-23 ENCOUNTER — OFFICE VISIT (OUTPATIENT)
Dept: CARDIOLOGY | Facility: CLINIC | Age: 74
End: 2024-07-23
Payer: COMMERCIAL

## 2024-07-23 VITALS
BODY MASS INDEX: 27.11 KG/M2 | DIASTOLIC BLOOD PRESSURE: 70 MMHG | HEART RATE: 73 BPM | WEIGHT: 183 LBS | SYSTOLIC BLOOD PRESSURE: 118 MMHG | RESPIRATION RATE: 16 BRPM | HEIGHT: 69 IN

## 2024-07-23 DIAGNOSIS — I10 ESSENTIAL HYPERTENSION: ICD-10-CM

## 2024-07-23 DIAGNOSIS — I95.9 HYPOTENSION, UNSPECIFIED HYPOTENSION TYPE: Primary | Chronic | ICD-10-CM

## 2024-07-23 DIAGNOSIS — R07.89 CHEST PAIN, ATYPICAL: ICD-10-CM

## 2024-07-23 DIAGNOSIS — I49.1 PAC (PREMATURE ATRIAL CONTRACTION): ICD-10-CM

## 2024-07-23 DIAGNOSIS — I34.0 NON-RHEUMATIC MITRAL REGURGITATION: ICD-10-CM

## 2024-07-23 DIAGNOSIS — N18.31 STAGE 3A CHRONIC KIDNEY DISEASE: ICD-10-CM

## 2024-07-23 PROCEDURE — 99214 OFFICE O/P EST MOD 30 MIN: CPT | Performed by: INTERNAL MEDICINE

## 2024-07-23 PROCEDURE — 93000 ELECTROCARDIOGRAM COMPLETE: CPT | Performed by: INTERNAL MEDICINE

## 2024-07-23 NOTE — PROGRESS NOTES
RM:________     PCP: Yvette Muñiz APRN    : 1950  AGE: 73 y.o.  EST PATIENT     REASON FOR VISIT/  CC:        BP Readings from Last 3 Encounters:   24 138/68   24 96/56   24 159/91      Wt Readings from Last 3 Encounters:   24 83.7 kg (184 lb 8 oz)   24 82.1 kg (181 lb 1.6 oz)   24 79.4 kg (175 lb 0.7 oz)        WT: ____________ BP: __________L __________R HR______    CHEST PAIN: _____________    SOA: _____________PALPS: _______________     LIGHTHEADED: ___________FATIGUE: ________________ EDEMA __________    ALLERGIES:Raloxifene, Gabapentin, Latex, and Pregabalin SMOKING HISTORY:  Social History     Tobacco Use    Smoking status: Never    Smokeless tobacco: Never   Vaping Use    Vaping status: Never Used   Substance Use Topics    Alcohol use: Yes     Alcohol/week: 2.0 standard drinks of alcohol     Types: 2 Glasses of wine per week     Comment: Maybe every two to three weeks    Drug use: Never     CAFFEINE USE_________________  ALCOHOL ______________________

## 2024-07-23 NOTE — PROGRESS NOTES
Date of Office Visit: 24  Encounter Provider: Stephan Hernandez MD  Place of Service: [facility] CARDIOLOGY  Patient Name: Myra Charles  :1950    Chief Complaint   Patient presents with    Coronary Artery Disease     Follow up  Mitral Valve prolapse   Dizziness  Essential hypertension   :     HPI:     Ms. Charles is 73 y.o. and presents today in follow up. I have reviewed prior notes and there are no changes except for any new updates described below. I have also reviewed any information entered into the medical record by the patient or by ancillary staff.     She had a CT coronary angiogram in  that was completely normal; her calcium score was zero. She had an echo in 2020 that showed very mild anterior MVP without significant MR; the study was otherwise normal.  A follow up echo in  was unchanged.     Over the last several months she has had problems where she gets drops in her blood pressure and then she feels profoundly fatigued for several hours.  She gets pain in the middle of her back and across her chest when these episodes happen.  They sometimes happen after standing on her feet for a while and after standing up quickly.  She has had a CT angiogram of the chest, carotid duplex, and myocardial perfusion stress test which have all been normal.  Her blood pressure medications have been adjusted.  She intermittently wears compression stockings and she tries to push fluids.  When the episodes happen, her systolic pressure will drop to the 80s.    Past Medical History:   Diagnosis Date    Arthritis     Asthma     Backache 2016    Low back pain, in sacral region       Benign hypertension 2016    CKD (chronic kidney disease) stage 3, GFR 30-59 ml/min     COPD (chronic obstructive pulmonary disease)     Coronary artery disease     Cortical senile cataract 2016    Deep vein thrombosis     Essential (primary) hypertension 2016     GERD (gastroesophageal reflux disease) 02/17/2016    Hallux valgus     Deformity, w/inflammation       History of coronary angiogram     CT cor angio 2019, normal cors, Agatston 0    Iron deficiency anemia     Lung nodule, solitary 03/08/2012    Solitary nodule of lung - RIGHT upper lobe 15mm, PET positive       Malignant carcinoid tumor of lung 02/17/2016    Atypical carcinoid tumor of the LEFT lung treated in March 2012      Mitral valve prolapse 02/17/2016    Osteoporosis 04/20/2016    Primary fibromyalgia syndrome 02/17/2016    Rosacea 02/17/2016    Seborrheic keratosis     History of, upper and lower back       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 Enodscopic vein harvest on the left. Left lower extremity arteriogram.)    BREAST CYST EXCISION      pt unsure of laterality    CERVICAL CONIZATION      CONIZATION OF CERVIX 08654 (Excisional laser cone biopsy and vaporization of cervix.)    CHOLECYSTECTOMY  09/07/2005    Cholecystectomy, laparoscopic (With intraoperative cholangiogram.)    COLONOSCOPY      COLONOSCOPY W/ POLYPECTOMY N/A 01/05/2022    Procedure: COLONOSCOPY WITH POLYPECTOMY;  Surgeon: Augustine Menjivar MD;  Location: [facility];  Service: Gastroenterology;  Laterality: N/A;  Rectal polyp    CYST REMOVAL      left 3rd toe    EXCISION LESION  11/11/1998    REMOVE LESION BACK OR FLANK 33873 (Excision times two.)    FOOT SURGERY  01/20/2009    Foot/toes surgery procedure (Soft tissue mass, left foot. Excision  of)    HERNIA REPAIR      Past history of umbilical herni repair.    HYSTERECTOMY      OTHER SURGICAL HISTORY  12/04/2012    Anesth, elbow area surgery (Manipulation of left elbow.)    OTHER SURGICAL HISTORY  03/05/2012    Anesth, elbow area surgery (Excision of plate and screws from olecranon bursa. Retained plate and screws, olecranon bursitis of previous fracture left elbow.)    OTHER SURGICAL HISTORY  10/16/2012    Treat elbow fracture (Open reduction and internal fixation.)    THORACOSCOPY  03/14/2012    Thoracoscopy, surgical (Bronchoscopy,LVATS (wedge resect MARYBETH), LULobectomy Thoracic lymphadenectomy)    THROAT SURGERY  07/25/2012    Throat surgery procedure (Thyroplasty type I (vocal cord medialozation & larynooplasty) Left vocal cord paralysis. Dysphoria. [facility] by Dr Tk Heart)    THYROID SURGERY  03/22/2016    Thyroid surgery (Right thyroid lobectomy and isthmusectomy.)    TONSILLECTOMY  02/03/1956    Chronic tonsillitis    VEIN LIGATION  09/28/2000    PHLEB VEINS - EXTREM (20+) 13334 (High ligation of ling saphenous vein, left, plus multiple phlebectomies, left lower extremity.)       Social History     Socioeconomic History    Marital status:    Tobacco Use    Smoking status: Never     Passive exposure: Never    Smokeless tobacco: Never   Vaping Use    Vaping status: Never Used   Substance and Sexual Activity    Alcohol use: Yes     Alcohol/week: 2.0 standard drinks of alcohol     Types: 2 Glasses of wine per week     Comment: Maybe every two to three weeks    Drug use: Never    Sexual activity: Not Currently     Partners: Male     Birth control/protection: Surgical       Family History   Problem Relation Age of Onset    Cancer Mother         myeloma    Diabetes Mother     Heart disease Mother     Arthritis Mother     Hypertension Mother     Heart attack Mother     Heart disease Father     Arthritis Father     Heart attack Father     Stroke Father     Hypertension Father      Vision loss Father     Lung cancer Maternal Grandfather     Lung cancer Other     Diabetes Maternal Grandmother     Breast cancer Neg Hx        Review of Systems   Constitutional: Positive for malaise/fatigue.   Respiratory:  Positive for shortness of breath.    Musculoskeletal:  Positive for myalgias.   All other systems reviewed and are negative.      Allergies   Allergen Reactions    Raloxifene Swelling, Other (See Comments) and Unknown - High Severity     Caused pain    Gabapentin Other (See Comments) and Unknown - High Severity     HA/sedation    Latex Hives and Itching    Pregabalin Swelling, Other (See Comments) and Unknown - High Severity     swelling         Current Outpatient Medications:     albuterol sulfate  (90 Base) MCG/ACT inhaler, Inhale 2 puffs Every 4 (Four) Hours As Needed for Wheezing or Shortness of Air., Disp: 18 g, Rfl: 0    amLODIPine (NORVASC) 5 MG tablet, Take 0.5 tablets by mouth Every Night. (Patient taking differently: Take 1 tablet by mouth Every Night. Takes 5 MG at night), Disp: , Rfl:     Ascorbic Acid 500 MG capsule, , Disp: , Rfl:     aspirin 81 MG EC tablet, Take 1 tablet by mouth Daily., Disp: , Rfl:     Breo Ellipta 100-25 MCG/ACT aerosol powder , , Disp: , Rfl:     Calcium-Phosphorus-Vitamin D 250-107-500 MG-MG-UNIT chewable tablet, Chew 1 tablet Daily., Disp: , Rfl:     cholecalciferol (VITAMIN D3) 25 MCG (1000 UT) tablet, Take 1 tablet by mouth Daily. (Patient taking differently: Take 2 tablets by mouth.), Disp: 90 tablet, Rfl: 1    cyclobenzaprine (FLEXERIL) 10 MG tablet, Take 1 tablet by mouth At Night As Needed for Muscle Spasms., Disp: 90 tablet, Rfl: 1    denosumab (Prolia) 60 MG/ML solution prefilled syringe syringe, , Disp: , Rfl:     DULoxetine (CYMBALTA) 60 MG capsule, Take 1 capsule by mouth Daily., Disp: 90 capsule, Rfl: 1    famotidine (PEPCID) 20 MG tablet, TAKE 1 TABLET TWICE A DAY, Disp: 180 tablet, Rfl: 0    fluticasone (FLONASE) 50 MCG/ACT nasal  "spray, 1 spray into the nostril(s) as directed by provider Daily. (Patient taking differently: 1 spray into the nostril(s) as directed by provider Daily As Needed.), Disp: 16 g, Rfl: 5    folic acid (FOLVITE) 1 MG tablet, Take 1 tablet by mouth Daily., Disp: 90 tablet, Rfl: 3    magnesium oxide (MAG-OX) 400 MG tablet, Take 1 tablet by mouth Daily., Disp: , Rfl:     metoprolol succinate XL (TOPROL-XL) 25 MG 24 hr tablet, TAKE 1 TABLET TWICE A DAY, Disp: 180 tablet, Rfl: 0    multivitamins-minerals (PRESERVISION AREDS 2) capsule capsule, Take 1 capsule by mouth 2 (Two) Times a Day., Disp: 90 capsule, Rfl: 1    vitamin B-12 (CYANOCOBALAMIN) 1000 MCG tablet, Take 1 tablet by mouth 2 (Two) Times a Week., Disp: , Rfl:       Objective:     Vitals:    07/23/24 1319   BP: 118/70   BP Location: Right arm   Patient Position: Sitting   Cuff Size: Adult   Pulse: 73   Resp: 16   Weight: 83 kg (183 lb)   Height: 175.3 cm (69\")       Body mass index is 27.02 kg/m².    Vitals reviewed.   Constitutional:       Appearance: Healthy appearance. Well-developed and not in distress.   Eyes:      Conjunctiva/sclera: Conjunctivae normal.   HENT:      Head: Normocephalic.      Nose: Nose normal.   Neck:      Vascular: No JVD. JVD normal.      Lymphadenopathy: No cervical adenopathy.   Pulmonary:      Effort: Pulmonary effort is normal.      Breath sounds: Normal breath sounds.   Cardiovascular:      Normal rate. Regular rhythm.      Murmurs: There is no murmur.   Pulses:     Intact distal pulses.   Edema:     Peripheral edema absent.   Abdominal:      Palpations: Abdomen is soft.      Tenderness: There is no abdominal tenderness.   Musculoskeletal: Normal range of motion.      Cervical back: Normal range of motion. Skin:     General: Skin is warm and dry.   Neurological:      General: No focal deficit present.      Mental Status: Alert and oriented to person, place, and time.      Cranial Nerves: No cranial nerve deficit.   Psychiatric:       "   Behavior: Behavior normal.         Thought Content: Thought content normal.         Judgment: Judgment normal.         ECG 12 Lead    Date/Time: 7/23/2024 1:34 PM  Performed by: Stephan Hernandez MD    Authorized by: Stephan Hernandez MD  Comparison: compared with previous ECG   Similar to previous ECG  Rhythm: sinus rhythm  Conduction: conduction normal  ST Segments: ST segments normal  T Waves: T waves normal  QRS axis: normal  Other: no other findings    Clinical impression: normal ECG          Assessment:       Diagnosis Plan   1. Hypotension, unspecified hypotension type  FL ESOPHAGRAM DOUBLE CONTRAST      2. Essential hypertension  ECG 12 Lead      3. Stage 3a chronic kidney disease        4. Non-rheumatic mitral regurgitation        5. PAC (premature atrial contraction)        6. Chest pain, atypical  FL ESOPHAGRAM DOUBLE CONTRAST             Plan:       Mrs Charles has a history of hypertension and CKD 3 and is now having episodes of hypotension of unclear etiology.  Some of them sound orthostatic like when she has been on her feet or when she stands up but she has had orthostatic vital signs checked and they have been negative.  The fact that she gets this pain in her back and chest makes me wonder about a vasovagal etiology from esophageal spasm.  She does not have coronary disease and she has had a thorough cardiac workup recently, and a CT ruled out any aortic pathology.  I think that her goal systolic blood pressure should be in the 130s to 140s to prevent symptomatic drops.  She is going to continue with amlodipine 2.5 mg at nighttime in case this is esophageal, and decrease metoprolol to 25 mg at nighttime as well.  She has a history of premature atrial contractions and we will have to see how decreasing the beta-blocker goes with this.    She has a history of mild anterior MVP without significant MR. Her last echo was in October 2023.  This will be repeated in 2026 unless her exam changes.    Sincerely,        Stephan Hernandez MD

## 2024-08-07 ENCOUNTER — TELEPHONE (OUTPATIENT)
Dept: CARDIOLOGY | Facility: CLINIC | Age: 74
End: 2024-08-07
Payer: COMMERCIAL

## 2024-08-07 ENCOUNTER — HOSPITAL ENCOUNTER (OUTPATIENT)
Dept: GENERAL RADIOLOGY | Facility: HOSPITAL | Age: 74
Discharge: HOME | End: 2024-08-07
Admitting: INTERNAL MEDICINE
Payer: MEDICARE

## 2024-08-07 DIAGNOSIS — I95.9 HYPOTENSION, UNSPECIFIED HYPOTENSION TYPE: Chronic | ICD-10-CM

## 2024-08-07 DIAGNOSIS — R07.89 CHEST PAIN, ATYPICAL: ICD-10-CM

## 2024-08-07 DIAGNOSIS — K22.4 ESOPHAGEAL DYSMOTILITY: Primary | ICD-10-CM

## 2024-08-07 PROCEDURE — 74221 X-RAY XM ESOPHAGUS 2CNTRST: CPT

## 2024-08-07 RX ORDER — FOLIC ACID 1 MG/1
1 TABLET ORAL DAILY
Qty: 90 TABLET | Refills: 0 | Status: SHIPPED | OUTPATIENT
Start: 2024-08-07

## 2024-08-07 RX ORDER — FOLIC ACID 1 MG/1
1 TABLET ORAL DAILY
Qty: 90 TABLET | Refills: 0 | Status: SHIPPED | OUTPATIENT
Start: 2024-08-07 | End: 2024-08-07 | Stop reason: SDUPTHER

## 2024-08-07 RX ADMIN — BARIUM SULFATE 700 MG: 700 TABLET ORAL at 11:10

## 2024-08-07 RX ADMIN — ANTACID/ANTIFLATULENT 1 PACKET: 380; 550; 10; 10 GRANULE, EFFERVESCENT ORAL at 11:11

## 2024-08-07 RX ADMIN — BARIUM SULFATE 183 ML: 960 POWDER, FOR SUSPENSION ORAL at 11:11

## 2024-08-07 RX ADMIN — BARIUM SULFATE 340 ML: 980 POWDER, FOR SUSPENSION ORAL at 11:11

## 2024-08-07 NOTE — TELEPHONE ENCOUNTER
I called and she does appear to have some esophageal dysmotility.  I wonder if she is having esophageal spasms that caused a terrible pain and then likely caused a vasovagal reaction.  I did go ahead and place the referral to GI but I will defer to her primary care as to whether or not she needs to be on a PPI.  She is on an H2 blocker.

## 2024-08-09 ENCOUNTER — OFFICE VISIT (OUTPATIENT)
Dept: GASTROENTEROLOGY | Facility: CLINIC | Age: 74
End: 2024-08-09
Payer: COMMERCIAL

## 2024-08-09 VITALS
SYSTOLIC BLOOD PRESSURE: 128 MMHG | WEIGHT: 180.8 LBS | HEIGHT: 69 IN | BODY MASS INDEX: 26.78 KG/M2 | DIASTOLIC BLOOD PRESSURE: 82 MMHG

## 2024-08-09 DIAGNOSIS — R07.9 CHEST PAIN, UNSPECIFIED TYPE: ICD-10-CM

## 2024-08-09 DIAGNOSIS — K21.9 GASTROESOPHAGEAL REFLUX DISEASE, UNSPECIFIED WHETHER ESOPHAGITIS PRESENT: Primary | ICD-10-CM

## 2024-08-09 RX ORDER — AMLODIPINE BESYLATE 2.5 MG/1
2.5 TABLET ORAL DAILY
COMMUNITY

## 2024-08-09 RX ORDER — PANTOPRAZOLE SODIUM 40 MG/1
40 TABLET, DELAYED RELEASE ORAL DAILY
Qty: 90 TABLET | Refills: 3 | Status: SHIPPED | OUTPATIENT
Start: 2024-08-09

## 2024-08-09 NOTE — PROGRESS NOTES
PATIENT INFORMATION  Myra Charles       - 1950    CHIEF COMPLAINT  Chief Complaint   Patient presents with    Esophageal Dysmotility     Heartburn       HISTORY OF PRESENT ILLNESS    Here today for evaluation of esophageal dysmotility    Hx chest pain episodes only when hypotensive episodes, cards questioning whether may be esophageal spasms with vasovagal episodes. Cardiac workup has been negative. Renal eval was good. 2 severe episodes in the last month, last episode this morning, started with light headedness, took BP sitting 140s, then stood up and SBP was 90s and then started with chest pain and shortness of air. Still lightheaded from episode this morning, but worst part generally passes after 15 min, but can last longer. Happening for >4 months.    HB depending on what she eats and how late, not sure how often. Taking pepcid for awhile. No dysphagia. Not taking NSAIDs. Tylenol PRN. Hx vocal cord surgery.     Significant with stress.    2024 Esophagram with tertiary waves, GERD, small HH.    2022 colonoscopy with only hyperplastic polyp  2005 EGD with Gastritis, stomach with HP polyps. Only path available for review.        REVIEWED PERTINENT RESULTS/ LABS  Lab Results   Component Value Date    CASEREPORT  2022     Surgical Pathology Report                         Case: [id number]                                  Authorizing Provider:  Augustine Menjivar        Collected:           2022 03:13 PM                                 MD Art                                                                   Ordering Location:     [facility]   Received:            2022 03:42 PM                                 OR                                                                           Pathologist:           Robyn Sue MD                                                          Specimen:    Large Intestine, Rectum                                                                     FINALDX  01/05/2022     1. Rectum, Polyp, Polypectomy:     A. Fragments of hyperplastic polyp.      jab/brb        Lab Results   Component Value Date    HGB 12.4 07/15/2024    MCV 89 07/15/2024     07/15/2024    ALT 30 07/15/2024    AST 25 07/15/2024    INR 1.00 05/18/2024    TRIG CANCELED 07/15/2024    FERRITIN 260.00 (H) 06/28/2023    IRON 55 06/28/2023    TIBC 243 (L) 06/28/2023      FL ESOPHAGRAM DOUBLE CONTRAST    Result Date: 8/7/2024  Narrative: FL ESOPHAGRAM DOUBLE CONTRAST Date of Exam: 8/7/2024 10:30 AM EDT Indication: possible nutcracker esophagus or spasm. Comparison: None available. Technique:  Patient ingested effervescent crystals followed by high density barium for double contrast imaging of the esophagus. Patient subsequently ingested medium density barium for single-contrast evaluation. Fluoroscopic Time: 2.5 minutes Number of Images: 10 Findings: The esophagus appears normal in course, caliber and mucosal pattern. There are tertiary waves noted in the mid and distal third of the esophagus likely representing esophageal dysmotility. Swallowing is normal. There is a small hiatal hernia noted. The visualized portion of the stomach appears normal in course and mucosal pattern. There is gastroesophageal reflux noted with provocative maneuvers.     Impression: Impression: 1. Tertiary waves noted in the mid and distal third of the esophagus likely representing esophageal dysmotility. 2. Small hiatal hernia. 3. Gastroesophageal reflux noted with provocative maneuvers. Recommend PPI if not already taking. Report dictated by: Osmin Nuno DNP  I have personally reviewed this case and agree with the findings above: Electronically Signed: Erick Riley MD  8/7/2024 12:01 PM EDT  Workstation ID: [id number]     REVIEW OF SYSTEMS  Review of Systems   Constitutional:  Positive for fatigue (Fibromyalgia).   Eyes: Negative.    Respiratory:  Positive for choking, chest  tightness and shortness of breath.    Cardiovascular:  Positive for chest pain.   Gastrointestinal:  Negative for abdominal pain, constipation, diarrhea, nausea and vomiting.        Esophageal Dysmotility    Endocrine: Negative.    Genitourinary: Negative.    Musculoskeletal: Negative.    Skin: Negative.    Allergic/Immunologic: Negative.    Neurological:  Positive for dizziness, light-headedness and headaches.   Hematological:  Bruises/bleeds easily.   Psychiatric/Behavioral: Negative.           ACTIVE PROBLEMS  Patient Active Problem List    Diagnosis     Chest pain [R07.9]     Anemia [D64.9]     Osteoarthritis [M19.90]     Bile reflux esophagitis [K21.00]     Left foot pain [M79.672]     Elevated alkaline phosphatase level [R74.8]     Cervical nerve root compression [G54.2]     History of acute renal failure [Z87.448]     DDD (degenerative disc disease), cervical [M50.30]     Dupuytren's contracture of right hand [M72.0]     CKD (chronic kidney disease) stage 3, GFR 30-59 ml/min [N18.30]     Overweight (BMI 25.0-29.9) [E66.3]     History of iron deficiency [Z86.39]     Neck pain [M54.2]     History of fracture of left ankle [Z87.81]     Non-rheumatic mitral regurgitation [I34.0]     History of bilateral cataract extraction [Z98.41, Z98.42]     Chronic left-sided low back pain with left-sided sciatica [M54.42, G89.29]     Varicose veins of both lower extremities with pain [I83.813]     Seborrheic keratosis [L82.1]     Iron deficiency anemia [D50.9]     Hallux valgus [M20.10]     Elevated ferritin [R79.89]     History of deep venous thrombosis [Z86.718]     Malignant neoplasm of upper lobe of left lung [C34.12]     Idiopathic peripheral neuropathy [G60.9]     Multinodular goiter (nontoxic) [E04.2]     Osteoporosis [M81.0]     Hyperthyroidism [E05.90]     Nonexudative age-related macular degeneration, bilateral, early dry stage [H35.3131]     Cortical age-related cataract [H25.019]     Hoarseness or changing voice  [R49.9]     Vitamin D deficiency [E55.9]     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 [E05.20]     Abnormal blood level of iron [R79.0]     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 [J38.01]     Rosacea [L71.9]     Fibrositis [M79.7]     Mitral valve prolapse [I34.1]     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]     GERD (gastroesophageal reflux disease) [K21.9]     Cortical senile cataract [H25.019]     Backache [M54.9]     Screen for colon cancer [Z12.11]     Encounter for general adult medical examination without abnormal findings [Z00.00]     Medicare annual wellness visit, subsequent [Z00.00]     Vitreous floaters [H43.399]     Vitreous detachment [H43.819]     B12 deficiency [E53.8]     PAC (premature atrial contraction) [I49.1]     Fibromyalgia [M79.7]     Osteopenia [M85.80]     History of malignant carcinoid tumor of bronchus and lung [Z85.110]     Lung nodule, solitary [R91.1]     Gastroesophageal reflux disease without esophagitis [K21.9]     History of partial thyroidectomy [E89.0]     Essential hypertension [I10]     Restless legs syndrome [G25.81]          PAST MEDICAL HISTORY  Past Medical History:   Diagnosis Date    Arthritis     Asthma     Backache 02/17/2016    Low back pain, in sacral region       Benign hypertension 02/17/2016    CKD (chronic kidney disease) stage 3, GFR 30-59 ml/min     COPD (chronic obstructive pulmonary disease)     Coronary artery disease     Cortical senile cataract 02/17/2016    Deep vein thrombosis     Essential (primary) hypertension 04/20/2016    GERD (gastroesophageal reflux disease) 02/17/2016    Hallux valgus     Deformity, w/inflammation       History of coronary angiogram     CT cor angio 2019, normal cors, Agatston 0    Iron deficiency anemia     Lung nodule, solitary 03/08/2012    Solitary nodule of lung - RIGHT upper lobe 15mm, PET positive       Malignant carcinoid tumor of lung 02/17/2016    Atypical carcinoid tumor  of the LEFT lung treated in March 2012      Mitral valve prolapse 02/17/2016    Osteoporosis 04/20/2016    Primary fibromyalgia syndrome 02/17/2016    Rosacea 02/17/2016    Seborrheic keratosis     History of, upper and lower back       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 04/20/2016    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 02/17/2016    Paralysis of vocal cords and larynx, unilateral - LEFT side       Varicose vein     Vitamin D deficiency 04/20/2016    Vitreous detachment 12/01/2014    Vitreous floaters 12/01/2014         SURGICAL HISTORY  Past Surgical History:   Procedure Laterality Date    ADENOIDECTOMY  2/3/1956    ARTERIAL BYPASS SURGERY  09/25/2012    Artery bypass graft (Left femoral to posterior tibial artery bypass. Enodscopic vein harvest on the left. Left lower extremity arteriogram.)    BREAST CYST EXCISION      pt unsure of laterality    CERVICAL CONIZATION      CONIZATION OF CERVIX 92765 (Excisional laser cone biopsy and vaporization of cervix.)    CHOLECYSTECTOMY  09/07/2005    Cholecystectomy, laparoscopic (With intraoperative cholangiogram.)    COLONOSCOPY      COLONOSCOPY W/ POLYPECTOMY N/A 01/05/2022    Procedure: COLONOSCOPY WITH POLYPECTOMY;  Surgeon: Augustine Menjivar MD;  Location: [facility];  Service: Gastroenterology;  Laterality: N/A;  Rectal polyp    CYST REMOVAL      left 3rd toe    EXCISION LESION  11/11/1998    REMOVE LESION BACK OR FLANK 42527 (Excision times two.)    FOOT SURGERY  01/20/2009    Foot/toes surgery procedure (Soft tissue mass, left foot. Excision of)    HERNIA REPAIR      Past history of umbilical herni repair.    HYSTERECTOMY      OTHER SURGICAL HISTORY  12/04/2012    Anesth, elbow area surgery (Manipulation of left elbow.)    OTHER SURGICAL HISTORY  03/05/2012    Anesth, elbow area surgery (Excision of plate and screws from olecranon bursa. Retained plate and screws, olecranon bursitis of previous fracture  left elbow.)    OTHER SURGICAL HISTORY  10/16/2012    Treat elbow fracture (Open reduction and internal fixation.)    THORACOSCOPY  03/14/2012    Thoracoscopy, surgical (Bronchoscopy,LVATS (wedge resect MARYBETH), LULobectomy Thoracic lymphadenectomy)    THROAT SURGERY  07/25/2012    Throat surgery procedure (Thyroplasty type I (vocal cord medialozation & larynooplasty) Left vocal cord paralysis. Dysphoria. [facility] by Dr Tk Heart)    THYROID SURGERY  03/22/2016    Thyroid surgery (Right thyroid lobectomy and isthmusectomy.)    TONSILLECTOMY  02/03/1956    Chronic tonsillitis    UPPER GASTROINTESTINAL ENDOSCOPY      VEIN LIGATION  09/28/2000    PHLEB VEINS - EXTREM (20+) 60286 (High ligation of ling saphenous vein, left, plus multiple phlebectomies, left lower extremity.)         FAMILY HISTORY  Family History   Problem Relation Age of Onset    Cancer Mother         myeloma    Diabetes Mother     Heart disease Mother     Arthritis Mother     Hypertension Mother     Heart attack Mother     Heart disease Father     Arthritis Father     Heart attack Father     Stroke Father     Hypertension Father     Vision loss Father     Lung cancer Maternal Grandfather     Lung cancer Other     Diabetes Maternal Grandmother     Breast cancer Neg Hx          SOCIAL HISTORY  Social History     Occupational History    Not on file   Tobacco Use    Smoking status: Never     Passive exposure: Never    Smokeless tobacco: Never   Vaping Use    Vaping status: Never Used   Substance and Sexual Activity    Alcohol use: Yes     Alcohol/week: 2.0 standard drinks of alcohol     Types: 2 Glasses of wine per week     Comment: Maybe every two to three weeks    Drug use: Never    Sexual activity: Not Currently     Partners: Male     Birth control/protection: Surgical         CURRENT MEDICATIONS    Current Outpatient Medications:     albuterol sulfate  (90 Base) MCG/ACT inhaler, Inhale 2 puffs Every 4 (Four) Hours As Needed for  Wheezing or Shortness of Air., Disp: 18 g, Rfl: 0    amLODIPine (NORVASC) 2.5 MG tablet, Take 1 tablet by mouth Daily., Disp: , Rfl:     Ascorbic Acid 500 MG capsule, , Disp: , Rfl:     aspirin 81 MG EC tablet, Take 1 tablet by mouth Daily., Disp: , Rfl:     Breo Ellipta 100-25 MCG/ACT aerosol powder , , Disp: , Rfl:     Calcium-Phosphorus-Vitamin D 250-107-500 MG-MG-UNIT chewable tablet, Chew 1 tablet Daily., Disp: , Rfl:     cholecalciferol (VITAMIN D3) 25 MCG (1000 UT) tablet, Take 1 tablet by mouth Daily. (Patient taking differently: Take 2 tablets by mouth.), Disp: 90 tablet, Rfl: 1    cyclobenzaprine (FLEXERIL) 10 MG tablet, Take 1 tablet by mouth At Night As Needed for Muscle Spasms., Disp: 90 tablet, Rfl: 1    denosumab (Prolia) 60 MG/ML solution prefilled syringe syringe, , Disp: , Rfl:     DULoxetine (CYMBALTA) 60 MG capsule, Take 1 capsule by mouth Daily., Disp: 90 capsule, Rfl: 1    fluticasone (FLONASE) 50 MCG/ACT nasal spray, 1 spray into the nostril(s) as directed by provider Daily. (Patient taking differently: 1 spray into the nostril(s) as directed by provider Daily As Needed.), Disp: 16 g, Rfl: 5    folic acid (FOLVITE) 1 MG tablet, Take 1 tablet by mouth Daily., Disp: 90 tablet, Rfl: 0    magnesium oxide (MAG-OX) 400 MG tablet, Take 1 tablet by mouth Daily., Disp: , Rfl:     metoprolol succinate XL (TOPROL-XL) 25 MG 24 hr tablet, TAKE 1 TABLET TWICE A DAY, Disp: 180 tablet, Rfl: 0    multivitamins-minerals (PRESERVISION AREDS 2) capsule capsule, Take 1 capsule by mouth 2 (Two) Times a Day., Disp: 90 capsule, Rfl: 1    vitamin B-12 (CYANOCOBALAMIN) 1000 MCG tablet, Take 1 tablet by mouth 2 (Two) Times a Week., Disp: , Rfl:     pantoprazole (PROTONIX) 40 MG EC tablet, Take 1 tablet by mouth Daily., Disp: 90 tablet, Rfl: 3    ALLERGIES  Raloxifene, Gabapentin, Latex, and Pregabalin    VITALS  Vitals:    08/09/24 1112   BP: 128/82   BP Location: Left arm   Patient Position: Sitting   Cuff Size: Large  "Adult   Weight: 82 kg (180 lb 12.8 oz)   Height: 175.3 cm (69.02\")       PHYSICAL EXAM  Debilities/Disabilities Identified: None  Emotional Behavior: Appropriate  Wt Readings from Last 3 Encounters:   08/09/24 82 kg (180 lb 12.8 oz)   07/23/24 83 kg (183 lb)   07/22/24 83.7 kg (184 lb 8 oz)     Ht Readings from Last 1 Encounters:   08/09/24 175.3 cm (69.02\")     Body mass index is 26.69 kg/m².  Physical Exam  Constitutional:       General: She is not in acute distress.     Appearance: Normal appearance. She is not ill-appearing.   HENT:      Head: Normocephalic and atraumatic.      Mouth/Throat:      Mouth: Mucous membranes are moist.      Pharynx: No posterior oropharyngeal erythema.   Eyes:      General: No scleral icterus.  Cardiovascular:      Rate and Rhythm: Normal rate and regular rhythm.      Heart sounds: Normal heart sounds.   Pulmonary:      Effort: Pulmonary effort is normal.      Breath sounds: Normal breath sounds.   Abdominal:      General: Abdomen is flat. Bowel sounds are normal. There is no distension.      Palpations: Abdomen is soft. There is no mass.      Tenderness: There is no abdominal tenderness. There is no guarding or rebound. Negative signs include Hill's sign.      Hernia: No hernia is present.   Musculoskeletal:      Cervical back: Neck supple.   Skin:     General: Skin is warm.      Capillary Refill: Capillary refill takes less than 2 seconds.   Neurological:      General: No focal deficit present.      Mental Status: She is alert and oriented to person, place, and time.   Psychiatric:         Mood and Affect: Mood normal.         Behavior: Behavior normal.         Thought Content: Thought content normal.         Judgment: Judgment normal.         CLINICAL DATA REVIEWED   reviewed previous lab results and integrated with today's visit, reviewed notes from other physicians and/or last GI encounter, reviewed previous endoscopy results and available photos, reviewed surgical pathology " results from previous biopsies    ASSESSMENT  Diagnoses and all orders for this visit:    Gastroesophageal reflux disease, unspecified whether esophagitis present  -     pantoprazole (PROTONIX) 40 MG EC tablet; Take 1 tablet by mouth Daily.  -     Case Request; Standing  -     Case Request    Chest pain, unspecified type    Other orders  -     amLODIPine (NORVASC) 2.5 MG tablet; Take 1 tablet by mouth Daily.  -     Follow Anesthesia Guidelines / Protocol; Future  -     Obtain Informed Consent; Standing          PLAN    Will start daily pantoprazole  Reviewed with Dr. Gurrola and will proceed with EGD    Return in about 3 months (around 11/9/2024).    I have discussed the above plan with the patient.  They verbalize understanding and are in agreement with the plan.  They have been advised to contact the office for any questions, concerns, or changes related to their health.

## 2024-08-12 DIAGNOSIS — K21.9 GASTROESOPHAGEAL REFLUX DISEASE, UNSPECIFIED WHETHER ESOPHAGITIS PRESENT: Primary | ICD-10-CM

## 2024-08-26 ENCOUNTER — TRANSCRIBE ORDERS (OUTPATIENT)
Dept: ADMINISTRATIVE | Facility: HOSPITAL | Age: 74
End: 2024-08-26
Payer: COMMERCIAL

## 2024-08-26 ENCOUNTER — LAB (OUTPATIENT)
Dept: LAB | Facility: HOSPITAL | Age: 74
End: 2024-08-26
Payer: COMMERCIAL

## 2024-08-26 DIAGNOSIS — E04.1 THYROID NODULE: Primary | ICD-10-CM

## 2024-08-26 DIAGNOSIS — E04.1 THYROID NODULE: ICD-10-CM

## 2024-08-26 LAB
T3FREE SERPL-MCNC: 2.64 PG/ML (ref 2–4.4)
T4 FREE SERPL-MCNC: 1.38 NG/DL (ref 0.92–1.68)
TSH SERPL DL<=0.05 MIU/L-ACNC: 1.34 UIU/ML (ref 0.27–4.2)

## 2024-08-26 PROCEDURE — 84439 ASSAY OF FREE THYROXINE: CPT

## 2024-08-26 PROCEDURE — 36415 COLL VENOUS BLD VENIPUNCTURE: CPT

## 2024-08-26 PROCEDURE — 84481 FREE ASSAY (FT-3): CPT

## 2024-08-26 PROCEDURE — 84443 ASSAY THYROID STIM HORMONE: CPT

## 2024-09-24 ENCOUNTER — TELEPHONE (OUTPATIENT)
Dept: GASTROENTEROLOGY | Facility: CLINIC | Age: 74
End: 2024-09-24
Payer: COMMERCIAL

## 2024-09-25 ENCOUNTER — ANESTHESIA EVENT (OUTPATIENT)
Dept: PERIOP | Facility: HOSPITAL | Age: 74
End: 2024-09-25
Payer: MEDICARE

## 2024-09-26 ENCOUNTER — HOSPITAL ENCOUNTER (OUTPATIENT)
Facility: HOSPITAL | Age: 74
Setting detail: HOSPITAL OUTPATIENT SURGERY
Discharge: HOME | End: 2024-09-26
Attending: INTERNAL MEDICINE | Admitting: INTERNAL MEDICINE
Payer: MEDICARE

## 2024-09-26 ENCOUNTER — ANESTHESIA (OUTPATIENT)
Dept: PERIOP | Facility: HOSPITAL | Age: 74
End: 2024-09-26
Payer: MEDICARE

## 2024-09-26 VITALS
HEIGHT: 69 IN | OXYGEN SATURATION: 98 % | HEART RATE: 60 BPM | TEMPERATURE: 97.5 F | RESPIRATION RATE: 16 BRPM | WEIGHT: 185 LBS | SYSTOLIC BLOOD PRESSURE: 146 MMHG | BODY MASS INDEX: 27.4 KG/M2 | DIASTOLIC BLOOD PRESSURE: 81 MMHG

## 2024-09-26 DIAGNOSIS — K21.9 GASTROESOPHAGEAL REFLUX DISEASE, UNSPECIFIED WHETHER ESOPHAGITIS PRESENT: ICD-10-CM

## 2024-09-26 PROCEDURE — 88305 TISSUE EXAM BY PATHOLOGIST: CPT | Performed by: INTERNAL MEDICINE

## 2024-09-26 PROCEDURE — 43239 EGD BIOPSY SINGLE/MULTIPLE: CPT | Performed by: INTERNAL MEDICINE

## 2024-09-26 PROCEDURE — 25810000003 LACTATED RINGERS PER 1000 ML

## 2024-09-26 PROCEDURE — 25010000002 PROPOFOL 200 MG/20ML EMULSION: Performed by: ANESTHESIOLOGY

## 2024-09-26 RX ORDER — SODIUM CHLORIDE 9 MG/ML
40 INJECTION, SOLUTION INTRAVENOUS AS NEEDED
Status: DISCONTINUED | OUTPATIENT
Start: 2024-09-26 | End: 2024-09-26 | Stop reason: HOSPADM

## 2024-09-26 RX ORDER — LIDOCAINE HYDROCHLORIDE 20 MG/ML
INJECTION, SOLUTION INFILTRATION; PERINEURAL AS NEEDED
Status: DISCONTINUED | OUTPATIENT
Start: 2024-09-26 | End: 2024-09-26 | Stop reason: SURG

## 2024-09-26 RX ORDER — PROPOFOL 10 MG/ML
INJECTION, EMULSION INTRAVENOUS AS NEEDED
Status: DISCONTINUED | OUTPATIENT
Start: 2024-09-26 | End: 2024-09-26 | Stop reason: SURG

## 2024-09-26 RX ORDER — SODIUM CHLORIDE 0.9 % (FLUSH) 0.9 %
10 SYRINGE (ML) INJECTION EVERY 12 HOURS SCHEDULED
Status: DISCONTINUED | OUTPATIENT
Start: 2024-09-26 | End: 2024-09-26 | Stop reason: HOSPADM

## 2024-09-26 RX ORDER — LIDOCAINE HYDROCHLORIDE 10 MG/ML
0.5 INJECTION, SOLUTION EPIDURAL; INFILTRATION; INTRACAUDAL; PERINEURAL ONCE AS NEEDED
Status: DISCONTINUED | OUTPATIENT
Start: 2024-09-26 | End: 2024-09-26 | Stop reason: HOSPADM

## 2024-09-26 RX ORDER — SODIUM CHLORIDE, SODIUM LACTATE, POTASSIUM CHLORIDE, CALCIUM CHLORIDE 600; 310; 30; 20 MG/100ML; MG/100ML; MG/100ML; MG/100ML
9 INJECTION, SOLUTION INTRAVENOUS CONTINUOUS
Status: DISCONTINUED | OUTPATIENT
Start: 2024-09-26 | End: 2024-09-26 | Stop reason: HOSPADM

## 2024-09-26 RX ORDER — SODIUM CHLORIDE 0.9 % (FLUSH) 0.9 %
10 SYRINGE (ML) INJECTION AS NEEDED
Status: DISCONTINUED | OUTPATIENT
Start: 2024-09-26 | End: 2024-09-26 | Stop reason: HOSPADM

## 2024-09-26 RX ADMIN — SODIUM CHLORIDE, POTASSIUM CHLORIDE, SODIUM LACTATE AND CALCIUM CHLORIDE 9 ML/HR: 600; 310; 30; 20 INJECTION, SOLUTION INTRAVENOUS at 13:40

## 2024-09-26 RX ADMIN — PROPOFOL 100 MG: 10 INJECTION, EMULSION INTRAVENOUS at 15:39

## 2024-09-26 RX ADMIN — LIDOCAINE HYDROCHLORIDE 100 MG: 20 INJECTION, SOLUTION INFILTRATION; PERINEURAL at 15:39

## 2024-09-26 NOTE — BRIEF OP NOTE
ESOPHAGOGASTRODUODENOSCOPY WITH BIOPSY  Progress Note    Myra Charles  9/26/2024    Pre-op Diagnosis:   Gastroesophageal reflux disease, unspecified whether esophagitis present [K21.9]       Post-Op Diagnosis Codes:     * Gastroesophageal reflux disease, unspecified whether esophagitis present [K21.9]     * Reflux esophagitis [K21.00]     * Gastritis [K29.70]    Procedure/CPT® Codes:        Procedure(s):  ESOPHAGOGASTRODUODENOSCOPY WITH BIOPSY              Surgeon(s):  Augustine Menjivar MD    Anesthesia: Monitored Anesthesia Care    Staff:   Circulator: Bessie Olson RN; Jeanette Tsai RN  Scrub Person: Sam Glasgow         Estimated Blood Loss: none    Urine Voided: * No values recorded between 9/26/2024  3:27 PM and 9/26/2024  3:47 PM *    Specimens:                Specimens       ID Source Type Tests Collected By Collected At Frozen?    A Stomach Tissue TISSUE PATHOLOGY EXAM   Augustine Menjivar MD 9/26/24 1543     This specimen was not marked as sent.    B Esophagus, Distal Tissue TISSUE PATHOLOGY EXAM   Augustine Menjivar MD 9/26/24 1543     This specimen was not marked as sent.                  Drains: * No LDAs found *    Findings: Normal Duodenum  Mild Gastritis-biopsy  Reflux Esophagitis-biopsy        Complications: None          Augustine Menjivar MD     Date: 9/26/2024  Time: 15:49 EDT        
0 (no pain/absence of nonverbal indicators of pain)

## 2024-09-26 NOTE — H&P
Patient Care Team:  Head, ASHLEY Da Silva as PCP - General (Nurse Practitioner)  Damian Rock DPM as Consulting Physician (Podiatry)  Cruzito Souza MD as Consulting Physician (Hematology and Oncology)  Cordell Overton MD as Consulting Physician (Nephrology)  Stephan Hernandez MD as Consulting Physician (Cardiology)  Otto, Augustine Cordova MD as Consulting Physician (Gastroenterology)  Sonia Dominguez MD as Consulting Physician (Rheumatology)    CHIEF COMPLAINT:  NCCP    HISTORY OF PRESENT ILLNESS:  No stricture on esophagram    Past Medical History:   Diagnosis Date    Arthritis     Asthma     Backache 02/17/2016    Low back pain, in sacral region       Benign hypertension 02/17/2016    CKD (chronic kidney disease) stage 3, GFR 30-59 ml/min     COPD (chronic obstructive pulmonary disease)     Coronary artery disease     Cortical senile cataract 02/17/2016    Deep vein thrombosis     Essential (primary) hypertension 04/20/2016    GERD (gastroesophageal reflux disease) 02/17/2016    Hallux valgus     Deformity, w/inflammation       History of coronary angiogram     CT cor angio 2019, normal cors, Agatston 0    Iron deficiency anemia     Lung nodule, solitary 03/08/2012    Solitary nodule of lung - RIGHT upper lobe 15mm, PET positive       Malignant carcinoid tumor of lung 02/17/2016    Atypical carcinoid tumor of the LEFT lung treated in March 2012      Mitral valve prolapse 02/17/2016    Osteoporosis 04/20/2016    Primary fibromyalgia syndrome 02/17/2016    Rosacea 02/17/2016    Seborrheic keratosis     History of, upper and lower back       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 Enodscopic vein harvest on the left. Left lower extremity arteriogram.)    BREAST CYST EXCISION      pt unsure of laterality    CERVICAL CONIZATION      CONIZATION OF CERVIX 80698 (Excisional laser cone biopsy and vaporization of cervix.)    CHOLECYSTECTOMY  09/07/2005    Cholecystectomy, laparoscopic (With intraoperative cholangiogram.)    COLONOSCOPY      COLONOSCOPY W/ POLYPECTOMY N/A 01/05/2022    Procedure: COLONOSCOPY WITH POLYPECTOMY;  Surgeon: Augustine Menjivar MD;  Location: [facility];  Service: Gastroenterology;  Laterality: N/A;  Rectal polyp    CYST REMOVAL      left 3rd toe    EXCISION LESION  11/11/1998    REMOVE LESION BACK OR FLANK 66574 (Excision times two.)    FOOT SURGERY  01/20/2009    Foot/toes surgery procedure (Soft tissue mass, left foot. Excision of)    HERNIA REPAIR      Past history of umbilical herni repair.    HYSTERECTOMY      OTHER SURGICAL HISTORY  12/04/2012    Anesth, elbow area surgery (Manipulation of left elbow.)    OTHER SURGICAL HISTORY  03/05/2012    Anesth, elbow area surgery (Excision of plate and screws from olecranon bursa. Retained plate and screws, olecranon bursitis of previous fracture left elbow.)    OTHER SURGICAL HISTORY  10/16/2012    Treat elbow fracture (Open reduction and internal fixation.)    THORACOSCOPY  03/14/2012    Thoracoscopy, surgical (Bronchoscopy,LVATS (wedge resect MARYBETH), LULobectomy Thoracic lymphadenectomy)    THROAT SURGERY  07/25/2012    Throat surgery procedure (Thyroplasty type I (vocal cord medialozation & larynooplasty) Left vocal cord paralysis. Dysphoria. [facility] by Dr Tk Heart)    THYROID SURGERY  03/22/2016    Thyroid surgery (Right thyroid lobectomy and isthmusectomy.)    TONSILLECTOMY  02/03/1956    Chronic tonsillitis    UPPER  GASTROINTESTINAL ENDOSCOPY      VEIN LIGATION  09/28/2000    PHLEB VEINS - EXTREM (20+) 88882 (High ligation of ling saphenous vein, left, plus multiple phlebectomies, left lower extremity.)     Family History   Problem Relation Age of Onset    Cancer Mother         myeloma    Diabetes Mother     Heart disease Mother     Arthritis Mother     Hypertension Mother     Heart attack Mother     Heart disease Father     Arthritis Father     Heart attack Father     Stroke Father     Hypertension Father     Vision loss Father     Diabetes Maternal Grandmother     Lung cancer Maternal Grandfather     Lung cancer Other     Breast cancer Neg Hx     Malig Hyperthermia Neg Hx      Social History     Tobacco Use    Smoking status: Never     Passive exposure: Never    Smokeless tobacco: Never   Vaping Use    Vaping status: Never Used   Substance Use Topics    Alcohol use: Yes     Alcohol/week: 2.0 standard drinks of alcohol     Types: 2 Glasses of wine per week     Comment: Maybe every two to three weeks    Drug use: Never     Medications Prior to Admission   Medication Sig Dispense Refill Last Dose    Ascorbic Acid 500 MG capsule    9/25/2024    Breo Ellipta 100-25 MCG/ACT aerosol powder     9/26/2024    Calcium-Phosphorus-Vitamin D 250-107-500 MG-MG-UNIT chewable tablet Chew 1 tablet Daily.   9/25/2024    cholecalciferol (VITAMIN D3) 25 MCG (1000 UT) tablet Take 1 tablet by mouth Daily. (Patient taking differently: Take 2 tablets by mouth.) 90 tablet 1 9/25/2024    DULoxetine (CYMBALTA) 60 MG capsule Take 1 capsule by mouth Daily. 90 capsule 1 9/25/2024    folic acid (FOLVITE) 1 MG tablet Take 1 tablet by mouth Daily. 90 tablet 0 9/25/2024    magnesium oxide (MAG-OX) 400 MG tablet Take 1 tablet by mouth Daily.   9/25/2024    metoprolol succinate XL (TOPROL-XL) 25 MG 24 hr tablet TAKE 1 TABLET TWICE A  tablet 0 9/26/2024    pantoprazole (PROTONIX) 40 MG EC tablet Take 1 tablet by mouth Daily. 90 tablet 3 9/26/2024     "vitamin B-12 (CYANOCOBALAMIN) 1000 MCG tablet Take 1 tablet by mouth 2 (Two) Times a Week.   9/25/2024    albuterol sulfate  (90 Base) MCG/ACT inhaler Inhale 2 puffs Every 4 (Four) Hours As Needed for Wheezing or Shortness of Air. 18 g 0 9/5/2024    amLODIPine (NORVASC) 2.5 MG tablet Take 1 tablet by mouth Daily.   9/20/2024    aspirin 81 MG EC tablet Take 1 tablet by mouth Daily.   9/19/2024    cyclobenzaprine (FLEXERIL) 10 MG tablet Take 1 tablet by mouth At Night As Needed for Muscle Spasms. 90 tablet 1 More than a month    denosumab (Prolia) 60 MG/ML solution prefilled syringe syringe    6/3/2024    fluticasone (FLONASE) 50 MCG/ACT nasal spray 1 spray into the nostril(s) as directed by provider Daily. (Patient taking differently: Administer 1 spray into the nostril(s) as directed by provider Daily As Needed.) 16 g 5 More than a month    multivitamins-minerals (PRESERVISION AREDS 2) capsule capsule Take 1 capsule by mouth 2 (Two) Times a Day. 90 capsule 1 9/19/2024     Allergies:  Raloxifene, Gabapentin, Latex, and Pregabalin    REVIEW OF SYSTEMS:  Please see the above history of present illness for pertinent positives and negatives.  The remainder of the patient's systems have been reviewed and are negative.     Vital Signs  Temp:  [97.5 °F (36.4 °C)] 97.5 °F (36.4 °C)  Heart Rate:  [63] 63  Resp:  [17] 17  BP: (180)/(79) 180/79    Flowsheet Rows      Flowsheet Row First Filed Value   Admission Height 175.3 cm (69\") Documented at 09/26/2024 1339   Admission Weight 83.9 kg (185 lb) Documented at 09/26/2024 1326             Physical Exam:  Physical Exam   Constitutional: Patient appears well-developed and well-nourished and in no acute distress   HEENT:   Head: Normocephalic and atraumatic.   Eyes:  Pupils are equal, round, and reactive to light. EOM are intact. Sclerae are anicteric and non-injected.  Mouth and Throat: Patient has moist mucous membranes. Oropharynx is clear of any erythema or exudate.   "   Neck: Neck supple. No JVD present. No thyromegaly present. No lymphadenopathy present.  Cardiovascular: Regular rate, regular rhythm, S1 normal and S2 normal.  Exam reveals no gallop and no friction rub.  No murmur heard.  Pulmonary/Chest: Lungs are clear to auscultation bilaterally. No respiratory distress. No wheezes. No rhonchi. No rales.   Abdominal: Soft. Bowel sounds are normal. No distension and no mass. There is no hepatosplenomegaly. There is no tenderness.   Musculoskeletal: Normal Muscle tone  Extremities: No edema. Pulses are palpable in all 4 extremities.  Neurological: Patient is alert and oriented to person, place, and time. Cranial nerves II-XII are grossly intact with no focal deficits.  Skin: Skin is warm. No rash noted. Nails show no clubbing.  No cyanosis or erythema.    Debilities/Disabilities Identified: None  Emotional Behavior: Appropriate     Results Review:   I reviewed the patient's new clinical results.    Lab Results (most recent)       None            Imaging Results (Most Recent)       None          reviewed    ECG/EMG Results (most recent)       None          reviewed    Assessment & Plan   NCCP /  EGD      I discussed the patient's findings and my recommendations with patient.     Augustine Menjivar MD  09/26/24  15:19 EDT    Time: 10 min prior to procedure.

## 2024-09-30 DIAGNOSIS — I10 ESSENTIAL HYPERTENSION: ICD-10-CM

## 2024-09-30 RX ORDER — METOPROLOL SUCCINATE 25 MG/1
25 TABLET, EXTENDED RELEASE ORAL 2 TIMES DAILY
Qty: 180 TABLET | Refills: 0 | Status: SHIPPED | OUTPATIENT
Start: 2024-09-30

## 2024-10-01 LAB
CYTO UR: NORMAL
LAB AP CASE REPORT: NORMAL
PATH REPORT.ADDENDUM SPEC: NORMAL
PATH REPORT.FINAL DX SPEC: NORMAL
PATH REPORT.GROSS SPEC: NORMAL

## 2024-11-07 RX ORDER — FOLIC ACID 1 MG/1
1000 TABLET ORAL DAILY
Qty: 90 TABLET | Refills: 0 | OUTPATIENT
Start: 2024-11-07

## 2024-11-07 RX ORDER — FOLIC ACID 1 MG/1
1 TABLET ORAL DAILY
Qty: 90 TABLET | Refills: 0 | Status: SHIPPED | OUTPATIENT
Start: 2024-11-07

## 2024-11-13 ENCOUNTER — OFFICE VISIT (OUTPATIENT)
Dept: CARDIOLOGY | Facility: CLINIC | Age: 74
End: 2024-11-13
Payer: COMMERCIAL

## 2024-11-13 ENCOUNTER — LAB (OUTPATIENT)
Dept: LAB | Facility: HOSPITAL | Age: 74
End: 2024-11-13
Payer: MEDICARE

## 2024-11-13 VITALS
HEIGHT: 69 IN | OXYGEN SATURATION: 99 % | WEIGHT: 179 LBS | HEART RATE: 74 BPM | DIASTOLIC BLOOD PRESSURE: 70 MMHG | SYSTOLIC BLOOD PRESSURE: 120 MMHG | BODY MASS INDEX: 26.51 KG/M2

## 2024-11-13 DIAGNOSIS — I10 ESSENTIAL HYPERTENSION: ICD-10-CM

## 2024-11-13 DIAGNOSIS — N18.31 STAGE 3A CHRONIC KIDNEY DISEASE: ICD-10-CM

## 2024-11-13 DIAGNOSIS — I34.1 MITRAL VALVE PROLAPSE: ICD-10-CM

## 2024-11-13 DIAGNOSIS — I10 ESSENTIAL HYPERTENSION: Primary | ICD-10-CM

## 2024-11-13 RX ORDER — METOPROLOL SUCCINATE 25 MG/1
12.5 TABLET, EXTENDED RELEASE ORAL 2 TIMES DAILY
Start: 2024-11-13

## 2024-11-13 NOTE — PROGRESS NOTES
There is absolutely no indication for a cath in her. If she has any chest pain, or SOA, a CCTA would be okay.    elio

## 2024-11-13 NOTE — PROGRESS NOTES
CARDIOLOGY        Patient Name: Myra Charles  :1950  Age: 73 y.o.  Primary Cardiologist: Stephan Hernandez MD  Encounter Provider:  Ace Gandhi PA-C    Date of Service: 24            CHIEF COMPLAINT / REASON FOR OFFICE VISIT     Follow-up      HISTORY OF PRESENT ILLNESS       HPI  Myra Charles is a 73 y.o. female who presents today for follow-up.     Pt was seen in the ER on 24.  Patient has labs, EKG's, and CTA of the chest.  Patient was instructed to follow-up in the office.  On 2024 she mentionsthat over the past few months she has had 3 bouts of feeling lightheaded when standing for prolonged period time.  Patient feels as if she was going to pass out.  Patient has felt sweaty with sensation.  Patient is a states at rest and it goes away.  Patient denies any recent illness.  Patient denies any chest pain, new shortness of breath, palpitations,edema to legs, or orthopnea.  Patient denies any recent changes in medications other than her increase of her amlodipine to 7.5.  Patient takes in the morning.  Her amlodipine was decreased to 5 mg at that time.     Patient returned back to the ER on 2024 for dizziness, chest pain, and postural dizziness with presyncope.  Patient had a workup including cardiac labs that were negative.  Patient was not found to be orthostatic at that time.  Patient was to follow-up in the office.  Patient had a outpatient Holter monitor and stress test ordered.  Stress test was normal.  Patient had 3% PACs burden otherwise benign Holter monitor.  Patient started on Imdur 30 mg and had another reduction in her amlodipine to 2.5 mg.     On 24 she didn't have any chest pain, palpitations, new shortness of breath, or leg edema.  Patient still having bouts of lightheadedness mainly when she gets up quickly.  Patient occasionally uses compression stockings.  Patient is concerned that her  had similar symptoms and was diagnosed with  Lewy body dementia.     On 7/23/2024 she continued having bouts with her blood pressure will drop and would feel fatigued afterwards.  She continues to have pain in her back and across her chest when this happens.  The past 2 occasions have been after she is taking a shower.  She has had CT angiogram, carotid duplex, and myocardial perfusion testing prior to this visit that were all normal.  She intermittently works for compression stockings at that time.    Patient continues to have these bouts despite stopping her Norvasc.  Past two occasions were after she showered.  She is concerned that her blood pressure will spike up to 140-150 systolic.  She says that her heart rate will jump up to low 100s.  She feels fatigue after these occur.  Patient also gets headaches with these.  She denies any exertional chest pain, shortness of air, nausea, or palpitations.  Patient denies any edema to her legs.  No syncopal episodes.  Patient has followed up with her nephrologist and her kidney function has been stable       Below is a brief summary of patient's pertinent cardiac history:  She had a CT coronary angiogram in 2019 that was completely normal; her calcium score was zero. She had an echo in October 2020 that showed very mild anterior MVP without significant MR; the study was otherwise normal.  A follow up echo in 2023 was unchanged.      She is doing well. She has occasional palpitations which feel like small flutters. She does not have sustained tachycardia. She denies chest pain, lightheadedness, syncope, leg swelling, orthopnea, dyspnea. Her BP at home is consistently 140s/80s. She follows with Dr Pablo Pan for CKD3.    The following portions of the patient's history were reviewed and updated as appropriate: allergies, current medications, past family history, past medical history, past social history, past surgical history and problem list.      VITAL SIGNS     Visit Vitals  /70 (BP Location: Left arm, Patient  "Position: Sitting, Cuff Size: Adult)   Pulse 74   Ht 175.3 cm (69\")   Wt 81.2 kg (179 lb)   SpO2 99%   BMI 26.43 kg/m²       @[email or web address]  Wt Readings from Last 3 Encounters:   11/13/24 81.2 kg (179 lb)   09/26/24 83.9 kg (185 lb)   08/09/24 82 kg (180 lb 12.8 oz)     Body mass index is 26.43 kg/m².        PHYSICAL EXAMINATION     Constitutional:       General: Awake. Not in acute distress.     Appearance: Not in distress.   Pulmonary:      Effort: Pulmonary effort is normal.      Breath sounds: Normal breath sounds.   Cardiovascular:      Normal rate. Regular rhythm.      Murmurs: There is no murmur.   Edema:     Peripheral edema absent.   Skin:     General: Skin is warm.   Neurological:      Mental Status: Alert.   Psychiatric:         Behavior: Behavior is cooperative.           REVIEWED DATA       ECG 12 Lead    Date/Time: 11/13/2024 3:26 PM  Performed by: Ace Gandhi PA-C    Authorized by: Ace Gandhi PA-C  Comparison: compared with previous ECG   Similar to previous ECG  Rhythm: sinus rhythm  Rate: normal  BPM: 74  T inversion: aVL  QRS axis: normal    Clinical impression: normal ECG  Comments: States similar to EKG on 7/23/2024          Cardiac Procedures:    Ultrasound carotid bilaterally on 6/10/2024  IMPRESSION:  Impression:  Minimal bilateral eccentric atherosclerotic plaque at the carotid bulbs. No evidence of flow-limiting stenosis. Normal bilateral ICA/CCA ratios.      Holter monitor on 5/22/2024  Interpretation Summary       A relatively benign monitor study.    Premature atrial contractions occured occasionally. 3% of all beats are PACs.    There were diary entries for dyspnea and lightheadedness; these correlate with sinus rhythm.     Stress test on 5/30/2024  Interpretation Summary       Myocardial perfusion imaging indicates a normal myocardial perfusion study with no evidence of ischemia. Impressions are consistent with a low risk study.    Left ventricular ejection " fraction is hyperdynamic (Calculated EF > 70%).    There is no prior study available for comparison.     CT angiogram on 4/9/2024  IMPRESSION:  1. No evidence for pulmonary thromboembolic disease.  2. Postoperative changes on the left related to left upper lobectomy.  3. Previous right lobe thyroidectomy. 1.5 cm left lobe low-density  nodule not evident on previous chest CT 11/01/2021. Recommend further  evaluation with thyroid sonogram.     Transthoracic echo on 10/31/2023  Interpretation Summary       Left ventricular systolic function is normal. Calculated left ventricular EF = 65.8% Normal left ventricular cavity size and wall thickness noted. All left ventricular wall segments contract normally. Left ventricular diastolic function is consistent with (grade I) impaired relaxation.    There is mild mitral valve prolapse of the anterior mitral leaflet. Trace to mild mitral valve regurgitation is present. No significant mitral valve stenosis is present.     Ultrasound renal bilaterally on 7/12/2023  IMPRESSION:     1. No hydronephrosis or shadowing stone on either side.  2. Imaging findings supportive of provided history of chronic renal  parenchymal disease.  3. Bladder not well distended or evaluated. Trace postvoid residual       Lipid Panel          1/17/2024    09:03 7/15/2024    09:05   Lipid Panel   Total Cholesterol 198  CANCELED    Triglycerides 79  CANCELED    HDL Cholesterol 67  CANCELED    VLDL Cholesterol 14  CANCELED    LDL Cholesterol  117         Lab Results   Component Value Date     07/15/2024     06/25/2024    K 4.7 07/15/2024    K 3.9 06/25/2024     07/15/2024     06/25/2024    CO2 25 07/15/2024    CO2 23.5 06/25/2024    BUN 26 07/15/2024    BUN 26 (H) 06/25/2024    CREATININE 1.38 (H) 07/15/2024    CREATININE 1.43 (H) 06/25/2024    EGFRIFNONA 36 (L) 11/15/2021    EGFRIFNONA 34 (L) 08/19/2021    EGFRIFAFRI 42 (L) 11/15/2021    EGFRIFAFRI 41 (L) 08/19/2021    GLUCOSE 78  "07/15/2024    GLUCOSE 57 (L) 06/25/2024    CALCIUM 9.1 07/15/2024    CALCIUM 9.0 06/25/2024    PROTENTOTREF 6.0 07/15/2024    PROTENTOTREF 6.2 01/17/2024    ALBUMIN 4.2 07/15/2024    ALBUMIN 4.1 06/25/2024    BILITOT 0.3 07/15/2024    BILITOT 0.2 05/18/2024    AST 25 07/15/2024    AST 24 05/18/2024    ALT 30 07/15/2024    ALT 28 05/18/2024     Lab Results   Component Value Date    WBC 7.6 07/15/2024    WBC 7.34 05/18/2024    HGB 12.4 07/15/2024    HGB 12.2 05/18/2024    HCT 38.7 07/15/2024    HCT 38.6 05/18/2024    MCV 89 07/15/2024    MCV 90.8 05/18/2024     07/15/2024     05/18/2024     No results found for: \"PROBNP\", \"BNP\"  Lab Results   Component Value Date    TROPONINT 10 05/18/2024     Lab Results   Component Value Date    TSH 1.340 08/26/2024    TSH 2.200 07/15/2024             ASSESSMENT & PLAN     Diagnoses and all orders for this visit:    1. Essential hypertension (Primary)    2. Mitral valve prolapse    3. Stage 3a chronic kidney disease    1. Essential hypertension (Primary)              Blood pressure normal in the office today.  Will try reducing her metoprolol to 12.5 mg twice daily and to take her Norvasc at night.  She will continue using her compression stockings stay hydrated.  She has had multiple testing recently of CT angiogram of chest, stress test, Holter monitor, and carotid duplex. She's had normal TSH, free T3 and free T4 on 8/26/24.  She had a CT angiogram coronary on 6/11/2019 that was unremarkable.    2. Mitral valve prolapse              Echo on 10-31-23 showed mild anterior MVP without significant MR. LVSF normal with EF of 65.8%.     3. Stage 3a chronic kidney disease              Creatinine on 7-23-24 of 1.38. Repeat labs are pending from PCP    4. Headache   She is having more frequent bouts of headaches.  She is concerned that this could be related to Lewy body dementia which her aspirin had.  She is in talks with them ahead with referral to neurology.     Patient " looks well overall in office today. Her symptoms certainly sound like it could be related to orthostasis.  Patient to wear compression stockings and stay well-hydrated.  Will see if medications changed above will help.  I will send her for a 24-hour catecholamine testing.    Return in about 3 months (around 2/13/2025) for Dr. Hernandez.    Future Appointments         Provider Department Center    12/2/2024 3:00 PM Noreen Solares APRN [facility] GASTROENTEROLOGY [facility]    1/16/2025 8:00 AM [facility] PRIMARY CARE [facility]    1/23/2025 1:15 PM Head, ASHLEY Da Silva [facility] PRIMARY CARE [facility]    2/18/2025 1:15 PM Stephan Hernandez MD [facility] CARDIOLOGY [facility]                MEDICATIONS         Discharge Medications            Accurate as of November 13, 2024  3:51 PM. If you have any questions, ask your nurse or doctor.                Changes to Medications        Instructions Start Date   cholecalciferol 25 MCG (1000 UT) tablet  Commonly known as: VITAMIN D3  What changed:   how much to take  when to take this   1,000 Units, Oral, Daily      fluticasone 50 MCG/ACT nasal spray  Commonly known as: FLONASE  What changed:   when to take this  reasons to take this   1 spray, Nasal, Daily      metoprolol succinate XL 25 MG 24 hr tablet  Commonly known as: TOPROL-XL  What changed: how much to take  Changed by: Ace Gandhi   12.5 mg, Oral, 2 Times Daily             Continue These Medications        Instructions Start Date   albuterol sulfate  (90 Base) MCG/ACT inhaler  Commonly known as: PROVENTIL HFA;VENTOLIN HFA;PROAIR HFA   2 puffs, Inhalation, Every 4 Hours PRN      amLODIPine 2.5 MG tablet  Commonly known as: NORVASC   2.5 mg, Daily      Ascorbic Acid 500 MG capsule   No dose, route, or frequency recorded.      aspirin 81 MG EC tablet   1 tablet, Daily      Breo Ellipta 100-25 MCG/ACT aerosol powder   Generic drug: Fluticasone  Furoate-Vilanterol   No dose, route, or frequency recorded.      Calcium-Phosphorus-Vitamin D 250-107-500 MG-MG-UNIT chewable tablet   1 tablet, Daily      cyclobenzaprine 10 MG tablet  Commonly known as: FLEXERIL   10 mg, Oral, Nightly PRN      DULoxetine 60 MG capsule  Commonly known as: CYMBALTA   60 mg, Oral, Daily      folic acid 1 MG tablet  Commonly known as: FOLVITE   1 mg, Oral, Daily      magnesium oxide 400 MG tablet  Commonly known as: MAG-OX   400 mg, Daily      multivitamins-minerals capsule capsule   1 capsule, Oral, 2 Times Daily      pantoprazole 40 MG EC tablet  Commonly known as: PROTONIX   40 mg, Oral, Daily      Prolia 60 MG/ML solution prefilled syringe syringe  Generic drug: denosumab   No dose, route, or frequency recorded.      vitamin B-12 1000 MCG tablet  Commonly known as: CYANOCOBALAMIN   1,000 mcg, 2 Times Weekly                   **Dragon Disclaimer:   Much of this encounter note is an electronic transcription/translation of spoken language to printed text. The electronic translation of spoken language may permit erroneous, or at times, nonsensical words or phrases to be inadvertently transcribed. Although I have reviewed the note for such errors, some may still exist.

## 2024-11-13 NOTE — Clinical Note
She continues to have bouts similar to when he saw her in July.  She has had multiple testing.  I was going to send her for 24-hour catecholamine testing. I decreased her metoprolol.  She asked about heart cath.  She had no exertional chest pain and negative recent stress. She had CTA coronary in 2019 that was unremarkable. Do you have any other thoughts?

## 2024-11-15 ENCOUNTER — LAB (OUTPATIENT)
Dept: LAB | Facility: HOSPITAL | Age: 74
End: 2024-11-15
Payer: MEDICARE

## 2024-11-15 ENCOUNTER — TRANSCRIBE ORDERS (OUTPATIENT)
Dept: ADMINISTRATIVE | Facility: HOSPITAL | Age: 74
End: 2024-11-15
Payer: COMMERCIAL

## 2024-11-15 DIAGNOSIS — I10 ESSENTIAL HYPERTENSION, MALIGNANT: ICD-10-CM

## 2024-11-15 DIAGNOSIS — I10 ESSENTIAL HYPERTENSION, MALIGNANT: Primary | ICD-10-CM

## 2024-11-15 DIAGNOSIS — I34.1 MITRAL VALVE PROLAPSE: ICD-10-CM

## 2024-11-15 PROCEDURE — 84585 ASSAY OF URINE VMA: CPT

## 2024-11-15 PROCEDURE — 82384 ASSAY THREE CATECHOLAMINES: CPT

## 2024-11-22 LAB
DOPAMINE 24H UR-MRATE: 113 UG/24 HR (ref 0–510)
DOPAMINE UR-MCNC: 94 UG/L
EPINEPH 24H UR-MRATE: <4 UG/24 HR (ref 0–20)
EPINEPH UR-MCNC: <3 UG/L
NOREPINEPH 24H UR-MRATE: 25 UG/24 HR (ref 0–135)
NOREPINEPH UR-MCNC: 21 UG/L
VMA 24H UR-MRATE: 2 MG/24 HR (ref 0–7.5)
VMA UR-MCNC: 1.7 MG/L

## 2024-11-25 ENCOUNTER — TELEPHONE (OUTPATIENT)
Dept: CARDIOLOGY | Facility: CLINIC | Age: 74
End: 2024-11-25
Payer: COMMERCIAL

## 2024-11-25 NOTE — TELEPHONE ENCOUNTER
Called and left VM. Will continue to try to reach patient. HUB transfer call to triage.     Rekha Naqvi RN  Triage [facility]

## 2024-11-25 NOTE — TELEPHONE ENCOUNTER
----- Message from Ace Gandhi sent at 11/22/2024  3:59 PM EST -----  Please let patient know that her urine culture performance was normal.  Thank you

## 2024-11-25 NOTE — TELEPHONE ENCOUNTER
Myrashaheed Guajardo Melvin returned call.  Reviewed results with her and she verbalized understanding of results.    Thank you,  Noreen RODRIGUES RN  Triage Nurse [facility]  11/25/24 12:52 EST

## 2024-11-25 NOTE — TELEPHONE ENCOUNTER
Called and left VM. Will continue to try to reach patient. HUB transfer call to triage.     Rekha Naqvi RN  Triage [facility]

## 2024-11-25 NOTE — TELEPHONE ENCOUNTER
"       Hub staff attempted to follow warm transfer process and was unsuccessful     Caller: Myra Charles \"Casandra\"    Relationship to patient: Self    Best call back number:  [phone]    Patient is needing: PATIENT RETURNING CALL        "

## 2024-12-02 ENCOUNTER — OFFICE VISIT (OUTPATIENT)
Dept: GASTROENTEROLOGY | Facility: CLINIC | Age: 74
End: 2024-12-02
Payer: COMMERCIAL

## 2024-12-02 VITALS
WEIGHT: 185.3 LBS | BODY MASS INDEX: 27.44 KG/M2 | HEIGHT: 69 IN | SYSTOLIC BLOOD PRESSURE: 130 MMHG | DIASTOLIC BLOOD PRESSURE: 68 MMHG

## 2024-12-02 DIAGNOSIS — K21.9 GASTROESOPHAGEAL REFLUX DISEASE, UNSPECIFIED WHETHER ESOPHAGITIS PRESENT: ICD-10-CM

## 2024-12-02 PROCEDURE — 99214 OFFICE O/P EST MOD 30 MIN: CPT

## 2024-12-02 RX ORDER — PANTOPRAZOLE SODIUM 40 MG/1
40 TABLET, DELAYED RELEASE ORAL 2 TIMES DAILY
Qty: 180 TABLET | Refills: 0 | Status: SHIPPED | OUTPATIENT
Start: 2024-12-02

## 2024-12-02 NOTE — PROGRESS NOTES
PATIENT INFORMATION  Myra Charles       - 1950    CHIEF COMPLAINT  Chief Complaint   Patient presents with    Heartburn    Chest Pain       HISTORY OF PRESENT ILLNESS  Here today for EGD follow-up    2024 [facility] for dysphagia consistent with reactive gastropathy, reflux esophagitis, no HP or Barretts. Reviewed path and images with patient.     CP episodes with hypotension at LOV, cards were questioning esophageal spasm vs vasovagal episode. HB dependent on what she eats and timing with daily pepcid. Intiated PPI therapy. Stomach felt better once started PPI, but it did not effect episodes. Occasional breakthrough, but pretty well controlled. Nausea is isolated to these episodes. No dysphagia. Can drink when this happens, does not seem to be GI related.  POTS?    Still having CP/hypotension episodes, had was feeling much better until the last several weeks and it returned, still have tachycardic episodes and BP fluctuates all day when it happens and happens when she stands up. Cards has adjusted meds, but then BP too high. Patient plans to talk to cards tomorrow.    Anxious because  had negative cardiac testing and then abnormal cath and required open heart surgery the next month.    Stress level is high. Does have fibro.    2024 Esophagram with tertiary waves, GERD, small HH.     2022 colonoscopy with only hyperplastic polyp          REVIEWED PERTINENT RESULTS/ LABS  Lab Results   Component Value Date    CASEREPORT  2024     Surgical Pathology Report                         Case: [id number]                                  Authorizing Provider:  Augustine Menjivar        Collected:           2024 03:43 PM                                 MD Art                                                                   Ordering Location:     [facility]   Received:            2024 04:16 PM                                 OR                                                                            Pathologist:           Shweta Chavarria MD                                                        Specimens:   1) - Stomach                                                                                        2) - Esophagus, Distal                                                                     FINALDX  09/26/2024     1.   Stomach, biopsy:         A.  Gastric antral mucosa with features suggestive of reactive gastropathy.         B.  Negative for intestinal metaplasia.         C.  Negative for H. pylori-type organisms (H&E stain).    2.  Distal esophagus, biopsy:         A.  Squamocolumnar mucosa with mild reactive changes and chronic inflammation.         B.  Negative for intestinal metaplasia.           Lab Results   Component Value Date    HGB 12.4 07/15/2024    MCV 89 07/15/2024     07/15/2024    ALT 30 07/15/2024    AST 25 07/15/2024    INR 1.00 05/18/2024    TRIG CANCELED 07/15/2024    FERRITIN 260.00 (H) 06/28/2023    IRON 55 06/28/2023    TIBC 243 (L) 06/28/2023      No results found.    REVIEW OF SYSTEMS  Review of Systems   Constitutional:  Negative for activity change, chills, fever and unexpected weight change.   HENT:  Negative for congestion.    Eyes:  Negative for visual disturbance.   Respiratory:  Positive for shortness of breath.    Cardiovascular:  Positive for chest pain. Negative for palpitations.        Tachycardia   Gastrointestinal:  Positive for nausea. Negative for abdominal pain and blood in stool.        Reflux   Endocrine: Negative for cold intolerance and heat intolerance.   Genitourinary:  Negative for hematuria.   Musculoskeletal:  Negative for gait problem.   Skin:  Negative for color change.   Allergic/Immunologic: Negative for immunocompromised state.   Neurological:  Negative for weakness and light-headedness.   Hematological:  Negative for adenopathy.   Psychiatric/Behavioral:  Negative for sleep disturbance. The patient  is not nervous/anxious.          ACTIVE PROBLEMS  Patient Active Problem List    Diagnosis     Chest pain [R07.9]     Anemia [D64.9]     Osteoarthritis [M19.90]     Bile reflux esophagitis [K21.00]     Left foot pain [M79.672]     Elevated alkaline phosphatase level [R74.8]     Cervical nerve root compression [G54.2]     History of acute renal failure [Z87.448]     DDD (degenerative disc disease), cervical [M50.30]     Dupuytren's contracture of right hand [M72.0]     CKD (chronic kidney disease) stage 3, GFR 30-59 ml/min [N18.30]     Overweight (BMI 25.0-29.9) [E66.3]     History of iron deficiency [Z86.39]     Neck pain [M54.2]     History of fracture of left ankle [Z87.81]     Non-rheumatic mitral regurgitation [I34.0]     History of bilateral cataract extraction [Z98.41, Z98.42]     Chronic left-sided low back pain with left-sided sciatica [M54.42, G89.29]     Varicose veins of both lower extremities with pain [I83.813]     Seborrheic keratosis [L82.1]     Iron deficiency anemia [D50.9]     Hallux valgus [M20.10]     Elevated ferritin [R79.89]     History of deep venous thrombosis [Z86.718]     Malignant neoplasm of upper lobe of left lung [C34.12]     Idiopathic peripheral neuropathy [G60.9]     Multinodular goiter (nontoxic) [E04.2]     Osteoporosis [M81.0]     Hyperthyroidism [E05.90]     Nonexudative age-related macular degeneration, bilateral, early dry stage [H35.3131]     Cortical age-related cataract [H25.019]     Hoarseness or changing voice [R49.9]     Vitamin D deficiency [E55.9]     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 [E05.20]     Abnormal blood level of iron [R79.0]     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 [J38.01]     Rosacea [L71.9]     Fibrositis [M79.7]     Mitral valve prolapse [I34.1]     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]     GERD (gastroesophageal reflux disease) [K21.9]     Cortical senile cataract [H25.019]     Backache [M54.9]      Screen for colon cancer [Z12.11]     Encounter for general adult medical examination without abnormal findings [Z00.00]     Medicare annual wellness visit, subsequent [Z00.00]     Vitreous floaters [H43.399]     Vitreous detachment [H43.819]     B12 deficiency [E53.8]     PAC (premature atrial contraction) [I49.1]     Fibromyalgia [M79.7]     Osteopenia [M85.80]     History of malignant carcinoid tumor of bronchus and lung [Z85.110]     Lung nodule, solitary [R91.1]     Gastroesophageal reflux disease without esophagitis [K21.9]     History of partial thyroidectomy [E89.0]     Essential hypertension [I10]     Restless legs syndrome [G25.81]          PAST MEDICAL HISTORY  Past Medical History:   Diagnosis Date    Abnormal ECG     Arthritis     Asthma     Backache 02/17/2016    Low back pain, in sacral region       Benign hypertension 02/17/2016    CKD (chronic kidney disease) stage 3, GFR 30-59 ml/min     COPD (chronic obstructive pulmonary disease)     Coronary artery disease     Cortical senile cataract 02/17/2016    Deep vein thrombosis     Essential (primary) hypertension 04/20/2016    GERD (gastroesophageal reflux disease) 02/17/2016    Hallux valgus     Deformity, w/inflammation       Hernia 6 months old    History of coronary angiogram     CT cor angio 2019, normal cors, Agatston 0    Iron deficiency anemia     Irritable bowel syndrome     Lung nodule, solitary 03/08/2012    Solitary nodule of lung - RIGHT upper lobe 15mm, PET positive       Malignant carcinoid tumor of lung 02/17/2016    Atypical carcinoid tumor of the LEFT lung treated in March 2012      Mitral valve prolapse 02/17/2016    Osteoporosis 04/20/2016    Primary fibromyalgia syndrome 02/17/2016    Rosacea 02/17/2016    Seborrheic keratosis     History of, upper and lower back       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 04/20/2016    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 02/17/2016    Paralysis  of vocal cords and larynx, unilateral - LEFT side       Varicose vein     Vitamin D deficiency 04/20/2016    Vitreous detachment 12/01/2014    Vitreous floaters 12/01/2014         SURGICAL HISTORY  Past Surgical History:   Procedure Laterality Date    ADENOIDECTOMY  2/3/1956    ARTERIAL BYPASS SURGERY  09/25/2012    Artery bypass graft (Left femoral to posterior tibial artery bypass. Enodscopic vein harvest on the left. Left lower extremity arteriogram.)    BREAST CYST EXCISION      pt unsure of laterality    CERVICAL CONIZATION      CONIZATION OF CERVIX 30132 (Excisional laser cone biopsy and vaporization of cervix.)    CHOLECYSTECTOMY  09/07/2005    Cholecystectomy, laparoscopic (With intraoperative cholangiogram.)    COLONOSCOPY      COLONOSCOPY W/ POLYPECTOMY N/A 01/05/2022    Procedure: COLONOSCOPY WITH POLYPECTOMY;  Surgeon: Augustine Menjivar MD;  Location: [facility] OR;  Service: Gastroenterology;  Laterality: N/A;  Rectal polyp    CYST REMOVAL      left 3rd toe    ENDOSCOPY N/A 9/26/2024    Procedure: ESOPHAGOGASTRODUODENOSCOPY WITH BIOPSY;  Surgeon: Augustine Menjivar MD;  Location: [facility] OR;  Service: Gastroenterology;  Laterality: N/A;  gastritis, esophagitis    EXCISION LESION  11/11/1998    REMOVE LESION BACK OR FLANK 13772 (Excision times two.)    FOOT SURGERY  01/20/2009    Foot/toes surgery procedure (Soft tissue mass, left foot. Excision of)    HERNIA REPAIR      Past history of umbilical herni repair.    HYSTERECTOMY      OTHER SURGICAL HISTORY  12/04/2012    Anesth, elbow area surgery (Manipulation of left elbow.)    OTHER SURGICAL HISTORY  03/05/2012    Anesth, elbow area surgery (Excision of plate and screws from olecranon bursa. Retained plate and screws, olecranon bursitis of previous fracture left elbow.)    OTHER SURGICAL HISTORY  10/16/2012    Treat elbow fracture (Open reduction and internal fixation.)    THORACOSCOPY  03/14/2012    Thoracoscopy, surgical  (Bronchoscopy,LVATS (wedge resect MARYBETH), LULobectomy Thoracic lymphadenectomy)    THROAT SURGERY  07/25/2012    Throat surgery procedure (Thyroplasty type I (vocal cord medialozation & larynooplasty) Left vocal cord paralysis. Dysphoria. [facility] by Dr Tk Heart)    THYROID SURGERY  03/22/2016    Thyroid surgery (Right thyroid lobectomy and isthmusectomy.)    TONSILLECTOMY  02/03/1956    Chronic tonsillitis    UPPER GASTROINTESTINAL ENDOSCOPY      VEIN LIGATION  09/28/2000    PHLEB VEINS - EXTREM (20+) 11750 (High ligation of ling saphenous vein, left, plus multiple phlebectomies, left lower extremity.)         FAMILY HISTORY  Family History   Problem Relation Age of Onset    Cancer Mother         myeloma    Diabetes Mother     Heart disease Mother     Arthritis Mother     Hypertension Mother     Heart attack Mother     Heart disease Father     Arthritis Father     Heart attack Father     Stroke Father     Hypertension Father     Vision loss Father     Diabetes Maternal Grandmother     Lung cancer Maternal Grandfather     Lung cancer Other     Breast cancer Neg Hx     Malig Hyperthermia Neg Hx          SOCIAL HISTORY  Social History     Occupational History    Not on file   Tobacco Use    Smoking status: Never     Passive exposure: Never    Smokeless tobacco: Never   Vaping Use    Vaping status: Never Used   Substance and Sexual Activity    Alcohol use: Yes     Alcohol/week: 2.0 standard drinks of alcohol     Types: 2 Glasses of wine per week     Comment: Occasionally    Drug use: Never    Sexual activity: Not Currently     Partners: Male     Birth control/protection: Surgical         CURRENT MEDICATIONS    Current Outpatient Medications:     albuterol sulfate  (90 Base) MCG/ACT inhaler, Inhale 2 puffs Every 4 (Four) Hours As Needed for Wheezing or Shortness of Air., Disp: 18 g, Rfl: 0    amLODIPine (NORVASC) 2.5 MG tablet, Take 1 tablet by mouth Daily., Disp: , Rfl:     Ascorbic Acid 500 MG  "capsule, , Disp: , Rfl:     aspirin 81 MG EC tablet, Take 1 tablet by mouth Daily., Disp: , Rfl:     Breo Ellipta 100-25 MCG/ACT aerosol powder , , Disp: , Rfl:     Calcium-Phosphorus-Vitamin D 250-107-500 MG-MG-UNIT chewable tablet, Chew 1 tablet Daily., Disp: , Rfl:     cholecalciferol (VITAMIN D3) 25 MCG (1000 UT) tablet, Take 1 tablet by mouth Daily. (Patient taking differently: Take 2 tablets by mouth.), Disp: 90 tablet, Rfl: 1    cyclobenzaprine (FLEXERIL) 10 MG tablet, Take 1 tablet by mouth At Night As Needed for Muscle Spasms., Disp: 90 tablet, Rfl: 1    denosumab (Prolia) 60 MG/ML solution prefilled syringe syringe, , Disp: , Rfl:     DULoxetine (CYMBALTA) 60 MG capsule, Take 1 capsule by mouth Daily., Disp: 90 capsule, Rfl: 1    fluticasone (FLONASE) 50 MCG/ACT nasal spray, 1 spray into the nostril(s) as directed by provider Daily. (Patient taking differently: Administer 1 spray into the nostril(s) as directed by provider Daily As Needed.), Disp: 16 g, Rfl: 5    folic acid (FOLVITE) 1 MG tablet, Take 1 tablet by mouth Daily., Disp: 90 tablet, Rfl: 0    magnesium oxide (MAG-OX) 400 MG tablet, Take 1 tablet by mouth Daily., Disp: , Rfl:     metoprolol succinate XL (TOPROL-XL) 25 MG 24 hr tablet, Take 0.5 tablets by mouth 2 (Two) Times a Day., Disp: , Rfl:     multivitamins-minerals (PRESERVISION AREDS 2) capsule capsule, Take 1 capsule by mouth 2 (Two) Times a Day., Disp: 90 capsule, Rfl: 1    pantoprazole (PROTONIX) 40 MG EC tablet, Take 1 tablet by mouth 2 (Two) Times a Day., Disp: 180 tablet, Rfl: 0    vitamin B-12 (CYANOCOBALAMIN) 1000 MCG tablet, Take 1 tablet by mouth 2 (Two) Times a Week., Disp: , Rfl:     ALLERGIES  Raloxifene, Gabapentin, Latex, and Pregabalin    VITALS  Vitals:    12/02/24 1455   BP: 130/68   BP Location: Left arm   Patient Position: Sitting   Cuff Size: Adult   Weight: 84.1 kg (185 lb 4.8 oz)   Height: 175.3 cm (69\")       PHYSICAL EXAM  Debilities/Disabilities Identified: " "None  Emotional Behavior: Appropriate  Wt Readings from Last 3 Encounters:   12/02/24 84.1 kg (185 lb 4.8 oz)   11/13/24 81.2 kg (179 lb)   09/26/24 83.9 kg (185 lb)     Ht Readings from Last 1 Encounters:   12/02/24 175.3 cm (69\")     Body mass index is 27.36 kg/m².  Physical Exam  Constitutional:       General: She is not in acute distress.     Appearance: Normal appearance. She is not ill-appearing.   HENT:      Head: Normocephalic and atraumatic.      Mouth/Throat:      Mouth: Mucous membranes are moist.      Pharynx: No posterior oropharyngeal erythema.   Eyes:      General: No scleral icterus.  Cardiovascular:      Rate and Rhythm: Normal rate and regular rhythm.      Heart sounds: Normal heart sounds.   Pulmonary:      Effort: Pulmonary effort is normal.      Breath sounds: Normal breath sounds.   Abdominal:      General: Abdomen is flat. Bowel sounds are normal. There is no distension.      Palpations: Abdomen is soft. There is no mass.      Tenderness: There is no abdominal tenderness. There is no guarding or rebound. Negative signs include Hill's sign.      Hernia: No hernia is present.   Musculoskeletal:      Cervical back: Neck supple.   Skin:     General: Skin is warm.      Capillary Refill: Capillary refill takes less than 2 seconds.   Neurological:      General: No focal deficit present.      Mental Status: She is alert and oriented to person, place, and time.   Psychiatric:         Mood and Affect: Mood normal.         Behavior: Behavior normal.         Thought Content: Thought content normal.         Judgment: Judgment normal.         CLINICAL DATA REVIEWED   reviewed previous lab results and integrated with today's visit, reviewed notes from other physicians and/or last GI encounter, reviewed previous endoscopy results and available photos, reviewed surgical pathology results from previous biopsies      ASSESSMENT  Diagnoses and all orders for this visit:    Gastroesophageal reflux disease, " unspecified whether esophagitis present  -     pantoprazole (PROTONIX) 40 MG EC tablet; Take 1 tablet by mouth 2 (Two) Times a Day.          PLAN    Symptoms do not seem consistent with GI origin  GERD: Will increase PPI to BID for 2 months and assess for improvement    Return in about 4 months (around 4/2/2025).    I have discussed the above plan with the patient.  They verbalize understanding and are in agreement with the plan.  They have been advised to contact the office for any questions, concerns, or changes related to their health.

## 2024-12-09 DIAGNOSIS — R55 POSTURAL DIZZINESS WITH NEAR SYNCOPE: Primary | ICD-10-CM

## 2024-12-09 DIAGNOSIS — R42 POSTURAL DIZZINESS WITH NEAR SYNCOPE: Primary | ICD-10-CM

## 2024-12-10 ENCOUNTER — TELEPHONE (OUTPATIENT)
Dept: CARDIOLOGY | Facility: CLINIC | Age: 74
End: 2024-12-10
Payer: COMMERCIAL

## 2024-12-10 DIAGNOSIS — R42 DIZZINESS: Primary | ICD-10-CM

## 2024-12-10 DIAGNOSIS — I10 ESSENTIAL HYPERTENSION: ICD-10-CM

## 2024-12-10 DIAGNOSIS — N18.31 STAGE 3A CHRONIC KIDNEY DISEASE: ICD-10-CM

## 2024-12-10 DIAGNOSIS — K22.4 ESOPHAGEAL DYSMOTILITY: ICD-10-CM

## 2024-12-10 DIAGNOSIS — I34.1 MITRAL VALVE PROLAPSE: ICD-10-CM

## 2024-12-10 RX ORDER — METOPROLOL TARTRATE 1 MG/ML
5 INJECTION, SOLUTION INTRAVENOUS
OUTPATIENT
Start: 2024-12-10

## 2024-12-10 RX ORDER — METOPROLOL TARTRATE 50 MG
50 TABLET ORAL
OUTPATIENT
Start: 2024-12-10

## 2024-12-10 RX ORDER — METOPROLOL TARTRATE 25 MG/1
25 TABLET, FILM COATED ORAL ONCE
OUTPATIENT
Start: 2024-12-10

## 2024-12-10 RX ORDER — NITROGLYCERIN 0.4 MG/1
0.4 TABLET SUBLINGUAL
OUTPATIENT
Start: 2024-12-10 | End: 2024-12-10

## 2024-12-10 RX ORDER — SODIUM CHLORIDE 0.9 % (FLUSH) 0.9 %
10 SYRINGE (ML) INJECTION EVERY 12 HOURS SCHEDULED
OUTPATIENT
Start: 2024-12-10

## 2024-12-10 RX ORDER — IVABRADINE 5 MG/1
15 TABLET, FILM COATED ORAL ONCE
OUTPATIENT
Start: 2024-12-10 | End: 2024-12-10

## 2024-12-10 RX ORDER — SODIUM CHLORIDE 0.9 % (FLUSH) 0.9 %
10 SYRINGE (ML) INJECTION AS NEEDED
OUTPATIENT
Start: 2024-12-10

## 2024-12-10 RX ORDER — METOPROLOL TARTRATE 25 MG/1
200 TABLET, FILM COATED ORAL ONCE
OUTPATIENT
Start: 2024-12-10 | End: 2024-12-10

## 2024-12-10 RX ORDER — NITROGLYCERIN 0.4 MG/1
0.8 TABLET SUBLINGUAL
OUTPATIENT
Start: 2024-12-10

## 2024-12-10 RX ORDER — METOPROLOL TARTRATE 25 MG/1
150 TABLET, FILM COATED ORAL ONCE
OUTPATIENT
Start: 2024-12-10

## 2024-12-10 RX ORDER — METOPROLOL TARTRATE 25 MG/1
50 TABLET, FILM COATED ORAL ONCE
OUTPATIENT
Start: 2024-12-10

## 2024-12-10 RX ORDER — METOPROLOL TARTRATE 25 MG/1
100 TABLET, FILM COATED ORAL ONCE
OUTPATIENT
Start: 2024-12-10

## 2024-12-10 RX ORDER — SODIUM CHLORIDE 9 MG/ML
40 INJECTION, SOLUTION INTRAVENOUS AS NEEDED
OUTPATIENT
Start: 2024-12-10

## 2024-12-31 DIAGNOSIS — M79.7 FIBROMYALGIA: ICD-10-CM

## 2024-12-31 DIAGNOSIS — I10 ESSENTIAL HYPERTENSION: ICD-10-CM

## 2025-01-02 RX ORDER — DULOXETIN HYDROCHLORIDE 60 MG/1
60 CAPSULE, DELAYED RELEASE ORAL DAILY
Qty: 90 CAPSULE | Refills: 1 | Status: SHIPPED | OUTPATIENT
Start: 2025-01-02 | End: 2025-01-03 | Stop reason: SDUPTHER

## 2025-01-02 RX ORDER — FOLIC ACID 1 MG/1
1000 TABLET ORAL DAILY
Qty: 90 TABLET | Refills: 0 | Status: SHIPPED | OUTPATIENT
Start: 2025-01-02

## 2025-01-02 RX ORDER — AMLODIPINE BESYLATE 5 MG/1
7.5 TABLET ORAL DAILY
Qty: 135 TABLET | Refills: 3 | Status: SHIPPED | OUTPATIENT
Start: 2025-01-02

## 2025-01-03 DIAGNOSIS — M79.7 FIBROMYALGIA: ICD-10-CM

## 2025-01-03 RX ORDER — DULOXETIN HYDROCHLORIDE 60 MG/1
60 CAPSULE, DELAYED RELEASE ORAL DAILY
Qty: 90 CAPSULE | Refills: 1 | Status: SHIPPED | OUTPATIENT
Start: 2025-01-03

## 2025-01-03 RX ORDER — DULOXETIN HYDROCHLORIDE 60 MG/1
60 CAPSULE, DELAYED RELEASE ORAL DAILY
Qty: 14 CAPSULE | Refills: 0 | Status: SHIPPED | OUTPATIENT
Start: 2025-01-03 | End: 2025-01-03 | Stop reason: SDUPTHER

## 2025-01-06 ENCOUNTER — HOSPITAL ENCOUNTER (OUTPATIENT)
Dept: CARDIOLOGY | Facility: HOSPITAL | Age: 75
Discharge: HOME | End: 2025-01-06
Payer: COMMERCIAL

## 2025-01-07 ENCOUNTER — TRANSCRIBE ORDERS (OUTPATIENT)
Dept: ADMINISTRATIVE | Facility: HOSPITAL | Age: 75
End: 2025-01-07
Payer: COMMERCIAL

## 2025-01-07 ENCOUNTER — LAB (OUTPATIENT)
Dept: LAB | Facility: HOSPITAL | Age: 75
End: 2025-01-07
Payer: MEDICARE

## 2025-01-07 DIAGNOSIS — N18.32 CHRONIC KIDNEY DISEASE (CKD) STAGE G3B/A1, MODERATELY DECREASED GLOMERULAR FILTRATION RATE (GFR) BETWEEN 30-44 ML/MIN/1.73 SQUARE METER AND ALBUMINURIA CREATININE RATIO LESS THAN 30 MG/G (CMS/H*: Primary | ICD-10-CM

## 2025-01-07 DIAGNOSIS — I12.9 HYPERTENSIVE NEPHROPATHY: ICD-10-CM

## 2025-01-07 DIAGNOSIS — N18.32 CHRONIC KIDNEY DISEASE (CKD) STAGE G3B/A1, MODERATELY DECREASED GLOMERULAR FILTRATION RATE (GFR) BETWEEN 30-44 ML/MIN/1.73 SQUARE METER AND ALBUMINURIA CREATININE RATIO LESS THAN 30 MG/G (CMS/H*: ICD-10-CM

## 2025-01-07 LAB
ALBUMIN SERPL-MCNC: 4.3 G/DL (ref 3.5–5.2)
ALBUMIN UR-MCNC: 3.1 MG/DL
ANION GAP SERPL CALCULATED.3IONS-SCNC: 10.2 MMOL/L (ref 5–15)
BACTERIA UR QL AUTO: ABNORMAL /HPF
BILIRUB UR QL STRIP: NEGATIVE
BUN SERPL-MCNC: 25 MG/DL (ref 8–23)
BUN/CREAT SERPL: 14.3 (ref 7–25)
CALCIUM SPEC-SCNC: 9.4 MG/DL (ref 8.6–10.5)
CHLORIDE SERPL-SCNC: 102 MMOL/L (ref 98–107)
CLARITY UR: ABNORMAL
CO2 SERPL-SCNC: 24.8 MMOL/L (ref 22–29)
COD CRY URNS QL: PRESENT /HPF
COLOR UR: YELLOW
CREAT SERPL-MCNC: 1.75 MG/DL (ref 0.57–1)
CREAT UR-MCNC: 278.4 MG/DL
CREAT UR-MCNC: 278.4 MG/DL
EGFRCR SERPLBLD CKD-EPI 2021: 30.3 ML/MIN/1.73
GLUCOSE SERPL-MCNC: 104 MG/DL (ref 65–99)
GLUCOSE UR STRIP-MCNC: NEGATIVE MG/DL
GRAN CASTS URNS QL MICRO: ABNORMAL /LPF
HGB UR QL STRIP.AUTO: NEGATIVE
HYALINE CASTS UR QL AUTO: ABNORMAL /LPF
KETONES UR QL STRIP: ABNORMAL
LEUKOCYTE ESTERASE UR QL STRIP.AUTO: ABNORMAL
MICROALBUMIN/CREAT UR: 11.1 MG/G (ref 0–29)
NITRITE UR QL STRIP: NEGATIVE
PH UR STRIP.AUTO: 6 [PH] (ref 5–8)
PHOSPHATE SERPL-MCNC: 4.4 MG/DL (ref 2.5–4.5)
POTASSIUM SERPL-SCNC: 4.2 MMOL/L (ref 3.5–5.2)
PROT ?TM UR-MCNC: 29.4 MG/DL
PROT UR QL STRIP: ABNORMAL
PROT/CREAT UR: 105.6 MG/G CREA (ref 0–200)
RBC # UR STRIP: ABNORMAL /HPF
REF LAB TEST METHOD: ABNORMAL
RENAL EPI CELLS #/AREA URNS HPF: ABNORMAL /HPF
SODIUM SERPL-SCNC: 137 MMOL/L (ref 136–145)
SP GR UR STRIP: 1.02 (ref 1–1.03)
SQUAMOUS #/AREA URNS HPF: ABNORMAL /HPF
UROBILINOGEN UR QL STRIP: ABNORMAL
WBC # UR STRIP: ABNORMAL /HPF

## 2025-01-07 PROCEDURE — 81001 URINALYSIS AUTO W/SCOPE: CPT

## 2025-01-07 PROCEDURE — 84156 ASSAY OF PROTEIN URINE: CPT

## 2025-01-07 PROCEDURE — 82570 ASSAY OF URINE CREATININE: CPT

## 2025-01-07 PROCEDURE — 82043 UR ALBUMIN QUANTITATIVE: CPT

## 2025-01-07 PROCEDURE — 80069 RENAL FUNCTION PANEL: CPT

## 2025-01-07 PROCEDURE — 36415 COLL VENOUS BLD VENIPUNCTURE: CPT

## 2025-01-15 DIAGNOSIS — K21.9 GASTROESOPHAGEAL REFLUX DISEASE WITHOUT ESOPHAGITIS: ICD-10-CM

## 2025-01-15 DIAGNOSIS — I10 ESSENTIAL HYPERTENSION: ICD-10-CM

## 2025-01-15 DIAGNOSIS — N18.32 STAGE 3B CHRONIC KIDNEY DISEASE: ICD-10-CM

## 2025-01-17 ENCOUNTER — PATIENT MESSAGE (OUTPATIENT)
Age: 75
End: 2025-01-17
Payer: COMMERCIAL

## 2025-01-17 DIAGNOSIS — I10 ESSENTIAL HYPERTENSION: ICD-10-CM

## 2025-01-17 LAB
ALBUMIN SERPL-MCNC: 4.1 G/DL (ref 3.5–5.2)
ALBUMIN/GLOB SERPL: 1.7 G/DL
ALP SERPL-CCNC: 95 U/L (ref 39–117)
ALT SERPL-CCNC: 23 U/L (ref 1–33)
AST SERPL-CCNC: 17 U/L (ref 1–32)
BILIRUB SERPL-MCNC: 0.3 MG/DL (ref 0–1.2)
BUN SERPL-MCNC: 25 MG/DL (ref 8–23)
BUN/CREAT SERPL: 14.1 (ref 7–25)
CALCIUM SERPL-MCNC: 8.9 MG/DL (ref 8.6–10.5)
CHLORIDE SERPL-SCNC: 102 MMOL/L (ref 98–107)
CHOLEST SERPL-MCNC: 193 MG/DL (ref 0–200)
CHOLEST/HDLC SERPL: 3.39 {RATIO}
CO2 SERPL-SCNC: 24.6 MMOL/L (ref 22–29)
CREAT SERPL-MCNC: 1.77 MG/DL (ref 0.57–1)
EGFRCR SERPLBLD CKD-EPI 2021: 29.9 ML/MIN/1.73
ERYTHROCYTE [DISTWIDTH] IN BLOOD BY AUTOMATED COUNT: 12.6 % (ref 12.3–15.4)
GLOBULIN SER CALC-MCNC: 2.4 GM/DL
GLUCOSE SERPL-MCNC: 85 MG/DL (ref 65–99)
HCT VFR BLD AUTO: 39 % (ref 34–46.6)
HDLC SERPL-MCNC: 57 MG/DL (ref 40–60)
HGB BLD-MCNC: 12.8 G/DL (ref 12–15.9)
LDLC SERPL CALC-MCNC: 116 MG/DL (ref 0–100)
MCH RBC QN AUTO: 28.5 PG (ref 26.6–33)
MCHC RBC AUTO-ENTMCNC: 32.8 G/DL (ref 31.5–35.7)
MCV RBC AUTO: 86.9 FL (ref 79–97)
PLATELET # BLD AUTO: 227 10*3/MM3 (ref 140–450)
POTASSIUM SERPL-SCNC: 4 MMOL/L (ref 3.5–5.2)
PROT SERPL-MCNC: 6.5 G/DL (ref 6–8.5)
RBC # BLD AUTO: 4.49 10*6/MM3 (ref 3.77–5.28)
SODIUM SERPL-SCNC: 139 MMOL/L (ref 136–145)
TRIGL SERPL-MCNC: 114 MG/DL (ref 0–150)
TSH SERPL DL<=0.005 MIU/L-ACNC: 2.47 UIU/ML (ref 0.27–4.2)
VLDLC SERPL CALC-MCNC: 20 MG/DL (ref 5–40)
WBC # BLD AUTO: 6.04 10*3/MM3 (ref 3.4–10.8)

## 2025-01-17 NOTE — TELEPHONE ENCOUNTER
Myra Charles returned call.  Reviewed Dr. Hernandez's message and recommendations with her and she verbalized understanding of recommendations.  Patient is agreeable to proceed with CTA on Monday.     Transferred call to Jenny in [facility] to discuss appointment further.    Thank you,  Noreen RODRIGUES RN  Triage Nurse [facility]  01/17/25 11:05 EST

## 2025-01-17 NOTE — TELEPHONE ENCOUNTER
"Please let her know that I s/w Dr Pablo Pan on the phone just now -- he feels that the risk is very low (never zero, obviously) but with IV fluids before and after (we have a protocol for that) and the tiny amount of dye we use, the benefits seem to outweigh the risk. He said \"I would have it done personally.\"    Ty  elio  "

## 2025-01-17 NOTE — TELEPHONE ENCOUNTER
Per chart review,  patient follows Dr. Pablo Pan and she just saw him on 1/13/25.    Rekha Naqvi RN  Triage [facility]

## 2025-01-17 NOTE — TELEPHONE ENCOUNTER
I attempted to call the patient to review Dr. Hernandez's message with her, but she did not answer. I left her a VM letting her know that I am sending Dr. Hernandez's recommendations to her via [employer]. Encouraged her to call back if she has any further questions or concerns.     Rekha Naqvi RN  Triage [facility]

## 2025-01-20 ENCOUNTER — HOSPITAL ENCOUNTER (OUTPATIENT)
Dept: CARDIOLOGY | Facility: HOSPITAL | Age: 75
Discharge: HOME | End: 2025-01-20
Payer: COMMERCIAL

## 2025-01-20 ENCOUNTER — HOSPITAL ENCOUNTER (OUTPATIENT)
Dept: CT IMAGING | Facility: HOSPITAL | Age: 75
Discharge: HOME | End: 2025-01-20
Payer: COMMERCIAL

## 2025-01-20 VITALS
HEART RATE: 79 BPM | DIASTOLIC BLOOD PRESSURE: 104 MMHG | SYSTOLIC BLOOD PRESSURE: 137 MMHG | RESPIRATION RATE: 16 BRPM | OXYGEN SATURATION: 98 %

## 2025-01-20 VITALS
OXYGEN SATURATION: 95 % | HEART RATE: 70 BPM | SYSTOLIC BLOOD PRESSURE: 125 MMHG | DIASTOLIC BLOOD PRESSURE: 76 MMHG | RESPIRATION RATE: 16 BRPM

## 2025-01-20 DIAGNOSIS — I10 ESSENTIAL HYPERTENSION: ICD-10-CM

## 2025-01-20 DIAGNOSIS — N18.32 STAGE 3B CHRONIC KIDNEY DISEASE: Primary | ICD-10-CM

## 2025-01-20 DIAGNOSIS — K22.4 ESOPHAGEAL DYSMOTILITY: ICD-10-CM

## 2025-01-20 DIAGNOSIS — R42 DIZZINESS: ICD-10-CM

## 2025-01-20 DIAGNOSIS — N18.31 STAGE 3A CHRONIC KIDNEY DISEASE: ICD-10-CM

## 2025-01-20 DIAGNOSIS — I34.1 MITRAL VALVE PROLAPSE: ICD-10-CM

## 2025-01-20 DIAGNOSIS — I95.9 HYPOTENSION, UNSPECIFIED HYPOTENSION TYPE: ICD-10-CM

## 2025-01-20 DIAGNOSIS — I48.0 PAROXYSMAL ATRIAL FIBRILLATION: Primary | ICD-10-CM

## 2025-01-20 DIAGNOSIS — R07.2 PRECORDIAL PAIN: ICD-10-CM

## 2025-01-20 DIAGNOSIS — I48.19 PERSISTENT ATRIAL FIBRILLATION: ICD-10-CM

## 2025-01-20 PROCEDURE — 75574 CT ANGIO HRT W/3D IMAGE: CPT | Performed by: STUDENT IN AN ORGANIZED HEALTH CARE EDUCATION/TRAINING PROGRAM

## 2025-01-20 PROCEDURE — 93010 ELECTROCARDIOGRAM REPORT: CPT | Performed by: STUDENT IN AN ORGANIZED HEALTH CARE EDUCATION/TRAINING PROGRAM

## 2025-01-20 PROCEDURE — 94760 N-INVAS EAR/PLS OXIMETRY 1: CPT

## 2025-01-20 PROCEDURE — 25810000003 SODIUM CHLORIDE 0.9 % SOLUTION: Performed by: PHYSICIAN ASSISTANT

## 2025-01-20 PROCEDURE — 96374 THER/PROPH/DIAG INJ IV PUSH: CPT

## 2025-01-20 PROCEDURE — 25810000003 SODIUM CHLORIDE 0.9 % SOLUTION: Performed by: INTERNAL MEDICINE

## 2025-01-20 PROCEDURE — 25510000001 IOPAMIDOL PER 1 ML: Performed by: PHYSICIAN ASSISTANT

## 2025-01-20 PROCEDURE — 75574 CT ANGIO HRT W/3D IMAGE: CPT

## 2025-01-20 PROCEDURE — 93005 ELECTROCARDIOGRAM TRACING: CPT | Performed by: STUDENT IN AN ORGANIZED HEALTH CARE EDUCATION/TRAINING PROGRAM

## 2025-01-20 PROCEDURE — 99214 OFFICE O/P EST MOD 30 MIN: CPT | Performed by: STUDENT IN AN ORGANIZED HEALTH CARE EDUCATION/TRAINING PROGRAM

## 2025-01-20 PROCEDURE — 36415 COLL VENOUS BLD VENIPUNCTURE: CPT

## 2025-01-20 RX ORDER — NITROGLYCERIN 0.4 MG/1
0.8 TABLET SUBLINGUAL ONCE
Status: DISCONTINUED | OUTPATIENT
Start: 2025-01-20 | End: 2025-01-21 | Stop reason: HOSPADM

## 2025-01-20 RX ORDER — SODIUM CHLORIDE 0.9 % (FLUSH) 0.9 %
10 SYRINGE (ML) INJECTION EVERY 12 HOURS SCHEDULED
Status: DISCONTINUED | OUTPATIENT
Start: 2025-01-20 | End: 2025-01-21 | Stop reason: HOSPADM

## 2025-01-20 RX ORDER — METOPROLOL SUCCINATE 25 MG/1
25 TABLET, EXTENDED RELEASE ORAL 2 TIMES DAILY
Qty: 180 TABLET | Refills: 0 | OUTPATIENT
Start: 2025-01-20

## 2025-01-20 RX ORDER — IOPAMIDOL 755 MG/ML
100 INJECTION, SOLUTION INTRAVASCULAR
Status: COMPLETED | OUTPATIENT
Start: 2025-01-20 | End: 2025-01-20

## 2025-01-20 RX ORDER — SODIUM CHLORIDE 9 MG/ML
250 INJECTION, SOLUTION INTRAVENOUS CONTINUOUS
Status: SHIPPED | OUTPATIENT
Start: 2025-01-20 | End: 2025-01-20

## 2025-01-20 RX ORDER — METOPROLOL TARTRATE 100 MG/1
100 TABLET ORAL ONCE
Status: COMPLETED | OUTPATIENT
Start: 2025-01-20 | End: 2025-01-20

## 2025-01-20 RX ORDER — SODIUM CHLORIDE 0.9 % (FLUSH) 0.9 %
10 SYRINGE (ML) INJECTION AS NEEDED
Status: DISCONTINUED | OUTPATIENT
Start: 2025-01-20 | End: 2025-01-21 | Stop reason: HOSPADM

## 2025-01-20 RX ADMIN — METOPROLOL TARTRATE 100 MG: 100 TABLET, FILM COATED ORAL at 13:05

## 2025-01-20 RX ADMIN — SODIUM CHLORIDE 250 ML/HR: 9 INJECTION, SOLUTION INTRAVENOUS at 14:20

## 2025-01-20 RX ADMIN — METOPROLOL TARTRATE 100 MG: 100 TABLET, FILM COATED ORAL at 13:51

## 2025-01-20 RX ADMIN — IOPAMIDOL 100 ML: 755 INJECTION, SOLUTION INTRAVENOUS at 14:18

## 2025-01-20 RX ADMIN — METOPROLOL TARTRATE 5 MG: 1 INJECTION, SOLUTION INTRAVENOUS at 12:43

## 2025-01-20 RX ADMIN — SODIUM CHLORIDE 500 ML: 9 INJECTION, SOLUTION INTRAVENOUS at 12:12

## 2025-01-20 NOTE — PROGRESS NOTES
"PT returned from CCTA. C/O feeling a little \"dizzy\". Ambulated with assist x2 to room. VSS. Post-CCTA IVF's started per order. PT tolerating PO snack well.   "

## 2025-01-20 NOTE — PROGRESS NOTES
PT now back in NSR. CCTA back on schedule. See MAR as far as BB documentation given. PT HR 61-62 with breath hold after last dose of PO metoprolol. PT to CT for CCTA.

## 2025-01-20 NOTE — PROGRESS NOTES
"[facility]        Patient Name: Myra Charles  Age/Sex: 74 y.o. female  : 1950  MRN: [id number]    Date of Admission: 2025  Date of Encounter Visit: 25  Encounter Provider: Hernandez Luz MD  Referring Provider: Hernandez Luz MD  Place of Service: [facility] CARDIOLOGY  Patient Care Team:  Head, ASHLEY Da Silva as PCP - General (Nurse Practitioner)  Damian Rock DPM as Consulting Physician (Podiatry)  Cruzito Souza MD as Consulting Physician (Hematology and Oncology)  Cordell Overton MD as Consulting Physician (Nephrology)  Stephan Hernandez MD as Consulting Physician (Cardiology)  Otto, Augustine Cordova MD as Consulting Physician (Gastroenterology)  Sonia Dominguez MD as Consulting Physician (Rheumatology)  Mayur Neal MD as Consulting Physician (Hematology and Oncology)    Subjective:     Chief Complaint: Here for CCTA, found to be in atrial fibrillation       History of Present Illness:  Myra Charles is a 74 y.o. female who normally follows with Dr. Hernandez.  She has had a history of labile blood pressures and intermittent presyncopal episodes where her \"blood pressure will bottom out\".  She also has significant CKD and intermittent chest pains.    She is being worked up for her labile blood pressures with an upcoming tilt table test, as well as the intermittent chest pains with CCTA today.  She has prominent CKD and is receiving IV fluids for prehydration.    She was having some intermittent episodes of lightheadedness when she was arriving today, will being hooked up to the monitor she was noted be in atrial fibrillation with RVR with rates in the 130s.  She is unaware of an irregular heartbeat.  She has no symptoms aside from some occasional lightheadedness.    She was given IV metoprolol which slowed her rate down, and then she converted to sinus rhythm thereafter.    Currently, she remains in sinus " rhythm as undergoing prep for CCTA    Prior Cardiac Testing:  Stress test Myocard perfusion May 2024: Low risk.  No ischemia.  EF greater than 70  .  Holter monitor 48 hours May 2024: 3% PACs but otherwise no significant arrhythmias.  Diary events correlated with sinus rhythm.    EchoOctober 2023: Normal EF, 66%, trace to mild MR and mild prolapse of mitral anterior leaflet.    Past Medical History:  Past Medical History:   Diagnosis Date    Abnormal ECG     Arthritis     Asthma     Backache 02/17/2016    Low back pain, in sacral region       Benign hypertension 02/17/2016    CKD (chronic kidney disease) stage 3, GFR 30-59 ml/min     COPD (chronic obstructive pulmonary disease)     Coronary artery disease     Cortical senile cataract 02/17/2016    Deep vein thrombosis     Essential (primary) hypertension 04/20/2016    GERD (gastroesophageal reflux disease) 02/17/2016    Hallux valgus     Deformity, w/inflammation       Hernia 6 months old    History of coronary angiogram     CT cor angio 2019, normal cors, Agatston 0    Iron deficiency anemia     Irritable bowel syndrome     Lung nodule, solitary 03/08/2012    Solitary nodule of lung - RIGHT upper lobe 15mm, PET positive       Malignant carcinoid tumor of lung 02/17/2016    Atypical carcinoid tumor of the LEFT lung treated in March 2012      Mitral valve prolapse 02/17/2016    Osteoporosis 04/20/2016    Primary fibromyalgia syndrome 02/17/2016    Rosacea 02/17/2016    Seborrheic keratosis     History of, upper and lower back       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 Enodscopic vein harvest on the left. Left lower extremity arteriogram.)    BREAST CYST EXCISION      pt unsure of laterality    CERVICAL CONIZATION      CONIZATION OF CERVIX 78956 (Excisional laser cone biopsy and vaporization of cervix.)    CHOLECYSTECTOMY  09/07/2005    Cholecystectomy, laparoscopic (With intraoperative cholangiogram.)    COLONOSCOPY      COLONOSCOPY W/ POLYPECTOMY N/A 01/05/2022    Procedure: COLONOSCOPY WITH POLYPECTOMY;  Surgeon: Augustine Menjivar MD;  Location: [facility] OR;  Service: Gastroenterology;  Laterality: N/A;  Rectal polyp    CYST REMOVAL      left 3rd toe    ENDOSCOPY N/A 9/26/2024    Procedure: ESOPHAGOGASTRODUODENOSCOPY WITH BIOPSY;  Surgeon: Augustine Menjivar MD;  Location: [facility] OR;  Service: Gastroenterology;  Laterality: N/A;  gastritis, esophagitis    EXCISION LESION  11/11/1998    REMOVE LESION BACK OR FLANK 93448 (Excision times two.)    FOOT SURGERY  01/20/2009    Foot/toes surgery procedure (Soft tissue mass, left foot. Excision of)    HERNIA REPAIR      Past history of umbilical herni repair.    HYSTERECTOMY      OTHER SURGICAL HISTORY  12/04/2012    Anesth, elbow area surgery (Manipulation of left elbow.)    OTHER SURGICAL HISTORY  03/05/2012    Anesth, elbow area surgery (Excision of plate and screws from olecranon bursa. Retained plate and screws, olecranon bursitis of previous fracture left elbow.)    OTHER SURGICAL HISTORY  10/16/2012    Treat elbow fracture (Open reduction and internal fixation.)    THORACOSCOPY  03/14/2012    Thoracoscopy, surgical (Bronchoscopy,LVATS (wedge resect MARYBETH), LULobectomy Thoracic lymphadenectomy)    THROAT SURGERY  07/25/2012    Throat surgery procedure (Thyroplasty type I (vocal cord medialozation & larynooplasty) Left vocal cord paralysis. Dysphoria. [facility] by Dr Tk Heart)    THYROID SURGERY  03/22/2016     Thyroid surgery (Right thyroid lobectomy and isthmusectomy.)    TONSILLECTOMY  02/03/1956    Chronic tonsillitis    UPPER GASTROINTESTINAL ENDOSCOPY      VEIN LIGATION  09/28/2000    PHLEB VEINS - EXTREM (20+) 40701 (High ligation of ling saphenous vein, left, plus multiple phlebectomies, left lower extremity.)       Home Medications:   (Not in a hospital admission)      Allergies:  Allergies   Allergen Reactions    Raloxifene Swelling, Other (See Comments) and Unknown - High Severity     Caused pain    Gabapentin Other (See Comments) and Unknown - High Severity     HA/sedation    Latex Hives and Itching    Pregabalin Swelling, Other (See Comments) and Unknown - High Severity     swelling       Past Social History:  Social History     Socioeconomic History    Marital status:    Tobacco Use    Smoking status: Never     Passive exposure: Never    Smokeless tobacco: Never   Vaping Use    Vaping status: Never Used   Substance and Sexual Activity    Alcohol use: Yes     Alcohol/week: 2.0 standard drinks of alcohol     Types: 2 Glasses of wine per week     Comment: Occasionally    Drug use: Never    Sexual activity: Not Currently     Partners: Male     Birth control/protection: Surgical       Past Family History:  Family History   Problem Relation Age of Onset    Cancer Mother         myeloma    Diabetes Mother     Heart disease Mother     Arthritis Mother     Hypertension Mother     Heart attack Mother     Heart disease Father     Arthritis Father     Heart attack Father     Stroke Father     Hypertension Father     Vision loss Father     Diabetes Maternal Grandmother     Lung cancer Maternal Grandfather     Lung cancer Other     Breast cancer Neg Hx     Malig Hyperthermia Neg Hx        REVIEW OF SYSTEMS:   14 point ROS was performed and is negative except as outlined in HPI        ECG 12 Lead    Date/Time: 1/20/2025 1:42 PM  Performed by: Hernandez Luz MD    Authorized by: Hernandez Luz MD  Rhythm: atrial  fibrillation  BPM: 135  Conduction: incomplete right bundle branch block  ST Segments: ST segments normal  T Waves: T waves normal  QRS axis: normal  Other: no other findings    Clinical impression: abnormal EKG             Objective:   Heart Rate:  [] 79  Resp:  [16] 16  BP: (131-137)/() 137/104   No intake or output data in the 24 hours ending 01/20/25 1339  There is no height or weight on file to calculate BMI.  There were no vitals filed for this visit.  Weight change:     General Appearance:    Alert, cooperative, in no acute distress, resting in bed   Throat:   oral mucosa moist   Neck:   supple, trachea midline, no thyromegaly, no carotid bruit, no JVD   Lungs:     Clear to auscultation,respirations regular, even and unlabored.    Heart:    Regular rhythm and normal rate, normal S1 and S2, no murmur, no gallop, no rub, no click.  Regular rate rhythm, at time of my exam   Chest Wall:    No abnormalities observed   Abdomen:     nondistended, no guarding, no rebound  tenderness   Extremities:   Moves all extremities well, at time of my edema, no cyanosis, no redness   Pulses:   Pulses palpable and equal bilaterally. Normal radial, carotid, femoral, dorsalis pedis and posterior tibial pulses bilaterally.    Skin:  Psychiatric:   No bleeding, bruising or rash    Alert and oriented x 3, normal mood and affect     Lab Review:   Results from last 7 days   Lab Units 01/16/25  0811   SODIUM mmol/L 139   POTASSIUM mmol/L 4.0   CHLORIDE mmol/L 102   CO2 mmol/L 24.6   BUN mg/dL 25*   CREATININE mg/dL 1.77*   GLUCOSE mg/dL 85   CALCIUM mg/dL 8.9   AST (SGOT) U/L 17   ALT (SGPT) U/L 23         Results from last 7 days   Lab Units 01/16/25  0811   WBC 10*3/mm3 6.04   HEMOGLOBIN g/dL 12.8   HEMATOCRIT % 39.0   PLATELETS 10*3/mm3 227             Results from last 7 days   Lab Units 01/16/25  0811   TRIGLYCERIDES mg/dL 114   HDL CHOL mg/dL 57         Results from last 7 days   Lab Units 01/16/25  0811   TSH uIU/mL  2.470       Echo EF Estimated  Lab Results   Component Value Date    ECHOEFEST 58 10/22/2020       EKG:   I personally viewed and interpreted the patient's EKG.  Per above.  Shows atrial fibrillation which is new.    Imaging:  Imaging Results (Most Recent)       None                Assessment:     There are no hospital problems to display for this patient.         Plan:     Atypical chest pain: She remains in sinus rhythm.  Proceed with CCTA as already scheduled by her primary cardiologist.  Atrial fibrillation, paroxysmal: She has no awareness of an irregular heartbeat, but showed up in A-fib.  She has an intermittent spells where she will feel lightheaded and dizzy with low blood pressure, and I wonder if A-fib would be contributing.  Arrange for 2-week Zio patch, to be fitted at time of her tilt table  Since she is unaware of her A-fib episodes, her [id number] is 3, going to empirically start Eliquis 5 twice daily  She has appointment with her primary cardiologist in 1 month to discuss ongoing anticoagulation.  We should hopefully have the results of her Holter monitor by then, as well as further discussions about need for more long-term, chronic anticoagulation.  Again, she does not have any awareness of an elevated heart rate which is concerning.     Thank you for allowing me to participate in the care of Myra Charles. Feel free to contact me directly with any further questions or concerns.    Hernandez Luz MD  [facility]  01/20/25  13:39 EST      Part of this note may be an electronic transcription/translation of spoken language to printed text using the Dragon Dictation System.

## 2025-01-20 NOTE — PATIENT INSTRUCTIONS
**You can start taking your Eliquis tomorrow.**    **Stop taking aspirin.**    **You will need to have the Zio cardiac event monitor placed on Wednesday, 1/22/25, after your tilt table test.**

## 2025-01-20 NOTE — PROGRESS NOTES
PT states that she took her PM dose of 12.5 mg Metoprolol and 25 mg at noon today as directed. HR 's, A-fib. Will notify BRIGETTE PICHARDO.

## 2025-01-20 NOTE — PROGRESS NOTES
NS IVF's complete. PT given Eliquis discount card. Has samples. PT instructed to D/C ASA as of now.  VSS. Remains in SR. Ready for D/C.

## 2025-01-22 ENCOUNTER — HOSPITAL ENCOUNTER (OUTPATIENT)
Dept: CARDIOLOGY | Facility: HOSPITAL | Age: 75
Discharge: HOME | End: 2025-01-22
Payer: COMMERCIAL

## 2025-01-22 ENCOUNTER — TELEPHONE (OUTPATIENT)
Dept: CARDIOLOGY | Facility: CLINIC | Age: 75
End: 2025-01-22
Payer: COMMERCIAL

## 2025-01-22 VITALS
WEIGHT: 178 LBS | DIASTOLIC BLOOD PRESSURE: 67 MMHG | OXYGEN SATURATION: 96 % | BODY MASS INDEX: 26.36 KG/M2 | SYSTOLIC BLOOD PRESSURE: 160 MMHG | HEART RATE: 76 BPM | HEIGHT: 69 IN | TEMPERATURE: 97.7 F | RESPIRATION RATE: 16 BRPM

## 2025-01-22 DIAGNOSIS — I34.1 MITRAL VALVE PROLAPSE: ICD-10-CM

## 2025-01-22 DIAGNOSIS — I10 ESSENTIAL HYPERTENSION: ICD-10-CM

## 2025-01-22 DIAGNOSIS — I48.91 UNSPECIFIED ATRIAL FIBRILLATION: ICD-10-CM

## 2025-01-22 DIAGNOSIS — N18.31 STAGE 3A CHRONIC KIDNEY DISEASE: ICD-10-CM

## 2025-01-22 DIAGNOSIS — I48.20 ATRIAL FIBRILLATION, CHRONIC: Primary | ICD-10-CM

## 2025-01-22 DIAGNOSIS — R42 DIZZINESS: ICD-10-CM

## 2025-01-22 DIAGNOSIS — K22.4 ESOPHAGEAL DYSMOTILITY: ICD-10-CM

## 2025-01-22 PROCEDURE — 93246 EXT ECG>7D<15D RECORDING: CPT

## 2025-01-22 PROCEDURE — 93660 TILT TABLE EVALUATION: CPT

## 2025-01-22 RX ORDER — SODIUM CHLORIDE 0.9 % (FLUSH) 0.9 %
10 SYRINGE (ML) INJECTION AS NEEDED
Status: DISCONTINUED | OUTPATIENT
Start: 2025-01-22 | End: 2025-01-23 | Stop reason: HOSPADM

## 2025-01-22 RX ORDER — NITROGLYCERIN 0.4 MG/1
0.4 TABLET SUBLINGUAL
Status: DISCONTINUED | OUTPATIENT
Start: 2025-01-22 | End: 2025-01-23 | Stop reason: HOSPADM

## 2025-01-22 NOTE — TELEPHONE ENCOUNTER
Notified patient of results/recommendations. Patient verbalized understanding.    Rekha Naqvi RN  Triage [facility]

## 2025-01-22 NOTE — TELEPHONE ENCOUNTER
Called and left VM, will continue to try to reach pt.    HUB- please put patient straight through to triage    Mari Uribe, RN  Triage RN  01/22/25 09:36 EST

## 2025-01-22 NOTE — TELEPHONE ENCOUNTER
----- Message from Ace Gandhi sent at 1/22/2025  9:32 AM EST -----  Please let patient know that her coronary CTA showed minimal two-vessel disease.  There was no obstructive disease noted.  This is reassuring.  She did have a small her hernia noted that she will follow-up with her PCP for.  Thank you

## 2025-01-23 ENCOUNTER — OFFICE VISIT (OUTPATIENT)
Dept: FAMILY MEDICINE CLINIC | Facility: CLINIC | Age: 75
End: 2025-01-23
Payer: COMMERCIAL

## 2025-01-23 VITALS
SYSTOLIC BLOOD PRESSURE: 136 MMHG | WEIGHT: 183 LBS | DIASTOLIC BLOOD PRESSURE: 80 MMHG | TEMPERATURE: 98.4 F | HEIGHT: 69 IN | BODY MASS INDEX: 27.11 KG/M2 | OXYGEN SATURATION: 98 % | HEART RATE: 83 BPM

## 2025-01-23 DIAGNOSIS — K21.9 GASTROESOPHAGEAL REFLUX DISEASE WITHOUT ESOPHAGITIS: ICD-10-CM

## 2025-01-23 DIAGNOSIS — I10 ESSENTIAL HYPERTENSION: ICD-10-CM

## 2025-01-23 DIAGNOSIS — I48.91 NEW ONSET ATRIAL FIBRILLATION: ICD-10-CM

## 2025-01-23 DIAGNOSIS — Z00.00 MEDICARE ANNUAL WELLNESS VISIT, SUBSEQUENT: Primary | ICD-10-CM

## 2025-01-23 DIAGNOSIS — Z12.31 ENCOUNTER FOR SCREENING MAMMOGRAM FOR MALIGNANT NEOPLASM OF BREAST: Primary | ICD-10-CM

## 2025-01-23 DIAGNOSIS — N18.32 STAGE 3B CHRONIC KIDNEY DISEASE: ICD-10-CM

## 2025-01-23 NOTE — PROGRESS NOTES
The ABCs of the Annual Wellness Visit  Subsequent Medicare Wellness Visit    Subjective      Myra Charles is a 74 y.o. female who presents for a Subsequent Medicare Wellness Visit.    The following portions of the patient's history were reviewed and   updated as appropriate: allergies, current medications, past family history, past medical history, past social history, past surgical history, and problem list.    Compared to one year ago, the patient feels her physical   health is the same.    Compared to one year ago, the patient feels her mental   health is the same.    Recent Hospitalizations:  She was not admitted to the hospital during the last year.       Current Medical Providers:  Patient Care Team:  Head, ASHLEY Da Silva as PCP - General (Nurse Practitioner)  Damian Rock DPM as Consulting Physician (Podiatry)  Cruzito Souza MD as Consulting Physician (Hematology and Oncology)  Cordell Overton MD as Consulting Physician (Nephrology)  Stephan Hernandez MD as Consulting Physician (Cardiology)  Otto, Augustine Cordova MD as Consulting Physician (Gastroenterology)  Sonia Dominguez MD as Consulting Physician (Rheumatology)  Mayur Neal MD as Consulting Physician (Hematology and Oncology)    Outpatient Medications Prior to Visit   Medication Sig Dispense Refill    albuterol sulfate  (90 Base) MCG/ACT inhaler Inhale 2 puffs Every 4 (Four) Hours As Needed for Wheezing or Shortness of Air. 18 g 0    amLODIPine (NORVASC) 2.5 MG tablet Take 1 tablet by mouth Daily.      apixaban (ELIQUIS) 5 MG tablet tablet Take 1 tablet by mouth 2 (Two) Times a Day for 30 days. 60 tablet 0    Ascorbic Acid 500 MG capsule       Breo Ellipta 100-25 MCG/ACT aerosol powder        Calcium-Phosphorus-Vitamin D 250-107-500 MG-MG-UNIT chewable tablet Chew 1 tablet Daily.      cholecalciferol (VITAMIN D3) 25 MCG (1000 UT) tablet Take 1 tablet by mouth Daily. (Patient taking differently: Take 2 tablets  by mouth.) 90 tablet 1    cyclobenzaprine (FLEXERIL) 10 MG tablet Take 1 tablet by mouth At Night As Needed for Muscle Spasms. 90 tablet 1    cyclobenzaprine (FLEXERIL) 10 MG tablet Take 1 tablet by mouth 3 (Three) Times a Day As Needed for Muscle Spasms for up to 7 days. 21 tablet 0    denosumab (Prolia) 60 MG/ML solution prefilled syringe syringe       DULoxetine (CYMBALTA) 60 MG capsule Take 1 capsule by mouth Daily. 90 capsule 1    fluticasone (FLONASE) 50 MCG/ACT nasal spray 1 spray into the nostril(s) as directed by provider Daily. (Patient taking differently: Administer 1 spray into the nostril(s) as directed by provider Daily As Needed.) 16 g 5    folic acid (FOLVITE) 1 MG tablet TAKE 1 TABLET DAILY 90 tablet 0    magnesium oxide (MAG-OX) 400 MG tablet Take 1 tablet by mouth Daily.      metoprolol succinate XL (TOPROL-XL) 25 MG 24 hr tablet Take 0.5 tablets by mouth 2 (Two) Times a Day.      multivitamins-minerals (PRESERVISION AREDS 2) capsule capsule Take 1 capsule by mouth 2 (Two) Times a Day. 90 capsule 1    pantoprazole (PROTONIX) 40 MG EC tablet Take 1 tablet by mouth 2 (Two) Times a Day. 180 tablet 0    vitamin B-12 (CYANOCOBALAMIN) 1000 MCG tablet Take 1 tablet by mouth 2 (Two) Times a Week.       No facility-administered medications prior to visit.       No opioid medication identified on active medication list. I have reviewed chart for other potential  high risk medication/s and harmful drug interactions in the elderly.        Aspirin is not on active medication list.  Aspirin use is not indicated based on review of current medical condition/s. Risk of harm outweighs potential benefits.  .    Patient Active Problem List   Diagnosis    Nonexudative age-related macular degeneration, bilateral, early dry stage    Cortical age-related cataract    Hyperthyroidism    Multinodular goiter (nontoxic)    Osteoporosis    B12 deficiency    Malignant neoplasm of upper lobe of left lung    Idiopathic peripheral  neuropathy    Gastroesophageal reflux disease without esophagitis    Elevated ferritin    Abnormal blood level of iron    Fibromyalgia    History of deep venous thrombosis    History of partial thyroidectomy    Hoarseness or changing voice    Encounter for general adult medical examination without abnormal findings    Osteopenia    PAC (premature atrial contraction)    Restless legs syndrome    Screen for colon cancer    Medicare annual wellness visit, subsequent    Vitreous floaters    Vitreous detachment    Vitamin D deficiency    Varicose veins of both lower extremities with pain    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    Seborrheic keratosis    Rosacea    Fibrositis    Mitral valve prolapse    Malignant carcinoid tumor of lung    Lung nodule, solitary    Iron deficiency anemia    Hallux valgus    GERD (gastroesophageal reflux disease)    Essential hypertension    Cortical senile cataract    Backache    Neck pain    CKD (chronic kidney disease) stage 3, GFR 30-59 ml/min    Overweight (BMI 25.0-29.9)    Cervical nerve root compression    Chronic left-sided low back pain with left-sided sciatica    DDD (degenerative disc disease), cervical    Dupuytren's contracture of right hand    Elevated alkaline phosphatase level    History of acute renal failure    History of bilateral cataract extraction    History of fracture of left ankle    History of iron deficiency    History of malignant carcinoid tumor of bronchus and lung    Non-rheumatic mitral regurgitation    Osteoarthritis    Bile reflux esophagitis    Left foot pain    Anemia    Chest pain     Advance Care Planning   Advance Care Planning     Advance Directive is on file.  ACP discussion was held with the patient during this visit. Patient has an advance directive in EMR which is still valid.      Objective    Vitals:    01/23/25 1304   BP: 136/80   BP Location: Left arm  "  Patient Position: Sitting   Cuff Size: Adult   Pulse: 83   Temp: 98.4 °F (36.9 °C)   SpO2: 98%   Weight: 83 kg (183 lb)   Height: 175.3 cm (69\")   PainSc:   4   PainLoc: Neck     Estimated body mass index is 27.02 kg/m² as calculated from the following:    Height as of this encounter: 175.3 cm (69\").    Weight as of this encounter: 83 kg (183 lb).    BMI is >= 25 and <30. (Overweight) The following options were offered after discussion;: exercise counseling/recommendations, nutrition counseling/recommendations, and information on healthy weight added to patient's after visit summary       Does the patient have evidence of cognitive impairment?   No    Lab Results   Component Value Date    CHLPL 193 2025    TRIG 114 2025    HDL 57 2025     (H) 2025    VLDL 20 2025          HEALTH RISK ASSESSMENT    Smoking Status:  Social History     Tobacco Use   Smoking Status Never    Passive exposure: Never   Smokeless Tobacco Never     Alcohol Consumption:  Social History     Substance and Sexual Activity   Alcohol Use Yes    Alcohol/week: 2.0 standard drinks of alcohol    Types: 2 Glasses of wine per week    Comment: Occasionally     Fall Risk Screen:    GILBERT Fall Risk Assessment was completed, and patient is at LOW risk for falls.Assessment completed on:2025    Depression Screenin/23/2025     1:11 PM   PHQ-2/PHQ-9 Depression Screening   Little interest or pleasure in doing things Not at all   Feeling down, depressed, or hopeless Not at all   How difficult have these problems made it for you to do your work, take care of things at home, or get along with other people? Not difficult at all       Health Habits and Functional and Cognitive Screenin/16/2025     2:49 PM   Functional & Cognitive Status   Do you have difficulty preparing food and eating? No    Do you have difficulty bathing yourself, getting dressed or grooming yourself? No    Do you have difficulty using " the toilet? No    Do you have difficulty moving around from place to place? No    Do you have trouble with steps or getting out of a bed or a chair? No    Current Diet Other    Dental Exam Up to date    Eye Exam Up to date    Exercise (times per week) 0 times per week    Current Exercises Include No Regular Exercise    Do you need help using the phone?  No    Are you deaf or do you have serious difficulty hearing?  No    Do you need help to go to places out of walking distance? No    Do you need help shopping? No    Do you need help preparing meals?  No    Do you need help with housework?  No    Do you need help with laundry? No    Do you need help taking your medications? No    Do you need help managing money? No    Do you ever drive or ride in a car without wearing a seat belt? No    Have you felt unusual stress, anger or loneliness in the last month? No    Who do you live with? Alone    If you need help, do you have trouble finding someone available to you? No    Have you been bothered in the last four weeks by sexual problems? No    Do you have difficulty concentrating, remembering or making decisions? No        Patient-reported       Age-appropriate Screening Schedule:  Refer to the list below for future screening recommendations based on patient's age, sex and/or medical conditions. Orders for these recommended tests are listed in the plan section. The patient has been provided with a written plan.    Health Maintenance   Topic Date Due    ANNUAL WELLNESS VISIT  01/22/2025    BMI FOLLOWUP  01/22/2025    COVID-19 Vaccine (9 - 2024-25 season) 01/25/2025 (Originally 9/1/2024)    MAMMOGRAM  01/03/2026    DXA SCAN  01/03/2026    COLORECTAL CANCER SCREENING  01/05/2032    TDAP/TD VACCINES (4 - Td or Tdap) 07/22/2034    HEPATITIS C SCREENING  Completed    INFLUENZA VACCINE  Completed    Pneumococcal Vaccine 65+  Completed    ZOSTER VACCINE  Completed                  CMS Preventative Services Quick Reference  Risk  Factors Identified During Encounter:    Immunizations Discussed/Encouraged: Influenza and COVID19  Dental Screening Recommended  Vision Screening Recommended    The above risks/problems have been discussed with the patient.  Pertinent information has been shared with the patient in the After Visit Summary.    There are no diagnoses linked to this encounter.    Follow Up:   Next Medicare Wellness visit to be scheduled in 1 year.      An After Visit Summary and PPPS were made available to the patient.

## 2025-01-23 NOTE — PROGRESS NOTES
Patient ID: Myra Charles is a 74 y.o. female     Patient Care Team:  Head, ASHLEY Da Silva as PCP - General (Nurse Practitioner)  Damian Rock DPM as Consulting Physician (Podiatry)  Cruzito Souza MD as Consulting Physician (Hematology and Oncology)  Cordell Overton MD as Consulting Physician (Nephrology)  Stephan Hernandez MD as Consulting Physician (Cardiology)  OttoAugustine velasquez MD as Consulting Physician (Gastroenterology)  Sonia Dominguez MD as Consulting Physician (Rheumatology)  Mayur Neal MD as Consulting Physician (Hematology and Oncology)    Subjective     Chief Complaint   Patient presents with    Medicare Wellness-subsequent    Fibromyalgia    Neck Pain     Rt side goes numb        History of Present Illness      History of Present Illness  The patient presents for a 6-month Medicare wellness checkup.    She underwent a CT angiogram on Monday, followed by a tilt table test yesterday, during which she experienced an episode of atrial fibrillation (AFib). She continues to experience episodes of hypotension. Her cardiologist has not yet responded to her message regarding these events. She was previously scheduled for a cardiology appointment in February, but anticipates an earlier consultation due to the recent findings. Her aspirin therapy was discontinued and replaced with Eliquis by the physician who conducted the CT angiogram. She is currently on a 2-week heart monitor. She reports no current palpitations and is uncertain about her AFib status. She did well during the tilt table test while lying down for the first 2 minutes and also while standing up. However, when she was laid back down on the table, she felt dizzy and lightheaded. She was informed that she had gone into AFib.    She is followed by nephrology due to stage III chronic kidney disease.    She reports severe shoulder pain, which she attributes to cleaning her car off snow and ice. She has been  applying ice to the affected area and was advised to continue Flexeril and perform certain exercises. However, she has not taken Flexeril this week due to her recent tests. She also experiences numbness in her shoulder while at rest. She was previously seen by a nurse practitioner at urgent care for a strain of the left trapezius muscle on 01/17/2025. She reports no falls.    She maintains her living will and has received all necessary vaccinations. She declines further COVID-19 vaccinations. She visits her dentist biannually and her ophthalmologist annually.  She is on Protonix for reflux, which she reports as effective. She has undergone a barium swallow test to investigate chest pain. She reports no leg swelling.    MEDICATIONS  Current: Eliquis, metoprolol, Protonix, Tylenol (as needed)  Discontinued: aspirin    IMMUNIZATIONS  She has received all necessary vaccinations. She declines further COVID-19 vaccinations.    Results  Laboratory Studies  Creatinine level is 1.7. GFR is below 30. LDL cholesterol is 116. White count is normal. Hemoglobin is normal. Liver function test is normal. Thyroid function test is normal.       She denies any complaints of fever, chills, cough, chest pain, shortness of air, abdominal pain, nausea, or any other concerns.     The following portions of the patient's history were reviewed and updated as appropriate: allergies, current medications, past family history, past medical history, past social history, past surgical history and problem list.       ROS    Vitals:    01/23/25 1304   BP: 136/80   Pulse: 83   Temp: 98.4 °F (36.9 °C)   SpO2: 98%       Documented weights    01/23/25 1304   Weight: 83 kg (183 lb)     Body mass index is 27.02 kg/m².    Results for orders placed or performed in visit on 01/15/25   TSH Rfx On Abnormal To Free T4    Collection Time: 01/16/25  8:11 AM    Specimen: Blood   Result Value Ref Range    TSH 2.470 0.270 - 4.200 uIU/mL   Lipid Panel With / Chol /  HDL Ratio    Collection Time: 01/16/25  8:11 AM    Specimen: Blood   Result Value Ref Range    Total Cholesterol 193 0 - 200 mg/dL    Triglycerides 114 0 - 150 mg/dL    HDL Cholesterol 57 40 - 60 mg/dL    VLDL Cholesterol Kend[place] 20 5 - 40 mg/dL    LDL Chol Calc (NIH) 116 (H) 0 - 100 mg/dL    Chol/HDL Ratio 3.39    Comprehensive Metabolic Panel    Collection Time: 01/16/25  8:11 AM    Specimen: Blood   Result Value Ref Range    Glucose 85 65 - 99 mg/dL    BUN 25 (H) 8 - 23 mg/dL    Creatinine 1.77 (H) 0.57 - 1.00 mg/dL    EGFR Result 29.9 (L) >60.0 mL/min/1.73    BUN/Creatinine Ratio 14.1 7.0 - 25.0    Sodium 139 136 - 145 mmol/L    Potassium 4.0 3.5 - 5.2 mmol/L    Chloride 102 98 - 107 mmol/L    Total CO2 24.6 22.0 - 29.0 mmol/L    Calcium 8.9 8.6 - 10.5 mg/dL    Total Protein 6.5 6.0 - 8.5 g/dL    Albumin 4.1 3.5 - 5.2 g/dL    Globulin 2.4 gm/dL    A/G Ratio 1.7 g/dL    Total Bilirubin 0.3 0.0 - 1.2 mg/dL    Alkaline Phosphatase 95 39 - 117 U/L    AST (SGOT) 17 1 - 32 U/L    ALT (SGPT) 23 1 - 33 U/L   CBC (No Diff)    Collection Time: 01/16/25  8:11 AM    Specimen: Blood   Result Value Ref Range    WBC 6.04 3.40 - 10.80 10*3/mm3    RBC 4.49 3.77 - 5.28 10*6/mm3    Hemoglobin 12.8 12.0 - 15.9 g/dL    Hematocrit 39.0 34.0 - 46.6 %    MCV 86.9 79.0 - 97.0 fL    MCH 28.5 26.6 - 33.0 pg    MCHC 32.8 31.5 - 35.7 g/dL    RDW 12.6 12.3 - 15.4 %    Platelets 227 140 - 450 10*3/mm3           Objective     Physical Exam  Vitals reviewed.   Constitutional:       General: She is not in acute distress.  HENT:      Head: Normocephalic and atraumatic.      Right Ear: Tympanic membrane normal.      Left Ear: Tympanic membrane normal.      Nose: No congestion.      Mouth/Throat:      Pharynx: No posterior oropharyngeal erythema.   Eyes:      Pupils: Pupils are equal, round, and reactive to light.   Cardiovascular:      Rate and Rhythm: Normal rate and regular rhythm.      Heart sounds: No murmur heard.  Pulmonary:      Effort:  Pulmonary effort is normal.   Musculoskeletal:      Cervical back: Normal range of motion.      Right lower leg: No edema.      Left lower leg: No edema.   Lymphadenopathy:      Cervical: No cervical adenopathy.   Neurological:      Mental Status: She is alert and oriented to person, place, and time.         Physical Exam  Lungs are clear.  Heart sounds are normal.    Vital Signs  Blood pressure is 136/80.    BMI is >= 25 and <30. (Overweight) The following options were offered after discussion;: exercise counseling/recommendations, nutrition counseling/recommendations, and information on healthy weight added to patient's after visit summary       Assessment & Plan     Assessment/Plan     Assessment & Plan  1. Atrial Fibrillation.  She experienced new onset atrial fibrillation during a recent CT angiogram and tilt table test. She was switched from aspirin to Eliquis. She is currently wearing a heart monitor for 2 weeks to capture the frequency of AFib episodes. Her blood pressure is 136/80, which is slightly elevated. She is currently on metoprolol, taking half a dose in the morning and half at night. She is advised to continue her current medication regimen and follow up with her cardiologist sooner than the scheduled February appointment if possible. If the heart monitor shows frequent AFib episodes, medication adjustments or procedures like ablation may be considered.    2. Left Trapezius Muscle Strain.  She reports significant pain in her shoulder after clearing snow and ice off her car. She was previously advised by an urgent care nurse practitioner to take Flexeril and perform exercises. She has not taken Flexeril this week due to recent tests. She is advised to resume Flexeril and continue with the recommended exercises. Ice application should also be continued to manage pain and inflammation.    3. Chronic Kidney Disease.  Her creatinine level is 1.7, and her GFR is below 30. She is under the care of a  nephrologist who advised her to maintain hydration and follow up in 2 months. She is advised to continue her current management plan and follow up with her nephrologist as scheduled.    4. Health Maintenance.  She has received all necessary vaccines and does not want any more COVID-19 vaccines. She visits the dentist twice a year and the eye doctor once a year. Her glucose, liver function, thyroid function, cholesterol levels, white count, and hemoglobin are all within normal limits.    Follow-up  The patient will follow up in 6 months.    Diagnoses and all orders for this visit:    1. Medicare annual wellness visit, subsequent (Primary)    2. New onset atrial fibrillation  -     CBC (No Diff); Future  -     Comprehensive Metabolic Panel; Future    3. Essential hypertension  -     CBC (No Diff); Future  -     Comprehensive Metabolic Panel; Future  -     Lipid Panel With / Chol / HDL Ratio; Future  -     TSH Rfx On Abnormal To Free T4; Future    4. Stage 3b chronic kidney disease  -     Comprehensive Metabolic Panel; Future    5. Gastroesophageal reflux disease without esophagitis  -     CBC (No Diff); Future  -     Comprehensive Metabolic Panel; Future          Follow Up:  Return in about 6 months (around 7/23/2025) for Recheck on HTN with fasting labs.  .    In the meantime, instructed to contact us sooner for any problems or concerns.    Patient was given instructions and counseling regarding condition or for health maintenance advice.  Please see specific information pulled into the AVS if appropriate.      Patient or patient representative verbalized consent for the use of Ambient Listening during the visit with  ASHLEY Ramirez for chart documentation. 1/23/2025  14:33 ASHLEY Valverde  [facility]  01/23/25  14:35 EST

## 2025-01-24 RX ORDER — FOLIC ACID 1 MG/1
1000 TABLET ORAL DAILY
Qty: 90 TABLET | Refills: 0 | OUTPATIENT
Start: 2025-01-24

## 2025-02-12 NOTE — PROGRESS NOTES
RM:________     PCP: Antonino, ASHLEY Da Silva    : 1950  AGE: 74 y.o.  EST PATIENT   REASON FOR VISIT/  CC:    Wt Readings from Last 3 Encounters:   25 83 kg (183 lb)   25 80.7 kg (178 lb)   25 82.1 kg (181 lb)      BP Readings from Last 3 Encounters:   25 136/80   25 160/67   25 125/76      WT: ____________ BP: __________L __________R HR______    ALLERGIES:Raloxifene, Gabapentin, Latex, and Pregabalin SMOKING HISTORY:  Social History     Tobacco Use    Smoking status: Never     Passive exposure: Never    Smokeless tobacco: Never   Vaping Use    Vaping status: Never Used   Substance Use Topics    Alcohol use: Yes     Alcohol/week: 2.0 standard drinks of alcohol     Types: 2 Glasses of wine per week     Comment: Occasionally    Drug use: Never     CAFFEINE USE_________________  ALCOHOL ______________________    Below is the patient's most recent value for Albumin, ALT, AST, BUN, Calcium, Chloride, Cholesterol, CO2, Creatinine, GFR, Glucose, HDL, Hematocrit, Hemoglobin, Hemoglobin A1C, LDL, Magnesium, Phosphorus, Platelets, Potassium, PSA, Sodium, Triglycerides, TSH and WBC.   Lab Results   Component Value Date    ALBUMIN 4.1 2025    ALT 23 2025    AST 17 2025    BUN 25 (H) 2025    CALCIUM 8.9 2025     2025    CHOL 175 2020    CO2 24.6 2025    CREATININE 1.77 (H) 2025    GFRAA 60 10/19/2016    GFRNONAA 50 10/19/2016    GLU 91 2018    HDL 57 2025    HCT 39.0 2025    HGB 12.8 2025     (H) 2025    MG 2.3 2024    PHOS 4.4 2025     2025    K 4.0 2025     2025    TRIG 114 2025    TSH 2.470 2025    WBC 6.04 2025          NEW DIAGNOSIS/ SURGERY/ HOSP OR ED VISITS: ______________________    __________________________________________________________________      RECENT LABS OR DIAGNOSTIC TESTING:   _____________________________    __________________________________________________________________      ASSESSMENT/ PLAN: _______________________________________________    __________________________________________________________________

## 2025-02-18 ENCOUNTER — OFFICE VISIT (OUTPATIENT)
Dept: CARDIOLOGY | Facility: CLINIC | Age: 75
End: 2025-02-18
Payer: COMMERCIAL

## 2025-02-18 VITALS
HEART RATE: 85 BPM | WEIGHT: 183.4 LBS | DIASTOLIC BLOOD PRESSURE: 66 MMHG | BODY MASS INDEX: 27.16 KG/M2 | SYSTOLIC BLOOD PRESSURE: 106 MMHG | HEIGHT: 69 IN

## 2025-02-18 DIAGNOSIS — R09.89 LABILE HYPERTENSION: ICD-10-CM

## 2025-02-18 DIAGNOSIS — N18.31 STAGE 3A CHRONIC KIDNEY DISEASE: ICD-10-CM

## 2025-02-18 DIAGNOSIS — R07.2 PRECORDIAL PAIN: Primary | ICD-10-CM

## 2025-02-18 DIAGNOSIS — I48.0 PAROXYSMAL ATRIAL FIBRILLATION: ICD-10-CM

## 2025-02-18 PROCEDURE — 93000 ELECTROCARDIOGRAM COMPLETE: CPT | Performed by: INTERNAL MEDICINE

## 2025-02-18 PROCEDURE — 99214 OFFICE O/P EST MOD 30 MIN: CPT | Performed by: INTERNAL MEDICINE

## 2025-02-18 RX ORDER — VERAPAMIL HYDROCHLORIDE 40 MG/1
40 TABLET ORAL 3 TIMES DAILY
Qty: 120 TABLET | Refills: 1 | Status: SHIPPED | OUTPATIENT
Start: 2025-02-18 | End: 2025-02-21

## 2025-02-18 NOTE — LETTER
2025     ASHLEY Russ  [address] [facility] [address]    Patient: Myra Charles   YOB: 1950   Date of Visit: 2025       Dear ASHLEY Russ    Myra Charles was in my office today. Below is a copy of my note.    If you have questions, please do not hesitate to call me. I look forward to following Myra along with you.         Sincerely,        Stephan Hernandez MD        CC: Cordell Pan MD    RM:________     PCP: Yvette Muñiz APRN    : 1950  AGE: 74 y.o.  EST PATIENT   REASON FOR VISIT/  CC:    Wt Readings from Last 3 Encounters:   25 83 kg (183 lb)   25 80.7 kg (178 lb)   25 82.1 kg (181 lb)      BP Readings from Last 3 Encounters:   25 136/80   25 160/67   25 125/76      WT: ____________ BP: __________L __________R HR______    ALLERGIES:Raloxifene, Gabapentin, Latex, and Pregabalin SMOKING HISTORY:  Social History     Tobacco Use   • Smoking status: Never     Passive exposure: Never   • Smokeless tobacco: Never   Vaping Use   • Vaping status: Never Used   Substance Use Topics   • Alcohol use: Yes     Alcohol/week: 2.0 standard drinks of alcohol     Types: 2 Glasses of wine per week     Comment: Occasionally   • Drug use: Never     CAFFEINE USE_________________  ALCOHOL ______________________    Below is the patient's most recent value for Albumin, ALT, AST, BUN, Calcium, Chloride, Cholesterol, CO2, Creatinine, GFR, Glucose, HDL, Hematocrit, Hemoglobin, Hemoglobin A1C, LDL, Magnesium, Phosphorus, Platelets, Potassium, PSA, Sodium, Triglycerides, TSH and WBC.   Lab Results   Component Value Date    ALBUMIN 4.1 2025    ALT 23 2025    AST 17 2025    BUN 25 (H) 2025    CALCIUM 8.9 2025     2025    CHOL 175 2020    CO2 24.6 2025    CREATININE 1.77 (H) 2025    GFRAA 60 10/19/2016    GFRNONAA 50 10/19/2016    GLU 91 2018    HDL 57  2025    HCT 39.0 2025    HGB 12.8 2025     (H) 2025    MG 2.3 2024    PHOS 4.4 2025     2025    K 4.0 2025     2025    TRIG 114 2025    TSH 2.470 2025    WBC 6.04 2025          NEW DIAGNOSIS/ SURGERY/ HOSP OR ED VISITS: ______________________    __________________________________________________________________      RECENT LABS OR DIAGNOSTIC TESTING:  _____________________________    __________________________________________________________________      ASSESSMENT/ PLAN: _______________________________________________    __________________________________________________________________    Date of Office Visit: 25  Encounter Provider: Stephan Hernandez MD  Place of Service: [facility] CARDIOLOGY  Patient Name: Myra Charles  :1950    Chief Complaint   Patient presents with   • Hypertension   :     HPI:     Ms. Charles is 74 y.o. and presents today in follow up. I have reviewed prior notes and there are no changes except for any new updates described below. I have also reviewed any information entered into the medical record by the patient or by ancillary staff.     She had a CT coronary angiogram in  that was completely normal; her calcium score was zero. She had an echo in 2020 that showed very mild anterior MVP without significant MR; the study was otherwise normal.  A follow up echo in  was unchanged.     She presented in 2024 with episodes of atypical chest and back pain, as well as very labile blood pressure. Her BP would just abruptly drop and then she would feel fatigued for hours. A myocardial perfusion stress test was normal. Since her symptoms continued, she underwent CCTA. Her CACS was 1 and she had luminal irregularities in the distal LCX and RCA. She did go into atrial fibrillation during the study. She also had a tilt table. The study itself  was negative for orthostatic hypotension, POTS, or cardiodepressor syncope. However, at the end of the study, she went into AF and developed the same symptoms she had previously reported. She was placed on metoprolol and apixaban. A monitor was placed; she mailed it in on February 5.     She has continued to have these episodes. Her SBP will swing from 70 mm Hg to 140mm Hg. Her pulse seems to run higher than her baseline. She doesn't feel palpitations, though. She has not passed out, thankfully.     Past Medical History:   Diagnosis Date   • Arthritis    • Asthma    • Backache 02/17/2016    Low back pain, in sacral region      • CKD (chronic kidney disease) stage 3, GFR 30-59 ml/min    • COPD (chronic obstructive pulmonary disease)    • Coronary artery disease    • Cortical senile cataract 02/17/2016   • Deep vein thrombosis    • Essential (primary) hypertension 04/20/2016   • GERD (gastroesophageal reflux disease) 02/17/2016   • Hallux valgus     Deformity, w/inflammation      • Hernia 6 months old   • History of coronary angiogram     CT cor angio 2019, normal cors, Agatston 0   • Iron deficiency anemia    • Irritable bowel syndrome    • Lung nodule, solitary 03/08/2012    Solitary nodule of lung - RIGHT upper lobe 15mm, PET positive      • Malignant carcinoid tumor of lung 02/17/2016    Atypical carcinoid tumor of the LEFT lung treated in March 2012     • Mitral valve prolapse 02/17/2016   • Osteoporosis 04/20/2016   • Paroxysmal atrial fibrillation 2/18/2025   • Primary fibromyalgia syndrome 02/17/2016   • Rosacea 02/17/2016   • Seasonal allergies    • Seborrheic keratosis     History of, upper and lower back      • 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 Enodscopic vein harvest on the left. Left lower extremity arteriogram.)   • BREAST CYST EXCISION      pt unsure of laterality   • CERVICAL CONIZATION      CONIZATION OF CERVIX 37971 (Excisional laser cone biopsy and vaporization of cervix.)   • CHOLECYSTECTOMY  09/07/2005    Cholecystectomy, laparoscopic (With intraoperative cholangiogram.)   • COLONOSCOPY     • COLONOSCOPY W/ POLYPECTOMY N/A 01/05/2022    Procedure: COLONOSCOPY WITH POLYPECTOMY;  Surgeon: Augustine Menjivar MD;  Location:  [facility] OR;  Service: Gastroenterology;  Laterality: N/A;  Rectal polyp   • CYST REMOVAL      left 3rd toe   • ENDOSCOPY N/A 9/26/2024    Procedure: ESOPHAGOGASTRODUODENOSCOPY WITH BIOPSY;  Surgeon: Augustine Menjivar MD;  Location:  [facility] OR;  Service: Gastroenterology;  Laterality: N/A;  gastritis, esophagitis   • EXCISION LESION  11/11/1998    REMOVE LESION BACK OR FLANK 51983 (Excision times two.)   • FOOT SURGERY  01/20/2009    Foot/toes surgery procedure (Soft tissue mass, left foot. Excision of)   • HERNIA REPAIR      Past history of umbilical herni repair.   • HYSTERECTOMY     • OTHER SURGICAL HISTORY  12/04/2012    Anesth, elbow area surgery (Manipulation of left elbow.)   • OTHER SURGICAL HISTORY  03/05/2012    Anesth, elbow area surgery (Excision of plate and screws from olecranon bursa. Retained plate and screws, olecranon bursitis of previous fracture left elbow.)   • OTHER SURGICAL HISTORY  10/16/2012    Treat elbow fracture (Open reduction and internal fixation.)   • THORACOSCOPY  03/14/2012    Thoracoscopy, surgical (Bronchoscopy,LVATS (wedge resect MARYBETH), LULobectomy Thoracic lymphadenectomy)   • THROAT SURGERY  07/25/2012    Throat surgery procedure  (Thyroplasty type I (vocal cord medialozation & larynooplasty) Left vocal cord paralysis. Dysphoria. [facility] by Dr Tk Heart)   • THYROID SURGERY  03/22/2016    Thyroid surgery (Right thyroid lobectomy and isthmusectomy.)   • TONSILLECTOMY  02/03/1956    Chronic tonsillitis   • UPPER GASTROINTESTINAL ENDOSCOPY     • VEIN LIGATION  09/28/2000    PHLEB VEINS - EXTREM (20+) 44891 (High ligation of ling saphenous vein, left, plus multiple phlebectomies, left lower extremity.)       Social History     Socioeconomic History   • Marital status:    Tobacco Use   • Smoking status: Never     Passive exposure: Never   • Smokeless tobacco: Never   Vaping Use   • Vaping status: Never Used   Substance and Sexual Activity   • Alcohol use: Yes     Alcohol/week: 2.0 standard drinks of alcohol     Types: 2 Glasses of wine per week     Comment: Occasionally   • Drug use: Never   • Sexual activity: Not Currently     Partners: Male     Birth control/protection: Surgical       Family History   Problem Relation Age of Onset   • Cancer Mother         myeloma   • Diabetes Mother    • Heart disease Mother    • Arthritis Mother    • Hypertension Mother    • Heart attack Mother    • Heart disease Father    • Arthritis Father    • Heart attack Father    • Stroke Father    • Hypertension Father    • Vision loss Father    • Diabetes Maternal Grandmother    • Lung cancer Maternal Grandfather    • Lung cancer Other    • Breast cancer Neg Hx    • Malig Hyperthermia Neg Hx        Review of Systems   Constitutional: Positive for malaise/fatigue.   Cardiovascular:  Positive for chest pain.   Respiratory:  Positive for shortness of breath.    Musculoskeletal:  Positive for myalgias.   Neurological:  Positive for dizziness, light-headedness and weakness.   All other systems reviewed and are negative.      Allergies   Allergen Reactions   • Raloxifene Swelling, Other (See Comments) and Unknown - High Severity     Caused pain   •  Gabapentin Other (See Comments) and Unknown - High Severity     HA/sedation   • Latex Hives and Itching   • Pregabalin Swelling, Other (See Comments) and Unknown - High Severity     swelling         Current Outpatient Medications:   •  albuterol sulfate  (90 Base) MCG/ACT inhaler, Inhale 2 puffs Every 4 (Four) Hours As Needed for Wheezing or Shortness of Air., Disp: 18 g, Rfl: 0  •  apixaban (ELIQUIS) 5 MG tablet tablet, Take 1 tablet by mouth 2 (Two) Times a Day for 360 days., Disp: 180 tablet, Rfl: 3  •  Ascorbic Acid 500 MG capsule, , Disp: , Rfl:   •  Breo Ellipta 100-25 MCG/ACT aerosol powder , , Disp: , Rfl:   •  Calcium-Phosphorus-Vitamin D 250-107-500 MG-MG-UNIT chewable tablet, Chew 1 tablet Daily., Disp: , Rfl:   •  cholecalciferol (VITAMIN D3) 25 MCG (1000 UT) tablet, Take 1 tablet by mouth Daily. (Patient taking differently: Take 2 tablets by mouth.), Disp: 90 tablet, Rfl: 1  •  denosumab (Prolia) 60 MG/ML solution prefilled syringe syringe, , Disp: , Rfl:   •  DULoxetine (CYMBALTA) 60 MG capsule, Take 1 capsule by mouth Daily., Disp: 90 capsule, Rfl: 1  •  fluticasone (FLONASE) 50 MCG/ACT nasal spray, 1 spray into the nostril(s) as directed by provider Daily. (Patient taking differently: Administer 1 spray into the nostril(s) as directed by provider Daily As Needed.), Disp: 16 g, Rfl: 5  •  folic acid (FOLVITE) 1 MG tablet, TAKE 1 TABLET DAILY, Disp: 90 tablet, Rfl: 0  •  magnesium oxide (MAG-OX) 400 MG tablet, Take 1 tablet by mouth Daily., Disp: , Rfl:   •  multivitamins-minerals (PRESERVISION AREDS 2) capsule capsule, Take 1 capsule by mouth 2 (Two) Times a Day., Disp: 90 capsule, Rfl: 1  •  pantoprazole (PROTONIX) 40 MG EC tablet, Take 1 tablet by mouth 2 (Two) Times a Day., Disp: 180 tablet, Rfl: 0  •  vitamin B-12 (CYANOCOBALAMIN) 1000 MCG tablet, Take 1 tablet by mouth 2 (Two) Times a Week., Disp: , Rfl:   •  verapamil (CALAN) 40 MG tablet, Take 1 tablet by mouth 3 (Three) Times a Day.,  "Disp: 120 tablet, Rfl: 1      Objective:     Vitals:    02/18/25 1301   BP: 106/66   BP Location: Left arm   Patient Position: Sitting   Cuff Size: Adult   Pulse: 85   Weight: 83.2 kg (183 lb 6.4 oz)   Height: 175.3 cm (69\")       Body mass index is 27.08 kg/m².    Vitals reviewed.   Constitutional:       Appearance: Healthy appearance. Well-developed and not in distress.   Eyes:      Conjunctiva/sclera: Conjunctivae normal.   HENT:      Head: Normocephalic.      Nose: Nose normal.   Neck:      Vascular: No JVD. JVD normal.      Lymphadenopathy: No cervical adenopathy.   Pulmonary:      Effort: Pulmonary effort is normal.      Breath sounds: Normal breath sounds.   Cardiovascular:      Normal rate. Regular rhythm.      Murmurs: There is no murmur.   Pulses:     Intact distal pulses.   Edema:     Peripheral edema absent.   Abdominal:      Palpations: Abdomen is soft.      Tenderness: There is no abdominal tenderness.   Musculoskeletal: Normal range of motion.      Cervical back: Normal range of motion. Skin:     General: Skin is warm and dry.   Neurological:      General: No focal deficit present.      Mental Status: Alert and oriented to person, place, and time.      Cranial Nerves: No cranial nerve deficit.   Psychiatric:         Behavior: Behavior normal.         Thought Content: Thought content normal.         Judgment: Judgment normal.         ECG 12 Lead    Date/Time: 2/18/2025 2:12 PM  Performed by: Stephan Hernandez MD    Authorized by: Stephan Hernandez MD  Comparison: compared with previous ECG   Comparison to previous ECG: SR replaces AF  Rhythm: sinus rhythm  Conduction: conduction normal  ST Segments: ST segments normal  T Waves: T waves normal  QRS axis: normal  Other: no other findings    Clinical impression: normal ECG          Assessment:       Diagnosis Plan   1. Precordial pain        2. Paroxysmal atrial fibrillation        3. Labile hypertension        4. Stage 3a chronic kidney disease             "   Plan:       Mrs Charles has a history of hypertension and CKD 3 and has been having episodes of hypotension intermittently, associated with chest and back discomfort. She was found to have PAF during a tilt table test, and this recreated her symptoms. She is not really noting much improvement with metoprolol and feels overmedicated. I am going to switch amlodipine (which she takes for esophageal spasm) to verapamil and discontinue metoprolol. We will start out very gingerly; 20mg BID. We can then titrate to 20mg TID or 40mg BID.     Once I have the results of her rhythm monitor, we will decide if we should attempt an antiarrhythmic medication vs consult EP for consideration of ablation, especially since she is so symptomatic.     She will continue with apixaban; her [id number] = 4.     Sincerely,       Stephan Hernandez MD

## 2025-02-18 NOTE — PROGRESS NOTES
Date of Office Visit: 25  Encounter Provider: Stephan Hernandez MD  Place of Service: [facility] CARDIOLOGY  Patient Name: Myra Charles  :1950    Chief Complaint   Patient presents with    Hypertension   :     HPI:     Ms. Charles is 74 y.o. and presents today in follow up. I have reviewed prior notes and there are no changes except for any new updates described below. I have also reviewed any information entered into the medical record by the patient or by ancillary staff.     She had a CT coronary angiogram in  that was completely normal; her calcium score was zero. She had an echo in 2020 that showed very mild anterior MVP without significant MR; the study was otherwise normal.  A follow up echo in  was unchanged.     She presented in 2024 with episodes of atypical chest and back pain, as well as very labile blood pressure. Her BP would just abruptly drop and then she would feel fatigued for hours. A myocardial perfusion stress test was normal. Since her symptoms continued, she underwent CCTA. Her CACS was 1 and she had luminal irregularities in the distal LCX and RCA. She did go into atrial fibrillation during the study. She also had a tilt table. The study itself was negative for orthostatic hypotension, POTS, or cardiodepressor syncope. However, at the end of the study, she went into AF and developed the same symptoms she had previously reported. She was placed on metoprolol and apixaban. A monitor was placed; she mailed it in on .     She has continued to have these episodes. Her SBP will swing from 70 mm Hg to 140mm Hg. Her pulse seems to run higher than her baseline. She doesn't feel palpitations, though. She has not passed out, thankfully.     Past Medical History:   Diagnosis Date    Arthritis     Asthma     Backache 2016    Low back pain, in sacral region       CKD (chronic kidney disease) stage 3, GFR 30-59 ml/min      COPD (chronic obstructive pulmonary disease)     Coronary artery disease     Cortical senile cataract 02/17/2016    Deep vein thrombosis     Essential (primary) hypertension 04/20/2016    GERD (gastroesophageal reflux disease) 02/17/2016    Hallux valgus     Deformity, w/inflammation       Hernia 6 months old    History of coronary angiogram     CT cor angio 2019, normal cors, Agatston 0    Iron deficiency anemia     Irritable bowel syndrome     Lung nodule, solitary 03/08/2012    Solitary nodule of lung - RIGHT upper lobe 15mm, PET positive       Malignant carcinoid tumor of lung 02/17/2016    Atypical carcinoid tumor of the LEFT lung treated in March 2012      Mitral valve prolapse 02/17/2016    Osteoporosis 04/20/2016    Paroxysmal atrial fibrillation 2/18/2025    Primary fibromyalgia syndrome 02/17/2016    Rosacea 02/17/2016    Seasonal allergies     Seborrheic keratosis     History of, upper and lower back       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 Enodscopic vein harvest on the left. Left lower extremity arteriogram.)    BREAST CYST EXCISION      pt unsure of laterality    CERVICAL CONIZATION      CONIZATION OF CERVIX 68978 (Excisional laser cone biopsy and vaporization of cervix.)    CHOLECYSTECTOMY  09/07/2005    Cholecystectomy, laparoscopic (With intraoperative cholangiogram.)    COLONOSCOPY      COLONOSCOPY W/ POLYPECTOMY N/A 01/05/2022    Procedure: COLONOSCOPY WITH POLYPECTOMY;  Surgeon: Augustine Menjivar MD;   Location:  [facility];  Service: Gastroenterology;  Laterality: N/A;  Rectal polyp    CYST REMOVAL      left 3rd toe    ENDOSCOPY N/A 9/26/2024    Procedure: ESOPHAGOGASTRODUODENOSCOPY WITH BIOPSY;  Surgeon: Augustine Menjivar MD;  Location:  [facility] OR;  Service: Gastroenterology;  Laterality: N/A;  gastritis, esophagitis    EXCISION LESION  11/11/1998    REMOVE LESION BACK OR FLANK 66832 (Excision times two.)    FOOT SURGERY  01/20/2009    Foot/toes surgery procedure (Soft tissue mass, left foot. Excision of)    HERNIA REPAIR      Past history of umbilical herni repair.    HYSTERECTOMY      OTHER SURGICAL HISTORY  12/04/2012    Anesth, elbow area surgery (Manipulation of left elbow.)    OTHER SURGICAL HISTORY  03/05/2012    Anesth, elbow area surgery (Excision of plate and screws from olecranon bursa. Retained plate and screws, olecranon bursitis of previous fracture left elbow.)    OTHER SURGICAL HISTORY  10/16/2012    Treat elbow fracture (Open reduction and internal fixation.)    THORACOSCOPY  03/14/2012    Thoracoscopy, surgical (Bronchoscopy,LVATS (wedge resect MARYBETH), LULobectomy Thoracic lymphadenectomy)    THROAT SURGERY  07/25/2012    Throat surgery procedure (Thyroplasty type I (vocal cord medialozation & larynooplasty) Left vocal cord paralysis. Dysphoria. [facility] by Dr Tk Heart)    THYROID SURGERY  03/22/2016    Thyroid surgery (Right thyroid lobectomy and isthmusectomy.)    TONSILLECTOMY  02/03/1956    Chronic tonsillitis    UPPER GASTROINTESTINAL ENDOSCOPY      VEIN LIGATION  09/28/2000    PHLEB VEINS - EXTREM (20+) 59804 (High ligation of ling saphenous vein, left, plus multiple phlebectomies, left lower extremity.)       Social History     Socioeconomic History    Marital status:    Tobacco Use    Smoking status: Never     Passive exposure: Never    Smokeless tobacco: Never   Vaping Use    Vaping status: Never Used   Substance and Sexual Activity    Alcohol use: Yes      Alcohol/week: 2.0 standard drinks of alcohol     Types: 2 Glasses of wine per week     Comment: Occasionally    Drug use: Never    Sexual activity: Not Currently     Partners: Male     Birth control/protection: Surgical       Family History   Problem Relation Age of Onset    Cancer Mother         myeloma    Diabetes Mother     Heart disease Mother     Arthritis Mother     Hypertension Mother     Heart attack Mother     Heart disease Father     Arthritis Father     Heart attack Father     Stroke Father     Hypertension Father     Vision loss Father     Diabetes Maternal Grandmother     Lung cancer Maternal Grandfather     Lung cancer Other     Breast cancer Neg Hx     Malig Hyperthermia Neg Hx        Review of Systems   Constitutional: Positive for malaise/fatigue.   Cardiovascular:  Positive for chest pain.   Respiratory:  Positive for shortness of breath.    Musculoskeletal:  Positive for myalgias.   Neurological:  Positive for dizziness, light-headedness and weakness.   All other systems reviewed and are negative.      Allergies   Allergen Reactions    Raloxifene Swelling, Other (See Comments) and Unknown - High Severity     Caused pain    Gabapentin Other (See Comments) and Unknown - High Severity     HA/sedation    Latex Hives and Itching    Pregabalin Swelling, Other (See Comments) and Unknown - High Severity     swelling         Current Outpatient Medications:     albuterol sulfate  (90 Base) MCG/ACT inhaler, Inhale 2 puffs Every 4 (Four) Hours As Needed for Wheezing or Shortness of Air., Disp: 18 g, Rfl: 0    apixaban (ELIQUIS) 5 MG tablet tablet, Take 1 tablet by mouth 2 (Two) Times a Day for 360 days., Disp: 180 tablet, Rfl: 3    Ascorbic Acid 500 MG capsule, , Disp: , Rfl:     Breo Ellipta 100-25 MCG/ACT aerosol powder , , Disp: , Rfl:     Calcium-Phosphorus-Vitamin D 250-107-500 MG-MG-UNIT chewable tablet, Chew 1 tablet Daily., Disp: , Rfl:     cholecalciferol (VITAMIN D3) 25 MCG (1000 UT) tablet,  "Take 1 tablet by mouth Daily. (Patient taking differently: Take 2 tablets by mouth.), Disp: 90 tablet, Rfl: 1    denosumab (Prolia) 60 MG/ML solution prefilled syringe syringe, , Disp: , Rfl:     DULoxetine (CYMBALTA) 60 MG capsule, Take 1 capsule by mouth Daily., Disp: 90 capsule, Rfl: 1    fluticasone (FLONASE) 50 MCG/ACT nasal spray, 1 spray into the nostril(s) as directed by provider Daily. (Patient taking differently: Administer 1 spray into the nostril(s) as directed by provider Daily As Needed.), Disp: 16 g, Rfl: 5    folic acid (FOLVITE) 1 MG tablet, TAKE 1 TABLET DAILY, Disp: 90 tablet, Rfl: 0    magnesium oxide (MAG-OX) 400 MG tablet, Take 1 tablet by mouth Daily., Disp: , Rfl:     multivitamins-minerals (PRESERVISION AREDS 2) capsule capsule, Take 1 capsule by mouth 2 (Two) Times a Day., Disp: 90 capsule, Rfl: 1    pantoprazole (PROTONIX) 40 MG EC tablet, Take 1 tablet by mouth 2 (Two) Times a Day., Disp: 180 tablet, Rfl: 0    vitamin B-12 (CYANOCOBALAMIN) 1000 MCG tablet, Take 1 tablet by mouth 2 (Two) Times a Week., Disp: , Rfl:     verapamil (CALAN) 40 MG tablet, Take 1 tablet by mouth 3 (Three) Times a Day., Disp: 120 tablet, Rfl: 1      Objective:     Vitals:    02/18/25 1301   BP: 106/66   BP Location: Left arm   Patient Position: Sitting   Cuff Size: Adult   Pulse: 85   Weight: 83.2 kg (183 lb 6.4 oz)   Height: 175.3 cm (69\")       Body mass index is 27.08 kg/m².    Vitals reviewed.   Constitutional:       Appearance: Healthy appearance. Well-developed and not in distress.   Eyes:      Conjunctiva/sclera: Conjunctivae normal.   HENT:      Head: Normocephalic.      Nose: Nose normal.   Neck:      Vascular: No JVD. JVD normal.      Lymphadenopathy: No cervical adenopathy.   Pulmonary:      Effort: Pulmonary effort is normal.      Breath sounds: Normal breath sounds.   Cardiovascular:      Normal rate. Regular rhythm.      Murmurs: There is no murmur.   Pulses:     Intact distal pulses.   Edema:     " Peripheral edema absent.   Abdominal:      Palpations: Abdomen is soft.      Tenderness: There is no abdominal tenderness.   Musculoskeletal: Normal range of motion.      Cervical back: Normal range of motion. Skin:     General: Skin is warm and dry.   Neurological:      General: No focal deficit present.      Mental Status: Alert and oriented to person, place, and time.      Cranial Nerves: No cranial nerve deficit.   Psychiatric:         Behavior: Behavior normal.         Thought Content: Thought content normal.         Judgment: Judgment normal.         ECG 12 Lead    Date/Time: 2/18/2025 2:12 PM  Performed by: Stephan Hernandez MD    Authorized by: Stephan Hernandez MD  Comparison: compared with previous ECG   Comparison to previous ECG: SR replaces AF  Rhythm: sinus rhythm  Conduction: conduction normal  ST Segments: ST segments normal  T Waves: T waves normal  QRS axis: normal  Other: no other findings    Clinical impression: normal ECG          Assessment:       Diagnosis Plan   1. Precordial pain        2. Paroxysmal atrial fibrillation        3. Labile hypertension        4. Stage 3a chronic kidney disease               Plan:       Mrs Charles has a history of hypertension and CKD 3 and has been having episodes of hypotension intermittently, associated with chest and back discomfort. She was found to have PAF during a tilt table test, and this recreated her symptoms. She is not really noting much improvement with metoprolol and feels overmedicated. I am going to switch amlodipine (which she takes for esophageal spasm) to verapamil and discontinue metoprolol. We will start out very gingerly; 20mg BID. We can then titrate to 20mg TID or 40mg BID.     Once I have the results of her rhythm monitor, we will decide if we should attempt an antiarrhythmic medication vs consult EP for consideration of ablation, especially since she is so symptomatic.     She will continue with apixaban; her [id number] = 4.     Sincerely,        Stephan Hernandez MD

## 2025-02-21 ENCOUNTER — TELEPHONE (OUTPATIENT)
Dept: CARDIOLOGY | Facility: CLINIC | Age: 75
End: 2025-02-21
Payer: COMMERCIAL

## 2025-03-17 ENCOUNTER — TRANSCRIBE ORDERS (OUTPATIENT)
Dept: ADMINISTRATIVE | Facility: HOSPITAL | Age: 75
End: 2025-03-17
Payer: COMMERCIAL

## 2025-03-17 ENCOUNTER — LAB (OUTPATIENT)
Dept: LAB | Facility: HOSPITAL | Age: 75
End: 2025-03-17
Payer: MEDICARE

## 2025-03-17 DIAGNOSIS — N18.32 STAGE 3B CHRONIC KIDNEY DISEASE: ICD-10-CM

## 2025-03-17 DIAGNOSIS — I12.9 HYPERTENSIVE NEPHROPATHY: ICD-10-CM

## 2025-03-17 DIAGNOSIS — N18.32 STAGE 3B CHRONIC KIDNEY DISEASE: Primary | ICD-10-CM

## 2025-03-17 LAB
ALBUMIN SERPL-MCNC: 4.2 G/DL (ref 3.5–5.2)
ALBUMIN UR-MCNC: <1.2 MG/DL
ANION GAP SERPL CALCULATED.3IONS-SCNC: 11.4 MMOL/L (ref 5–15)
BACTERIA UR QL AUTO: NORMAL /HPF
BILIRUB UR QL STRIP: NEGATIVE
BUN SERPL-MCNC: 25 MG/DL (ref 8–23)
BUN/CREAT SERPL: 18.1 (ref 7–25)
CALCIUM SPEC-SCNC: 8.8 MG/DL (ref 8.6–10.5)
CHLORIDE SERPL-SCNC: 99 MMOL/L (ref 98–107)
CLARITY UR: CLEAR
CO2 SERPL-SCNC: 25.6 MMOL/L (ref 22–29)
COLOR UR: YELLOW
CREAT SERPL-MCNC: 1.38 MG/DL (ref 0.57–1)
CREAT UR-MCNC: 48.4 MG/DL
CREAT UR-MCNC: 48.4 MG/DL
EGFRCR SERPLBLD CKD-EPI 2021: 40.2 ML/MIN/1.73
GLUCOSE SERPL-MCNC: 89 MG/DL (ref 65–99)
GLUCOSE UR STRIP-MCNC: NEGATIVE MG/DL
HGB UR QL STRIP.AUTO: NEGATIVE
HYALINE CASTS UR QL AUTO: NORMAL /LPF
KETONES UR QL STRIP: NEGATIVE
LEUKOCYTE ESTERASE UR QL STRIP.AUTO: NEGATIVE
MICROALBUMIN/CREAT UR: NORMAL MG/G{CREAT}
NITRITE UR QL STRIP: NEGATIVE
PH UR STRIP.AUTO: 7 [PH] (ref 5–8)
PHOSPHATE SERPL-MCNC: 3.7 MG/DL (ref 2.5–4.5)
POTASSIUM SERPL-SCNC: 4.5 MMOL/L (ref 3.5–5.2)
PROT ?TM UR-MCNC: 4.6 MG/DL
PROT UR QL STRIP: NEGATIVE
PROT/CREAT UR: 95 MG/G CREA (ref 0–200)
RBC # UR STRIP: NORMAL /HPF
REF LAB TEST METHOD: NORMAL
SODIUM SERPL-SCNC: 136 MMOL/L (ref 136–145)
SP GR UR STRIP: 1.01 (ref 1–1.03)
SQUAMOUS #/AREA URNS HPF: NORMAL /HPF
UROBILINOGEN UR QL STRIP: NORMAL
WBC # UR STRIP: NORMAL /HPF

## 2025-03-17 PROCEDURE — 82043 UR ALBUMIN QUANTITATIVE: CPT

## 2025-03-17 PROCEDURE — 80069 RENAL FUNCTION PANEL: CPT

## 2025-03-17 PROCEDURE — 82570 ASSAY OF URINE CREATININE: CPT

## 2025-03-17 PROCEDURE — 84156 ASSAY OF PROTEIN URINE: CPT

## 2025-03-17 PROCEDURE — 36415 COLL VENOUS BLD VENIPUNCTURE: CPT

## 2025-03-17 PROCEDURE — 81001 URINALYSIS AUTO W/SCOPE: CPT

## 2025-03-19 LAB
CV ZIO AF AFL AVG BPM: 99 BPM
CV ZIO AF AFL BPM HIGH: 182 BPM
CV ZIO AF AFL BPM LOW: 52 BPM
CV ZIO AF AFL F EPI AVG BPM: 118 BPM
CV ZIO AF AFL F EPI BPM HIGH: 182 BPM
CV ZIO AF AFL F EPI BPM LOW: 80 BPM
CV ZIO AF AFL F EPI DT: NORMAL
CV ZIO AF AFL L EPI AVG BPM: 111 BPM
CV ZIO AF AFL L EPI BPM HIGH: 177 BPM
CV ZIO AF AFL L EPI BPM LOW: 74 BPM
CV ZIO AF AFL L EPI DUR: NORMAL
CV ZIO AF AFL L EPI END: NORMAL
CV ZIO AF AFL L EPI START: NORMAL
CV ZIO AF AFL PERCENT: 9 %
CV ZIO AF AFL S EPI AVG BPM: 81 BPM
CV ZIO AF AFL S EPI BPM HIGH: 168 BPM
CV ZIO AF AFL S EPI BPM LOW: 52 BPM
CV ZIO AF AFL S EPI DT: NORMAL
CV ZIO AF AFL SYMPT IN PT: NORMAL
CV ZIO BASELINE AVG BPM: 79 BPM
CV ZIO BASELINE BPM HIGH: 182 BPM
CV ZIO BASELINE BPM LOW: 52 BPM
CV ZIO DEVICE ANALYSIS TIME: NORMAL
CV ZIO ECT SVE COUNT: NORMAL EPISODES
CV ZIO ECT SVE CPLT COUNT: 4742 EPISODES
CV ZIO ECT SVE CPLT FREQ: NORMAL
CV ZIO ECT SVE FREQ: NORMAL
CV ZIO ECT SVE TPLT COUNT: 149 EPISODES
CV ZIO ECT SVE TPLT FREQ: NORMAL
CV ZIO ECT VE COUNT: 1107 EPISODES
CV ZIO ECT VE CPLT COUNT: 33 EPISODES
CV ZIO ECT VE CPLT FREQ: NORMAL
CV ZIO ECT VE FREQ: NORMAL
CV ZIO ECT VE TPLT COUNT: 0 EPISODES
CV ZIO ECT VE TPLT FREQ: 0
CV ZIO ECTOPIC SVE COUPLET RAW PERCENT: 0.6 %
CV ZIO ECTOPIC SVE ISOLATED PERCENT: 3.79 %
CV ZIO ECTOPIC SVE TRIPLET RAW PERCENT: 0.03 %
CV ZIO ECTOPIC VE COUPLET RAW PERCENT: 0 %
CV ZIO ECTOPIC VE ISOLATED PERCENT: 0.07 %
CV ZIO ECTOPIC VE TRIPLET RAW PERCENT: 0 %
CV ZIO ENROLLMENT END: NORMAL
CV ZIO ENROLLMENT START: NORMAL
CV ZIO IRREG TYPE: NORMAL
CV ZIO L TRIGEMINY DUR: 14.1 SEC
CV ZIO L TRIGEMINY END: NORMAL
CV ZIO L TRIGEMINY START: NORMAL
CV ZIO PATIENT EVENTS DIARIES: 8
CV ZIO PATIENT EVENTS TRIGGERS: 9
CV ZIO PAUSE COUNT: 0
CV ZIO PRESCRIPTION STATUS: NORMAL
CV ZIO SVT AVG BPM: 142 BPM
CV ZIO SVT BPM HIGH: 182 BPM
CV ZIO SVT BPM LOW: 92 BPM
CV ZIO SVT COUNT: 6
CV ZIO SVT F EPI AVG BPM: 156 BPM
CV ZIO SVT F EPI BEATS: 4 BEATS
CV ZIO SVT F EPI BPM HIGH: 182 BPM
CV ZIO SVT F EPI BPM LOW: 135 BPM
CV ZIO SVT F EPI DUR: 1.5 SEC
CV ZIO SVT F EPI END: NORMAL
CV ZIO SVT F EPI START: NORMAL
CV ZIO SVT L EPI AVG BPM: 138 BPM
CV ZIO SVT L EPI BEATS: 5 BEATS
CV ZIO SVT L EPI BPM HIGH: 164 BPM
CV ZIO SVT L EPI BPM LOW: 92 BPM
CV ZIO SVT L EPI DUR: 2.2 SEC
CV ZIO SVT L EPI END: NORMAL
CV ZIO SVT L EPI START: NORMAL
CV ZIO TOTAL  ENROLLMENT PERIOD: NORMAL
CV ZIO VT COUNT: 0

## 2025-03-31 ENCOUNTER — HOSPITAL ENCOUNTER (OUTPATIENT)
Dept: MAMMOGRAPHY | Facility: HOSPITAL | Age: 75
Discharge: HOME | End: 2025-03-31
Admitting: NURSE PRACTITIONER
Payer: COMMERCIAL

## 2025-03-31 ENCOUNTER — PATIENT MESSAGE (OUTPATIENT)
Dept: CARDIOLOGY | Facility: CLINIC | Age: 75
End: 2025-03-31
Payer: COMMERCIAL

## 2025-03-31 DIAGNOSIS — Z12.31 ENCOUNTER FOR SCREENING MAMMOGRAM FOR MALIGNANT NEOPLASM OF BREAST: ICD-10-CM

## 2025-03-31 PROCEDURE — 77063 BREAST TOMOSYNTHESIS BI: CPT

## 2025-03-31 PROCEDURE — 77063 BREAST TOMOSYNTHESIS BI: CPT | Performed by: RADIOLOGY

## 2025-03-31 PROCEDURE — 77067 SCR MAMMO BI INCL CAD: CPT | Performed by: RADIOLOGY

## 2025-03-31 PROCEDURE — 77067 SCR MAMMO BI INCL CAD: CPT

## 2025-03-31 RX ORDER — METOPROLOL SUCCINATE 25 MG/1
TABLET, EXTENDED RELEASE ORAL
Start: 2025-03-31

## 2025-04-02 ENCOUNTER — OFFICE VISIT (OUTPATIENT)
Dept: GASTROENTEROLOGY | Facility: CLINIC | Age: 75
End: 2025-04-02
Payer: COMMERCIAL

## 2025-04-02 VITALS
SYSTOLIC BLOOD PRESSURE: 102 MMHG | BODY MASS INDEX: 26.78 KG/M2 | WEIGHT: 180.8 LBS | DIASTOLIC BLOOD PRESSURE: 60 MMHG | HEIGHT: 69 IN

## 2025-04-02 DIAGNOSIS — K21.9 GASTROESOPHAGEAL REFLUX DISEASE WITHOUT ESOPHAGITIS: Primary | ICD-10-CM

## 2025-04-02 RX ORDER — CYCLOBENZAPRINE HCL 10 MG
1 TABLET ORAL
COMMUNITY

## 2025-04-02 NOTE — PROGRESS NOTES
PATIENT INFORMATION  Myra Charles       - 1950    CHIEF COMPLAINT  Chief Complaint   Patient presents with    Heartburn       HISTORY OF PRESENT ILLNESS    Here today for GERD Follow-up     Since LOV as been diagnosed with afib high HR, and adjusted meds and metoprolol seems to be well controlled. CTA looked good. Did not tolerate verapamil.       GERD: Stomach well controlled and back down to omeprazole once a day, will take a 2nd if any breakthrough maybe 1-2 episodes a week, dependent on what she eats. Avoids spicy foods.    Fibro acting up because of bad weather.      2024 Esophagram with tertiary waves, GERD, small HH.     2022 colonoscopy with only hyperplastic polyp    2024 NCCP for dysphagia consistent with reactive gastropathy, reflux esophagitis, no HP or Barretts.           REVIEWED PERTINENT RESULTS/ LABS  Lab Results   Component Value Date    CASEREPORT  2024     Surgical Pathology Report                         Case: [id number]                                  Authorizing Provider:  Augustine Menjivar        Collected:           2024 03:43 PM                                 MD Art                                                                   Ordering Location:     [facility]   Received:            2024 04:16 PM                                 OR                                                                           Pathologist:           Shweta Chavarria MD                                                        Specimens:   1) - Stomach                                                                                        2) - Esophagus, Distal                                                                     FINALDX  2024     1.   Stomach, biopsy:         A.  Gastric antral mucosa with features suggestive of reactive gastropathy.         B.  Negative for intestinal metaplasia.         C.  Negative for H.  pylori-type organisms (H&E stain).    2.  Distal esophagus, biopsy:         A.  Squamocolumnar mucosa with mild reactive changes and chronic inflammation.         B.  Negative for intestinal metaplasia.           Lab Results   Component Value Date    HGB 12.8 01/16/2025    MCV 86.9 01/16/2025     01/16/2025    ALT 23 01/16/2025    AST 17 01/16/2025    INR 1.00 05/18/2024    TRIG 114 01/16/2025    FERRITIN 260.00 (H) 06/28/2023    IRON 55 06/28/2023    TIBC 243 (L) 06/28/2023      Mammo Screening Digital Tomosynthesis Bilateral With CAD  Result Date: 3/31/2025  Narrative: DIGITAL SCREENING MAMMOGRAM WITH TOMOSYNTHESIS  HISTORY: Screening Mammography.  Low dose full field digital breast tomosynthesis imaging was performed with 2D and 3D acquisitions consisting of bilateral CC and MLO views. Examination is compared to prior examination dating back to 11/18/2021. Examination is read in conjunction with computer aided detection.  FINDINGS:   The breast tissue is heterogeneously dense, which may obscure small masses. No suspicious masses, microcalcifications or areas of architectural  distortion are identified. Left breast post excisional biopsy changes are stable.      Impression: Benign screening mammogram.  RECOMMENDATION:  Continue annual screening mammography.  BI-RADS CATEGORY 2, BENIGN.   CAD was utilized.  The standard false-negative rate of mammography is between 10% and 25%. Complex patterns or increased breast density will markedly elevate the false-negative rate of mammography.    A letter, in lay terminology, with the results of this exam will be mailed to the patient.  3/31/2025 3:19 PM by Dr. Jose L Martinez MD on Workstation: [employer]        REVIEW OF SYSTEMS  Review of Systems      ACTIVE PROBLEMS  Patient Active Problem List    Diagnosis     Paroxysmal atrial fibrillation [I48.0]     Chest pain [R07.9]     Anemia [D64.9]     Osteoarthritis [M19.90]     Bile reflux esophagitis [K21.00]     Left foot  pain [M79.672]     Elevated alkaline phosphatase level [R74.8]     Cervical nerve root compression [G54.2]     History of acute renal failure [Z87.448]     DDD (degenerative disc disease), cervical [M50.30]     Dupuytren's contracture of right hand [M72.0]     CKD (chronic kidney disease) stage 3, GFR 30-59 ml/min [N18.30]     Overweight (BMI 25.0-29.9) [E66.3]     History of iron deficiency [Z86.39]     Neck pain [M54.2]     History of fracture of left ankle [Z87.81]     Non-rheumatic mitral regurgitation [I34.0]     History of bilateral cataract extraction [Z98.41, Z98.42]     Chronic left-sided low back pain with left-sided sciatica [M54.42, G89.29]     Varicose veins of both lower extremities with pain [I83.813]     Seborrheic keratosis [L82.1]     Iron deficiency anemia [D50.9]     Hallux valgus [M20.10]     Elevated ferritin [R79.89]     History of deep venous thrombosis [Z86.718]     Malignant neoplasm of upper lobe of left lung [C34.12]     Idiopathic peripheral neuropathy [G60.9]     Multinodular goiter (nontoxic) [E04.2]     Osteoporosis [M81.0]     Hyperthyroidism [E05.90]     Nonexudative age-related macular degeneration, bilateral, early dry stage [H35.3131]     Cortical age-related cataract [H25.019]     Hoarseness or changing voice [R49.9]     Vitamin D deficiency [E55.9]     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 [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.20]     Abnormal blood level of iron [R79.0]     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 [J38.01]     Rosacea [L71.9]     Fibrositis [M79.7]     Mitral valve prolapse [I34.1]     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]     GERD (gastroesophageal reflux disease) [K21.9]     Cortical senile cataract [H25.019]     Backache [M54.9]     Screen for colon cancer [Z12.11]     Encounter for general adult medical examination without abnormal findings [Z00.00]     Medicare annual wellness visit, subsequent [Z00.00]     Vitreous floaters [H43.399]      Vitreous detachment [H43.819]     B12 deficiency [E53.8]     PAC (premature atrial contraction) [I49.1]     Fibromyalgia [M79.7]     Osteopenia [M85.80]     History of malignant carcinoid tumor of bronchus and lung [Z85.110]     Lung nodule, solitary [R91.1]     Gastroesophageal reflux disease without esophagitis [K21.9]     History of partial thyroidectomy [E89.0]     Essential hypertension [I10]     Restless legs syndrome [G25.81]          PAST MEDICAL HISTORY  Past Medical History:   Diagnosis Date    Arthritis     Asthma     Backache 02/17/2016    Low back pain, in sacral region       CKD (chronic kidney disease) stage 3, GFR 30-59 ml/min     COPD (chronic obstructive pulmonary disease)     Coronary artery disease     Cortical senile cataract 02/17/2016    Deep vein thrombosis     Essential (primary) hypertension 04/20/2016    GERD (gastroesophageal reflux disease) 02/17/2016    Hallux valgus     Deformity, w/inflammation       Hernia 6 months old    History of coronary angiogram     CT cor angio 2019, normal cors, Agatston 0    Iron deficiency anemia     Irritable bowel syndrome     Lung nodule, solitary 03/08/2012    Solitary nodule of lung - RIGHT upper lobe 15mm, PET positive       Malignant carcinoid tumor of lung 02/17/2016    Atypical carcinoid tumor of the LEFT lung treated in March 2012      Mitral valve prolapse 02/17/2016    Osteoporosis 04/20/2016    Paroxysmal atrial fibrillation 2/18/2025    Primary fibromyalgia syndrome 02/17/2016    Rosacea 02/17/2016    Seasonal allergies     Seborrheic keratosis     History of, upper and lower back       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 04/20/2016    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 02/17/2016    Paralysis of vocal cords and larynx, unilateral - LEFT side       Varicose vein     Vitamin D deficiency 04/20/2016    Vitreous detachment 12/01/2014    Vitreous floaters 12/01/2014         SURGICAL  HISTORY  Past Surgical History:   Procedure Laterality Date    ADENOIDECTOMY  2/3/1956    ARTERIAL BYPASS SURGERY  09/25/2012    Artery bypass graft (Left femoral to posterior tibial artery bypass. Enodscopic vein harvest on the left. Left lower extremity arteriogram.)    BREAST CYST EXCISION Left     CERVICAL CONIZATION      CONIZATION OF CERVIX 66527 (Excisional laser cone biopsy and vaporization of cervix.)    CHOLECYSTECTOMY  09/07/2005    Cholecystectomy, laparoscopic (With intraoperative cholangiogram.)    COLONOSCOPY      COLONOSCOPY W/ POLYPECTOMY N/A 01/05/2022    Procedure: COLONOSCOPY WITH POLYPECTOMY;  Surgeon: Augustine Menjivar MD;  Location:  [facility] OR;  Service: Gastroenterology;  Laterality: N/A;  Rectal polyp    CYST REMOVAL      left 3rd toe    ENDOSCOPY N/A 09/26/2024    Procedure: ESOPHAGOGASTRODUODENOSCOPY WITH BIOPSY;  Surgeon: Augustine Menjivar MD;  Location:  [facility] OR;  Service: Gastroenterology;  Laterality: N/A;  gastritis, esophagitis    EXCISION LESION  11/11/1998    REMOVE LESION BACK OR FLANK 69763 (Excision times two.)    FOOT SURGERY  01/20/2009    Foot/toes surgery procedure (Soft tissue mass, left foot. Excision of)    HERNIA REPAIR      Past history of umbilical herni repair.    HYSTERECTOMY  2005    OTHER SURGICAL HISTORY  12/04/2012    Anesth, elbow area surgery (Manipulation of left elbow.)    OTHER SURGICAL HISTORY  03/05/2012    Anesth, elbow area surgery (Excision of plate and screws from olecranon bursa. Retained plate and screws, olecranon bursitis of previous fracture left elbow.)    OTHER SURGICAL HISTORY  10/16/2012    Treat elbow fracture (Open reduction and internal fixation.)    THORACOSCOPY  03/14/2012    Thoracoscopy, surgical (Bronchoscopy,LVATS (wedge resect MARYBETH), LULobectomy Thoracic lymphadenectomy)    THROAT SURGERY  07/25/2012    Throat surgery procedure (Thyroplasty type I (vocal cord medialozation & larynooplasty) Left vocal cord  paralysis. Dysphoria. [facility] by Dr Tk Heart)    THYROID SURGERY  03/22/2016    Thyroid surgery (Right thyroid lobectomy and isthmusectomy.)    TONSILLECTOMY  02/03/1956    Chronic tonsillitis    UPPER GASTROINTESTINAL ENDOSCOPY      VEIN LIGATION  09/28/2000    PHLEB VEINS - EXTREM (20+) 10524 (High ligation of ling saphenous vein, left, plus multiple phlebectomies, left lower extremity.)         FAMILY HISTORY  Family History   Problem Relation Age of Onset    Cancer Mother         myeloma    Diabetes Mother     Heart disease Mother     Arthritis Mother     Hypertension Mother     Heart attack Mother     Heart disease Father     Arthritis Father     Heart attack Father     Stroke Father     Hypertension Father     Vision loss Father     Diabetes Maternal Grandmother     Lung cancer Maternal Grandfather     Lung cancer Other     Breast cancer Neg Hx     Malig Hyperthermia Neg Hx          SOCIAL HISTORY  Social History     Occupational History    Not on file   Tobacco Use    Smoking status: Never     Passive exposure: Never    Smokeless tobacco: Never   Vaping Use    Vaping status: Never Used   Substance and Sexual Activity    Alcohol use: Yes     Alcohol/week: 2.0 standard drinks of alcohol     Types: 2 Glasses of wine per week     Comment: Occasionally    Drug use: Never    Sexual activity: Not Currently     Partners: Male     Birth control/protection: Surgical         CURRENT MEDICATIONS    Current Outpatient Medications:     albuterol sulfate  (90 Base) MCG/ACT inhaler, Inhale 2 puffs Every 4 (Four) Hours As Needed for Wheezing or Shortness of Air., Disp: 18 g, Rfl: 0    amLODIPine (NORVASC) 5 MG tablet, Take 1.5 tablets by mouth Daily., Disp: , Rfl:     apixaban (ELIQUIS) 5 MG tablet tablet, Take 1 tablet by mouth 2 (Two) Times a Day for 360 days., Disp: 180 tablet, Rfl: 3    Ascorbic Acid 500 MG capsule, , Disp: , Rfl:     Breo Ellipta 100-25 MCG/ACT aerosol powder , , Disp: , Rfl:      "Calcium-Phosphorus-Vitamin D 250-107-500 MG-MG-UNIT chewable tablet, Chew 1 tablet Daily., Disp: , Rfl:     cholecalciferol (VITAMIN D3) 25 MCG (1000 UT) tablet, Take 1 tablet by mouth Daily. (Patient taking differently: Take 2 tablets by mouth.), Disp: 90 tablet, Rfl: 1    cyclobenzaprine (FLEXERIL) 10 MG tablet, Take 1 tablet by mouth., Disp: , Rfl:     denosumab (Prolia) 60 MG/ML solution prefilled syringe syringe, , Disp: , Rfl:     DULoxetine (CYMBALTA) 60 MG capsule, Take 1 capsule by mouth Daily., Disp: 90 capsule, Rfl: 1    fluticasone (FLONASE) 50 MCG/ACT nasal spray, 1 spray into the nostril(s) as directed by provider Daily. (Patient taking differently: Administer 1 spray into the nostril(s) as directed by provider Daily As Needed.), Disp: 16 g, Rfl: 5    folic acid (FOLVITE) 1 MG tablet, TAKE 1 TABLET DAILY, Disp: 90 tablet, Rfl: 0    magnesium oxide (MAG-OX) 400 MG tablet, Take 1 tablet by mouth Daily., Disp: , Rfl:     metoprolol succinate XL (TOPROL-XL) 25 MG 24 hr tablet, Take full tablet in am and hald tablet pm, Disp: , Rfl:     multivitamins-minerals (PRESERVISION AREDS 2) capsule capsule, Take 1 capsule by mouth 2 (Two) Times a Day., Disp: 90 capsule, Rfl: 1    pantoprazole (PROTONIX) 40 MG EC tablet, Take 1 tablet by mouth 2 (Two) Times a Day. (Patient taking differently: Take 1 tablet by mouth Daily.), Disp: 180 tablet, Rfl: 0    vitamin B-12 (CYANOCOBALAMIN) 1000 MCG tablet, Take 1 tablet by mouth 2 (Two) Times a Week., Disp: , Rfl:     ALLERGIES  Raloxifene, Gabapentin, Latex, and Pregabalin    VITALS  Vitals:    04/02/25 1033   BP: 102/60   BP Location: Left arm   Patient Position: Sitting   Cuff Size: Adult   Weight: 82 kg (180 lb 12.8 oz)   Height: 175.3 cm (69\")       PHYSICAL EXAM  Debilities/Disabilities Identified: None  Emotional Behavior: Appropriate  Wt Readings from Last 3 Encounters:   04/02/25 82 kg (180 lb 12.8 oz)   02/18/25 83.2 kg (183 lb 6.4 oz)   01/23/25 83 kg (183 lb) " "    Ht Readings from Last 1 Encounters:   04/02/25 175.3 cm (69\")     Body mass index is 26.7 kg/m².  Physical Exam  Constitutional:       General: She is not in acute distress.     Appearance: Normal appearance. She is not ill-appearing.   HENT:      Head: Normocephalic and atraumatic.      Mouth/Throat:      Mouth: Mucous membranes are moist.      Pharynx: No posterior oropharyngeal erythema.   Eyes:      General: No scleral icterus.  Cardiovascular:      Rate and Rhythm: Normal rate and regular rhythm.      Heart sounds: Normal heart sounds.   Pulmonary:      Effort: Pulmonary effort is normal.      Breath sounds: Normal breath sounds.   Abdominal:      General: Abdomen is flat. Bowel sounds are normal. There is no distension.      Palpations: Abdomen is soft. There is no mass.      Tenderness: There is no abdominal tenderness. There is no guarding or rebound. Negative signs include Hill's sign.      Hernia: No hernia is present.   Musculoskeletal:      Cervical back: Neck supple.   Skin:     General: Skin is warm.      Capillary Refill: Capillary refill takes less than 2 seconds.   Neurological:      General: No focal deficit present.      Mental Status: She is alert and oriented to person, place, and time.   Psychiatric:         Mood and Affect: Mood normal.         Behavior: Behavior normal.         Thought Content: Thought content normal.         Judgment: Judgment normal.         CLINICAL DATA REVIEWED   reviewed previous lab results and integrated with today's visit, reviewed notes from other physicians and/or last GI encounter, reviewed previous endoscopy results and available photos, reviewed surgical pathology results from previous biopsies    ASSESSMENT  Diagnoses and all orders for this visit:    Gastroesophageal reflux disease without esophagitis    Other orders  -     cyclobenzaprine (FLEXERIL) 10 MG tablet; Take 1 tablet by mouth.          PLAN    GERD: Continue omeprazole daily, ok to use 2nd " PRN    Return in about 6 months (around 10/2/2025).    I have discussed the above plan with the patient.  They verbalize understanding and are in agreement with the plan.  They have been advised to contact the office for any questions, concerns, or changes related to their health.

## 2025-05-16 ENCOUNTER — HOSPITAL ENCOUNTER (OUTPATIENT)
Dept: GENERAL RADIOLOGY | Facility: HOSPITAL | Age: 75
Discharge: HOME | End: 2025-05-16
Payer: MEDICARE

## 2025-05-16 ENCOUNTER — OFFICE VISIT (OUTPATIENT)
Dept: FAMILY MEDICINE CLINIC | Facility: CLINIC | Age: 75
End: 2025-05-16
Payer: COMMERCIAL

## 2025-05-16 VITALS
TEMPERATURE: 97.4 F | OXYGEN SATURATION: 100 % | BODY MASS INDEX: 26.36 KG/M2 | SYSTOLIC BLOOD PRESSURE: 112 MMHG | DIASTOLIC BLOOD PRESSURE: 70 MMHG | HEIGHT: 69 IN | HEART RATE: 84 BPM | WEIGHT: 178 LBS

## 2025-05-16 DIAGNOSIS — M25.561 ACUTE PAIN OF RIGHT KNEE: Primary | ICD-10-CM

## 2025-05-16 PROCEDURE — 99213 OFFICE O/P EST LOW 20 MIN: CPT | Performed by: NURSE PRACTITIONER

## 2025-05-16 PROCEDURE — 73560 X-RAY EXAM OF KNEE 1 OR 2: CPT

## 2025-05-16 RX ORDER — METHYLPREDNISOLONE 4 MG/1
TABLET ORAL
Qty: 21 EACH | Refills: 0 | Status: SHIPPED | OUTPATIENT
Start: 2025-05-16

## 2025-05-16 NOTE — PROGRESS NOTES
Answers submitted by the patient for this visit:  Lower Extremity Injury Questionnaire (Submitted on 5/14/2025)  Chief Complaint: Extremity pain  Injury: No  Pain location: right hip, right upper leg, right knee  Pain quality: other  Pain - numeric: 6/10  Progression since onset: unchanged  tingling: No  inability to bear weight: Yes  numbness: No  lower extremity swelling: Yes  redness: No    Patient ID: Myra Charles is a 74 y.o. female     Patient Care Team:  Head, ASHLEY Da Silva as PCP - General (Nurse Practitioner)  Damian Rock DPM as Consulting Physician (Podiatry)  Cruzito Souza MD as Consulting Physician (Hematology and Oncology)  Cordell Overton MD as Consulting Physician (Nephrology)  Stephan Hernandez MD as Consulting Physician (Cardiology)  Augustine Menjivar MD as Consulting Physician (Gastroenterology)  Sonia Dominguez MD as Consulting Physician (Rheumatology)  Mayur Neal MD as Consulting Physician (Hematology and Oncology)    Subjective     Chief Complaint   Patient presents with    Knee Pain     R - does feel swollen, x 2-3 weeks. No falls        History of Present Illness      History of Present Illness  The patient presents for evaluation of right knee pain.    She reports a persistent issue with her right knee, which she rates as a 6 on a pain scale of 0 to 10. The onset of this discomfort was approximately 2 to 3 weeks ago. Initially, the pain radiated from her hip down to her leg, but it has since localized to her knee. She describes the sensation as stiff and swollen, particularly noticeable when bending her leg. The pain is most severe upon standing after sitting or lying down, and while it persists during ambulation, there is a slight increase in flexibility. She has not applied ice to the area and has found no relief from Tylenol. She has attempted to manage the pain with Voltaren gel, which did not provide relief, and has expressed interest in trying  it again. She does not own an Ace wrap. She is currently on Eliquis. She also mentions a history of fibromyalgia but does not believe the current symptoms are related.    She continues to experience atrial fibrillation and tachycardia. She takes metoprolol in the morning and half at night, but it is still not helping. She has had some episodes where she felt so bad after it happened that she ended up down for the day.  She plans to contact cardiology concerning this.      Results         She denies any complaints of fever, chills, cough, chest pain, shortness of air, abdominal pain, nausea, or any other concerns.     The following portions of the patient's history were reviewed and updated as appropriate: allergies, current medications, past family history, past medical history, past social history, past surgical history and problem list.       ROS    Vitals:    05/16/25 0837   BP: 112/70   Pulse: 84   Temp: 97.4 °F (36.3 °C)   SpO2: 100%       Documented weights    05/16/25 0837   Weight: 80.7 kg (178 lb)     Body mass index is 26.29 kg/m².    Results for orders placed or performed in visit on 03/17/25   Renal Function Panel    Collection Time: 03/17/25  3:21 PM    Specimen: Blood   Result Value Ref Range    Glucose 89 65 - 99 mg/dL    BUN 25 (H) 8 - 23 mg/dL    Creatinine 1.38 (H) 0.57 - 1.00 mg/dL    Sodium 136 136 - 145 mmol/L    Potassium 4.5 3.5 - 5.2 mmol/L    Chloride 99 98 - 107 mmol/L    CO2 25.6 22.0 - 29.0 mmol/L    Calcium 8.8 8.6 - 10.5 mg/dL    Albumin 4.2 3.5 - 5.2 g/dL    Phosphorus 3.7 2.5 - 4.5 mg/dL    Anion Gap 11.4 5.0 - 15.0 mmol/L    BUN/Creatinine Ratio 18.1 7.0 - 25.0    eGFR 40.2 (L) >60.0 mL/min/1.73   Protein / Creatinine Ratio, Urine - Urine, Clean Catch    Collection Time: 03/17/25  3:21 PM    Specimen: Urine, Clean Catch   Result Value Ref Range    Protein/Creatinine Ratio, Urine 95.0 0.0 - 200.0 mg/G Crea    Creatinine, Urine 48.4 mg/dL    Total Protein, Urine 4.6 mg/dL    Microalbumin / Creatinine Urine Ratio - Urine, Clean Catch    Collection Time: 03/17/25  3:21 PM    Specimen: Urine, Clean Catch   Result Value Ref Range    Microalbumin/Creatinine Ratio      Creatinine, Urine 48.4 mg/dL    Microalbumin, Urine <1.2 mg/dL   Urinalysis without microscopic (no culture) - Urine, Clean Catch    Collection Time: 03/17/25  3:21 PM    Specimen: Urine, Clean Catch   Result Value Ref Range    Color, UA Yellow Yellow, Straw    Appearance, UA Clear Clear    pH, UA 7.0 5.0 - 8.0    Specific Gravity, UA 1.009 1.005 - 1.030    Glucose, UA Negative Negative    Ketones, UA Negative Negative    Bilirubin, UA Negative Negative    Blood, UA Negative Negative    Protein, UA Negative Negative    Leuk Esterase, UA Negative Negative    Nitrite, UA Negative Negative    Urobilinogen, UA 0.2 E.U./dL 0.2 - 1.0 E.U./dL   Urinalysis, Microscopic Only - Urine, Clean Catch    Collection Time: 03/17/25  3:21 PM    Specimen: Urine, Clean Catch   Result Value Ref Range    RBC, UA 0-2 None Seen, 0-2 /HPF    WBC, UA 0-2 None Seen, 0-2 /HPF    Bacteria, UA None Seen None Seen /HPF    Squamous Epithelial Cells, UA 0-2 None Seen, 0-2 /HPF    Hyaline Casts, UA None Seen None Seen /LPF    Methodology Automated Microscopy            Objective     Physical Exam  Vitals reviewed.   Constitutional:       General: She is not in acute distress.  Cardiovascular:      Rate and Rhythm: Normal rate.   Pulmonary:      Effort: Pulmonary effort is normal.   Musculoskeletal:      Right knee: Swelling and bony tenderness present. No deformity, erythema or crepitus. Decreased range of motion. Normal alignment. Normal pulse.      Left knee: Normal. Normal pulse.      Right lower leg: No edema.      Left lower leg: No edema.   Neurological:      Mental Status: She is alert and oriented to person, place, and time.         Physical Exam  Extremities: Mild swelling in the right knee    Assessment & Plan     Assessment/Plan     Assessment &  Plan  1. Right knee pain.  - The patient's right knee pain has persisted for 2 to 3 weeks, rated at a 6/10 on the pain scale. The pain is accompanied by stiffness and swelling, particularly in the morning and after sitting.  - Physical examination reveals swelling on both sides of the knee, with the right lateral side being worse. The patient reports pain in the knee and up the side of the thigh.  - An x-ray of the right knee will be ordered to evaluate the joint. The patient has been counseled on the potential causes of knee pain and the limitations of the x-ray in detecting soft tissue injuries.  - A prescription for Medrol Dosepak has been sent to the pharmacy to reduce inflammation. The patient is instructed to rest, ice, and elevate the knee, use Voltaren gel after icing, and apply an Ace wrap for stabilization. If the Ace wrap is uncomfortable, a knee sleeve may be considered.        Diagnoses and all orders for this visit:    1. Acute pain of right knee (Primary)  -     XR Knee 1 or 2 View Right  -     methylPREDNISolone (MEDROL) 4 MG dose pack; Take as directed on package instructions.  Dispense: 21 each; Refill: 0          Follow Up:  No follow-ups on file.    In the meantime, instructed to contact us sooner for any problems or concerns.    Patient was given instructions and counseling regarding condition or for health maintenance advice.  Please see specific information pulled into the AVS if appropriate.      Patient or patient representative verbalized consent for the use of Ambient Listening during the visit with  ASHLEY Ramirez for chart documentation. 5/16/2025  16:19 EDT    ASHLEY Ramirez  Family Medicine  [facility]  05/16/25  16:18 EDT

## 2025-05-27 ENCOUNTER — PATIENT MESSAGE (OUTPATIENT)
Dept: CARDIOLOGY | Facility: CLINIC | Age: 75
End: 2025-05-27
Payer: COMMERCIAL

## 2025-05-27 RX ORDER — FOLIC ACID 1 MG/1
1000 TABLET ORAL DAILY
Qty: 90 TABLET | Refills: 0 | Status: SHIPPED | OUTPATIENT
Start: 2025-05-27

## 2025-05-27 RX ORDER — FOLIC ACID 1 MG/1
1000 TABLET ORAL DAILY
Qty: 90 TABLET | Refills: 0 | Status: SHIPPED | OUTPATIENT
Start: 2025-05-27 | End: 2025-05-27 | Stop reason: SDUPTHER

## 2025-05-28 RX ORDER — METOPROLOL SUCCINATE 25 MG/1
25 TABLET, EXTENDED RELEASE ORAL 2 TIMES DAILY
Start: 2025-05-28

## 2025-05-28 NOTE — TELEPHONE ENCOUNTER
Patient is taking 1 full tablet of metoprolol in the AM and 1/2 tablet in the PM. She did not take any extra this weekend. She denies being out in the heat and she said she has been staying hydrated.     Rekha Naqvi RN  Triage [facility] [place]

## 2025-05-28 NOTE — TELEPHONE ENCOUNTER
Notified patient of recommendations. Patient verbalized understanding.    Rekha Naqvi RN  Triage [facility] [place]

## 2025-05-28 NOTE — TELEPHONE ENCOUNTER
Is she still taking 1.5 metoprolol daily? Did she take any extra metoprolol this weekend? Was she out in the heat, staying hydrated?    elio

## 2025-06-01 DIAGNOSIS — M79.7 FIBROMYALGIA: ICD-10-CM

## 2025-06-02 DIAGNOSIS — M25.561 ACUTE PAIN OF RIGHT KNEE: Primary | ICD-10-CM

## 2025-06-02 RX ORDER — DULOXETIN HYDROCHLORIDE 60 MG/1
60 CAPSULE, DELAYED RELEASE ORAL DAILY
Qty: 90 CAPSULE | Refills: 1 | Status: SHIPPED | OUTPATIENT
Start: 2025-06-02

## 2025-06-12 DIAGNOSIS — M25.561 ACUTE PAIN OF RIGHT KNEE: Primary | ICD-10-CM

## 2025-06-17 ENCOUNTER — OFFICE VISIT (OUTPATIENT)
Dept: ORTHOPEDIC SURGERY | Facility: CLINIC | Age: 75
End: 2025-06-17
Payer: COMMERCIAL

## 2025-06-17 VITALS
HEART RATE: 88 BPM | WEIGHT: 180.6 LBS | HEIGHT: 69 IN | BODY MASS INDEX: 26.75 KG/M2 | DIASTOLIC BLOOD PRESSURE: 78 MMHG | SYSTOLIC BLOOD PRESSURE: 127 MMHG

## 2025-06-17 DIAGNOSIS — M17.11 PRIMARY OSTEOARTHRITIS OF RIGHT KNEE: Primary | ICD-10-CM

## 2025-06-17 RX ORDER — TRIAMCINOLONE ACETONIDE 40 MG/ML
80 INJECTION, SUSPENSION INTRA-ARTICULAR; INTRAMUSCULAR
Status: COMPLETED | OUTPATIENT
Start: 2025-06-17 | End: 2025-06-17

## 2025-06-17 RX ORDER — LIDOCAINE HYDROCHLORIDE 10 MG/ML
4 INJECTION, SOLUTION EPIDURAL; INFILTRATION; INTRACAUDAL; PERINEURAL
Status: COMPLETED | OUTPATIENT
Start: 2025-06-17 | End: 2025-06-17

## 2025-06-17 RX ADMIN — LIDOCAINE HYDROCHLORIDE 4 ML: 10 INJECTION, SOLUTION EPIDURAL; INFILTRATION; INTRACAUDAL; PERINEURAL at 14:13

## 2025-06-17 RX ADMIN — TRIAMCINOLONE ACETONIDE 80 MG: 40 INJECTION, SUSPENSION INTRA-ARTICULAR; INTRAMUSCULAR at 14:13

## 2025-06-17 NOTE — PROGRESS NOTES
- Large Joint Arthrocentesis: R knee on 6/17/2025 2:13 PM  Indications: pain  Details: 22 G needle, anterolateral approach  Medications: 80 mg triamcinolone acetonide 40 MG/ML; 4 mL lidocaine PF 1% 1 %  Outcome: tolerated well, no immediate complications  Procedure, treatment alternatives, risks and benefits explained, specific risks discussed. Consent was given by the patient. Immediately prior to procedure a time out was called to verify the correct patient, procedure, equipment, support staff and site/side marked as required. Patient was prepped and draped in the usual sterile fashion.

## 2025-06-17 NOTE — PROGRESS NOTES
Subjective:     Patient ID: Myra Charles is a 74 y.o. female.    Chief Complaint:    History of Present Illness  Myra Charles presents to clinic today for evaluation of right knee pain.  The patient states the knee pain is located anterior laterally in the knee and is worse with activity and better with rest.  So far she has tried activity modification.  She states the pain is severe and debilitating and prevents her from accomplishing the things that she like to enjoy and her ADLs.  She still to get some answers some resolution of her symptoms and to avoid surgery.  She states that she has too much to do to have this much knee pain.     Social History     Occupational History    Not on file   Tobacco Use    Smoking status: Never     Passive exposure: Never    Smokeless tobacco: Never   Vaping Use    Vaping status: Never Used   Substance and Sexual Activity    Alcohol use: Yes     Alcohol/week: 2.0 standard drinks of alcohol     Comment: Occasionally    Drug use: Never    Sexual activity: Not Currently     Partners: Male     Birth control/protection: Surgical      Past Medical History:   Diagnosis Date    Abnormal ECG     Ankle sprain     Arthritis     Arthritis of back     Asthma     Backache 02/17/2016    Low back pain, in sacral region       CKD (chronic kidney disease) stage 3, GFR 30-59 ml/min     COPD (chronic obstructive pulmonary disease)     Coronary artery disease     Cortical senile cataract 02/17/2016    Deep vein thrombosis     Essential (primary) hypertension 04/20/2016    GERD (gastroesophageal reflux disease) 02/17/2016    Hallux valgus     Deformity, w/inflammation       Hernia 6 months old    History of coronary angiogram     CT cor angio 2019, normal cors, Agatston 0    Iron deficiency anemia     Irritable bowel syndrome     Knee sprain     Knee swelling     Lung nodule, solitary 03/08/2012    Solitary nodule of lung - RIGHT upper lobe 15mm, PET positive       Malignant carcinoid  tumor of lung 02/17/2016    Atypical carcinoid tumor of the LEFT lung treated in March 2012      Mitral valve prolapse 02/17/2016    Osteoporosis 04/20/2016    Paroxysmal atrial fibrillation 02/18/2025    Primary fibromyalgia syndrome 02/17/2016    Rosacea 02/17/2016    Scoliosis     Seasonal allergies     Seborrheic keratosis     History of, upper and lower back       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 Enodscopic vein harvest on the left. Left lower extremity arteriogram.)    BREAST CYST EXCISION Left     CERVICAL CONIZATION      CONIZATION OF CERVIX 21830 (Excisional laser cone biopsy and vaporization of cervix.)    CHOLECYSTECTOMY  09/07/2005    Cholecystectomy, laparoscopic (With intraoperative cholangiogram.)    COLONOSCOPY      COLONOSCOPY W/ POLYPECTOMY N/A 01/05/2022    Procedure: COLONOSCOPY WITH POLYPECTOMY;  Surgeon: Augustine Menjivar MD;  Location:  [facility];  Service: Gastroenterology;  Laterality: N/A;  Rectal polyp    CYST REMOVAL      left 3rd toe    ELBOW PROCEDURE      Fractured left elbow    ENDOSCOPY N/A 09/26/2024    Procedure: ESOPHAGOGASTRODUODENOSCOPY WITH BIOPSY;  Surgeon: Augustine Menjivar MD;  Location:  [facility];  Service: Gastroenterology;  Laterality: N/A;  gastritis, esophagitis    EXCISION LESION  11/11/1998    REMOVE LESION BACK OR FLANK 42582 (Excision times two.)    FOOT SURGERY  01/20/2009    Foot/toes surgery procedure (Soft tissue mass, left foot. Excision of)    HERNIA  "REPAIR      Past history of umbilical herni repair.    HYSTERECTOMY  2005    OTHER SURGICAL HISTORY  12/04/2012    Anesth, elbow area surgery (Manipulation of left elbow.)    OTHER SURGICAL HISTORY  03/05/2012    Anesth, elbow area surgery (Excision of plate and screws from olecranon bursa. Retained plate and screws, olecranon bursitis of previous fracture left elbow.)    OTHER SURGICAL HISTORY  10/16/2012    Treat elbow fracture (Open reduction and internal fixation.)    THORACOSCOPY  03/14/2012    Thoracoscopy, surgical (Bronchoscopy,LVATS (wedge resect MARYBETH), LULobectomy Thoracic lymphadenectomy)    THROAT SURGERY  07/25/2012    Throat surgery procedure (Thyroplasty type I (vocal cord medialozation & larynooplasty) Left vocal cord paralysis. Dysphoria. [facility] by Dr Tk Heart)    THYROID SURGERY  03/22/2016    Thyroid surgery (Right thyroid lobectomy and isthmusectomy.)    TONSILLECTOMY  02/03/1956    Chronic tonsillitis    UPPER GASTROINTESTINAL ENDOSCOPY      VEIN LIGATION  09/28/2000    PHLEB VEINS - EXTREM (20+) 28214 (High ligation of ling saphenous vein, left, plus multiple phlebectomies, left lower extremity.)       Family History   Problem Relation Age of Onset    Cancer Mother         myeloma    Diabetes Mother     Heart disease Mother     Arthritis Mother     Hypertension Mother     Heart attack Mother     Heart disease Father     Arthritis Father     Heart attack Father     Stroke Father     Hypertension Father     Vision loss Father     Diabetes Maternal Grandmother     Lung cancer Maternal Grandfather     Lung cancer Other     Breast cancer Neg Hx     Malig Hyperthermia Neg Hx                  Objective:  Vitals:    06/17/25 1402   Height: 175.3 cm (69\")     There were no vitals filed for this visit.  Body mass index is 26.29 kg/m².           Right Knee Exam     Tenderness   The patient is experiencing tenderness in the lateral joint line, lateral retinaculum, medial retinaculum and " medial joint line.    Range of Motion   Extension:  5   Flexion:  100     Tests   Varus: negative Valgus: negative  Lachman:  Anterior - negative    Posterior - negative  Drawer:  Anterior - negative    Posterior - negative    Other   Erythema: absent  Scars: absent  Sensation: normal  Pulse: present  Swelling: mild  Effusion: no effusion present               Imagin views of the right knee were reviewed by myself in the office today  Indication: right knee pain  Findings: X-rays demonstrate no acute osseous abnormality.  There is no signs of fracture dislocation or subluxation.  There is  moderate  Kellgren and Nikita grade 3joint space narrowing, subchondral sclerosis, cystic changes, and periarticular osteophytes.  Comparative studies: None    XR Knee 1 or 2 View Right  Result Date: 2025  Impression: 1. Moderate degenerative change of the right knee. No acute bony abnormality. Electronically Signed: Joshua Harrell MD  2025 11:00 AM EDT  Workstation ID: [id number]      Assessment:        1. Primary osteoarthritis of right knee           Plan:          Discussed treatment options at length with patient at today's visit.  I discussed with the patient that she has developed right knee osteoarthritis. I discussed with the patient that the mainstays of conservative treatment for right knee OA include physical therapy, nonsteroidal anti-inflammatories, injections, activity modification, and home exercises.  The patient states that they  would like to try right knee steroid injection and referral to physical therapy.   Follow up: 6 to 8 weeks with 4 standing views of the right knee    Myra Charles was in agreement with plan and had all questions answered.     Medications:  No orders of the defined types were placed in this encounter.      Followup:  No follow-ups on file.    Diagnoses and all orders for this visit:    1. Primary osteoarthritis of right knee (Primary)          Dictated utilizing  Rubina dictation

## 2025-06-18 ENCOUNTER — OFFICE VISIT (OUTPATIENT)
Age: 75
End: 2025-06-18
Payer: COMMERCIAL

## 2025-06-18 VITALS
OXYGEN SATURATION: 98 % | HEIGHT: 69 IN | DIASTOLIC BLOOD PRESSURE: 88 MMHG | HEART RATE: 75 BPM | WEIGHT: 181.2 LBS | BODY MASS INDEX: 26.84 KG/M2 | SYSTOLIC BLOOD PRESSURE: 140 MMHG

## 2025-06-18 DIAGNOSIS — R09.89 LABILE HYPERTENSION: ICD-10-CM

## 2025-06-18 DIAGNOSIS — N18.31 STAGE 3A CHRONIC KIDNEY DISEASE: ICD-10-CM

## 2025-06-18 DIAGNOSIS — R07.2 PRECORDIAL PAIN: Primary | ICD-10-CM

## 2025-06-18 DIAGNOSIS — I48.0 PAROXYSMAL ATRIAL FIBRILLATION: ICD-10-CM

## 2025-06-18 PROCEDURE — 99214 OFFICE O/P EST MOD 30 MIN: CPT | Performed by: PHYSICIAN ASSISTANT

## 2025-06-18 RX ORDER — CALCIUM CARBONATE/VITAMIN D3 500 MG-10
TABLET ORAL
COMMUNITY

## 2025-06-18 RX ORDER — AMLODIPINE,VALSARTAN AND HYDROCHLOROTHIAZIDE 10; 25; 160 MG/1; MG/1; MG/1
TABLET, FILM COATED ORAL
COMMUNITY
End: 2025-06-18 | Stop reason: ALTCHOICE

## 2025-06-18 NOTE — PROGRESS NOTES
CARDIOLOGY        Patient Name: Myra Charles  :1950  Age: 74 y.o.  Primary Cardiologist: Stephan Hernandez MD  Encounter Provider:  Ace Gandhi PA-C    Date of Service: 25            CHIEF COMPLAINT / REASON FOR OFFICE VISIT     BP issues      HISTORY OF PRESENT ILLNESS       HPI  Myra Charles is a 74 y.o. female who presents today for BP issues.     Pt has a history significant for MVP, HTN, and CKD presents to the office for BP issues.  Patient was seen in office on 2025 where she continued to have episodes where BP would drop abruptly.  She would feel fatigued after.  She was switched off her amlodipine to verapamil and her metoprolol was discontinued.  She had a Holter Monitor placed at that time that showed atrial fibrillation 9% of time with a average heart rate of 99.  She has some issues with her verapamil and was placed back on her metoprolol.  This was adjusted and raised to metoprolol succinate 25 twice daily.  She is here for follow-up    Patient continues to have similar bouts her blood pressure going low.  She does feel better when she does not take her dose of amlodipine.  She does feel her A-fib more often.  She does have a Kardia device that she checks her heart rate with now.  She continues to check her blood pressures.  She has not had any bleeding issues being on her Eliquis.  She denies any chest pain, changes in her shortness of breath, or edema to her legs.  She does use her compression stockings.        Below is a brief summary of patient's pertinent cardiac history  She had a CT coronary angiogram in  that was completely normal; her calcium score was zero. She had an echo in 2020 that showed very mild anterior MVP without significant MR; the study was otherwise normal.  A follow up echo in  was unchanged.      She presented in 2024 with episodes of atypical chest and back pain, as well as very labile blood pressure. Her BP  "would just abruptly drop and then she would feel fatigued for hours. A myocardial perfusion stress test was normal. Since her symptoms continued, she underwent CCTA. Her CACS was 1 and she had luminal irregularities in the distal LCX and RCA. She did go into atrial fibrillation during the study. She also had a tilt table. The study itself was negative for orthostatic hypotension, POTS, or cardiodepressor syncope. However, at the end of the study, she went into AF and developed the same symptoms she had previously reported. She was placed on metoprolol and apixaban. A monitor was placed; she mailed it in on February 5.     The following portions of the patient's history were reviewed and updated as appropriate: allergies, current medications, past family history, past medical history, past social history, past surgical history and problem list.      VITAL SIGNS     Visit Vitals  /88   Pulse 75   Ht 175.3 cm (69\")   Wt 82.2 kg (181 lb 3.2 oz)   SpO2 98%   BMI 26.76 kg/m²       @[email or web address]  Wt Readings from Last 3 Encounters:   06/18/25 82.2 kg (181 lb 3.2 oz)   06/17/25 81.9 kg (180 lb 9.6 oz)   05/16/25 80.7 kg (178 lb)     Body mass index is 26.76 kg/m².        PHYSICAL EXAMINATION     Constitutional:       General: Awake. Not in acute distress.     Appearance: Not in distress.   Pulmonary:      Effort: Pulmonary effort is normal.   Cardiovascular:      Normal rate. Regular rhythm.      Murmurs: There is no murmur.   Edema:     Peripheral edema absent.   Skin:     General: Skin is warm.   Neurological:      Mental Status: Alert.   Psychiatric:         Behavior: Behavior is cooperative.           REVIEWED DATA     Procedures    Cardiac Procedures:    Holter monitor on 3/19/2025    An abnormal monitor study.    Atrial fibrillation occurred 9% of the time with an average heart rate of 99 bpm.  The longest episode lasted 3 hours and 58 minutes.    CT angiogram coronary on 1/20/2025  IMPRESSION:  Minimal " (1-24%) 2 vessel disease of the distal RCA and mid circumflex. CAD RADS 1  Overall there is mild amount of coronary plaque per above.  No non-atherosclerotic coronary abnormalities   Please refer to the radiology report for information on non-cardiac structures.        RECOMMENDATION:  Reassurance, consider nonatherosclerotic causes of chest pain.  Consider risk factor modification per guideline directed care.     Ultrasound carotid bilaterally on 6/10/2024  IMPRESSION:  Impression:  Minimal bilateral eccentric atherosclerotic plaque at the carotid bulbs. No evidence of flow-limiting stenosis. Normal bilateral ICA/CCA ratios.     Holter monitor on 5/22/2024  Interpretation Summary       A relatively benign monitor study.    Premature atrial contractions occured occasionally. 3% of all beats are PACs.    There were diary entries for dyspnea and lightheadedness; these correlate with sinus rhythm.     Stress test on 5/30/2024  Interpretation Summary       Myocardial perfusion imaging indicates a normal myocardial perfusion study with no evidence of ischemia. Impressions are consistent with a low risk study.    Left ventricular ejection fraction is hyperdynamic (Calculated EF > 70%).    There is no prior study available for comparison.     CT angiogram on 4/9/2024  IMPRESSION:  1. No evidence for pulmonary thromboembolic disease.  2. Postoperative changes on the left related to left upper lobectomy.  3. Previous right lobe thyroidectomy. 1.5 cm left lobe low-density  nodule not evident on previous chest CT 11/01/2021. Recommend further  evaluation with thyroid sonogram.      Transthoracic echo on 10/31/2023  Interpretation Summary       Left ventricular systolic function is normal. Calculated left ventricular EF = 65.8% Normal left ventricular cavity size and wall thickness noted. All left ventricular wall segments contract normally. Left ventricular diastolic function is consistent with (grade I) impaired relaxation.    " There is mild mitral valve prolapse of the anterior mitral leaflet. Trace to mild mitral valve regurgitation is present. No significant mitral valve stenosis is present.     Ultrasound renal bilaterally on 7/12/2023  IMPRESSION:     1. No hydronephrosis or shadowing stone on either side.  2. Imaging findings supportive of provided history of chronic renal  parenchymal disease.  3. Bladder not well distended or evaluated. Trace postvoid residual       Lipid Panel          7/15/2024    09:05 1/16/2025    08:11   Lipid Panel   Total Cholesterol CANCELED  193    Triglycerides CANCELED  114    HDL Cholesterol CANCELED  57    VLDL Cholesterol CANCELED  20    LDL Cholesterol   116        Lab Results   Component Value Date     03/17/2025     01/16/2025    K 4.5 03/17/2025    K 4.0 01/16/2025    CL 99 03/17/2025     01/16/2025    CO2 25.6 03/17/2025    CO2 24.6 01/16/2025    BUN 25 (H) 03/17/2025    BUN 25 (H) 01/16/2025    CREATININE 1.38 (H) 03/17/2025    CREATININE 1.77 (H) 01/16/2025    EGFRIFNONA 36 (L) 11/15/2021    EGFRIFNONA 34 (L) 08/19/2021    EGFRIFAFRI 42 (L) 11/15/2021    EGFRIFAFRI 41 (L) 08/19/2021    GLUCOSE 89 03/17/2025    GLUCOSE 85 01/16/2025    CALCIUM 8.8 03/17/2025    CALCIUM 8.9 01/16/2025    ALBUMIN 4.2 03/17/2025    ALBUMIN 4.1 01/16/2025    BILITOT 0.3 01/16/2025    BILITOT 0.3 07/15/2024    AST 17 01/16/2025    AST 25 07/15/2024    ALT 23 01/16/2025    ALT 30 07/15/2024     Lab Results   Component Value Date    WBC 6.04 01/16/2025    WBC 7.6 07/15/2024    HGB 12.8 01/16/2025    HGB 12.4 07/15/2024    HCT 39.0 01/16/2025    HCT 38.7 07/15/2024    MCV 86.9 01/16/2025    MCV 89 07/15/2024     01/16/2025     07/15/2024     No results found for: \"PROBNP\", \"BNP\"  Lab Results   Component Value Date    TROPONINT 10 05/18/2024     Lab Results   Component Value Date    TSH 2.470 01/16/2025    TSH 1.340 08/26/2024             ASSESSMENT & PLAN     Diagnoses and all orders for " this visit:    1. Precordial pain (Primary)    2. Paroxysmal atrial fibrillation    3. Labile hypertension    4. Stage 3a chronic kidney disease      Mrs Charles has a history of hypertension and CKD 3 and has been having episodes of hypotension intermittently, associated with chest and back discomfort. She was found to have PAF during a tilt table test, and this recreated her symptoms.  She feels better when she does not take her amlodipine at night.  Will reduce her dose and have her take 2.5 mg in the morning and 2.5 mg at night.  She will continue checking her blood pressures and heart rate.      She does notice her A-fib more so recently.  She does have a Kardia device that she uses to check it.  Currently on metoprolol and tolerating it well.  She is on Eliquis without any bleeding issues.     She will continue with apixaban; her [id number] = 4.    She has no distress today.  She will continue her metoprolol and Eliquis.  I will reduce her amlodipine and she will take 2.5 mg in the morning and 2.5 mg at night.  Will continue checking her blood pressures and heart rate and let us know what they are running in 2 weeks.  She will continue her follow-up with Dr. Hernandez in August.  I wonder if her medications she is currently taking contribute to her low blood pressures.  She does only occasionally take her Flexeril.    Return in about 3 months (around 9/18/2025) for Dr. Hernandez.    Future Appointments         Provider Department Center    7/8/2025 11:00 AM Kurt Huffman, TEQUILA [facility]    7/16/2025 9:20 AM (Arrive by 9:05 AM) [facility] PR[facility]    7/24/2025 1:45 PM (Arrive by 1:30 PM) Head, ASHLEY Da Silva [facility] PRIMARY CARE [facility]    8/19/2025 11:00 AM (Arrive by 10:45 AM) Stephan Hernandez MD [facility] CARDIOLOGY [facility]    8/22/2025 1:30 PM (Arrive by 1:15 PM) Tian Nicole MD [facility]  GROUP ORTHOPEDICS [facility]    4/2/2026 1:00 PM (Arrive by 12:45 PM) Noreen Solares APRN [facility] GASTROENTEROLOGY [facility]                MEDICATIONS         Discharge Medications            Accurate as of June 18, 2025  2:43 PM. If you have any questions, ask your nurse or doctor.                Changes to Medications        Instructions Start Date   cholecalciferol 25 MCG (1000 UT) tablet  Commonly known as: VITAMIN D3  What changed:   how much to take  when to take this   1,000 Units, Oral, Daily      fluticasone 50 MCG/ACT nasal spray  Commonly known as: FLONASE  What changed:   when to take this  reasons to take this   1 spray, Nasal, Daily      pantoprazole 40 MG EC tablet  Commonly known as: PROTONIX  What changed: when to take this   40 mg, Oral, 2 Times Daily             Continue These Medications        Instructions Start Date   albuterol sulfate  (90 Base) MCG/ACT inhaler  Commonly known as: PROVENTIL HFA;VENTOLIN HFA;PROAIR HFA   2 puffs, Inhalation, Every 4 Hours PRN      amLODIPine 5 MG tablet  Commonly known as: NORVASC   7.5 mg, Daily      apixaban 5 MG tablet tablet  Commonly known as: ELIQUIS   5 mg, Oral, 2 Times Daily      Ascorbic Acid 500 MG capsule   No dose, route, or frequency recorded.      Breo Ellipta 100-25 MCG/ACT aerosol powder   Generic drug: Fluticasone Furoate-Vilanterol   No dose, route, or frequency recorded.      Calcium 500+D 500-10 MG-MCG tablet  Generic drug: Calcium Carb-Cholecalciferol       Calcium-Phosphorus-Vitamin D 250-107-500 MG-MG-UNIT chewable tablet   1 tablet, Daily      cyclobenzaprine 10 MG tablet  Commonly known as: FLEXERIL   1 tablet      DULoxetine 60 MG capsule  Commonly known as: CYMBALTA   60 mg, Oral, Daily      folic acid 1 MG tablet  Commonly known as: FOLVITE   1,000 mcg, Oral, Daily      magnesium oxide 400 MG tablet  Commonly known as: MAG-OX   400 mg, Daily      metoprolol succinate XL 25 MG 24 hr tablet  Commonly known as:  TOPROL-XL   25 mg, Oral, 2 Times Daily      multivitamins-minerals capsule capsule   1 capsule, Oral, 2 Times Daily      Prolia 60 MG/ML solution prefilled syringe syringe  Generic drug: denosumab   No dose, route, or frequency recorded.      vitamin B-12 1000 MCG tablet  Commonly known as: CYANOCOBALAMIN   1,000 mcg, 2 Times Weekly                   **Dragon Disclaimer:   Much of this encounter note is an electronic transcription/translation of spoken language to printed text. The electronic translation of spoken language may permit erroneous, or at times, nonsensical words or phrases to be inadvertently transcribed. Although I have reviewed the note for such errors, some may still exist.

## 2025-06-19 ENCOUNTER — PATIENT ROUNDING (BHMG ONLY) (OUTPATIENT)
Dept: ORTHOPEDIC SURGERY | Facility: CLINIC | Age: 75
End: 2025-06-19
Payer: COMMERCIAL

## 2025-06-19 NOTE — PROGRESS NOTES
A My-Chart message has been sent to the patient for PATIENT ROUNDING with [facility].

## 2025-06-23 ENCOUNTER — PATIENT MESSAGE (OUTPATIENT)
Dept: CARDIOLOGY | Facility: CLINIC | Age: 75
End: 2025-06-23
Payer: COMMERCIAL

## 2025-06-24 RX ORDER — APIXABAN 5 MG/1
5 TABLET, FILM COATED ORAL 2 TIMES DAILY
Qty: 60 TABLET | Refills: 0 | Status: SHIPPED | OUTPATIENT
Start: 2025-06-24 | End: 2025-06-24 | Stop reason: SDUPTHER

## 2025-07-08 ENCOUNTER — HOSPITAL ENCOUNTER (OUTPATIENT)
Dept: PHYSICAL THERAPY | Facility: HOSPITAL | Age: 75
Setting detail: THERAPIES SERIES
Discharge: HOME | End: 2025-07-08
Payer: MEDICARE

## 2025-07-08 DIAGNOSIS — M17.11 ARTHRITIS OF RIGHT KNEE: Primary | ICD-10-CM

## 2025-07-08 PROCEDURE — 97161 PT EVAL LOW COMPLEX 20 MIN: CPT

## 2025-07-08 NOTE — THERAPY EVALUATION
Outpatient Physical Therapy Ortho Initial Evaluation   Jose Maria     Patient Name: Myra Charles  : 1950  MRN: [id number]  Today's Date: 2025      Visit Date: 2025    Patient Active Problem List   Diagnosis    Nonexudative age-related macular degeneration, bilateral, early dry stage    Cortical age-related cataract    Hyperthyroidism    Multinodular goiter (nontoxic)    Osteoporosis    B12 deficiency    Malignant neoplasm of upper lobe of left lung    Idiopathic peripheral neuropathy    Gastroesophageal reflux disease without esophagitis    Elevated ferritin    Abnormal blood level of iron    Fibromyalgia    History of deep venous thrombosis    History of partial thyroidectomy    Hoarseness or changing voice    Encounter for general adult medical examination without abnormal findings    Osteopenia    PAC (premature atrial contraction)    Restless legs syndrome    Screen for colon cancer    Medicare annual wellness visit, subsequent    Vitreous floaters    Vitreous detachment    Vitamin D deficiency    Varicose veins of both lower extremities with pain    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    Seborrheic keratosis    Rosacea    Fibrositis    Mitral valve prolapse    Malignant carcinoid tumor of lung    Lung nodule, solitary    Iron deficiency anemia    Hallux valgus    GERD (gastroesophageal reflux disease)    Essential hypertension    Cortical senile cataract    Backache    Neck pain    CKD (chronic kidney disease) stage 3, GFR 30-59 ml/min    Overweight (BMI 25.0-29.9)    Cervical nerve root compression    Chronic left-sided low back pain with left-sided sciatica    DDD (degenerative disc disease), cervical    Dupuytren's contracture of right hand    Elevated alkaline phosphatase level    History of acute renal failure    History of bilateral cataract extraction    History of fracture of left  ankle    History of iron deficiency    History of malignant carcinoid tumor of bronchus and lung    Non-rheumatic mitral regurgitation    Osteoarthritis    Bile reflux esophagitis    Left foot pain    Anemia    Chest pain    Paroxysmal atrial fibrillation        Past Medical History:   Diagnosis Date    Abnormal ECG     Ankle sprain     Arthritis     Arthritis of back     Asthma     Backache 02/17/2016    Low back pain, in sacral region       CKD (chronic kidney disease) stage 3, GFR 30-59 ml/min     COPD (chronic obstructive pulmonary disease)     Coronary artery disease     Cortical senile cataract 02/17/2016    Deep vein thrombosis     Essential (primary) hypertension 04/20/2016    GERD (gastroesophageal reflux disease) 02/17/2016    Hallux valgus     Deformity, w/inflammation       Hernia 6 months old    History of coronary angiogram     CT cor angio 2019, normal cors, Agatston 0    Iron deficiency anemia     Irritable bowel syndrome     Knee sprain     Knee swelling     Lung nodule, solitary 03/08/2012    Solitary nodule of lung - RIGHT upper lobe 15mm, PET positive       Malignant carcinoid tumor of lung 02/17/2016    Atypical carcinoid tumor of the LEFT lung treated in March 2012      Mitral valve prolapse 02/17/2016    Osteoporosis 04/20/2016    Paroxysmal atrial fibrillation 02/18/2025    Primary fibromyalgia syndrome 02/17/2016    Rosacea 02/17/2016    Scoliosis     Seasonal allergies     Seborrheic keratosis     History of, upper and lower back       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 04/20/2016    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 02/17/2016    Paralysis of vocal cords and larynx, unilateral - LEFT side       Varicose vein     Vitamin D deficiency 04/20/2016    Vitreous detachment 12/01/2014    Vitreous floaters 12/01/2014        Past Surgical History:   Procedure Laterality Date    ADENOIDECTOMY  2/3/1956    ARTERIAL BYPASS SURGERY  09/25/2012     Artery bypass graft (Left femoral to posterior tibial artery bypass. Enodscopic vein harvest on the left. Left lower extremity arteriogram.)    BREAST CYST EXCISION Left     CERVICAL CONIZATION      CONIZATION OF CERVIX 24359 (Excisional laser cone biopsy and vaporization of cervix.)    CHOLECYSTECTOMY  09/07/2005    Cholecystectomy, laparoscopic (With intraoperative cholangiogram.)    COLONOSCOPY      COLONOSCOPY W/ POLYPECTOMY N/A 01/05/2022    Procedure: COLONOSCOPY WITH POLYPECTOMY;  Surgeon: Augustine Menjivar MD;  Location:  [facility];  Service: Gastroenterology;  Laterality: N/A;  Rectal polyp    CYST REMOVAL      left 3rd toe    ELBOW PROCEDURE      Fractured left elbow    ENDOSCOPY N/A 09/26/2024    Procedure: ESOPHAGOGASTRODUODENOSCOPY WITH BIOPSY;  Surgeon: Augustine Menjivar MD;  Location:  [facility];  Service: Gastroenterology;  Laterality: N/A;  gastritis, esophagitis    EXCISION LESION  11/11/1998    REMOVE LESION BACK OR FLANK 84238 (Excision times two.)    FOOT SURGERY  01/20/2009    Foot/toes surgery procedure (Soft tissue mass, left foot. Excision of)    HERNIA REPAIR      Past history of umbilical herni repair.    HYSTERECTOMY  2005    OTHER SURGICAL HISTORY  12/04/2012    Anesth, elbow area surgery (Manipulation of left elbow.)    OTHER SURGICAL HISTORY  03/05/2012    Anesth, elbow area surgery (Excision of plate and screws from olecranon bursa. Retained plate and screws, olecranon bursitis of previous fracture left elbow.)    OTHER SURGICAL HISTORY  10/16/2012    Treat elbow fracture (Open reduction and internal fixation.)    THORACOSCOPY  03/14/2012    Thoracoscopy, surgical (Bronchoscopy,LVATS (wedge resect MARYBETH), LULobectomy Thoracic lymphadenectomy)    THROAT SURGERY  07/25/2012    Throat surgery procedure (Thyroplasty type I (vocal cord medialozation & larynooplasty) Left vocal cord paralysis. Dysphoria. [facility] by Dr Tk Heart)    THYROID SURGERY  03/22/2016     Thyroid surgery (Right thyroid lobectomy and isthmusectomy.)    TONSILLECTOMY  02/03/1956    Chronic tonsillitis    UPPER GASTROINTESTINAL ENDOSCOPY      VEIN LIGATION  09/28/2000    PHLEB VEINS - EXTREM (20+) 85136 (High ligation of ling saphenous vein, left, plus multiple phlebectomies, left lower extremity.)       Visit Dx:     ICD-10-CM ICD-9-CM   1. Arthritis of right knee  M17.11 716.96          Patient History       Row Name 07/08/25 1000             History    Chief Complaint Joint stiffness;Joint swelling  -AS      Type of Pain Knee pain  -AS      Brief Description of Current Complaint Patient presents to outpatient PT with a diagnosis of right knee OA. Patient reports she would like to avoid surgery. Patient did receive an injection in right knee which she states has helped a lot.  -AS      Previous treatment for THIS PROBLEM Injections  -AS      Patient/Caregiver Goals Improve mobility;Improve strength  -AS      Patient's Rating of General Health Good  -AS      Hand Dominance right-handed  -AS      Occupation/sports/leisure activities Walking  -AS      Patient seeing anyone else for problem(s)? Yes  -AS      How has patient tried to help current problem? rest, medication  -AS      What clinical tests have you had for this problem? X-ray  -AS      Results of Clinical Tests Advanced OA right knee  -AS      Are you or can you be pregnant No  -AS         Pain     Pain Location Knee  -AS      Pain at Present 0  -AS      Pain at Best 0  -AS      Pain at Worst 8  -AS      Pain Frequency Intermittent  -AS      Pain Description Aching  -AS      What Performance Factors Make the Current Problem(s) WORSE? Activity  -AS      What Performance Factors Make the Current Problem(s) BETTER? Rest  -AS         Fall Risk Assessment    Any falls in the past year: No  -AS         Services    Prior Rehab/Home Health Experiences No  -AS      Are you currently receiving Home Health services No  -AS      Do you plan to receive  Home Health services in the near future No  -AS         Daily Activities    Primary Language English  -AS      Are you able to read Yes  -AS      Are you able to write Yes  -AS      How does patient learn best? Listening;Reading  -AS      Teaching needs identified Home Exercise Program;Management of Condition  -AS      Patient is concerned about/has problems with Climbing Stairs;Flexibility;Performing home management (household chores, shopping, care of dependents);Performing job responsibilities/community activities (work, school,;Performing sports, recreation, and play activities;Walking  -AS      Does patient have problems with the following? None  -AS      Barriers to learning None  -AS      Pt Participated in POC and Goals Yes  -AS         Safety    Are you being hurt, hit, or frightened by anyone at home or in your life? No  -AS      Are you being neglected by a caregiver No  -AS      Have you had any of the following issues with N/A  -AS                User Key  (r) = Recorded By, (t) = Taken By, (c) = Cosigned By      Initials Name Provider Type    AS Kurt Huffman, PT Physical Therapist                     PT Ortho       Row Name 07/08/25 1000       Precautions and Contraindications    Precautions/Limitations no known precautions/limitations  -AS       Posture/Observations    Posture- WNL Posture is WNL  -AS       Patellar Accessory Motions    Superior glide Right:;Hypomobile  -AS    Inferior glide Right:;Hypomobile  -AS    Medial glide Right:;Hypomobile  -AS    Lateral glide Right:;Hypomobile  -AS       Right Lower Ext    Rt Knee Extension/Flexion AROM 0-123 degrees  -AS       MMT Right Lower Ext    Rt Hip Flexion MMT, Gross Movement (4-/5) good minus  -AS    Rt Hip Extension MMT, Gross Movement (4-/5) good minus  -AS    Rt Hip ABduction MMT, Gross Movement (4-/5) good minus  -AS    Rt Knee Extension MMT, Gross Movement (4-/5) good minus  -AS    Rt Knee Flexion MMT, Gross Movement (4-/5) good minus   -AS       Sensation    Sensation WNL? WNL  -AS    Light Touch No apparent deficits  -AS       Lower Extremity Flexibility    Hamstrings Right:;Moderately limited  -AS              User Key  (r) = Recorded By, (t) = Taken By, (c) = Cosigned By      Initials Name Provider Type    AS Kurt Huffman, PT Physical Therapist                                Therapy Education  Given: HEP, Symptoms/condition management, Pain management  Program: New  How Provided: Verbal, Demonstration, Written  Provided to: Patient  Level of Understanding: Teach back education performed, Demonstrated, Verbalized      PT OP Goals       Row Name 07/08/25 1000          PT Short Term Goals    STG 1 Patient to demonstrate compliance with initial HEP for flexibility, ROM, and strengthening.  -AS     STG 2 Patient to report RLE pain on VAS of 4-5/10 at worst with activity.  -AS     STG 3 Patient to demonstrate improved RLE strength to 4/5 in all planes.  -AS     STG 4 --  -AS        Long Term Goals    LTG 1 Patient to demonstrate compliance with advanced HEP for flexibility, ROM, and strengthening.  -AS     LTG 2 Patient to report RLE pain on VAS of 1-2/10 at worst with activity.  -AS     LTG 3 Patient to demonstrate improved RLE strength to 4/5 in all planes.  -AS     LTG 4 Patient to report improved function and decreased pain on LEFS to 55/80.  -AS     LTG 5 --  -AS               User Key  (r) = Recorded By, (t) = Taken By, (c) = Cosigned By      Initials Name Provider Type    AS Kurt Huffman, PT Physical Therapist                     PT Assessment/Plan       Row Name 07/08/25 1000          PT Assessment    Functional Limitations Impaired gait;Impaired locomotion;Limitations in community activities;Limitation in home management;Performance in leisure activities;Performance in self-care ADL;Performance in work activities;Performance in sport activities  -AS     Impairments Impaired aerobic capacity;Impaired flexibility;Muscle  strength;Pain;Range of motion  -AS     Assessment Comments Patient presents to outpatient PT with a diagnosis of right knee OA. Patient reports she would like to avoid surgery. Patient did receive an injection in right knee which she states has helped a lot. Patient has good left knee ROM of 0-123 degrees, limited RLE strength, and increased pain with activity. Patient has limited function at this time secondary to the above.  -AS     Please refer to paper survey for additional self-reported information Yes  -AS     Rehab Potential Good  -AS     Patient/caregiver participated in establishment of treatment plan and goals Yes  -AS     Patient would benefit from skilled therapy intervention Yes  -AS        PT Plan    PT Frequency 1x/week  -AS     Predicted Duration of Therapy Intervention (PT) 4-6 weeks  -AS     Planned CPT's? PT RE-EVAL: 50289;PT THER PROC EA 15 MIN: 09751;PT THER ACT EA 15 MIN: 88297;PT MANUAL THERAPY EA 15 MIN: 00903;PT NEUROMUSC RE-EDUCATION EA 15 MIN: 53637;PT GAIT TRAINING EA 15 MIN: 43083  -AS               User Key  (r) = Recorded By, (t) = Taken By, (c) = Cosigned By      Initials Name Provider Type    AS Kurt Huffman, PT Physical Therapist                       OP Exercises       Row Name 07/08/25 1000             Subjective Pain    Able to rate subjective pain? yes  -AS      Pre-Treatment Pain Level 0  -AS      Post-Treatment Pain Level 0  -AS         Exercise 1    Exercise Name 1 R HS Stretch  -AS      Reps 1 10  -AS      Time 1 10 sec hold each  -AS         Exercise 2    Exercise Name 2 QS  -AS      Reps 2 25  -AS      Time 2 5 sec hold each  -AS         Exercise 3    Exercise Name 3 3-Way Hip  -AS      Reps 3 25 each  -AS         Exercise 4    Exercise Name 4 --  -AS      Reps 4 --  -AS         Exercise 5    Exercise Name 5 Supine Clams  -AS      Reps 5 --  -AS      Time 5 --  -AS         Exercise 6    Exercise Name 6 Bridge vs Band  -AS         Exercise 7    Exercise Name 7 SAQ  Ball Squeeze  -AS      Reps 7 25  -AS         Exercise 8    Exercise Name 8 LAQ  -AS      Reps 8 25  -AS         Exercise 9    Exercise Name 9 TKE  -AS      Reps 9 --  -AS      Time 9 --  -AS         Exercise 10    Exercise Name 10 Heel Raises  -AS         Exercise 11    Exercise Name 11 Mini Squats  -AS         Exercise 12    Exercise Name 12 FWD Step Ups  -AS         Exercise 13    Exercise Name 13 Lateral Step Overs  -AS                User Key  (r) = Recorded By, (t) = Taken By, (c) = Cosigned By      Initials Name Provider Type    AS Kurt Huffman, PT Physical Therapist                                  Outcome Measure Options: Lower Extremity Functional Scale (LEFS)  Lower Extremity Functional Index  Any of your usual work, housework or school activities: A little bit of difficulty  Your usual hobbies, recreational or sporting activities: Moderate difficulty  Getting into or out of the bath: A little bit of difficulty  Walking between rooms: No difficulty  Putting on your shoes or socks: No difficulty  Squatting: Moderate difficulty  Lifting an object, like a bag of groceries from the floor: A little bit of difficulty  Performing light activities around your home: No difficulty  Performing heavy activities around your home: Moderate difficulty  Getting into or out of a car: No difficulty  Walking 2 blocks: A little bit of difficulty  Walking a mile: A little bit of difficulty  Going up or down 10 stairs (about 1 flight of stairs): Moderate difficulty  Standing for 1 hour: A little bit of difficulty  Sitting for 1 hour: No difficulty  Running on even ground: Extreme difficulty or unable to perform activity  Running on uneven ground: Extreme difficulty or unable to perform activity  Making sharp turns while running fast: Moderate difficulty  Hopping: Quite a bit of difficulty  Rolling over in bed: No difficulty  Total: 53  Lower Extremity Functional Index  Any of your usual work, housework or school  activities: A little bit of difficulty  Your usual hobbies, recreational or sporting activities: Moderate difficulty  Getting into or out of the bath: A little bit of difficulty  Walking between rooms: No difficulty  Putting on your shoes or socks: No difficulty  Squatting: Moderate difficulty  Lifting an object, like a bag of groceries from the floor: A little bit of difficulty  Performing light activities around your home: No difficulty  Performing heavy activities around your home: Moderate difficulty  Getting into or out of a car: No difficulty  Walking 2 blocks: A little bit of difficulty  Walking a mile: A little bit of difficulty  Going up or down 10 stairs (about 1 flight of stairs): Moderate difficulty  Standing for 1 hour: A little bit of difficulty  Sitting for 1 hour: No difficulty  Running on even ground: Extreme difficulty or unable to perform activity  Running on uneven ground: Extreme difficulty or unable to perform activity  Making sharp turns while running fast: Moderate difficulty  Hopping: Quite a bit of difficulty  Rolling over in bed: No difficulty  Total: 53      Time Calculation:     Start Time: 1044  Stop Time: 1141  Time Calculation (min): 57 min     Therapy Charges for Today       Code Description Service Date Service Provider Modifiers Qty    [id number]  PT EVAL LOW COMPLEXITY 4 7/8/2025 Kurt Huffman, PT GP 1            PT G-Codes  Outcome Measure Options: Lower Extremity Functional Scale (LEFS)  Total: 53         Kurt Huffman, PT  7/8/2025

## 2025-07-10 DIAGNOSIS — K21.9 GASTROESOPHAGEAL REFLUX DISEASE, UNSPECIFIED WHETHER ESOPHAGITIS PRESENT: ICD-10-CM

## 2025-07-11 RX ORDER — PANTOPRAZOLE SODIUM 40 MG/1
40 TABLET, DELAYED RELEASE ORAL DAILY
Qty: 90 TABLET | Refills: 3 | Status: SHIPPED | OUTPATIENT
Start: 2025-07-11

## 2025-07-15 DIAGNOSIS — I10 ESSENTIAL HYPERTENSION: ICD-10-CM

## 2025-07-15 DIAGNOSIS — I48.91 NEW ONSET ATRIAL FIBRILLATION: ICD-10-CM

## 2025-07-15 DIAGNOSIS — K21.9 GASTROESOPHAGEAL REFLUX DISEASE WITHOUT ESOPHAGITIS: ICD-10-CM

## 2025-07-15 DIAGNOSIS — N18.32 STAGE 3B CHRONIC KIDNEY DISEASE: ICD-10-CM

## 2025-07-17 LAB
ALBUMIN SERPL-MCNC: 4.2 G/DL (ref 3.5–5.2)
ALBUMIN/GLOB SERPL: 2.1 G/DL
ALP SERPL-CCNC: 102 U/L (ref 39–117)
ALT SERPL-CCNC: 36 U/L (ref 1–33)
AST SERPL-CCNC: 31 U/L (ref 1–32)
BILIRUB SERPL-MCNC: 0.4 MG/DL (ref 0–1.2)
BUN SERPL-MCNC: 21 MG/DL (ref 8–23)
BUN/CREAT SERPL: 13.7 (ref 7–25)
CALCIUM SERPL-MCNC: 9 MG/DL (ref 8.6–10.5)
CHLORIDE SERPL-SCNC: 103 MMOL/L (ref 98–107)
CHOLEST SERPL-MCNC: 187 MG/DL (ref 0–200)
CHOLEST/HDLC SERPL: 2.75 {RATIO}
CO2 SERPL-SCNC: 26.5 MMOL/L (ref 22–29)
CREAT SERPL-MCNC: 1.53 MG/DL (ref 0.57–1)
EGFRCR SERPLBLD CKD-EPI 2021: 35.6 ML/MIN/1.73
ERYTHROCYTE [DISTWIDTH] IN BLOOD BY AUTOMATED COUNT: 13.2 % (ref 12.3–15.4)
GLOBULIN SER CALC-MCNC: 2 GM/DL
GLUCOSE SERPL-MCNC: 82 MG/DL (ref 65–99)
HCT VFR BLD AUTO: 39.6 % (ref 34–46.6)
HDLC SERPL-MCNC: 68 MG/DL (ref 40–60)
HGB BLD-MCNC: 12.9 G/DL (ref 12–15.9)
LDLC SERPL CALC-MCNC: 104 MG/DL (ref 0–100)
MCH RBC QN AUTO: 28.9 PG (ref 26.6–33)
MCHC RBC AUTO-ENTMCNC: 32.6 G/DL (ref 31.5–35.7)
MCV RBC AUTO: 88.8 FL (ref 79–97)
PLATELET # BLD AUTO: 239 10*3/MM3 (ref 140–450)
POTASSIUM SERPL-SCNC: 4.6 MMOL/L (ref 3.5–5.2)
PROT SERPL-MCNC: 6.2 G/DL (ref 6–8.5)
RBC # BLD AUTO: 4.46 10*6/MM3 (ref 3.77–5.28)
SODIUM SERPL-SCNC: 140 MMOL/L (ref 136–145)
TRIGL SERPL-MCNC: 81 MG/DL (ref 0–150)
TSH SERPL DL<=0.005 MIU/L-ACNC: 1.6 UIU/ML (ref 0.27–4.2)
VLDLC SERPL CALC-MCNC: 15 MG/DL (ref 5–40)
WBC # BLD AUTO: 7.09 10*3/MM3 (ref 3.4–10.8)

## 2025-07-24 ENCOUNTER — OFFICE VISIT (OUTPATIENT)
Dept: FAMILY MEDICINE CLINIC | Facility: CLINIC | Age: 75
End: 2025-07-24
Payer: COMMERCIAL

## 2025-07-24 VITALS
DIASTOLIC BLOOD PRESSURE: 80 MMHG | SYSTOLIC BLOOD PRESSURE: 128 MMHG | HEART RATE: 78 BPM | WEIGHT: 181 LBS | HEIGHT: 69 IN | BODY MASS INDEX: 26.81 KG/M2 | TEMPERATURE: 97.3 F | OXYGEN SATURATION: 100 %

## 2025-07-24 DIAGNOSIS — E78.00 ELEVATED LDL CHOLESTEROL LEVEL: ICD-10-CM

## 2025-07-24 DIAGNOSIS — M81.0 AGE-RELATED OSTEOPOROSIS WITHOUT CURRENT PATHOLOGICAL FRACTURE: ICD-10-CM

## 2025-07-24 DIAGNOSIS — I10 ESSENTIAL HYPERTENSION: ICD-10-CM

## 2025-07-24 DIAGNOSIS — I48.0 PAROXYSMAL ATRIAL FIBRILLATION: Primary | ICD-10-CM

## 2025-07-24 DIAGNOSIS — M79.7 FIBROMYALGIA: ICD-10-CM

## 2025-07-24 DIAGNOSIS — J44.9 CHRONIC OBSTRUCTIVE PULMONARY DISEASE, UNSPECIFIED COPD TYPE: ICD-10-CM

## 2025-07-24 DIAGNOSIS — N18.32 STAGE 3B CHRONIC KIDNEY DISEASE: ICD-10-CM

## 2025-07-24 NOTE — PROGRESS NOTES
Answers submitted by the patient for this visit:  Problem not listed (Submitted on 7/17/2025)  Chief Complaint: Other medical problem  Reason for appointment: Regular Follow up  abdominal pain: No  anorexia: No  joint pain: No  change in stool: No  chest pain: No  chills: No  nasal congestion: No  cough: No  diaphoresis: No  fatigue: No  fever: No  headaches: No  joint swelling: No  myalgias: No  nausea: No  neck pain: No  numbness: No  rash: No  sore throat: No  swollen glands: No  dysuria: No  vertigo: No  visual change: No  vomiting: No  weakness: No  Patient ID: Myra Charles is a 74 y.o. female     Patient Care Team:  Head, ASHLEY Da Silva as PCP - General (Nurse Practitioner)  Cordell Overton MD as Consulting Physician (Nephrology)  Stephan Hernandez MD as Consulting Physician (Cardiology)  Otto, Augustine Cordova MD as Consulting Physician (Gastroenterology)  Deonte Blanco MD as Consulting Physician (Rheumatology)  Jamie Nur MD as Consulting Physician (Pulmonary Disease)    Subjective     Chief Complaint   Patient presents with    Hypertension    Fibromyalgia       History of Present Illness      History of Present Illness  The patient presents for a 6-month checkup and had some blood work done.    She has been experiencing bruising, which she attributes to her blood thinners. Her small dog, myrtle by the name of Leslye,  often jumps on her, causing additional bruising. She reports having had two episodes of atrial fibrillation this week, which she manages by sitting down and relaxing or splashing cold water on her face. She is under the care of Dr. Hernandez for this condition. Additionally, she experiences tachycardia, with her heart rate exceeding 100, leading to back pain across her bra line. She believes these symptoms are due to either atrial fibrillation or tachycardia and manages them by resting for a few minutes.    She is currently seeing a kidney specialist, a  gastroenterologist, and an orthopedic doctor. She believes her gastroenterologist will discharge her soon as she sees no need for further visits. She has one more appointment with her orthopedic doctor, who will likely advise her to return only if she experiences knee pain again. She received an injection from him, which significantly improved her condition. She has attended one physical therapy session and continues to do the exercises at home, which have been beneficial.   She continues to see her rheumatologist, Dr. Blanco, who manages her Prolia treatment. She consults a podiatrist, Dr. Ridley, for foot issues and injections.    She has not seen her oncologist in a long time. She saw an ENT specialist who biopsied a nodule, which was negative. He is now monitoring her for tinnitus. She wanted to ensure she did not have hearing loss, which she does not, except at 8000 Hz, which is not a concern. He also monitors her thyroid due to the presence of nodules. She has an appointment with him in 09/2025.    She continues to see her pulmonologist, Dr. Nur, for COPD management. She experiences shortness of breath during activities such as making the bed or changing sheets. She is unsure if her condition has worsened or remains the same. She plans to discuss this with her pulmonologist. She has been told she has a 10% chance of tumor recurrence and wants to ensure it is not present. Her COPD was mild until she contracted COVID-19, after which it worsened. She uses inhalers for her COPD.    She has noticed an increase in her HDL levels and a decrease in her LDL levels.    Social History:  Marital Status:   Living Condition: Lives alone    PAST SURGICAL HISTORY:   - Lung cancer surgery in 2012    Results  DEXA Bone Density Axial (01/03/2024 09:37)    Mammo Screening Digital Tomosynthesis Bilateral With CAD (03/31/2025 12:13)      Labs   - White Blood Cell Count: Normal   - Hemoglobin: Stable   - Thyroid Function  Test: Normal   - LDL Cholesterol: Decreased   - Total Cholesterol: Decreased   - HDL Cholesterol: Increased   - Kidney Function Test: GFR increased from 29.9 to 35.6, creatinine decreased from 1.7 to 1.53   - Liver Function Test: Normal       She denies any complaints of fever, chills, cough, chest pain, shortness of air, abdominal pain, nausea, or any other concerns.     The following portions of the patient's history were reviewed and updated as appropriate: allergies, current medications, past family history, past medical history, past social history, past surgical history and problem list.       ROS    Vitals:    07/24/25 1333   BP: 128/80   Pulse: 78   Temp: 97.3 °F (36.3 °C)   SpO2: 100%       Documented weights    07/24/25 1333   Weight: 82.1 kg (181 lb)     Body mass index is 26.73 kg/m².    Results for orders placed or performed in visit on 07/15/25   TSH Rfx On Abnormal To Free T4    Collection Time: 07/16/25  9:15 AM    Specimen: Blood   Result Value Ref Range    TSH 1.600 0.270 - 4.200 uIU/mL   Lipid Panel With / Chol / HDL Ratio    Collection Time: 07/16/25  9:15 AM    Specimen: Blood   Result Value Ref Range    Total Cholesterol 187 0 - 200 mg/dL    Triglycerides 81 0 - 150 mg/dL    HDL Cholesterol 68 (H) 40 - 60 mg/dL    VLDL Cholesterol [place] 15 5 - 40 mg/dL    LDL Chol Calc (NIH) 104 (H) 0 - 100 mg/dL    Chol/HDL Ratio 2.75    Comprehensive Metabolic Panel    Collection Time: 07/16/25  9:15 AM    Specimen: Blood   Result Value Ref Range    Glucose 82 65 - 99 mg/dL    BUN 21.0 8.0 - 23.0 mg/dL    Creatinine 1.53 (H) 0.57 - 1.00 mg/dL    EGFR Result 35.6 (L) >60.0 mL/min/1.73    BUN/Creatinine Ratio 13.7 7.0 - 25.0    Sodium 140 136 - 145 mmol/L    Potassium 4.6 3.5 - 5.2 mmol/L    Chloride 103 98 - 107 mmol/L    Total CO2 26.5 22.0 - 29.0 mmol/L    Calcium 9.0 8.6 - 10.5 mg/dL    Total Protein 6.2 6.0 - 8.5 g/dL    Albumin 4.2 3.5 - 5.2 g/dL    Globulin 2.0 gm/dL    A/G Ratio 2.1 g/dL    Total  Bilirubin 0.4 0.0 - 1.2 mg/dL    Alkaline Phosphatase 102 39 - 117 U/L    AST (SGOT) 31 1 - 32 U/L    ALT (SGPT) 36 (H) 1 - 33 U/L   CBC (No Diff)    Collection Time: 07/16/25  9:15 AM    Specimen: Blood   Result Value Ref Range    WBC 7.09 3.40 - 10.80 10*3/mm3    RBC 4.46 3.77 - 5.28 10*6/mm3    Hemoglobin 12.9 12.0 - 15.9 g/dL    Hematocrit 39.6 34.0 - 46.6 %    MCV 88.8 79.0 - 97.0 fL    MCH 28.9 26.6 - 33.0 pg    MCHC 32.6 31.5 - 35.7 g/dL    RDW 13.2 12.3 - 15.4 %    Platelets 239 140 - 450 10*3/mm3           Objective     Physical Exam  Vitals reviewed.   Constitutional:       General: She is not in acute distress.     Appearance: She is well-developed.   HENT:      Head: Normocephalic and atraumatic.      Right Ear: Tympanic membrane normal.      Left Ear: Tympanic membrane normal.      Nose: No congestion.   Eyes:      Pupils: Pupils are equal, round, and reactive to light.   Cardiovascular:      Rate and Rhythm: Normal rate and regular rhythm.      Heart sounds: Normal heart sounds. No murmur heard.  Pulmonary:      Effort: Pulmonary effort is normal.      Breath sounds: Normal breath sounds. No wheezing, rhonchi or rales.   Abdominal:      General: Bowel sounds are normal.      Palpations: Abdomen is soft.      Tenderness: There is no abdominal tenderness.   Musculoskeletal:         General: No tenderness. Normal range of motion.      Cervical back: Normal range of motion and neck supple.      Right lower leg: No edema.      Left lower leg: No edema.   Skin:     General: Skin is warm and dry.      Findings: Bruising (bilateral forearms) present. No erythema or rash.   Neurological:      Mental Status: She is alert and oriented to person, place, and time.      Motor: No weakness.      Gait: Gait normal.   Psychiatric:         Behavior: Behavior normal.         Physical Exam      Assessment & Plan     Assessment/Plan     Assessment & Plan  1. Atrial Fibrillation.  - Reported experiencing atrial fibrillation  twice this week, which resolved with rest and cold water application.  - Heart rate is well-controlled today.  - Currently on Eliquis and appropriate medications to control heart rate.  - Continues to follow up with Dr. Hernandez for atrial fibrillation management.    2. Chronic Obstructive Pulmonary Disease.  - Reports increased difficulty breathing, especially in hot weather and during physical activity.  - Oxygen level is 100%, and lungs sound good today.  - Continues using inhalers and will follow up with pulmonary doctor to assess the need for a chest x-ray and evaluate COPD.    3. Stage III Chronic Kidney Disease.  - Kidney function is stable, with a slight improvement in GFR from 29.9 to 35.6 compared to six months ago.  - Creatinine levels have fluctuated but are currently at 1.53.  - Sodium and potassium levels are normal.  - Continues to follow up with Dr. Pablo Pan or nephrology care.    4. Hyperlipidemia.  - Cholesterol levels have improved, with a decrease in LDL and total cholesterol and an increase in HDL.  - Continues current diet and medication regimen.  - Cholesterol levels will be monitored regularly.    Follow-up: A follow-up visit is scheduled in 6 months.    Diagnoses and all orders for this visit:    1. Paroxysmal atrial fibrillation (Primary)  -     CBC (No Diff); Future  -     Comprehensive Metabolic Panel; Future  -     Lipid Panel With / Chol / HDL Ratio; Future  -     TSH Rfx On Abnormal To Free T4; Future    2. Chronic obstructive pulmonary disease, unspecified COPD type    3. Essential hypertension  -     CBC (No Diff); Future  -     Comprehensive Metabolic Panel; Future  -     Lipid Panel With / Chol / HDL Ratio; Future  -     TSH Rfx On Abnormal To Free T4; Future    4. Fibromyalgia    5. Age-related osteoporosis without current pathological fracture    6. Stage 3b chronic kidney disease  -     CBC (No Diff); Future  -     Comprehensive Metabolic Panel; Future  -     Lipid Panel With / Chol  / HDL Ratio; Future  -     TSH Rfx On Abnormal To Free T4; Future    7. Elevated LDL cholesterol level          Follow Up:  Return in about 6 months (around 1/24/2026) for Medicare Wellness with fasting labs week before.  .    In the meantime, instructed to contact us sooner for any problems or concerns.    Patient was given instructions and counseling regarding condition or for health maintenance advice.  Please see specific information pulled into the AVS if appropriate.      Patient or patient representative verbalized consent for the use of Ambient Listening during the visit with  ASHLEY Ramirez for chart documentation. 7/25/2025  12:33 EDT    Yvette Muñiz, APRN  Family Medicine  [facility] [place]  07/25/25  12:33 EDT

## 2025-07-30 ENCOUNTER — HOSPITAL ENCOUNTER (OUTPATIENT)
Dept: PHYSICAL THERAPY | Facility: HOSPITAL | Age: 75
Setting detail: THERAPIES SERIES
Discharge: HOME | End: 2025-07-30
Payer: COMMERCIAL

## 2025-07-30 DIAGNOSIS — M17.11 ARTHRITIS OF RIGHT KNEE: Primary | ICD-10-CM

## 2025-07-30 PROCEDURE — 97110 THERAPEUTIC EXERCISES: CPT

## 2025-07-30 NOTE — THERAPY TREATMENT NOTE
Outpatient Physical Therapy Ortho Treatment Note  JACQUES Moise     Patient Name: Myra Charles  : 1950  MRN: [id number]  Today's Date: 2025      Visit Date: 2025    Visit Dx:    ICD-10-CM ICD-9-CM   1. Arthritis of right knee  M17.11 716.96       Patient Active Problem List   Diagnosis    Nonexudative age-related macular degeneration, bilateral, early dry stage    Cortical age-related cataract    Hyperthyroidism    Multinodular goiter (nontoxic)    Osteoporosis    B12 deficiency    Malignant neoplasm of upper lobe of left lung    Idiopathic peripheral neuropathy    Gastroesophageal reflux disease without esophagitis    Elevated ferritin    Abnormal blood level of iron    Fibromyalgia    History of deep venous thrombosis    History of partial thyroidectomy    Hoarseness or changing voice    Encounter for general adult medical examination without abnormal findings    Osteopenia    PAC (premature atrial contraction)    Restless legs syndrome    Screen for colon cancer    Medicare annual wellness visit, subsequent    Vitreous floaters    Vitreous detachment    Vitamin D deficiency    Varicose veins of both lower extremities with pain    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    Seborrheic keratosis    Rosacea    Fibrositis    Mitral valve prolapse    Malignant carcinoid tumor of lung    Lung nodule, solitary    Iron deficiency anemia    Hallux valgus    GERD (gastroesophageal reflux disease)    Essential hypertension    Cortical senile cataract    Backache    Neck pain    CKD (chronic kidney disease) stage 3, GFR 30-59 ml/min    Overweight (BMI 25.0-29.9)    Cervical nerve root compression    Chronic left-sided low back pain with left-sided sciatica    DDD (degenerative disc disease), cervical    Dupuytren's contracture of right hand    Elevated alkaline phosphatase level    History of acute renal  failure    History of bilateral cataract extraction    History of fracture of left ankle    History of iron deficiency    History of malignant carcinoid tumor of bronchus and lung    Non-rheumatic mitral regurgitation    Osteoarthritis    Bile reflux esophagitis    Left foot pain    Anemia    Chest pain    Paroxysmal atrial fibrillation        Past Medical History:   Diagnosis Date    Abnormal ECG     Ankle sprain     Arthritis     Arthritis of back     Asthma     Backache 02/17/2016    Low back pain, in sacral region       CKD (chronic kidney disease) stage 3, GFR 30-59 ml/min     COPD (chronic obstructive pulmonary disease)     Coronary artery disease     Cortical senile cataract 02/17/2016    Deep vein thrombosis     Essential (primary) hypertension 04/20/2016    GERD (gastroesophageal reflux disease) 02/17/2016    Hallux valgus     Deformity, w/inflammation       Hernia 6 months old    History of coronary angiogram     CT cor angio 2019, normal cors, Agatston 0    Iron deficiency anemia     Irritable bowel syndrome     Knee sprain     Knee swelling     Lung nodule, solitary 03/08/2012    Solitary nodule of lung - RIGHT upper lobe 15mm, PET positive       Malignant carcinoid tumor of lung 02/17/2016    Atypical carcinoid tumor of the LEFT lung treated in March 2012      Mitral valve prolapse 02/17/2016    Osteoporosis 04/20/2016    Paroxysmal atrial fibrillation 02/18/2025    Primary fibromyalgia syndrome 02/17/2016    Rosacea 02/17/2016    Scoliosis     Seasonal allergies     Seborrheic keratosis     History of, upper and lower back       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 04/20/2016    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 02/17/2016    Paralysis of vocal cords and larynx, unilateral - LEFT side       Varicose vein     Vitamin D deficiency 04/20/2016    Vitreous detachment 12/01/2014    Vitreous floaters 12/01/2014        Past Surgical History:   Procedure  Laterality Date    ADENOIDECTOMY  2/3/1956    ARTERIAL BYPASS SURGERY  09/25/2012    Artery bypass graft (Left femoral to posterior tibial artery bypass. Enodscopic vein harvest on the left. Left lower extremity arteriogram.)    BREAST CYST EXCISION Left     CERVICAL CONIZATION      CONIZATION OF CERVIX 94207 (Excisional laser cone biopsy and vaporization of cervix.)    CHOLECYSTECTOMY  09/07/2005    Cholecystectomy, laparoscopic (With intraoperative cholangiogram.)    COLONOSCOPY      COLONOSCOPY W/ POLYPECTOMY N/A 01/05/2022    Procedure: COLONOSCOPY WITH POLYPECTOMY;  Surgeon: Augustine Menjivar MD;  Location:  [facility];  Service: Gastroenterology;  Laterality: N/A;  Rectal polyp    CYST REMOVAL      left 3rd toe    ELBOW PROCEDURE      Fractured left elbow    ENDOSCOPY N/A 09/26/2024    Procedure: ESOPHAGOGASTRODUODENOSCOPY WITH BIOPSY;  Surgeon: Augustine Menjivar MD;  Location:  [facility];  Service: Gastroenterology;  Laterality: N/A;  gastritis, esophagitis    EXCISION LESION  11/11/1998    REMOVE LESION BACK OR FLANK 52889 (Excision times two.)    FOOT SURGERY  01/20/2009    Foot/toes surgery procedure (Soft tissue mass, left foot. Excision of)    HERNIA REPAIR      Past history of umbilical herni repair.    HYSTERECTOMY  2005    OTHER SURGICAL HISTORY  12/04/2012    Anesth, elbow area surgery (Manipulation of left elbow.)    OTHER SURGICAL HISTORY  03/05/2012    Anesth, elbow area surgery (Excision of plate and screws from olecranon bursa. Retained plate and screws, olecranon bursitis of previous fracture left elbow.)    OTHER SURGICAL HISTORY  10/16/2012    Treat elbow fracture (Open reduction and internal fixation.)    THORACOSCOPY  03/14/2012    Thoracoscopy, surgical (Bronchoscopy,LVATS (wedge resect MARYBETH), LULobectomy Thoracic lymphadenectomy)    THROAT SURGERY  07/25/2012    Throat surgery procedure (Thyroplasty type I (vocal cord medialozation & larynooplasty) Left vocal cord  paralysis. Dysphoria. [facility] by Dr Tk Heart)    THYROID SURGERY  03/22/2016    Thyroid surgery (Right thyroid lobectomy and isthmusectomy.)    TONSILLECTOMY  02/03/1956    Chronic tonsillitis    UPPER GASTROINTESTINAL ENDOSCOPY      VEIN LIGATION  09/28/2000    PHLEB VEINS - EXTREM (20+) 91860 (High ligation of ling saphenous vein, left, plus multiple phlebectomies, left lower extremity.)                        PT Assessment/Plan       Row Name 07/30/25 0900          PT Assessment    Assessment Comments Patient reporting she is doing well. Progressed patient with her strengthening exercises today and she tolerated this well. Plan to D/C patient onto a HEP only at this time.  -AS        PT Plan    PT Plan Comments D/C patient onto HEP only at this time.  -AS               User Key  (r) = Recorded By, (t) = Taken By, (c) = Cosigned By      Initials Name Provider Type    AS Kurt Huffman, PT Physical Therapist                       OP Exercises       Row Name 07/30/25 1023 07/30/25 0900          Subjective    Subjective Comments -- Patient states her knee is doing well and she is being compliant with her HEP.  -AS        Total Minutes    71015 - PT Therapeutic Exercise Minutes 30  -AS --        Exercise 1    Exercise Name 1 -- R HS Stretch  -AS     Reps 1 -- 10  -AS     Time 1 -- 10 sec hold each  -AS        Exercise 2    Exercise Name 2 -- QS  -AS     Reps 2 -- 25  -AS     Time 2 -- 5 sec hold each  -AS        Exercise 3    Exercise Name 3 -- 3-Way Hip  -AS     Reps 3 -- 25 each  -AS        Exercise 5    Exercise Name 5 -- Supine Clams  -AS        Exercise 6    Exercise Name 6 -- Bridge vs Band  -AS        Exercise 7    Exercise Name 7 -- SAQ Ball Squeeze  -AS     Reps 7 -- 25  -AS        Exercise 8    Exercise Name 8 -- LAQ  -AS     Reps 8 -- 25  -AS        Exercise 9    Exercise Name 9 -- TKE  -AS     Reps 9 -- 25  -AS     Time 9 -- Silver  -AS        Exercise 10    Exercise Name 10 -- Heel  Raises  -AS     Reps 10 -- 25  -AS        Exercise 11    Exercise Name 11 -- Mini Squats  -AS     Reps 11 -- 25  -AS        Exercise 12    Exercise Name 12 -- FWD Step Ups  -AS        Exercise 13    Exercise Name 13 -- Lateral Step Overs  -AS               User Key  (r) = Recorded By, (t) = Taken By, (c) = Cosigned By      Initials Name Provider Type    AS Kurt Huffman, PT Physical Therapist                                                    Time Calculation:   Start Time: 0953  Stop Time: 1023  Time Calculation (min): 30 min  Timed Charges  06423 - PT Therapeutic Exercise Minutes: 30  Total Minutes  Timed Charges Total Minutes: 30   Total Minutes: 30  Therapy Charges for Today       Code Description Service Date Service Provider Modifiers Qty    [id number]  PT THER PROC EA 15 MIN 7/30/2025 Kurt Huffman, PT GP 2                      Kurt Huffman, PT  7/30/2025

## 2025-08-19 ENCOUNTER — OFFICE VISIT (OUTPATIENT)
Dept: CARDIOLOGY | Facility: CLINIC | Age: 75
End: 2025-08-19
Payer: COMMERCIAL

## 2025-08-19 VITALS
BODY MASS INDEX: 26.73 KG/M2 | HEIGHT: 69 IN | SYSTOLIC BLOOD PRESSURE: 156 MMHG | WEIGHT: 180.5 LBS | HEART RATE: 70 BPM | DIASTOLIC BLOOD PRESSURE: 84 MMHG

## 2025-08-19 DIAGNOSIS — I10 ESSENTIAL HYPERTENSION: ICD-10-CM

## 2025-08-19 DIAGNOSIS — I49.1 PAC (PREMATURE ATRIAL CONTRACTION): ICD-10-CM

## 2025-08-19 DIAGNOSIS — I48.0 PAROXYSMAL ATRIAL FIBRILLATION: Primary | ICD-10-CM

## 2025-08-19 DIAGNOSIS — I34.1 MITRAL VALVE PROLAPSE: ICD-10-CM

## 2025-08-19 PROCEDURE — 99214 OFFICE O/P EST MOD 30 MIN: CPT | Performed by: INTERNAL MEDICINE

## 2025-08-19 PROCEDURE — 93000 ELECTROCARDIOGRAM COMPLETE: CPT | Performed by: INTERNAL MEDICINE

## 2025-08-19 RX ORDER — METOPROLOL TARTRATE 50 MG
TABLET ORAL
Qty: 60 TABLET | Refills: 1 | Status: SHIPPED | OUTPATIENT
Start: 2025-08-19

## 2025-08-22 ENCOUNTER — OFFICE VISIT (OUTPATIENT)
Dept: ORTHOPEDIC SURGERY | Facility: CLINIC | Age: 75
End: 2025-08-22
Payer: COMMERCIAL

## 2025-08-22 ENCOUNTER — TRANSCRIBE ORDERS (OUTPATIENT)
Dept: ADMINISTRATIVE | Facility: HOSPITAL | Age: 75
End: 2025-08-22
Payer: COMMERCIAL

## 2025-08-22 ENCOUNTER — LAB (OUTPATIENT)
Dept: LAB | Facility: HOSPITAL | Age: 75
End: 2025-08-22
Payer: MEDICARE

## 2025-08-22 VITALS — BODY MASS INDEX: 26.66 KG/M2 | WEIGHT: 180 LBS | HEIGHT: 69 IN

## 2025-08-22 DIAGNOSIS — E04.1 THYROID NODULE: Primary | ICD-10-CM

## 2025-08-22 DIAGNOSIS — M17.11 PRIMARY OSTEOARTHRITIS OF RIGHT KNEE: Primary | ICD-10-CM

## 2025-08-22 RX ORDER — FOLIC ACID 1 MG/1
1000 TABLET ORAL DAILY
Qty: 90 TABLET | Refills: 0 | Status: SHIPPED | OUTPATIENT
Start: 2025-08-22

## (undated) DEVICE — BW-412T DISP COMBO CLEANING BRUSH: Brand: SINGLE USE COMBINATION CLEANING BRUSH

## (undated) DEVICE — FRCP BX RADJAW4 NDL 2.8 240CM LG OG BX40

## (undated) DEVICE — Device

## (undated) DEVICE — ADAPT CLN BIOGUARD AIR/H2O DISP

## (undated) DEVICE — SPNG GZ WOVN 4X4IN 12PLY 10/BX STRL

## (undated) DEVICE — VIAL FORMALIN CAP 10P 40ML

## (undated) DEVICE — JACKT LAB F/R KNIT CUFF/COLR XLG BLU

## (undated) DEVICE — GLV SURG SENSICARE PI MIC PF SZ8 LF STRL

## (undated) DEVICE — GLV SURG SENSICARE PI MIC PF SZ7.5 LF STRL

## (undated) DEVICE — LINER SURG CANSTR SXN S/RIGD 1500CC

## (undated) DEVICE — THE BITE BLOCK MAXI, LATEX FREE STRAP IS USED TO PROTECT THE ENDOSCOPE INSERTION TUBE FROM BEING BITTEN BY THE PATIENT.

## (undated) DEVICE — SAFELINER SUCTION CANISTER 1000CC: Brand: DEROYAL

## (undated) DEVICE — KT ORCA ORCAPOD DISP STRL

## (undated) DEVICE — SOL IRR H2O BTL 1000ML STRL